# Patient Record
Sex: FEMALE | Race: WHITE | NOT HISPANIC OR LATINO | Employment: OTHER | URBAN - METROPOLITAN AREA
[De-identification: names, ages, dates, MRNs, and addresses within clinical notes are randomized per-mention and may not be internally consistent; named-entity substitution may affect disease eponyms.]

---

## 2017-05-02 ENCOUNTER — OFFICE VISIT (OUTPATIENT)
Dept: PODIATRY | Facility: CLINIC | Age: 82
End: 2017-05-02
Payer: MEDICARE

## 2017-05-02 ENCOUNTER — HOSPITAL ENCOUNTER (OUTPATIENT)
Dept: RADIOLOGY | Facility: HOSPITAL | Age: 82
Discharge: HOME | End: 2017-05-02
Attending: PODIATRIST
Payer: MEDICARE

## 2017-05-02 VITALS — HEIGHT: 65 IN | BODY MASS INDEX: 28.12 KG/M2 | WEIGHT: 168.81 LBS

## 2017-05-02 DIAGNOSIS — M79.672 FOOT PAIN, LEFT: ICD-10-CM

## 2017-05-02 DIAGNOSIS — G60.9 IDIOPATHIC PERIPHERAL NEUROPATHY: ICD-10-CM

## 2017-05-02 DIAGNOSIS — M20.10 HALLUX ABDUCTO VALGUS, UNSPECIFIED LATERALITY: ICD-10-CM

## 2017-05-02 DIAGNOSIS — M20.10 HALLUX ABDUCTO VALGUS, UNSPECIFIED LATERALITY: Primary | ICD-10-CM

## 2017-05-02 DIAGNOSIS — L84 CORN OR CALLUS: ICD-10-CM

## 2017-05-02 PROCEDURE — 1160F RVW MEDS BY RX/DR IN RCRD: CPT | Mod: S$GLB,,, | Performed by: PODIATRIST

## 2017-05-02 PROCEDURE — 99999 PR PBB SHADOW E&M-EST. PATIENT-LVL III: CPT | Mod: 25,PBBFAC,, | Performed by: PODIATRIST

## 2017-05-02 PROCEDURE — 73630 X-RAY EXAM OF FOOT: CPT | Mod: TC,PO,LT

## 2017-05-02 PROCEDURE — 73630 X-RAY EXAM OF FOOT: CPT | Mod: 26,LT,, | Performed by: RADIOLOGY

## 2017-05-02 PROCEDURE — 99203 OFFICE O/P NEW LOW 30 MIN: CPT | Mod: S$GLB,,, | Performed by: PODIATRIST

## 2017-05-02 PROCEDURE — 17999 UNLISTD PX SKN MUC MEMB SUBQ: CPT | Mod: CSM,,, | Performed by: PODIATRIST

## 2017-05-02 PROCEDURE — 1159F MED LIST DOCD IN RCRD: CPT | Mod: S$GLB,,, | Performed by: PODIATRIST

## 2017-05-02 PROCEDURE — 1126F AMNT PAIN NOTED NONE PRSNT: CPT | Mod: S$GLB,,, | Performed by: PODIATRIST

## 2017-05-02 RX ORDER — AMMONIUM LACTATE 12 G/100G
1 CREAM TOPICAL 2 TIMES DAILY
Qty: 140 G | Refills: 11 | Status: SHIPPED | OUTPATIENT
Start: 2017-05-02 | End: 2017-05-08

## 2017-05-02 RX ORDER — LIDOCAINE HYDROCHLORIDE 20 MG/ML
JELLY TOPICAL
Qty: 30 ML | Refills: 2 | Status: SHIPPED | OUTPATIENT
Start: 2017-05-02 | End: 2017-05-08

## 2017-05-02 RX ORDER — DICLOFENAC SODIUM 10 MG/G
2 GEL TOPICAL 4 TIMES DAILY
Qty: 100 G | Refills: 2 | Status: SHIPPED | OUTPATIENT
Start: 2017-05-02 | End: 2017-05-02

## 2017-05-02 NOTE — PROGRESS NOTES
Subjective:      Patient ID: Nel Cui is a 81 y.o. female.    Chief Complaint: Bunions (bilateral with painful callouse)    Pain, bunion, callus left 1st mtpj.  Gradual onset, worsening over past several months, aggravated by increased weight bearing, shoe gear, pressure.  No previous medical treatment.  OTC pain med not helping.  Denies trauma and surgery left foot.  Doesn't want bunionectomy if possible to avoid.      Review of Systems   Constitution: Negative for chills, diaphoresis, fever, malaise/fatigue and night sweats.   Cardiovascular: Negative for claudication, cyanosis, leg swelling and syncope.   Skin: Positive for suspicious lesions. Negative for color change, dry skin, nail changes, rash and unusual hair distribution.   Musculoskeletal: Positive for joint pain. Negative for falls, joint swelling, muscle cramps, muscle weakness and stiffness.   Gastrointestinal: Negative for constipation, diarrhea, nausea and vomiting.   Neurological: Negative for brief paralysis, disturbances in coordination, focal weakness, numbness, paresthesias, sensory change and tremors.           Objective:      Physical Exam   Constitutional: She appears well-developed and well-nourished. She is cooperative. No distress.   Cardiovascular:   Pulses:       Popliteal pulses are 2+ on the right side, and 2+ on the left side.        Dorsalis pedis pulses are 2+ on the right side, and 2+ on the left side.        Posterior tibial pulses are 1+ on the right side, and 1+ on the left side.   Capillary refill 3 seconds all toes/distal feet, all toes/both feet warm to touch.      Negative lymphadenopathy bilateral popliteal fossa and tarsal tunnel.      Negavie lower extremity edema bilateral.     Musculoskeletal:        Right ankle: Normal. She exhibits normal range of motion, no swelling, no ecchymosis, no deformity, no laceration and normal pulse. Achilles tendon normal. Achilles tendon exhibits no pain, no defect and normal  Charles's test results.   Visible and palpable bunion with pain at dorsomedial 1st metatarsal head right.  Hallux abducted right partially reducible, tracks laterally without being track bound.  No ecchymosis, erythema, edema, or cardinal signs infection or signs of trauma same foot.    Patient has hammertoes of digits  2-5 with crossover 2nd toe right all         partially reducible without symptom today.    Otherwise, Normal angle, base, station of gait. All ten toes without clubbing, cyanosis, or signs of ischemia.  No pain to palpation bilateral lower extremities.  Range of motion, stability, muscle strength, and muscle tone normal bilateral feet and legs.       Lymphadenopathy: No inguinal adenopathy noted on the right or left side.   Negative lymphadenopathy bilateral popliteal fossa and tarsal tunnel.   Neurological: She is alert. She has normal strength. She displays no atrophy and no tremor. A sensory deficit is present. She exhibits normal muscle tone. She displays no seizure activity. Gait normal.   Reflex Scores:       Patellar reflexes are 2+ on the right side and 2+ on the left side.       Achilles reflexes are 2+ on the right side and 2+ on the left side.  Diminished/loss of protective sensation all toes bilateral to 10 gram monofilament.     Skin: Skin is warm, dry and intact. No abrasion, no bruising, no burn, no ecchymosis, no laceration, no lesion and no rash noted. She is not diaphoretic. No cyanosis or erythema. No pallor. Nails show no clubbing.   Focal hyperkeratotic lesion consisting entirely of hyperkeratotic tissue medial left 1st mtpj without open skin, drainage, pus, fluctuance, malodor, or signs of infection    Otherwise, Skin is normal age and health appropriate color, turgor, texture, and temperature bilateral lower extremities without ulceration, hyperpigmentation, discoloration, masses nodules or cords palpated.  No ecchymosis, erythema, edema, or cardinal signs of infection  bilateral lower extremities.                 Assessment:       Encounter Diagnoses   Name Primary?    Hallux abducto valgus, unspecified laterality Yes    Foot pain, left     Idiopathic peripheral neuropathy     Corn or callus          Plan:       Nel was seen today for bunions.    Diagnoses and all orders for this visit:    Hallux abducto valgus, unspecified laterality  -     X-Ray Foot Complete Left; Future    Foot pain, left  -     X-Ray Foot Complete Left; Future    Idiopathic peripheral neuropathy    Corn or callus    Other orders  -     Cancel: Debridement  -     ammonium lactate 12 % Crea; Apply 1 application topically 2 (two) times daily.  -     diclofenac sodium 1 % Gel; Apply 2 g topically 4 (four) times daily.      I counseled the patient on her conditions, their implications and medical management.        Rx lidocaine gel, lachydrin    Discussed conservative treatment with shoes of adequate dimensions, material, and style to alleviate symptoms and delay or prevent surgical intervention.    Non covered foot care:    With the patient's permission, I debrided hyperkeratotic lesion(s) as above totaling     1 medial left 1st mtpj           to, not  Including dermis with sterile #15 blade.  Patient tolerated the procedure well and related significant relief.              No Follow-up on file.

## 2017-05-02 NOTE — MR AVS SNAPSHOT
[facility]  [address] [facility] [address]  [facility] [address]  Phone: [phone]                  Nel Cui   2017 1:00 PM   Office Visit    Description:  Female : 1935   Provider:  Pascual Mcdaniels DPM   Department:  [place] [facility]           Reason for Visit     Bunions           Diagnoses this Visit        Comments    Hallux abducto valgus, unspecified laterality    -  Primary     Foot pain, left         Idiopathic peripheral neuropathy         Corn or callus                To Do List           Future Appointments        Provider Department Dept Phone    2017 2:15 PM [facility] [id number] [facility] [phone]      Goals (5 Years of Data)     None       These Medications        Disp Refills Start End    ammonium lactate 12 % Crea 140 g 11 2017     Apply 1 application topically 2 (two) times daily. - Topical (Top)    Pharmacy: [facility] [address] [place] [address] AT [address] & [facility]  22 Ph #: [phone]       diclofenac sodium 1 % Gel 100 g 2 2017     Apply 2 g topically 4 (four) times daily. - Topical    Pharmacy: [facility] [address] [place] [address] AT [address] & [facility]  22 Ph #: [phone]         [facility]     [facility] Nurse Care Line - 24/ Assistance  Unless otherwise directed by your provider, please contact [facility], our nurse care line that is available for 24/7 assistance.     Registered nurses in the [facility] provide: appointment scheduling, clinical advisement, health education, and other advisory services.  Call: [phone] (toll free)               Medications           Message regarding Medications     Verify the changes and/or additions to your medication regime listed below are the same as discussed with your clinician today.  If any of these changes or additions are incorrect, please notify your healthcare provider.        START  taking these NEW medications        Refills    ammonium lactate 12 % Crea 11    Sig: Apply 1 application topically 2 (two) times daily.    Class: Normal    Route: Topical (Top)    diclofenac sodium 1 % Gel 2    Sig: Apply 2 g topically 4 (four) times daily.    Class: Normal    Route: Topical      STOP taking these medications     tiotropium bromide 2.5 mcg/actuation Mist Inhale into the lungs.           Verify that the below list of medications is an accurate representation of the medications you are currently taking.  If none reported, the list may be blank. If incorrect, please contact your healthcare provider. Carry this list with you in case of emergency.           Current Medications     ascorbic acid (VITAMIN C) 500 MG tablet Take 500 mg by mouth every morning. VITAMIN C 500    aspirin (ECOTRIN) 325 MG EC tablet Take 1 tablet (325 mg total) by mouth once daily.    bisoprolol (ZEBETA) 5 MG tablet Take 1 tablet (5 mg total) by mouth once daily.    CALCIUM CARBONATE (CALCIUM 600 ORAL) Take 1 tablet by mouth 2 (two) times daily before meals.     cholecalciferol, vitamin D3, (VITAMIN D3) 1,000 unit capsule Take 1,000 Units by mouth every morning. VITAMIN D 1000 UNIT TABS    coenzyme Q10 100 mg capsule Take 100 mg by mouth every morning. COENZYME Q10 100 MG CAPS    losartan-hydrochlorothiazide 100-25 mg (HYZAAR) 100-25 mg per tablet Take 1 tablet by mouth once daily.    MAGNESIUM CHLORIDE ORAL Take 1 tablet by mouth every morning.     MV/GNK BI EX/K.GINSG (CENTRUM PERFORMANCE ORAL) CENTRUM PERFORMANCE TABS    omega-3 fatty acids-vitamin E (FISH OIL) 1,000 mg Cap Take 1 tablet by mouth every morning. FISH OIL 1000 MG CAPS    pravastatin (PRAVACHOL) 40 MG tablet Take 1 tablet (40 mg total) by mouth once daily.    ammonium lactate 12 % Crea Apply 1 application topically 2 (two) times daily.    diclofenac sodium 1 % Gel Apply 2 g topically 4 (four) times daily.           Clinical Reference Information           Your  "Vitals Were     Height Weight Last Period BMI       5' 5" (1.651 m) 76.6 kg (168 lb 12.8 oz) (LMP Unknown) 28.09 kg/m2       Allergies as of 5/2/2017     Amoxicillin-pot Clavulanate    Corticosteroids (Glucocorticoids)    Erythromycin    Latex, Natural Rubber      Immunizations Administered on Date of Encounter - 5/2/2017     None      Orders Placed During Today's Visit     Future Labs/Procedures Expected by Expires    X-Ray Foot Complete Left  5/2/2017 5/3/2018      [facility] Sign-Up     Activating your [facility] account is as easy as 1-2-3!     1) Visit [email or web address], select Sign Up Now, enter this activation code and your date of birth, then select Next.  [email or web address]  Expires: 6/16/2017  1:29 PM      2) Create a username and password to use when you visit [facility] in the future and select a security question in case you lose your password and select Next.    3) Enter your e-mail address and click Sign Up!    Additional Information  If you have questions, please e-mail [email or web address] or call [phone] to talk to our [facility] staff. Remember, [facility] is NOT to be used for urgent needs. For medical emergencies, dial 911.         Language Assistance Services     ATTENTION: Language assistance services are available, free of charge. Please call [phone].      ATENCIÓN: Si habla español, tiene a vance disposición servicios gratuitos de asistencia lingüística. Llame al [phone].     [facility] Ý: N?u b?n nói Ti?ng Vi?t, có các d?ch v? h? tr? ngôn ng? mi?n phí dành cho b?n. G?i s? [phone].         [place] [facility] complies with applicable Federal civil rights laws and does not discriminate on the basis of race, color, national origin, age, disability, or sex.        "

## 2018-02-16 ENCOUNTER — OFFICE VISIT (OUTPATIENT)
Dept: PODIATRY | Facility: CLINIC | Age: 83
End: 2018-02-16
Payer: MEDICARE

## 2018-02-16 VITALS — BODY MASS INDEX: 28.29 KG/M2 | WEIGHT: 170 LBS

## 2018-02-16 DIAGNOSIS — M21.6X2 EQUINUS DEFORMITY OF BOTH FEET: ICD-10-CM

## 2018-02-16 DIAGNOSIS — G60.9 IDIOPATHIC PERIPHERAL NEUROPATHY: ICD-10-CM

## 2018-02-16 DIAGNOSIS — M21.6X1 EQUINUS DEFORMITY OF BOTH FEET: ICD-10-CM

## 2018-02-16 DIAGNOSIS — M77.42 METATARSALGIA OF LEFT FOOT: Primary | ICD-10-CM

## 2018-02-16 PROCEDURE — 99213 OFFICE O/P EST LOW 20 MIN: CPT | Mod: S$GLB,,, | Performed by: PODIATRIST

## 2018-02-16 PROCEDURE — 1125F AMNT PAIN NOTED PAIN PRSNT: CPT | Mod: S$GLB,,, | Performed by: PODIATRIST

## 2018-02-16 PROCEDURE — 99999 PR PBB SHADOW E&M-EST. PATIENT-LVL III: CPT | Mod: PBBFAC,,, | Performed by: PODIATRIST

## 2018-02-16 PROCEDURE — 1159F MED LIST DOCD IN RCRD: CPT | Mod: S$GLB,,, | Performed by: PODIATRIST

## 2018-02-16 PROCEDURE — 3008F BODY MASS INDEX DOCD: CPT | Mod: S$GLB,,, | Performed by: PODIATRIST

## 2018-02-16 NOTE — PROGRESS NOTES
"Subjective:      Patient ID: Nel Cui is a 82 y.o. female.    Chief Complaint: Foot Pain (ball of the foot left)  Patient presents to clinic with the chief complaint of Lt. Forefoot pain.  States pain is localized to the "ball of the foot" and exacerbated with pressure from weight bearing.  Describes pain as sharp and rates as a 9/10.   Symptoms are alleviated with rest.  Has attempted to self treat by wearing silicone insoles, however, this has done little to improve symptoms.  Denies injury to the affected foot.   Also, relates a past history of peripheral neuropathy.  Relates having some numbness and tingling, but is also experiencing nocturnal cramping.  Was initially told that symptoms were secondary to lack of fluids and an electrolyte imbalance, however, states that even with hydration symptoms are apparent.  Inquires as to the potential etiology of such symptoms.  Denies any additional pedal complaints.    Past Medical History:   Diagnosis Date    Cataracts, bilateral     Essential (primary) hypertension 10/5/2010    Hyperlipidemia     Osteopenia        Past Surgical History:   Procedure Laterality Date    CARPAL TUNNEL RELEASE Bilateral     wrist    CATARACT EXTRACTION, BILATERAL  2017    DILATION AND CURETTAGE OF UTERUS      MITRAL VALVE REPLACEMENT  07/20/2016    OOPHORECTOMY Left     TONSILLECTOMY      vein removal         Family History   Problem Relation Age of Onset    No Known Problems Mother     No Known Problems Father     No Known Problems Maternal Grandmother     No Known Problems Maternal Grandfather     No Known Problems Paternal Grandmother     No Known Problems Paternal Grandfather     No Known Problems Sister     No Known Problems Brother        Social History     Social History    Marital status: Single     Spouse name: N/A    Number of children: N/A    Years of education: N/A     Social History Main Topics    Smoking status: Former Smoker     Types: " "Cigarettes     Quit date: 6/28/1988    Smokeless tobacco: Never Used    Alcohol use No    Drug use: No    Sexual activity: Not Asked     Other Topics Concern    None     Social History Narrative    None       Current Outpatient Prescriptions   Medication Sig Dispense Refill    ascorbic acid (VITAMIN C) 500 MG tablet Take 500 mg by mouth every morning. VITAMIN C 500      CALCIUM CARBONATE (CALCIUM 600 ORAL) Take 630 mg by mouth 2 (two) times daily before meals.       cholecalciferol, vitamin D3, (VITAMIN D3) 1,000 unit capsule Take 1,000 Units by mouth every morning. VITAMIN D 1000 UNIT TABS      losartan-hydrochlorothiazide 100-25 mg (HYZAAR) 100-25 mg per tablet TAKE 1 TABLET BY MOUTH EVERY DAY 90 tablet 3    MAGNESIUM CHLORIDE ORAL Take 1 tablet by mouth every morning.       omega-3 fatty acids-vitamin E (FISH OIL) 1,000 mg Cap Take 1 tablet by mouth every morning. FISH OIL 1000 MG CAPS      pravastatin (PRAVACHOL) 40 MG tablet TAKE 1 TABLET(40 MG) BY MOUTH EVERY DAY 90 tablet 1    aspirin (ECOTRIN) 325 MG EC tablet Take 1 tablet (325 mg total) by mouth once daily.  0    bisoprolol (ZEBETA) 5 MG tablet Take 1 tablet (5 mg total) by mouth once daily. 90 tablet 3    coenzyme Q10 100 mg capsule Take 100 mg by mouth every morning. COENZYME Q10 100 MG CAPS      doxycycline monohydrate 100 mg Tab Take 1 tablet (100 mg total) by mouth 2 (two) times daily. 14 tablet 0    fluticasone-vilanterol (BREO ELLIPTA) 100-25 mcg/dose diskus inhaler Inhale 1 puff into the lungs once daily. Controller 1 each 0     No current facility-administered medications for this visit.        Review of patient's allergies indicates:   Allergen Reactions    Amoxicillin-pot clavulanate Other (See Comments)     "Spaced out feeling- can't take it longer than 4 or 5 days"    Corticosteroids (glucocorticoids)      Other reaction(s): tachycardia    Erythromycin      "Spaced out"    Latex, natural rubber      Turns skin red around " area where latex touches         Review of Systems   Constitution: Negative for chills and fever.   Cardiovascular: Negative for claudication and leg swelling.   Skin: Negative for color change and nail changes.   Musculoskeletal: Positive for joint pain. Negative for joint swelling.   Neurological: Positive for numbness and paresthesias.   Psychiatric/Behavioral: Negative for altered mental status.           Objective:      Physical Exam   Constitutional: She is oriented to person, place, and time. She appears well-developed and well-nourished. No distress.   Cardiovascular:   Pulses:       Dorsalis pedis pulses are 2+ on the right side, and 2+ on the left side.        Posterior tibial pulses are 2+ on the right side, and 2+ on the left side.   CFT <3 seconds bilateral.  Pedal hair growth present bilateral.  Varicosities noted bilateral.  No bilateral lower extremity edema.  Toes are warm to touch bilateral.     Musculoskeletal: She exhibits tenderness. She exhibits no edema.   Muscle strength 5/5 in all muscle groups bilateral.  No tenderness nor crepitation with ROM of foot/ankle joints bilateral.  Pain with palpation to Lt. sub 2nd - 5th metatarsal heads.  (-) Lachman sign on Lt.   Bilateral gastrocnemius equinus.  Bilateral pes planus foot type.  Forefoot fat pad atrophy bilateral.   Neurological: She is alert and oriented to person, place, and time. She has normal strength. A sensory deficit is present.   Protective sensation per [place]-Zainab monofilament decreased bilateral.    Vibratory sensation absent bilateral.    Light touch intact bilateral.   Skin: Skin is warm, dry and intact. Capillary refill takes less than 2 seconds. No abrasion, no bruising, no burn, no ecchymosis, no laceration, no lesion, no petechiae and no rash noted. She is not diaphoretic. No erythema. No pallor.   Pedal skin has normal turgor, temperature, and texture bilateral.  Toenails x 10 appear normotrophic. Examination of the  skin reveals no evidence of significant maceration, rashes, open lesions, suspicious appearing nevi or other concerning lesions.              Assessment:       Encounter Diagnoses   Name Primary?    Metatarsalgia of left foot Yes    Equinus deformity of both feet     Idiopathic peripheral neuropathy          Plan:       Nel was seen today for foot pain.    Diagnoses and all orders for this visit:    Metatarsalgia of left foot    Equinus deformity of both feet    Idiopathic peripheral neuropathy      I counseled the patient on her conditions, their implications and medical management.    - Given both verbal and written information regarding alpha lipoic acid and B-complex vitamins to address nocturnal cramping that is likely associated with motor neuropathy.    - Discussed performing stretching exercises to address bilateral equinus.  This should help to alleviate pressure to the forefoot.    - Recommend wearing supportive shoes and avoidance of barefoot walking, flip flops, and Crocs, as this will exacerbate current symptoms.      - Recommend wearing a pair of OTC 3/4 length Spenco insoles to offload the forefoot.    - Recommend icing the affected forefoot a minimum of 20 minutes daily.    - Given information regarding Procedure Brief, as she notes a tendency to develop calluses of bilateral plantar foot.    - RTC prn.    Maulik Martinez DPM

## 2019-05-11 ENCOUNTER — OFFICE VISIT (OUTPATIENT)
Dept: URGENT CARE | Facility: CLINIC | Age: 84
End: 2019-05-11
Payer: MEDICARE

## 2019-05-11 VITALS
RESPIRATION RATE: 18 BRPM | SYSTOLIC BLOOD PRESSURE: 155 MMHG | HEART RATE: 81 BPM | TEMPERATURE: 98 F | OXYGEN SATURATION: 95 % | BODY MASS INDEX: 29.32 KG/M2 | HEIGHT: 65 IN | DIASTOLIC BLOOD PRESSURE: 75 MMHG | WEIGHT: 176 LBS
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
## 2019-05-11 DIAGNOSIS — M54.50 ACUTE BILATERAL LOW BACK PAIN WITHOUT SCIATICA: ICD-10-CM

## 2019-05-11 PROCEDURE — 72100 X-RAY EXAM L-S SPINE 2/3 VWS: CPT | Mod: S$GLB,,, | Performed by: RADIOLOGY

## 2019-05-11 PROCEDURE — 1101F PR PT FALLS ASSESS DOC 0-1 FALLS W/OUT INJ PAST YR: ICD-10-PCS | Mod: CPTII,S$GLB,, | Performed by: INTERNAL MEDICINE

## 2019-05-11 PROCEDURE — 3077F PR MOST RECENT SYSTOLIC BLOOD PRESSURE >= 140 MM HG: ICD-10-PCS | Mod: CPTII,S$GLB,, | Performed by: INTERNAL MEDICINE

## 2019-05-11 PROCEDURE — 99214 OFFICE O/P EST MOD 30 MIN: CPT | Mod: S$GLB,,, | Performed by: INTERNAL MEDICINE

## 2019-05-11 PROCEDURE — 3077F SYST BP >= 140 MM HG: CPT | Mod: CPTII,S$GLB,, | Performed by: INTERNAL MEDICINE

## 2019-05-11 PROCEDURE — 99214 PR OFFICE/OUTPT VISIT, EST, LEVL IV, 30-39 MIN: ICD-10-PCS | Mod: S$GLB,,, | Performed by: INTERNAL MEDICINE

## 2019-05-11 PROCEDURE — 3078F PR MOST RECENT DIASTOLIC BLOOD PRESSURE < 80 MM HG: ICD-10-PCS | Mod: CPTII,S$GLB,, | Performed by: INTERNAL MEDICINE

## 2019-05-11 PROCEDURE — 1101F PT FALLS ASSESS-DOCD LE1/YR: CPT | Mod: CPTII,S$GLB,, | Performed by: INTERNAL MEDICINE

## 2019-05-11 PROCEDURE — 72100 XR LUMBAR SPINE 2 OR 3 VIEWS: ICD-10-PCS | Mod: S$GLB,,, | Performed by: RADIOLOGY

## 2019-05-11 PROCEDURE — 3078F DIAST BP <80 MM HG: CPT | Mod: CPTII,S$GLB,, | Performed by: INTERNAL MEDICINE

## 2019-05-11 NOTE — PATIENT INSTRUCTIONS
If your condition worsens we recommend that you receive another evaluation at the emergency room immediately or contact your primary medical clinics after hours call service to discuss your concerns. You must understand that you've received an Urgent Care treatment only and that you may be released before all of your medical problems are known or treated. You, the patient, will arrange for follow up care as instructed.  Drink plenty of Fluids  Wash hands frequently using mild antibacterial soap lathering for at least 15 seconds then rinse  Get plenty of Rest  Follow up in 1-2 weeks with Primary Care physician if not significantly better.   If you are not allergic please take Tylenol every 4-6 hours as needed and/or Ibuprofen every 6-8 hours as needed, over the counter for pain or fever.  Xray results will be available in 24 hours , we will call you with results if abnormal. Please call us if you want to find results and if you don't hear from us.

## 2019-05-11 NOTE — PROGRESS NOTES
"Subjective:       Patient ID: Nel Cui is a 84 y.o. female.    Vitals:  height is 5' 5" (1.651 m) and weight is 79.8 kg (176 lb). Her oral temperature is 97.5 °F (36.4 °C). Her blood pressure is 155/75 (abnormal) and her pulse is 81. Her respiration is 18 and oxygen saturation is 95%.     Chief Complaint: Fall    Pt presents today due a fall a [employer] on Tuesday, May 7, 2019.Pt is experiencing pain in her right elbow and right ankle and right thigh. Pt did not fall on anything in the store she fell straight back on her buttocks. Pt has taken OTC meds with no relief, did not seek emergency treatment. No head injury, no passing out    Fall   The accident occurred 3 to 5 days ago. The fall occurred while standing. She fell from a height of 3 to 5 ft. The point of impact was the buttocks. The pain is present in the right elbow, right upper leg and right wrist. The pain is at a severity of 5/10. The pain is moderate. The symptoms are aggravated by extension, movement, pressure on injury and use of injured limb. Pertinent negatives include no abdominal pain, hematuria or loss of consciousness. She has tried ice for the symptoms. The treatment provided no relief.       Constitution: Negative for fatigue.   HENT: Negative for facial swelling and facial trauma.    Neck: Negative for neck stiffness.   Cardiovascular: Negative for chest trauma.   Eyes: Negative for eye trauma, double vision and blurred vision.   Gastrointestinal: Negative for abdominal trauma, abdominal pain and rectal bleeding.   Genitourinary: Negative for hematuria, missed menses, genital trauma and pelvic pain.   Musculoskeletal: Positive for pain and trauma. Negative for joint swelling and abnormal ROM of joint.   Skin: Positive for color change, erythema and bruising. Negative for wound, abrasion and laceration.   Neurological: Negative for dizziness, history of vertigo, light-headedness, coordination disturbances, altered mental status " and loss of consciousness.   Hematologic/Lymphatic: Negative for history of bleeding disorder.   Psychiatric/Behavioral: Negative for altered mental status.       Objective:      Physical Exam   Constitutional: She is oriented to person, place, and time. She appears well-developed and well-nourished. She is cooperative.  Non-toxic appearance. She does not appear ill. No distress.   HENT:   Head: Normocephalic and atraumatic.   Right Ear: Hearing, tympanic membrane, external ear and ear canal normal.   Left Ear: Hearing, tympanic membrane, external ear and ear canal normal.   Nose: Nose normal. No mucosal edema, rhinorrhea or nasal deformity. No epistaxis. Right sinus exhibits no maxillary sinus tenderness and no frontal sinus tenderness. Left sinus exhibits no maxillary sinus tenderness and no frontal sinus tenderness.   Mouth/Throat: Uvula is midline, oropharynx is clear and moist and mucous membranes are normal. No trismus in the jaw. Normal dentition. No uvula swelling. No posterior oropharyngeal erythema.   Eyes: Conjunctivae and lids are normal. Right eye exhibits no discharge. Left eye exhibits no discharge. No scleral icterus.   Sclera clear bilat   Neck: Trachea normal, normal range of motion, full passive range of motion without pain and phonation normal. Neck supple.   Cardiovascular: Normal rate, regular rhythm, normal heart sounds, intact distal pulses and normal pulses.   Pulmonary/Chest: Effort normal and breath sounds normal. No respiratory distress.   Abdominal: Soft. Normal appearance and bowel sounds are normal. She exhibits no distension, no pulsatile midline mass and no mass. There is no tenderness.   Musculoskeletal: Normal range of motion. She exhibits no edema or deformity.        Lumbar back: She exhibits tenderness. She exhibits no swelling.        Arms:       Legs:  Neurological: She is alert and oriented to person, place, and time. She exhibits normal muscle tone. Coordination normal.    Skin: Skin is warm, dry and intact. She is not diaphoretic. There is erythema. No pallor.   Psychiatric: She has a normal mood and affect. Her speech is normal and behavior is normal. Judgment and thought content normal. Cognition and memory are normal.   Nursing note and vitals reviewed.      Good rom at affected sites including right elbow and right thigh. Dec rom at l-s spine. Denies numbness in legs , no bladder or fecal incontinence.   Assessment:       1. Acute bilateral low back pain without sciatica    2. Fall, initial encounter        Plan:         Acute bilateral low back pain without sciatica  -     XR LUMBAR SPINE 2 OR 3 VIEWS; Future; Expected date: 05/11/2019    Fall, initial encounter    If your condition worsens we recommend that you receive another evaluation at the emergency room immediately or contact your primary medical clinics after hours call service to discuss your concerns. You must understand that you've received an Urgent Care treatment only and that you may be released before all of your medical problems are known or treated. You, the patient, will arrange for follow up care as instructed.  Drink plenty of Fluids  Wash hands frequently using mild antibacterial soap lathering for at least 15 seconds then rinse  Get plenty of Rest  Follow up in 1-2 weeks with Primary Care physician if not significantly better.   If you are not allergic please take Tylenol every 4-6 hours as needed and/or Ibuprofen every 6-8 hours as needed, over the counter for pain or fever.

## 2019-05-12 ENCOUNTER — TELEPHONE (OUTPATIENT)
Dept: URGENT CARE | Facility: CLINIC | Age: 84
End: 2019-05-12

## 2019-05-12 NOTE — TELEPHONE ENCOUNTER
Called and spoke with pt regarding xray results from yesterday's visit. Pt request a copy of the report and was emailed to daughter candida 5/12

## 2019-11-29 ENCOUNTER — OFFICE VISIT (OUTPATIENT)
Dept: URGENT CARE | Facility: CLINIC | Age: 84
End: 2019-11-29
Payer: MEDICARE

## 2019-11-29 ENCOUNTER — TELEPHONE (OUTPATIENT)
Dept: URGENT CARE | Facility: CLINIC | Age: 84
End: 2019-11-29

## 2019-11-29 VITALS
RESPIRATION RATE: 16 BRPM | WEIGHT: 173 LBS | DIASTOLIC BLOOD PRESSURE: 80 MMHG | HEIGHT: 65 IN | HEART RATE: 83 BPM | SYSTOLIC BLOOD PRESSURE: 153 MMHG | OXYGEN SATURATION: 96 % | BODY MASS INDEX: 28.82 KG/M2 | TEMPERATURE: 98 F

## 2019-11-29 DIAGNOSIS — M79.672 LEFT FOOT PAIN: Primary | ICD-10-CM

## 2019-11-29 PROCEDURE — 99214 PR OFFICE/OUTPT VISIT, EST, LEVL IV, 30-39 MIN: ICD-10-PCS | Mod: S$GLB,,, | Performed by: PHYSICIAN ASSISTANT

## 2019-11-29 PROCEDURE — 73630 XR FOOT COMPLETE 3 VIEW LEFT: ICD-10-PCS | Mod: LT,S$GLB,, | Performed by: RADIOLOGY

## 2019-11-29 PROCEDURE — 73630 X-RAY EXAM OF FOOT: CPT | Mod: LT,S$GLB,, | Performed by: RADIOLOGY

## 2019-11-29 PROCEDURE — 99214 OFFICE O/P EST MOD 30 MIN: CPT | Mod: S$GLB,,, | Performed by: PHYSICIAN ASSISTANT

## 2019-11-29 NOTE — TELEPHONE ENCOUNTER
spoke with pt regarding final xray reading report from radiology, clarified with pt that there is no fracture or dislocation. told to follow up with ortho if pain persist.

## 2019-11-29 NOTE — PROGRESS NOTES
"Subjective:       Patient ID: Nel Cui is a 84 y.o. female.    Vitals:  height is 5' 5" (1.651 m) and weight is 78.5 kg (173 lb). Her oral temperature is 98.3 °F (36.8 °C). Her blood pressure is 153/80 (abnormal) and her pulse is 83. Her respiration is 16 and oxygen saturation is 96%.     Chief Complaint: Fall    Patient presents to urgent care for L foot pain. Patient reports that she accidentally tripped over her granddaughter's foot yesterday and hurt her L foot. Patient reports no LOC or head injury at the time of the injury. Patient denies prior injury or trauma to L foot. Patient reports pain is worse with weightbearing and better with rest. Patient reports that she has tried icing and elevating, but requesting XRAY to make sure it is not broken.     Fall   The accident occurred 12 to 24 hours ago. The fall occurred while walking. She fell from a height of 1 to 2 ft. She landed on hard floor. There was no blood loss. The point of impact was the left elbow and left foot. The pain is present in the left elbow and left foot. The pain is at a severity of 3/10. The pain is mild. Pertinent negatives include no abdominal pain, bowel incontinence, fever, headaches, hearing loss, loss of consciousness, nausea, numbness, tingling, visual change or vomiting. She has tried ice and elevation for the symptoms. The treatment provided mild relief.       Constitution: Negative for chills, sweating, fatigue and fever.   HENT: Negative for ear pain, congestion, sore throat, trouble swallowing and voice change.    Neck: Negative for neck pain, neck stiffness, painful lymph nodes and neck swelling.   Cardiovascular: Negative for chest pain, leg swelling, palpitations, sob on exertion and passing out.   Eyes: Negative for eye pain, eye redness, photophobia, double vision, blurred vision and eyelid swelling.   Respiratory: Negative for chest tightness, cough, sputum production, bloody sputum, shortness of breath, stridor " and wheezing.    Gastrointestinal: Negative for abdominal pain, abdominal bloating, nausea, vomiting, constipation, diarrhea, heartburn and bowel incontinence.   Genitourinary: Negative for dysuria, frequency and urgency.   Musculoskeletal: Positive for pain, trauma, joint pain, joint swelling and muscle ache. Negative for abnormal ROM of joint, back pain and muscle cramps.   Skin: Positive for bruising. Negative for rash, wound, abrasion, laceration and erythema.   Allergic/Immunologic: Negative for seasonal allergies.   Neurological: Negative for dizziness, light-headedness, passing out, loss of balance, headaches, disorientation, altered mental status, loss of consciousness, numbness, tingling and seizures.   Hematologic/Lymphatic: Negative for swollen lymph nodes.   Psychiatric/Behavioral: Negative for altered mental status, disorientation, nervous/anxious, sleep disturbance and depression. The patient is not nervous/anxious.        Objective:      Physical Exam   Constitutional: She is oriented to person, place, and time. She appears well-developed and well-nourished. She is cooperative.  Non-toxic appearance. She does not have a sickly appearance. She does not appear ill. No distress.   HENT:   Head: Normocephalic and atraumatic.   Right Ear: Hearing, tympanic membrane, external ear and ear canal normal.   Left Ear: Hearing, tympanic membrane, external ear and ear canal normal.   Nose: Nose normal. No mucosal edema, rhinorrhea or nasal deformity. No epistaxis. Right sinus exhibits no maxillary sinus tenderness and no frontal sinus tenderness. Left sinus exhibits no maxillary sinus tenderness and no frontal sinus tenderness.   Mouth/Throat: Uvula is midline, oropharynx is clear and moist and mucous membranes are normal. No trismus in the jaw. Normal dentition. No uvula swelling. No oropharyngeal exudate, posterior oropharyngeal edema or posterior oropharyngeal erythema.   Eyes: Pupils are equal, round, and  reactive to light. Conjunctivae, EOM and lids are normal. No scleral icterus.   Neck: Trachea normal, normal range of motion, full passive range of motion without pain and phonation normal. Neck supple. No neck rigidity. No edema and no erythema present.   Cardiovascular: Normal rate, regular rhythm, normal heart sounds, intact distal pulses and normal pulses.   Pulmonary/Chest: Effort normal and breath sounds normal. No respiratory distress. She has no decreased breath sounds. She has no rhonchi.   Abdominal: Normal appearance.   Musculoskeletal: She exhibits no edema or deformity.        Right ankle: Normal.        Left ankle: Normal.        Right foot: Normal.        Left foot: There is tenderness and swelling. There is normal range of motion, no bony tenderness, normal capillary refill, no crepitus, no deformity and no laceration.   FROM to upper and lower extremities bilateral. TTP over dorsal aspect of L foot with mild amount of swelling and ecchymosis. 2+ DP pulses bilateral. No numbness or tingling. Able to ambulate without difficulty.   Lymphadenopathy:     She has no cervical adenopathy.   Neurological: She is alert and oriented to person, place, and time. No cranial nerve deficit or sensory deficit. She exhibits normal muscle tone. Coordination and gait normal.   Skin: Skin is warm, dry, intact, not diaphoretic, not pale and no rash. Capillary refill takes less than 2 seconds. not left footLesions:  bruising and ecchymosisabrasion, burn, erythema, lesion and petechiae  Psychiatric: She has a normal mood and affect. Her speech is normal and behavior is normal. Judgment and thought content normal. Cognition and memory are normal.   Nursing note and vitals reviewed.    X-ray Foot Complete Left  Result Date: 11/29/2019  EXAMINATION: XR FOOT COMPLETE 3 VIEW LEFT CLINICAL HISTORY: .  Pain in left foot TECHNIQUE: AP, lateral and oblique views of the left foot were performed. COMPARISON: 05/02/2017 FINDINGS:  Three views left foot. There is osteopenia.  There is hallux valgus.  Degenerative changes are noted of the calcaneus and 1st metatarsophalangeal joint.  There is edema about the lower leg.  There is vascular calcification.   1. No acute displaced fracture or dislocation of the foot.  There is edema about the lower leg. Electronically signed by: Xander Quiroz MD Date:    11/29/2019 Time:    15:50        Assessment:       1. Left foot pain        Plan:         Left foot pain  -     X-Ray Foot Complete Left; Future; Expected date: 11/29/2019      Patient Instructions     You must understand that you've received an Urgent Care treatment only and that you may be released before all your medical problems are known or treated. You, the patient, will arrange for follow up care as instructed.  Follow up with your PCP or specialty clinic as directed if not improved or as needed. You can call [phone] to schedule an appointment with the appropriate provider.  If your condition worsens we recommend that you receive another evaluation at the Emergency Department for any concerns or worsening of condition.  Patient aware and verbalized understanding.    Discussed XRAY results with patient - pending radiology report.  Patient aware and verbalized understanding.    Rest, Ice, Compression and Elevation as discussed.  ACE Wrap and walking boot at home during the day for better support/comfort.  OTC Tylenol 6 hours as needed for pain.  You may do gentle stretching as tolerable.  Wear supportive shoes such as tennis shoes for better support/comfort.  Follow-up with PCP for further evaluation as needed.  Follow-up with Ortho for further evaluation if still experiencing pain as discussed.  Strict ER precautions given to patient.  Patient aware and verbalized understanding.    R.I.C.E.    R.I.C.E. stands for Rest, Ice, Compression, and Elevation. Doing these things helps limit pain and swelling after an injury. R.I.C.E. also helps  injuries heal faster. Use R.I.C.E. for sprains, strains, and severe bruises or bumps. Follow the tips on this handout and begin R.I.C.E. as soon as possible after an injury.  ? Rest  Pain is your bodys way of telling you to rest an injured area. Whether you have hurt an elbow, hand, foot, or knee, limiting its use will prevent further injury and help you heal.  ? Ice  Applying ice right after an injury helps prevent swelling and reduce pain. Dont place ice directly on your skin.  · Wrap a cold pack or bag of ice in a thin cloth. Place it over the injured area.  · Ice for 10 minutes every 3 hours. Dont ice for more than 20 minutes at a time.  ? Compression  Putting pressure (compression) on an injury helps prevent swelling and provides support.  · Wrap the injured area firmly with an elastic bandage. If your hand or foot tingles, becomes discolored, or feels cold to the touch, the bandage may be too tight. Rewrap it more loosely.  · If your bandage becomes too loose, rewrap it.  · Do not wear an elastic bandage overnight.  ? Elevation  Keeping an injury elevated helps reduce swelling, pain, and throbbing. Elevation is most effective when the injury is kept elevated higher than the heart.     Call your healthcare provider if you notice any of the following:  · Fingers or toes feel numb, are cold to the touch, or change color  · Skin looks shiny or tight  · Pain, swelling, or bruising worsens and is not improved with elevation   Date Last Reviewed: 9/3/2015  © 1798-4441 [employer]. [address] [place] [address], [place] [address]. All rights reserved. This information is not intended as a substitute for professional medical care. Always follow your healthcare professional's instructions.

## 2019-11-29 NOTE — PATIENT INSTRUCTIONS
You must understand that you've received an Urgent Care treatment only and that you may be released before all your medical problems are known or treated. You, the patient, will arrange for follow up care as instructed.  Follow up with your PCP or specialty clinic as directed if not improved or as needed. You can call [phone] to schedule an appointment with the appropriate provider.  If your condition worsens we recommend that you receive another evaluation at the Emergency Department for any concerns or worsening of condition.  Patient aware and verbalized understanding.    Discussed XRAY results with patient - pending radiology report.  Patient aware and verbalized understanding.    Rest, Ice, Compression and Elevation as discussed.  ACE Wrap and walking boot at home during the day for better support/comfort.  OTC Tylenol 6 hours as needed for pain.  You may do gentle stretching as tolerable.  Wear supportive shoes such as tennis shoes for better support/comfort.  Follow-up with PCP for further evaluation as needed.  Follow-up with Ortho for further evaluation if still experiencing pain as discussed.  Strict ER precautions given to patient.  Patient aware and verbalized understanding.    R.I.C.E.    R.I.C.E. stands for Rest, Ice, Compression, and Elevation. Doing these things helps limit pain and swelling after an injury. R.I.C.E. also helps injuries heal faster. Use R.I.C.E. for sprains, strains, and severe bruises or bumps. Follow the tips on this handout and begin R.I.C.E. as soon as possible after an injury.  ? Rest  Pain is your bodys way of telling you to rest an injured area. Whether you have hurt an elbow, hand, foot, or knee, limiting its use will prevent further injury and help you heal.  ? Ice  Applying ice right after an injury helps prevent swelling and reduce pain. Dont place ice directly on your skin.  · Wrap a cold pack or bag of ice in a thin cloth. Place it over the injured area.  · Ice  for 10 minutes every 3 hours. Dont ice for more than 20 minutes at a time.  ? Compression  Putting pressure (compression) on an injury helps prevent swelling and provides support.  · Wrap the injured area firmly with an elastic bandage. If your hand or foot tingles, becomes discolored, or feels cold to the touch, the bandage may be too tight. Rewrap it more loosely.  · If your bandage becomes too loose, rewrap it.  · Do not wear an elastic bandage overnight.  ? Elevation  Keeping an injury elevated helps reduce swelling, pain, and throbbing. Elevation is most effective when the injury is kept elevated higher than the heart.     Call your healthcare provider if you notice any of the following:  · Fingers or toes feel numb, are cold to the touch, or change color  · Skin looks shiny or tight  · Pain, swelling, or bruising worsens and is not improved with elevation   Date Last Reviewed: 9/3/2015  © 0417-6682 [employer]. [address] [place] [address], [place] [address]. All rights reserved. This information is not intended as a substitute for professional medical care. Always follow your healthcare professional's instructions.

## 2019-12-11 ENCOUNTER — OFFICE VISIT (OUTPATIENT)
Dept: URGENT CARE | Facility: CLINIC | Age: 84
End: 2019-12-11
Payer: MEDICARE

## 2019-12-11 VITALS
TEMPERATURE: 98 F | OXYGEN SATURATION: 98 % | BODY MASS INDEX: 28.82 KG/M2 | DIASTOLIC BLOOD PRESSURE: 77 MMHG | RESPIRATION RATE: 16 BRPM | HEIGHT: 65 IN | WEIGHT: 173 LBS | HEART RATE: 72 BPM | SYSTOLIC BLOOD PRESSURE: 153 MMHG

## 2019-12-11 DIAGNOSIS — M79.675 PAIN OF TOE OF LEFT FOOT: Primary | ICD-10-CM

## 2019-12-11 PROCEDURE — 99213 OFFICE O/P EST LOW 20 MIN: CPT | Mod: S$GLB,,, | Performed by: PHYSICIAN ASSISTANT

## 2019-12-11 PROCEDURE — 99213 PR OFFICE/OUTPT VISIT, EST, LEVL III, 20-29 MIN: ICD-10-PCS | Mod: S$GLB,,, | Performed by: PHYSICIAN ASSISTANT

## 2019-12-11 NOTE — PATIENT INSTRUCTIONS
Check with cardiologist about Ibuprofen dosage  May wrap toe if feels it helps.   Ace bandage not necessary  Try tennis shoe    You must understand that you've received an Urgent Care treatment only and that you may be released before all your medical problems are known or treated. You, the patient, will arrange for follow up care as instructed.  Follow up with your PCP or specialty clinic as directed in the next 1-2 weeks if not improved or as needed.  You can call [phone] to schedule an appointment with the appropriate provider.  If your condition worsens we recommend that you receive another evaluation at the emergency room immediately or contact your primary medical clinics after hours call service to discuss your concerns.  Please return here or go to the Emergency Department for any concerns or worsening of condition.

## 2019-12-11 NOTE — PROGRESS NOTES
"Subjective:       Patient ID: Nel Cui is a 84 y.o. female.    Vitals:  height is 5' 5" (1.651 m) and weight is 78.5 kg (173 lb). Her oral temperature is 97.8 °F (36.6 °C). Her blood pressure is 153/77 (abnormal) and her pulse is 72. Her respiration is 16 and oxygen saturation is 98%.     Chief Complaint: Foot Injury    Recurrent problem pt states this is the 3rd time for this left foot problem. Pt states its not feeling right and wants to know if needs more time to heal. Now has lower back pain with wearing the boot    Foot Injury    The incident occurred more than 1 week ago. The incident occurred at home. The pain is present in the left foot. The quality of the pain is described as aching. Associated symptoms include an inability to bear weight. The symptoms are aggravated by weight bearing and movement. She has tried nothing for the symptoms. The treatment provided no relief.       Constitution: Negative for chills, fatigue and fever.   HENT: Negative for congestion and sore throat.    Neck: Negative for painful lymph nodes.   Cardiovascular: Negative for chest pain and leg swelling.   Eyes: Negative for double vision and blurred vision.   Respiratory: Negative for cough and shortness of breath.    Gastrointestinal: Negative for nausea, vomiting and diarrhea.   Genitourinary: Negative for dysuria, frequency, urgency and history of kidney stones.   Musculoskeletal: Negative for joint pain, joint swelling, muscle cramps and muscle ache.   Skin: Negative for color change, pale, rash and bruising.   Allergic/Immunologic: Negative for seasonal allergies.   Neurological: Negative for dizziness, history of vertigo, light-headedness, passing out and headaches.   Hematologic/Lymphatic: Negative for swollen lymph nodes.   Psychiatric/Behavioral: Negative for nervous/anxious, sleep disturbance and depression. The patient is not nervous/anxious.        Objective:      Physical Exam   Constitutional: She is " oriented to person, place, and time. She appears well-developed and well-nourished. No distress.   HENT:   Head: Normocephalic and atraumatic.   Right Ear: External ear normal.   Left Ear: External ear normal.   Nose: Nose normal.   Eyes: Conjunctivae, EOM and lids are normal.   Neck: Trachea normal, full passive range of motion without pain and phonation normal. Neck supple.   Pulmonary/Chest: Effort normal. No respiratory distress.   Musculoskeletal: Normal range of motion.        Left foot: There is no bony tenderness and normal capillary refill.        Feet:    Very mild edema of 4th toe.   Neurological: She is alert and oriented to person, place, and time.   Skin: Skin is warm, dry, intact, not diaphoretic, not pale and no rash.   Psychiatric: She has a normal mood and affect. Her speech is normal and behavior is normal. Judgment and thought content normal. Cognition and memory are normal.   Nursing note and vitals reviewed.        Assessment:       1. Pain of toe of left foot        Plan:         Pain of toe of left foot     Patient has been in walking boot from his 2 weeks.  Advised that she may try her regular shoe  if this does not increase her pain she may discontinue boot.  She may try wrapping the toe to compress to keep swelling down.  Discussed natural progression of healing from injury.  She states she is already at 80% improvement.  Discussed that as long she is continuing to move in this direction I would be patient and give it a little more time.  She states that cardiologist at 1 point told her that she could take ibuprofen on a limited basis.  Advised that she call the office to get advice on what dosage in for what length of time she may do this.    You must understand that you've received an Urgent Care treatment only and that you may be released before all your medical problems are known or treated. You, the patient, will arrange for follow up care as instructed.  Follow up with your PCP or  specialty clinic as directed in the next 1-2 weeks if not improved or as needed.  You can call [phone] to schedule an appointment with the appropriate provider.  If your condition worsens we recommend that you receive another evaluation at the emergency room immediately or contact your primary medical clinics after hours call service to discuss your concerns.  Please return here or go to the Emergency Department for any concerns or worsening of condition.    Appointment time was over 15 min with over 50% spent counseling and reviewing medical records.

## 2020-07-14 ENCOUNTER — OFFICE VISIT (OUTPATIENT)
Dept: URGENT CARE | Facility: CLINIC | Age: 85
End: 2020-07-14
Payer: MEDICARE

## 2020-07-14 VITALS
OXYGEN SATURATION: 96 % | HEART RATE: 86 BPM | DIASTOLIC BLOOD PRESSURE: 75 MMHG | TEMPERATURE: 97 F | WEIGHT: 173 LBS | SYSTOLIC BLOOD PRESSURE: 157 MMHG | HEIGHT: 66 IN | BODY MASS INDEX: 27.8 KG/M2 | RESPIRATION RATE: 16 BRPM

## 2020-07-14 DIAGNOSIS — R03.0 ELEVATED BLOOD PRESSURE READING: ICD-10-CM

## 2020-07-14 DIAGNOSIS — M79.89 RIGHT LEG SWELLING: Primary | ICD-10-CM

## 2020-07-14 DIAGNOSIS — R52 PAIN: ICD-10-CM

## 2020-07-14 PROCEDURE — 99214 OFFICE O/P EST MOD 30 MIN: CPT | Mod: S$GLB,,, | Performed by: NURSE PRACTITIONER

## 2020-07-14 PROCEDURE — 99214 PR OFFICE/OUTPT VISIT, EST, LEVL IV, 30-39 MIN: ICD-10-PCS | Mod: S$GLB,,, | Performed by: NURSE PRACTITIONER

## 2020-07-14 PROCEDURE — 73502 X-RAY EXAM HIP UNI 2-3 VIEWS: CPT | Mod: RT,S$GLB,, | Performed by: RADIOLOGY

## 2020-07-14 PROCEDURE — 73502 XR HIP 2 VIEW RIGHT: ICD-10-PCS | Mod: RT,S$GLB,, | Performed by: RADIOLOGY

## 2020-07-14 NOTE — PATIENT INSTRUCTIONS
Please follow up with orthopedics as instructed. If you have been given a referral, [facility] will call you to select an appointment date, time, location, and health care provider. If [facility] does not call you, please call [phone] to set up your appointment. If you have taken x-rays today and would like a CD of your images, one can be provided for you.    Please return here or go to the Emergency Department for any concerns or worsening of condition.  If you were given a splint wear it at all times unless otherwise instructed.     If you were given crutches use them as we instructed. Do not rest your armpits on the foam pad or you risk compressing the nerves and the vessels there.    If you have been given a referral to orthopedics, I will NOT release you from restrictions for work or school duties. Orthopedics must clear you for full use if you have been referred-this is to protect and preserve your full use/function of that extremity/joint in the future.    If you lose control of your bowel and/or bladder, please go to the nearest Emergency Department immediately.  If you lose sensation in between your legs by your genitalia and/or rectum, please go to the nearest Emergency Department immediately.  If you lose control or sensation of any extremity, please go to the nearest Emergency Department immediately.    R.I.C.E.T. to the affected joint or limb as needed:    REST  Rest- the injured or sore extremity, this includes the use of an immobilization device you may have been given in clinic.   ICE Ice- for the next 24-48 hours, alternating 20 minutes on and 20 minutes off as needed up to 3 times a day. Don't use ice packs on the left shoulder if you have a heart condition, and don't use ice packs around the front or side of the neck. Heat treatments should be used for chronic/older conditions to help relax and loosen tissues and to stimulate blood flow to the area. Use heat treatments for conditions such as  overuse injuries before participating in activities.  When using heat treatments, be very careful to use a moderate heat for a limited time to avoid burns. Never leave heating pads or towels on for extended periods of time or while sleeping.   COMPRESSION Compression-Use an ACE wrap to provide compression to the injured extremity. Copper-infused compression garments are available over-the-counter at [employer], and other drug stores and may provide just the right amount of compression and reduce the likeliness of having to frequently adjust a bandage for comfort. Check circulation to make sure your bandage or compression device is not too tight.     ELEVATION Elevate-when possible to reduce swelling.     TOPICALS Topical relief: Tiger Balm may be used as directed on the label. Wash your hands before and after applying this medicine (do not get this medication in your eyes). For your first use, apply only to a small skin area to test how your skin reacts to the medicine. Tiger Balm contains camphor and menthol and this can cause a burning or cold sensation, which is usually mild and should lessen over time with continued use. If this sensation causes significant discomfort, wash the skin with soap and water.  Epsom salt: In water, Epsom salt breaks down into magnesium and sulfate. The theory is that when you soak in an Epsom salt bath, these get into your body through your skin. That hasn't been proven, but just soaking in warm water can help relax muscles and loosen stiff joints.       Why didn't I get a steroid shot or a medrol dose pack?  The benefits are small and, unlike topical glucocorticoids, systemic glucocorticoids possess a significant side effect profile. Major side effects include:     Skin consequences: Skin thinning and ecchymoses, Cushingoid appearance (rounded face), acne, weight gain, mild hirsutism, facial erythema, and striae.   Eye consequences: Cataracts, increased intraocular  pressure, exophthalmos.    Cardiovascular consequences: Fluid retention, premature atherosclerotic disease, and arrhythmias.    GI consequences: Increased risk for adverse gastrointestinal effects, such as gastritis, ulcer formation, and gastrointestinal bleeding   Bone and muscle consequences: Osteoporosis, osteonecrosis, and myopathy.   Neuropsychiatric effects: Mood disorders, psychosis, memory impairment, and    Metabolic and Endocrine consequences: Suppress the hypothalamic-pituitary-adrenal (HPA) axis and increase blood sugar.   Effects immunity predisposing you to getting a more severe infection and increases your white blood cell count.   Young children -- Growth impairment        You MAY have been given some of the following medications:    Narcotic pain medications and muscle relaxants: Muscle relaxants work by causing the muscles to become less tense or stiff, which in turn reduces pain and discomfort. Please be aware that narcotics and muscle relaxants can be addictive. If I gave you narcotics or relaxants, I have given you a limited quantity to take as it is needed at this time. However take it sparingly and only when needed. Do not operate machinery or drive on this medication.      Mobic is s known as a nonsteroidal anti-inflammatory drug (NSAID), it is used to treat inflammation. It reduces pain, swelling, and stiffness of the joints. Please do NOT take any other NSAID medications while on this medication, you can take Tylenol if you feel the need. If you were not prescribed an anti-inflammatory medication, and if you do not have any history of stomach/intestinal ulcers, or kidney disease, or are not taking a blood thinner such as Coumadin, Plavix, Pradaxa, Eloquis, or Xaralta for example, it is OK to take over the counter Ibuprofen or Advil or Motrin or Aleve as directed.  Do not take any of these medications on an empty stomach.

## 2020-07-14 NOTE — PROGRESS NOTES
"Subjective:       Patient ID: Nel Cui is a 85 y.o. female.    Vitals:  height is 5' 6" (1.676 m) and weight is 78.5 kg (173 lb). Her temperature is 97.4 °F (36.3 °C). Her blood pressure is 157/75 (abnormal) and her pulse is 86. Her respiration is 16 and oxygen saturation is 96%.     Chief Complaint: Leg Pain    Patient reports right side  leg pain, upper quad, lateral. No trauma reported. Feels a "mass".. Feels like a cramp.    Leg Pain   The incident occurred 12 to 24 hours ago. The incident occurred at home. There was no injury mechanism. The pain is present in the right thigh. The quality of the pain is described as aching. The pain is at a severity of 6/10. The pain is moderate. The pain has been fluctuating since onset. She reports no foreign bodies present. The symptoms are aggravated by palpation. She has tried ice, acetaminophen and NSAIDs for the symptoms. The treatment provided mild relief.       Constitution: Negative for chills, fatigue and fever.   HENT: Negative for congestion and sore throat.    Neck: Negative for painful lymph nodes.   Cardiovascular: Negative for chest pain and leg swelling.   Eyes: Negative for double vision and blurred vision.   Respiratory: Negative for cough and shortness of breath.    Gastrointestinal: Negative for nausea, vomiting and diarrhea.   Genitourinary: Negative for dysuria, frequency, urgency and history of kidney stones.   Musculoskeletal: Positive for pain, muscle cramps and muscle ache. Negative for joint pain and joint swelling.   Skin: Negative for color change, pale, rash and bruising.   Allergic/Immunologic: Negative for seasonal allergies.   Neurological: Negative for dizziness, history of vertigo, light-headedness, passing out and headaches.   Hematologic/Lymphatic: Negative for swollen lymph nodes.   Psychiatric/Behavioral: Negative for nervous/anxious, sleep disturbance and depression. The patient is not nervous/anxious.        Objective:    "   Physical Exam   Constitutional: She is oriented to person, place, and time. She appears well-developed. She is cooperative.  Non-toxic appearance. She does not appear ill. No distress.   HENT:   Head: Normocephalic and atraumatic.   Ears:   Right Ear: Hearing, tympanic membrane, external ear and ear canal normal.   Left Ear: Hearing, tympanic membrane, external ear and ear canal normal.   Nose: Nose normal. No mucosal edema, rhinorrhea or nasal deformity. No epistaxis. Right sinus exhibits no maxillary sinus tenderness and no frontal sinus tenderness. Left sinus exhibits no maxillary sinus tenderness and no frontal sinus tenderness.   Mouth/Throat: Uvula is midline, oropharynx is clear and moist and mucous membranes are normal. No trismus in the jaw. Normal dentition. No uvula swelling. No posterior oropharyngeal erythema.   Eyes: Conjunctivae and lids are normal. Right eye exhibits no discharge. Left eye exhibits no discharge. No scleral icterus.   Neck: Trachea normal, normal range of motion, full passive range of motion without pain and phonation normal. Neck supple.   Cardiovascular: Normal rate, regular rhythm, normal heart sounds and normal pulses.   Pulmonary/Chest: Effort normal and breath sounds normal. No respiratory distress.   Abdominal: Soft. Normal appearance and bowel sounds are normal. She exhibits no distension, no pulsatile midline mass and no mass. There is no abdominal tenderness.   Musculoskeletal: Normal range of motion.         General: No deformity.      Right hip: She exhibits swelling.        Legs:    Neurological: She is alert and oriented to person, place, and time. She exhibits normal muscle tone. Coordination normal.   Skin: Skin is warm, dry, intact, not diaphoretic and not pale. Psychiatric: Her speech is normal and behavior is normal. Judgment and thought content normal.   Nursing note and vitals reviewed.        Assessment:       1. Right leg swelling    2. Pain    3. Elevated  blood pressure reading        Plan:         Right leg swelling  -     Ambulatory referral/consult to Orthopedics    Pain  -     XR HIP 2 VIEW RIGHT; Future; Expected date: 07/14/2020    Elevated blood pressure reading    Offered Mobic and Flexeril, pt. refused.    Patient Instructions     Please follow up with orthopedics as instructed. If you have been given a referral, [facility] will call you to select an appointment date, time, location, and health care provider. If [facility] does not call you, please call [phone] to set up your appointment. If you have taken x-rays today and would like a CD of your images, one can be provided for you.    Please return here or go to the Emergency Department for any concerns or worsening of condition.  If you were given a splint wear it at all times unless otherwise instructed.     If you were given crutches use them as we instructed. Do not rest your armpits on the foam pad or you risk compressing the nerves and the vessels there.    If you have been given a referral to orthopedics, I will NOT release you from restrictions for work or school duties. Orthopedics must clear you for full use if you have been referred-this is to protect and preserve your full use/function of that extremity/joint in the future.    If you lose control of your bowel and/or bladder, please go to the nearest Emergency Department immediately.  If you lose sensation in between your legs by your genitalia and/or rectum, please go to the nearest Emergency Department immediately.  If you lose control or sensation of any extremity, please go to the nearest Emergency Department immediately.    R.I.C.E.T. to the affected joint or limb as needed:    REST  Rest- the injured or sore extremity, this includes the use of an immobilization device you may have been given in clinic.   ICE Ice- for the next 24-48 hours, alternating 20 minutes on and 20 minutes off as needed up to 3 times a day. Don't use ice packs on  the left shoulder if you have a heart condition, and don't use ice packs around the front or side of the neck. Heat treatments should be used for chronic/older conditions to help relax and loosen tissues and to stimulate blood flow to the area. Use heat treatments for conditions such as overuse injuries before participating in activities.  When using heat treatments, be very careful to use a moderate heat for a limited time to avoid burns. Never leave heating pads or towels on for extended periods of time or while sleeping.   COMPRESSION Compression-Use an ACE wrap to provide compression to the injured extremity. Copper-infused compression garments are available over-the-counter at [employer], and other drug stores and may provide just the right amount of compression and reduce the likeliness of having to frequently adjust a bandage for comfort. Check circulation to make sure your bandage or compression device is not too tight.     ELEVATION Elevate-when possible to reduce swelling.     TOPICALS Topical relief: Tiger Balm may be used as directed on the label. Wash your hands before and after applying this medicine (do not get this medication in your eyes). For your first use, apply only to a small skin area to test how your skin reacts to the medicine. Tiger Balm contains camphor and menthol and this can cause a burning or cold sensation, which is usually mild and should lessen over time with continued use. If this sensation causes significant discomfort, wash the skin with soap and water.  Epsom salt: In water, Epsom salt breaks down into magnesium and sulfate. The theory is that when you soak in an Epsom salt bath, these get into your body through your skin. That hasn't been proven, but just soaking in warm water can help relax muscles and loosen stiff joints.       Why didn't I get a steroid shot or a medrol dose pack?  The benefits are small and, unlike topical glucocorticoids, systemic  glucocorticoids possess a significant side effect profile. Major side effects include:     Skin consequences: Skin thinning and ecchymoses, Cushingoid appearance (rounded face), acne, weight gain, mild hirsutism, facial erythema, and striae.   Eye consequences: Cataracts, increased intraocular pressure, exophthalmos.    Cardiovascular consequences: Fluid retention, premature atherosclerotic disease, and arrhythmias.    GI consequences: Increased risk for adverse gastrointestinal effects, such as gastritis, ulcer formation, and gastrointestinal bleeding   Bone and muscle consequences: Osteoporosis, osteonecrosis, and myopathy.   Neuropsychiatric effects: Mood disorders, psychosis, memory impairment, and    Metabolic and Endocrine consequences: Suppress the hypothalamic-pituitary-adrenal (HPA) axis and increase blood sugar.   Effects immunity predisposing you to getting a more severe infection and increases your white blood cell count.   Young children -- Growth impairment        You MAY have been given some of the following medications:    Narcotic pain medications and muscle relaxants: Muscle relaxants work by causing the muscles to become less tense or stiff, which in turn reduces pain and discomfort. Please be aware that narcotics and muscle relaxants can be addictive. If I gave you narcotics or relaxants, I have given you a limited quantity to take as it is needed at this time. However take it sparingly and only when needed. Do not operate machinery or drive on this medication.      Mobic is s known as a nonsteroidal anti-inflammatory drug (NSAID), it is used to treat inflammation. It reduces pain, swelling, and stiffness of the joints. Please do NOT take any other NSAID medications while on this medication, you can take Tylenol if you feel the need. If you were not prescribed an anti-inflammatory medication, and if you do not have any history of stomach/intestinal ulcers, or kidney disease, or are not  taking a blood thinner such as Coumadin, Plavix, Pradaxa, Eloquis, or Xaralta for example, it is OK to take over the counter Ibuprofen or Advil or Motrin or Aleve as directed.  Do not take any of these medications on an empty stomach.

## 2020-08-28 ENCOUNTER — LAB VISIT (OUTPATIENT)
Dept: FAMILY MEDICINE | Facility: CLINIC | Age: 85
End: 2020-08-28
Payer: MEDICARE

## 2020-08-28 DIAGNOSIS — Z01.818 PREOP TESTING: ICD-10-CM

## 2020-08-28 PROCEDURE — U0003 INFECTIOUS AGENT DETECTION BY NUCLEIC ACID (DNA OR RNA); SEVERE ACUTE RESPIRATORY SYNDROME CORONAVIRUS 2 (SARS-COV-2) (CORONAVIRUS DISEASE [COVID-19]), AMPLIFIED PROBE TECHNIQUE, MAKING USE OF HIGH THROUGHPUT TECHNOLOGIES AS DESCRIBED BY CMS-2020-01-R: HCPCS

## 2020-08-29 LAB — SARS-COV-2 RNA RESP QL NAA+PROBE: NOT DETECTED

## 2021-01-01 ENCOUNTER — OFFICE VISIT (OUTPATIENT)
Dept: HEMATOLOGY/ONCOLOGY | Facility: CLINIC | Age: 86
End: 2021-01-01
Payer: MEDICARE

## 2021-01-01 ENCOUNTER — TELEPHONE (OUTPATIENT)
Dept: HEMATOLOGY/ONCOLOGY | Facility: CLINIC | Age: 86
End: 2021-01-01

## 2021-01-01 ENCOUNTER — TELEPHONE (OUTPATIENT)
Dept: HEMATOLOGY/ONCOLOGY | Facility: CLINIC | Age: 86
End: 2021-01-01
Payer: MEDICARE

## 2021-01-01 ENCOUNTER — OFFICE VISIT (OUTPATIENT)
Dept: URGENT CARE | Facility: CLINIC | Age: 86
End: 2021-01-01
Payer: MEDICARE

## 2021-01-01 VITALS
HEART RATE: 74 BPM | SYSTOLIC BLOOD PRESSURE: 111 MMHG | RESPIRATION RATE: 19 BRPM | DIASTOLIC BLOOD PRESSURE: 64 MMHG | HEIGHT: 65 IN | OXYGEN SATURATION: 96 % | WEIGHT: 152 LBS | TEMPERATURE: 98 F | BODY MASS INDEX: 25.33 KG/M2

## 2021-01-01 VITALS
TEMPERATURE: 98 F | OXYGEN SATURATION: 97 % | HEART RATE: 90 BPM | SYSTOLIC BLOOD PRESSURE: 150 MMHG | RESPIRATION RATE: 15 BRPM | BODY MASS INDEX: 25.33 KG/M2 | WEIGHT: 152 LBS | HEIGHT: 65 IN | DIASTOLIC BLOOD PRESSURE: 73 MMHG

## 2021-01-01 VITALS
SYSTOLIC BLOOD PRESSURE: 138 MMHG | HEIGHT: 65 IN | DIASTOLIC BLOOD PRESSURE: 70 MMHG | WEIGHT: 142.63 LBS | TEMPERATURE: 97 F | HEART RATE: 89 BPM | OXYGEN SATURATION: 99 % | BODY MASS INDEX: 23.76 KG/M2

## 2021-01-01 DIAGNOSIS — C25.9 ADENOCARCINOMA OF PANCREAS: Primary | ICD-10-CM

## 2021-01-01 DIAGNOSIS — L03.032 CELLULITIS OF TOE OF LEFT FOOT: Primary | ICD-10-CM

## 2021-01-01 DIAGNOSIS — C25.2 MALIGNANT NEOPLASM OF TAIL OF PANCREAS: ICD-10-CM

## 2021-01-01 DIAGNOSIS — G89.3 CANCER RELATED PAIN: ICD-10-CM

## 2021-01-01 DIAGNOSIS — B35.1 ONYCHOMYCOSIS OF GREAT TOE: Primary | ICD-10-CM

## 2021-01-01 DIAGNOSIS — I10 HYPERTENSION, UNSPECIFIED TYPE: ICD-10-CM

## 2021-01-01 DIAGNOSIS — E78.5 HYPERLIPIDEMIA, UNSPECIFIED HYPERLIPIDEMIA TYPE: ICD-10-CM

## 2021-01-01 DIAGNOSIS — M79.675 TOE PAIN, LEFT: ICD-10-CM

## 2021-01-01 DIAGNOSIS — K86.89 PANCREATIC INSUFFICIENCY: ICD-10-CM

## 2021-01-01 PROCEDURE — 1101F PR PT FALLS ASSESS DOC 0-1 FALLS W/OUT INJ PAST YR: ICD-10-PCS | Mod: CPTII,S$GLB,, | Performed by: INTERNAL MEDICINE

## 2021-01-01 PROCEDURE — 3074F SYST BP LT 130 MM HG: CPT | Mod: CPTII,S$GLB,, | Performed by: NURSE PRACTITIONER

## 2021-01-01 PROCEDURE — 3288F FALL RISK ASSESSMENT DOCD: CPT | Mod: CPTII,S$GLB,, | Performed by: INTERNAL MEDICINE

## 2021-01-01 PROCEDURE — 1160F RVW MEDS BY RX/DR IN RCRD: CPT | Mod: CPTII,S$GLB,, | Performed by: NURSE PRACTITIONER

## 2021-01-01 PROCEDURE — 3077F PR MOST RECENT SYSTOLIC BLOOD PRESSURE >= 140 MM HG: ICD-10-PCS | Mod: CPTII,S$GLB,, | Performed by: NURSE PRACTITIONER

## 2021-01-01 PROCEDURE — 1101F PT FALLS ASSESS-DOCD LE1/YR: CPT | Mod: CPTII,S$GLB,, | Performed by: INTERNAL MEDICINE

## 2021-01-01 PROCEDURE — 99204 OFFICE O/P NEW MOD 45 MIN: CPT | Mod: S$GLB,,, | Performed by: INTERNAL MEDICINE

## 2021-01-01 PROCEDURE — 3075F SYST BP GE 130 - 139MM HG: CPT | Mod: CPTII,S$GLB,, | Performed by: INTERNAL MEDICINE

## 2021-01-01 PROCEDURE — 1159F MED LIST DOCD IN RCRD: CPT | Mod: CPTII,S$GLB,, | Performed by: INTERNAL MEDICINE

## 2021-01-01 PROCEDURE — 3288F PR FALLS RISK ASSESSMENT DOCUMENTED: ICD-10-PCS | Mod: CPTII,S$GLB,, | Performed by: INTERNAL MEDICINE

## 2021-01-01 PROCEDURE — 99213 OFFICE O/P EST LOW 20 MIN: CPT | Mod: S$GLB,,, | Performed by: NURSE PRACTITIONER

## 2021-01-01 PROCEDURE — 3077F SYST BP >= 140 MM HG: CPT | Mod: CPTII,S$GLB,, | Performed by: NURSE PRACTITIONER

## 2021-01-01 PROCEDURE — 3078F DIAST BP <80 MM HG: CPT | Mod: CPTII,S$GLB,, | Performed by: NURSE PRACTITIONER

## 2021-01-01 PROCEDURE — 1160F PR REVIEW ALL MEDS BY PRESCRIBER/CLIN PHARMACIST DOCUMENTED: ICD-10-PCS | Mod: CPTII,S$GLB,, | Performed by: INTERNAL MEDICINE

## 2021-01-01 PROCEDURE — 1160F PR REVIEW ALL MEDS BY PRESCRIBER/CLIN PHARMACIST DOCUMENTED: ICD-10-PCS | Mod: CPTII,S$GLB,, | Performed by: NURSE PRACTITIONER

## 2021-01-01 PROCEDURE — 1126F PR PAIN SEVERITY QUANTIFIED, NO PAIN PRESENT: ICD-10-PCS | Mod: CPTII,S$GLB,, | Performed by: INTERNAL MEDICINE

## 2021-01-01 PROCEDURE — 1160F RVW MEDS BY RX/DR IN RCRD: CPT | Mod: CPTII,S$GLB,, | Performed by: INTERNAL MEDICINE

## 2021-01-01 PROCEDURE — 3074F PR MOST RECENT SYSTOLIC BLOOD PRESSURE < 130 MM HG: ICD-10-PCS | Mod: CPTII,S$GLB,, | Performed by: NURSE PRACTITIONER

## 2021-01-01 PROCEDURE — 1159F PR MEDICATION LIST DOCUMENTED IN MEDICAL RECORD: ICD-10-PCS | Mod: CPTII,S$GLB,, | Performed by: NURSE PRACTITIONER

## 2021-01-01 PROCEDURE — 3078F PR MOST RECENT DIASTOLIC BLOOD PRESSURE < 80 MM HG: ICD-10-PCS | Mod: CPTII,S$GLB,, | Performed by: NURSE PRACTITIONER

## 2021-01-01 PROCEDURE — 1159F PR MEDICATION LIST DOCUMENTED IN MEDICAL RECORD: ICD-10-PCS | Mod: CPTII,S$GLB,, | Performed by: INTERNAL MEDICINE

## 2021-01-01 PROCEDURE — 3075F PR MOST RECENT SYSTOLIC BLOOD PRESS GE 130-139MM HG: ICD-10-PCS | Mod: CPTII,S$GLB,, | Performed by: INTERNAL MEDICINE

## 2021-01-01 PROCEDURE — 1159F MED LIST DOCD IN RCRD: CPT | Mod: CPTII,S$GLB,, | Performed by: NURSE PRACTITIONER

## 2021-01-01 PROCEDURE — 3078F DIAST BP <80 MM HG: CPT | Mod: CPTII,S$GLB,, | Performed by: INTERNAL MEDICINE

## 2021-01-01 PROCEDURE — 3078F PR MOST RECENT DIASTOLIC BLOOD PRESSURE < 80 MM HG: ICD-10-PCS | Mod: CPTII,S$GLB,, | Performed by: INTERNAL MEDICINE

## 2021-01-01 PROCEDURE — 99213 PR OFFICE/OUTPT VISIT, EST, LEVL III, 20-29 MIN: ICD-10-PCS | Mod: S$GLB,,, | Performed by: NURSE PRACTITIONER

## 2021-01-01 PROCEDURE — 99999 PR PBB SHADOW E&M-EST. PATIENT-LVL V: ICD-10-PCS | Mod: PBBFAC,,, | Performed by: INTERNAL MEDICINE

## 2021-01-01 PROCEDURE — 99999 PR PBB SHADOW E&M-EST. PATIENT-LVL V: CPT | Mod: PBBFAC,,, | Performed by: INTERNAL MEDICINE

## 2021-01-01 PROCEDURE — 1126F AMNT PAIN NOTED NONE PRSNT: CPT | Mod: CPTII,S$GLB,, | Performed by: INTERNAL MEDICINE

## 2021-01-01 PROCEDURE — 99204 PR OFFICE/OUTPT VISIT, NEW, LEVL IV, 45-59 MIN: ICD-10-PCS | Mod: S$GLB,,, | Performed by: INTERNAL MEDICINE

## 2021-01-01 RX ORDER — CLOTRIMAZOLE 1 %
CREAM (GRAM) TOPICAL 2 TIMES DAILY
Qty: 28 G | Refills: 0 | Status: SHIPPED | OUTPATIENT
Start: 2021-01-01 | End: 2021-01-01

## 2021-01-01 RX ORDER — DOXYCYCLINE HYCLATE 100 MG
100 TABLET ORAL 2 TIMES DAILY
Qty: 14 TABLET | Refills: 0 | Status: SHIPPED | OUTPATIENT
Start: 2021-01-01 | End: 2021-01-01

## 2021-01-01 RX ORDER — PROMETHAZINE HYDROCHLORIDE 25 MG/1
25 TABLET ORAL EVERY 6 HOURS PRN
Qty: 60 TABLET | Refills: 6 | Status: SHIPPED | OUTPATIENT
Start: 2021-01-01 | End: 2022-01-01

## 2021-01-01 RX ORDER — ONDANSETRON 4 MG/1
4 TABLET, ORALLY DISINTEGRATING ORAL ONCE
Qty: 60 TABLET | Refills: 6 | Status: SHIPPED | OUTPATIENT
Start: 2021-01-01 | End: 2021-01-01

## 2021-01-01 RX ORDER — ONDANSETRON 4 MG/1
4 TABLET, ORALLY DISINTEGRATING ORAL EVERY 12 HOURS PRN
COMMUNITY
Start: 2021-01-01

## 2021-02-01 ENCOUNTER — LAB VISIT (OUTPATIENT)
Dept: LAB | Facility: OTHER | Age: 86
End: 2021-02-01
Payer: MEDICARE

## 2021-02-01 DIAGNOSIS — Z20.822 ENCOUNTER FOR LABORATORY TESTING FOR COVID-19 VIRUS: ICD-10-CM

## 2021-02-01 PROCEDURE — U0003 INFECTIOUS AGENT DETECTION BY NUCLEIC ACID (DNA OR RNA); SEVERE ACUTE RESPIRATORY SYNDROME CORONAVIRUS 2 (SARS-COV-2) (CORONAVIRUS DISEASE [COVID-19]), AMPLIFIED PROBE TECHNIQUE, MAKING USE OF HIGH THROUGHPUT TECHNOLOGIES AS DESCRIBED BY CMS-2020-01-R: HCPCS

## 2021-02-02 LAB — SARS-COV-2 RNA RESP QL NAA+PROBE: NOT DETECTED

## 2021-04-29 ENCOUNTER — PATIENT MESSAGE (OUTPATIENT)
Dept: RESEARCH | Facility: HOSPITAL | Age: 86
End: 2021-04-29

## 2021-05-07 ENCOUNTER — OFFICE VISIT (OUTPATIENT)
Dept: URGENT CARE | Facility: CLINIC | Age: 86
End: 2021-05-07
Payer: MEDICARE

## 2021-05-07 VITALS
DIASTOLIC BLOOD PRESSURE: 74 MMHG | BODY MASS INDEX: 26.84 KG/M2 | OXYGEN SATURATION: 96 % | TEMPERATURE: 99 F | HEART RATE: 91 BPM | RESPIRATION RATE: 16 BRPM | WEIGHT: 167 LBS | HEIGHT: 66 IN | SYSTOLIC BLOOD PRESSURE: 167 MMHG

## 2021-05-07 DIAGNOSIS — R53.83 FATIGUE, UNSPECIFIED TYPE: Primary | ICD-10-CM

## 2021-05-07 LAB
BILIRUB UR QL STRIP: NEGATIVE
GLUCOSE SERPL-MCNC: 85 MG/DL (ref 70–110)
GLUCOSE UR QL STRIP: NEGATIVE
KETONES UR QL STRIP: NEGATIVE
LEUKOCYTE ESTERASE UR QL STRIP: NEGATIVE
PH, POC UA: 5.5
POC BLOOD, URINE: NEGATIVE
POC NITRATES, URINE: NEGATIVE
PROT UR QL STRIP: NEGATIVE
SP GR UR STRIP: 1.01 (ref 1–1.03)
UROBILINOGEN UR STRIP-ACNC: NORMAL (ref 0.1–1.1)

## 2021-05-07 PROCEDURE — 82962 GLUCOSE BLOOD TEST: CPT | Mod: S$GLB,,, | Performed by: PHYSICIAN ASSISTANT

## 2021-05-07 PROCEDURE — 81003 POCT URINALYSIS, DIPSTICK, AUTOMATED, W/O SCOPE: ICD-10-PCS | Mod: QW,S$GLB,, | Performed by: PHYSICIAN ASSISTANT

## 2021-05-07 PROCEDURE — 99214 PR OFFICE/OUTPT VISIT, EST, LEVL IV, 30-39 MIN: ICD-10-PCS | Mod: 25,S$GLB,, | Performed by: PHYSICIAN ASSISTANT

## 2021-05-07 PROCEDURE — 82962 POCT GLUCOSE, HAND-HELD DEVICE: ICD-10-PCS | Mod: S$GLB,,, | Performed by: PHYSICIAN ASSISTANT

## 2021-05-07 PROCEDURE — 81003 URINALYSIS AUTO W/O SCOPE: CPT | Mod: QW,S$GLB,, | Performed by: PHYSICIAN ASSISTANT

## 2021-05-07 PROCEDURE — 99214 OFFICE O/P EST MOD 30 MIN: CPT | Mod: 25,S$GLB,, | Performed by: PHYSICIAN ASSISTANT

## 2021-05-07 RX ORDER — ASPIRIN 325 MG
325 TABLET ORAL DAILY
COMMUNITY
End: 2022-01-01 | Stop reason: ALTCHOICE

## 2021-09-27 ENCOUNTER — TELEPHONE (OUTPATIENT)
Dept: OTOLARYNGOLOGY | Facility: CLINIC | Age: 86
End: 2021-09-27

## 2021-12-22 PROBLEM — C25.9 ADENOCARCINOMA OF PANCREAS: Status: ACTIVE | Noted: 2021-01-01

## 2022-01-01 ENCOUNTER — PATIENT MESSAGE (OUTPATIENT)
Dept: HEMATOLOGY/ONCOLOGY | Facility: CLINIC | Age: 87
End: 2022-01-01
Payer: MEDICARE

## 2022-01-01 ENCOUNTER — NURSE TRIAGE (OUTPATIENT)
Dept: ADMINISTRATIVE | Facility: CLINIC | Age: 87
End: 2022-01-01
Payer: MEDICARE

## 2022-01-01 ENCOUNTER — DOCUMENTATION ONLY (OUTPATIENT)
Dept: INFUSION THERAPY | Facility: HOSPITAL | Age: 87
End: 2022-01-01
Payer: MEDICARE

## 2022-01-01 ENCOUNTER — OFFICE VISIT (OUTPATIENT)
Dept: HEMATOLOGY/ONCOLOGY | Facility: CLINIC | Age: 87
End: 2022-01-01
Payer: MEDICARE

## 2022-01-01 ENCOUNTER — PATIENT MESSAGE (OUTPATIENT)
Dept: HEMATOLOGY/ONCOLOGY | Facility: CLINIC | Age: 87
End: 2022-01-01

## 2022-01-01 ENCOUNTER — TELEPHONE (OUTPATIENT)
Dept: HEMATOLOGY/ONCOLOGY | Facility: CLINIC | Age: 87
End: 2022-01-01
Payer: MEDICARE

## 2022-01-01 ENCOUNTER — HOSPITAL ENCOUNTER (OUTPATIENT)
Dept: RADIOLOGY | Facility: HOSPITAL | Age: 87
Discharge: HOME | End: 2022-05-11
Attending: INTERNAL MEDICINE
Payer: MEDICARE

## 2022-01-01 ENCOUNTER — DOCUMENTATION ONLY (OUTPATIENT)
Dept: INFUSION THERAPY | Facility: HOSPITAL | Age: 87
End: 2022-01-01

## 2022-01-01 ENCOUNTER — HOSPITAL ENCOUNTER (OUTPATIENT)
Dept: RADIOLOGY | Facility: HOSPITAL | Age: 87
Discharge: HOME | End: 2022-04-01
Attending: INTERNAL MEDICINE
Payer: MEDICARE

## 2022-01-01 ENCOUNTER — TELEPHONE (OUTPATIENT)
Dept: HEMATOLOGY/ONCOLOGY | Facility: CLINIC | Age: 87
End: 2022-01-01

## 2022-01-01 ENCOUNTER — INFUSION (OUTPATIENT)
Dept: INFUSION THERAPY | Facility: HOSPITAL | Age: 87
End: 2022-01-01
Attending: INTERNAL MEDICINE
Payer: MEDICARE

## 2022-01-01 ENCOUNTER — OFFICE VISIT (OUTPATIENT)
Dept: PSYCHIATRY | Facility: CLINIC | Age: 87
End: 2022-01-01
Payer: MEDICARE

## 2022-01-01 ENCOUNTER — HOSPITAL ENCOUNTER (OUTPATIENT)
Dept: RADIOLOGY | Facility: HOSPITAL | Age: 87
Discharge: HOME | End: 2022-03-16
Attending: INTERNAL MEDICINE
Payer: MEDICARE

## 2022-01-01 ENCOUNTER — LAB VISIT (OUTPATIENT)
Dept: LAB | Facility: HOSPITAL | Age: 87
End: 2022-01-01
Payer: MEDICARE

## 2022-01-01 ENCOUNTER — TELEPHONE (OUTPATIENT)
Dept: INFUSION THERAPY | Facility: HOSPITAL | Age: 87
End: 2022-01-01
Payer: MEDICARE

## 2022-01-01 ENCOUNTER — HOSPITAL ENCOUNTER (OUTPATIENT)
Dept: RADIOLOGY | Facility: HOSPITAL | Age: 87
Discharge: HOME | End: 2022-03-21
Attending: INTERNAL MEDICINE
Payer: MEDICARE

## 2022-01-01 ENCOUNTER — HOSPITAL ENCOUNTER (OUTPATIENT)
Dept: RADIOLOGY | Facility: HOSPITAL | Age: 87
Discharge: HOME | End: 2022-06-20
Attending: INTERNAL MEDICINE
Payer: MEDICARE

## 2022-01-01 ENCOUNTER — PATIENT MESSAGE (OUTPATIENT)
Dept: FAMILY MEDICINE | Facility: CLINIC | Age: 87
End: 2022-01-01
Payer: MEDICARE

## 2022-01-01 ENCOUNTER — LAB VISIT (OUTPATIENT)
Dept: LAB | Facility: HOSPITAL | Age: 87
End: 2022-01-01
Attending: INTERNAL MEDICINE
Payer: MEDICARE

## 2022-01-01 ENCOUNTER — CLINICAL SUPPORT (OUTPATIENT)
Dept: HEMATOLOGY/ONCOLOGY | Facility: CLINIC | Age: 87
End: 2022-01-01
Payer: MEDICARE

## 2022-01-01 ENCOUNTER — HOSPITAL ENCOUNTER (OUTPATIENT)
Dept: RADIOLOGY | Facility: HOSPITAL | Age: 87
Discharge: HOME | End: 2022-05-26
Attending: INTERNAL MEDICINE
Payer: MEDICARE

## 2022-01-01 ENCOUNTER — HOSPITAL ENCOUNTER (OUTPATIENT)
Dept: RADIOLOGY | Facility: HOSPITAL | Age: 87
Discharge: HOME | End: 2022-01-04
Attending: INTERNAL MEDICINE
Payer: MEDICARE

## 2022-01-01 ENCOUNTER — CLINICAL SUPPORT (OUTPATIENT)
Dept: CARDIOLOGY | Facility: HOSPITAL | Age: 87
End: 2022-01-01
Attending: INTERNAL MEDICINE
Payer: MEDICARE

## 2022-01-01 ENCOUNTER — PATIENT MESSAGE (OUTPATIENT)
Dept: INFUSION THERAPY | Facility: HOSPITAL | Age: 87
End: 2022-01-01
Payer: MEDICARE

## 2022-01-01 ENCOUNTER — HOSPITAL ENCOUNTER (OUTPATIENT)
Dept: RADIOLOGY | Facility: HOSPITAL | Age: 87
Discharge: HOME | End: 2022-08-29
Attending: INTERNAL MEDICINE
Payer: MEDICARE

## 2022-01-01 ENCOUNTER — DOCUMENTATION ONLY (OUTPATIENT)
Dept: PHARMACY | Facility: AMBULARY SURGERY CENTER | Age: 87
End: 2022-01-01
Payer: MEDICARE

## 2022-01-01 ENCOUNTER — TELEPHONE (OUTPATIENT)
Dept: PSYCHIATRY | Facility: CLINIC | Age: 87
End: 2022-01-01
Payer: MEDICARE

## 2022-01-01 ENCOUNTER — HOSPITAL ENCOUNTER (OUTPATIENT)
Dept: RADIOLOGY | Facility: HOSPITAL | Age: 87
Discharge: HOME | End: 2022-03-23
Attending: INTERNAL MEDICINE
Payer: MEDICARE

## 2022-01-01 ENCOUNTER — PATIENT MESSAGE (OUTPATIENT)
Dept: ADMINISTRATIVE | Facility: OTHER | Age: 87
End: 2022-01-01
Payer: MEDICARE

## 2022-01-01 VITALS
OXYGEN SATURATION: 98 % | WEIGHT: 140.44 LBS | WEIGHT: 142 LBS | SYSTOLIC BLOOD PRESSURE: 132 MMHG | BODY MASS INDEX: 23.66 KG/M2 | HEART RATE: 80 BPM | DIASTOLIC BLOOD PRESSURE: 60 MMHG | HEIGHT: 65 IN | HEIGHT: 65 IN | SYSTOLIC BLOOD PRESSURE: 145 MMHG | RESPIRATION RATE: 16 BRPM | HEART RATE: 71 BPM | RESPIRATION RATE: 18 BRPM | DIASTOLIC BLOOD PRESSURE: 68 MMHG | BODY MASS INDEX: 23.4 KG/M2 | TEMPERATURE: 98 F

## 2022-01-01 VITALS
WEIGHT: 142.63 LBS | BODY MASS INDEX: 23.76 KG/M2 | WEIGHT: 142.63 LBS | BODY MASS INDEX: 23.76 KG/M2 | OXYGEN SATURATION: 98 % | HEART RATE: 71 BPM | HEIGHT: 65 IN | DIASTOLIC BLOOD PRESSURE: 60 MMHG | SYSTOLIC BLOOD PRESSURE: 134 MMHG | RESPIRATION RATE: 18 BRPM | DIASTOLIC BLOOD PRESSURE: 64 MMHG | SYSTOLIC BLOOD PRESSURE: 129 MMHG | HEIGHT: 65 IN | TEMPERATURE: 98 F | HEART RATE: 65 BPM | TEMPERATURE: 98 F

## 2022-01-01 VITALS
SYSTOLIC BLOOD PRESSURE: 128 MMHG | HEIGHT: 65 IN | HEART RATE: 79 BPM | DIASTOLIC BLOOD PRESSURE: 80 MMHG | HEIGHT: 65 IN | WEIGHT: 142.5 LBS | TEMPERATURE: 98 F | BODY MASS INDEX: 23.74 KG/M2 | SYSTOLIC BLOOD PRESSURE: 146 MMHG | DIASTOLIC BLOOD PRESSURE: 60 MMHG | WEIGHT: 140.44 LBS | HEART RATE: 92 BPM | BODY MASS INDEX: 23.4 KG/M2 | TEMPERATURE: 97 F | OXYGEN SATURATION: 97 % | OXYGEN SATURATION: 99 %

## 2022-01-01 VITALS
HEART RATE: 109 BPM | SYSTOLIC BLOOD PRESSURE: 134 MMHG | BODY MASS INDEX: 26.04 KG/M2 | DIASTOLIC BLOOD PRESSURE: 71 MMHG | DIASTOLIC BLOOD PRESSURE: 90 MMHG | OXYGEN SATURATION: 95 % | TEMPERATURE: 97 F | WEIGHT: 156.31 LBS | TEMPERATURE: 101 F | HEIGHT: 65 IN | BODY MASS INDEX: 26.93 KG/M2 | HEART RATE: 84 BPM | OXYGEN SATURATION: 96 % | HEIGHT: 65 IN | RESPIRATION RATE: 12 BRPM | WEIGHT: 161.63 LBS | RESPIRATION RATE: 16 BRPM | SYSTOLIC BLOOD PRESSURE: 148 MMHG

## 2022-01-01 VITALS
BODY MASS INDEX: 23.66 KG/M2 | WEIGHT: 142 LBS | SYSTOLIC BLOOD PRESSURE: 128 MMHG | DIASTOLIC BLOOD PRESSURE: 57 MMHG | HEIGHT: 65 IN | RESPIRATION RATE: 16 BRPM | OXYGEN SATURATION: 94 % | HEART RATE: 77 BPM

## 2022-01-01 VITALS
TEMPERATURE: 98 F | TEMPERATURE: 99 F | DIASTOLIC BLOOD PRESSURE: 80 MMHG | OXYGEN SATURATION: 97 % | RESPIRATION RATE: 16 BRPM | HEART RATE: 91 BPM | OXYGEN SATURATION: 98 % | HEART RATE: 80 BPM | HEIGHT: 65 IN | DIASTOLIC BLOOD PRESSURE: 75 MMHG | WEIGHT: 136.44 LBS | SYSTOLIC BLOOD PRESSURE: 158 MMHG | SYSTOLIC BLOOD PRESSURE: 150 MMHG | BODY MASS INDEX: 22.73 KG/M2

## 2022-01-01 VITALS
HEART RATE: 93 BPM | BODY MASS INDEX: 26.04 KG/M2 | HEIGHT: 65 IN | TEMPERATURE: 97 F | DIASTOLIC BLOOD PRESSURE: 78 MMHG | SYSTOLIC BLOOD PRESSURE: 155 MMHG | RESPIRATION RATE: 16 BRPM | WEIGHT: 156.31 LBS | OXYGEN SATURATION: 95 %

## 2022-01-01 VITALS
HEART RATE: 79 BPM | BODY MASS INDEX: 22.53 KG/M2 | HEIGHT: 66 IN | WEIGHT: 140.19 LBS | DIASTOLIC BLOOD PRESSURE: 70 MMHG | RESPIRATION RATE: 16 BRPM | OXYGEN SATURATION: 98 % | TEMPERATURE: 97 F | SYSTOLIC BLOOD PRESSURE: 145 MMHG

## 2022-01-01 VITALS
SYSTOLIC BLOOD PRESSURE: 139 MMHG | DIASTOLIC BLOOD PRESSURE: 64 MMHG | RESPIRATION RATE: 16 BRPM | HEART RATE: 71 BPM | TEMPERATURE: 97 F | TEMPERATURE: 98 F | SYSTOLIC BLOOD PRESSURE: 117 MMHG | RESPIRATION RATE: 18 BRPM | HEART RATE: 97 BPM | DIASTOLIC BLOOD PRESSURE: 69 MMHG

## 2022-01-01 VITALS
SYSTOLIC BLOOD PRESSURE: 143 MMHG | WEIGHT: 144.81 LBS | HEART RATE: 71 BPM | BODY MASS INDEX: 24.12 KG/M2 | RESPIRATION RATE: 20 BRPM | TEMPERATURE: 98 F | HEIGHT: 65 IN | DIASTOLIC BLOOD PRESSURE: 73 MMHG

## 2022-01-01 VITALS
DIASTOLIC BLOOD PRESSURE: 82 MMHG | HEIGHT: 65 IN | WEIGHT: 146.63 LBS | SYSTOLIC BLOOD PRESSURE: 144 MMHG | TEMPERATURE: 97 F | RESPIRATION RATE: 16 BRPM | BODY MASS INDEX: 24.43 KG/M2 | OXYGEN SATURATION: 94 % | HEART RATE: 91 BPM

## 2022-01-01 VITALS
DIASTOLIC BLOOD PRESSURE: 74 MMHG | BODY MASS INDEX: 23.76 KG/M2 | WEIGHT: 142.63 LBS | TEMPERATURE: 98 F | HEART RATE: 69 BPM | RESPIRATION RATE: 19 BRPM | SYSTOLIC BLOOD PRESSURE: 165 MMHG | HEIGHT: 65 IN

## 2022-01-01 VITALS
TEMPERATURE: 98 F | HEIGHT: 65 IN | RESPIRATION RATE: 20 BRPM | SYSTOLIC BLOOD PRESSURE: 131 MMHG | BODY MASS INDEX: 23.11 KG/M2 | HEART RATE: 61 BPM | WEIGHT: 138.69 LBS | OXYGEN SATURATION: 98 % | DIASTOLIC BLOOD PRESSURE: 68 MMHG

## 2022-01-01 VITALS
TEMPERATURE: 97 F | RESPIRATION RATE: 16 BRPM | SYSTOLIC BLOOD PRESSURE: 129 MMHG | HEART RATE: 65 BPM | OXYGEN SATURATION: 98 % | DIASTOLIC BLOOD PRESSURE: 64 MMHG | WEIGHT: 142.63 LBS | HEIGHT: 65 IN | BODY MASS INDEX: 23.76 KG/M2

## 2022-01-01 VITALS — RESPIRATION RATE: 18 BRPM | SYSTOLIC BLOOD PRESSURE: 147 MMHG | HEART RATE: 73 BPM | DIASTOLIC BLOOD PRESSURE: 69 MMHG

## 2022-01-01 VITALS
HEIGHT: 63 IN | RESPIRATION RATE: 18 BRPM | WEIGHT: 142.75 LBS | DIASTOLIC BLOOD PRESSURE: 70 MMHG | HEART RATE: 69 BPM | OXYGEN SATURATION: 97 % | TEMPERATURE: 98 F | HEART RATE: 70 BPM | BODY MASS INDEX: 25.29 KG/M2 | DIASTOLIC BLOOD PRESSURE: 61 MMHG | SYSTOLIC BLOOD PRESSURE: 138 MMHG | SYSTOLIC BLOOD PRESSURE: 131 MMHG | TEMPERATURE: 99 F

## 2022-01-01 VITALS
SYSTOLIC BLOOD PRESSURE: 133 MMHG | BODY MASS INDEX: 23.83 KG/M2 | HEART RATE: 72 BPM | RESPIRATION RATE: 18 BRPM | HEIGHT: 65 IN | WEIGHT: 143.06 LBS | DIASTOLIC BLOOD PRESSURE: 72 MMHG | TEMPERATURE: 97 F

## 2022-01-01 VITALS
BODY MASS INDEX: 22.75 KG/M2 | HEART RATE: 75 BPM | DIASTOLIC BLOOD PRESSURE: 73 MMHG | SYSTOLIC BLOOD PRESSURE: 126 MMHG | TEMPERATURE: 98 F | RESPIRATION RATE: 20 BRPM | WEIGHT: 141.56 LBS | OXYGEN SATURATION: 98 % | HEIGHT: 66 IN

## 2022-01-01 VITALS
DIASTOLIC BLOOD PRESSURE: 70 MMHG | BODY MASS INDEX: 22.44 KG/M2 | HEART RATE: 101 BPM | OXYGEN SATURATION: 97 % | HEIGHT: 66 IN | SYSTOLIC BLOOD PRESSURE: 114 MMHG | SYSTOLIC BLOOD PRESSURE: 130 MMHG | WEIGHT: 136.88 LBS | TEMPERATURE: 97 F | TEMPERATURE: 99 F | HEIGHT: 66 IN | HEART RATE: 88 BPM | RESPIRATION RATE: 18 BRPM | DIASTOLIC BLOOD PRESSURE: 70 MMHG | BODY MASS INDEX: 22 KG/M2

## 2022-01-01 VITALS
WEIGHT: 141.31 LBS | SYSTOLIC BLOOD PRESSURE: 133 MMHG | TEMPERATURE: 97 F | OXYGEN SATURATION: 96 % | BODY MASS INDEX: 23.16 KG/M2 | HEART RATE: 76 BPM | DIASTOLIC BLOOD PRESSURE: 67 MMHG

## 2022-01-01 VITALS
HEIGHT: 66 IN | DIASTOLIC BLOOD PRESSURE: 70 MMHG | HEART RATE: 83 BPM | SYSTOLIC BLOOD PRESSURE: 136 MMHG | OXYGEN SATURATION: 96 % | TEMPERATURE: 97 F | BODY MASS INDEX: 22.71 KG/M2 | WEIGHT: 141.31 LBS

## 2022-01-01 VITALS
RESPIRATION RATE: 19 BRPM | RESPIRATION RATE: 20 BRPM | DIASTOLIC BLOOD PRESSURE: 74 MMHG | SYSTOLIC BLOOD PRESSURE: 158 MMHG | TEMPERATURE: 98 F | WEIGHT: 142.63 LBS | DIASTOLIC BLOOD PRESSURE: 70 MMHG | BODY MASS INDEX: 23.11 KG/M2 | DIASTOLIC BLOOD PRESSURE: 74 MMHG | TEMPERATURE: 98 F | HEART RATE: 74 BPM | OXYGEN SATURATION: 99 % | BODY MASS INDEX: 22.92 KG/M2 | HEIGHT: 66 IN | SYSTOLIC BLOOD PRESSURE: 133 MMHG | SYSTOLIC BLOOD PRESSURE: 151 MMHG | HEART RATE: 74 BPM | RESPIRATION RATE: 18 BRPM | HEART RATE: 76 BPM | WEIGHT: 138.69 LBS | HEIGHT: 65 IN | TEMPERATURE: 98 F

## 2022-01-01 VITALS
RESPIRATION RATE: 18 BRPM | BODY MASS INDEX: 25.32 KG/M2 | DIASTOLIC BLOOD PRESSURE: 71 MMHG | TEMPERATURE: 98 F | HEART RATE: 78 BPM | HEIGHT: 63 IN | OXYGEN SATURATION: 97 % | SYSTOLIC BLOOD PRESSURE: 174 MMHG | WEIGHT: 142.88 LBS

## 2022-01-01 VITALS
SYSTOLIC BLOOD PRESSURE: 135 MMHG | WEIGHT: 144.19 LBS | OXYGEN SATURATION: 97 % | RESPIRATION RATE: 18 BRPM | HEART RATE: 72 BPM | WEIGHT: 142.88 LBS | HEIGHT: 63 IN | BODY MASS INDEX: 25.55 KG/M2 | TEMPERATURE: 98 F | TEMPERATURE: 97 F | DIASTOLIC BLOOD PRESSURE: 76 MMHG | DIASTOLIC BLOOD PRESSURE: 68 MMHG | BODY MASS INDEX: 25.32 KG/M2 | HEART RATE: 83 BPM | RESPIRATION RATE: 18 BRPM | SYSTOLIC BLOOD PRESSURE: 156 MMHG | HEIGHT: 63 IN

## 2022-01-01 VITALS
WEIGHT: 138.69 LBS | HEART RATE: 69 BPM | SYSTOLIC BLOOD PRESSURE: 127 MMHG | BODY MASS INDEX: 23.11 KG/M2 | TEMPERATURE: 98 F | HEIGHT: 65 IN | OXYGEN SATURATION: 97 % | RESPIRATION RATE: 20 BRPM | DIASTOLIC BLOOD PRESSURE: 67 MMHG

## 2022-01-01 VITALS
OXYGEN SATURATION: 87 % | DIASTOLIC BLOOD PRESSURE: 60 MMHG | TEMPERATURE: 98 F | SYSTOLIC BLOOD PRESSURE: 126 MMHG | WEIGHT: 136.88 LBS | HEART RATE: 113 BPM | HEIGHT: 66 IN | BODY MASS INDEX: 22 KG/M2

## 2022-01-01 VITALS
RESPIRATION RATE: 16 BRPM | BODY MASS INDEX: 24.43 KG/M2 | HEART RATE: 78 BPM | OXYGEN SATURATION: 96 % | WEIGHT: 146.63 LBS | HEIGHT: 65 IN | DIASTOLIC BLOOD PRESSURE: 72 MMHG | SYSTOLIC BLOOD PRESSURE: 142 MMHG

## 2022-01-01 VITALS
TEMPERATURE: 98 F | HEART RATE: 70 BPM | SYSTOLIC BLOOD PRESSURE: 141 MMHG | RESPIRATION RATE: 18 BRPM | DIASTOLIC BLOOD PRESSURE: 63 MMHG

## 2022-01-01 VITALS
HEIGHT: 66 IN | TEMPERATURE: 98 F | HEART RATE: 73 BPM | SYSTOLIC BLOOD PRESSURE: 131 MMHG | DIASTOLIC BLOOD PRESSURE: 71 MMHG | BODY MASS INDEX: 22.75 KG/M2 | OXYGEN SATURATION: 98 % | WEIGHT: 141.56 LBS | RESPIRATION RATE: 18 BRPM

## 2022-01-01 VITALS
DIASTOLIC BLOOD PRESSURE: 70 MMHG | WEIGHT: 140.44 LBS | TEMPERATURE: 98 F | OXYGEN SATURATION: 98 % | HEIGHT: 65 IN | HEART RATE: 86 BPM | BODY MASS INDEX: 23.4 KG/M2 | SYSTOLIC BLOOD PRESSURE: 148 MMHG

## 2022-01-01 VITALS
HEIGHT: 66 IN | TEMPERATURE: 98 F | HEART RATE: 75 BPM | OXYGEN SATURATION: 99 % | SYSTOLIC BLOOD PRESSURE: 122 MMHG | DIASTOLIC BLOOD PRESSURE: 70 MMHG | BODY MASS INDEX: 22.92 KG/M2 | WEIGHT: 142.63 LBS

## 2022-01-01 VITALS
HEART RATE: 71 BPM | WEIGHT: 142.75 LBS | BODY MASS INDEX: 25.29 KG/M2 | DIASTOLIC BLOOD PRESSURE: 71 MMHG | RESPIRATION RATE: 18 BRPM | HEIGHT: 63 IN | SYSTOLIC BLOOD PRESSURE: 147 MMHG | TEMPERATURE: 98 F | OXYGEN SATURATION: 97 %

## 2022-01-01 VITALS
HEIGHT: 65 IN | SYSTOLIC BLOOD PRESSURE: 130 MMHG | DIASTOLIC BLOOD PRESSURE: 72 MMHG | BODY MASS INDEX: 23.36 KG/M2 | RESPIRATION RATE: 16 BRPM | WEIGHT: 140.19 LBS | TEMPERATURE: 97 F | HEART RATE: 77 BPM

## 2022-01-01 VITALS
WEIGHT: 146.63 LBS | HEART RATE: 76 BPM | DIASTOLIC BLOOD PRESSURE: 70 MMHG | RESPIRATION RATE: 17 BRPM | BODY MASS INDEX: 24.43 KG/M2 | TEMPERATURE: 99 F | SYSTOLIC BLOOD PRESSURE: 158 MMHG | HEIGHT: 65 IN | OXYGEN SATURATION: 96 %

## 2022-01-01 VITALS
RESPIRATION RATE: 16 BRPM | HEART RATE: 85 BPM | SYSTOLIC BLOOD PRESSURE: 143 MMHG | TEMPERATURE: 97 F | DIASTOLIC BLOOD PRESSURE: 87 MMHG

## 2022-01-01 VITALS
BODY MASS INDEX: 23.17 KG/M2 | RESPIRATION RATE: 18 BRPM | WEIGHT: 144.19 LBS | DIASTOLIC BLOOD PRESSURE: 68 MMHG | HEIGHT: 66 IN | SYSTOLIC BLOOD PRESSURE: 135 MMHG | TEMPERATURE: 98 F | HEART RATE: 83 BPM | OXYGEN SATURATION: 95 %

## 2022-01-01 VITALS
HEART RATE: 95 BPM | DIASTOLIC BLOOD PRESSURE: 70 MMHG | SYSTOLIC BLOOD PRESSURE: 138 MMHG | WEIGHT: 145.5 LBS | OXYGEN SATURATION: 99 % | BODY MASS INDEX: 24.24 KG/M2 | HEIGHT: 65 IN | TEMPERATURE: 98 F

## 2022-01-01 VITALS
BODY MASS INDEX: 23.83 KG/M2 | DIASTOLIC BLOOD PRESSURE: 60 MMHG | HEART RATE: 63 BPM | OXYGEN SATURATION: 95 % | WEIGHT: 143.06 LBS | HEIGHT: 65 IN | SYSTOLIC BLOOD PRESSURE: 128 MMHG | TEMPERATURE: 97 F

## 2022-01-01 VITALS
OXYGEN SATURATION: 95 % | SYSTOLIC BLOOD PRESSURE: 130 MMHG | BODY MASS INDEX: 22 KG/M2 | HEIGHT: 66 IN | HEART RATE: 88 BPM | DIASTOLIC BLOOD PRESSURE: 70 MMHG | TEMPERATURE: 97 F | WEIGHT: 136.88 LBS

## 2022-01-01 VITALS
SYSTOLIC BLOOD PRESSURE: 130 MMHG | HEART RATE: 100 BPM | DIASTOLIC BLOOD PRESSURE: 70 MMHG | BODY MASS INDEX: 25.75 KG/M2 | OXYGEN SATURATION: 98 % | WEIGHT: 154.56 LBS | TEMPERATURE: 97 F | HEIGHT: 65 IN

## 2022-01-01 VITALS
DIASTOLIC BLOOD PRESSURE: 70 MMHG | HEART RATE: 79 BPM | BODY MASS INDEX: 22.75 KG/M2 | TEMPERATURE: 98 F | OXYGEN SATURATION: 98 % | SYSTOLIC BLOOD PRESSURE: 130 MMHG | HEIGHT: 66 IN | WEIGHT: 141.56 LBS

## 2022-01-01 DIAGNOSIS — K86.89 PANCREATIC INSUFFICIENCY: ICD-10-CM

## 2022-01-01 DIAGNOSIS — C78.7 METASTASIS TO LIVER: ICD-10-CM

## 2022-01-01 DIAGNOSIS — C25.9 PRIMARY PANCREATIC CANCER WITH METASTASIS TO OTHER SITE: Primary | ICD-10-CM

## 2022-01-01 DIAGNOSIS — Z79.899 IMMUNODEFICIENCY DUE TO CHEMOTHERAPY: ICD-10-CM

## 2022-01-01 DIAGNOSIS — G89.3 CANCER RELATED PAIN: ICD-10-CM

## 2022-01-01 DIAGNOSIS — I50.32 CHRONIC DIASTOLIC CONGESTIVE HEART FAILURE: ICD-10-CM

## 2022-01-01 DIAGNOSIS — C25.1 MALIGNANT NEOPLASM OF BODY OF PANCREAS: ICD-10-CM

## 2022-01-01 DIAGNOSIS — D69.6 THROMBOCYTOPENIA: ICD-10-CM

## 2022-01-01 DIAGNOSIS — E78.5 HYPERLIPIDEMIA, UNSPECIFIED HYPERLIPIDEMIA TYPE: ICD-10-CM

## 2022-01-01 DIAGNOSIS — T45.1X5A IMMUNODEFICIENCY DUE TO CHEMOTHERAPY: ICD-10-CM

## 2022-01-01 DIAGNOSIS — L89.322 PRESSURE INJURY OF LEFT BUTTOCK, STAGE 2: ICD-10-CM

## 2022-01-01 DIAGNOSIS — I48.91 ATRIAL FIBRILLATION, UNSPECIFIED TYPE: ICD-10-CM

## 2022-01-01 DIAGNOSIS — D84.821 IMMUNODEFICIENCY DUE TO CHEMOTHERAPY: ICD-10-CM

## 2022-01-01 DIAGNOSIS — A68.9 RECURRENT FEVER: ICD-10-CM

## 2022-01-01 DIAGNOSIS — I10 HYPERTENSION, UNSPECIFIED TYPE: ICD-10-CM

## 2022-01-01 DIAGNOSIS — C25.2 MALIGNANT NEOPLASM OF TAIL OF PANCREAS: ICD-10-CM

## 2022-01-01 DIAGNOSIS — R50.9 FEVER, UNSPECIFIED FEVER CAUSE: ICD-10-CM

## 2022-01-01 DIAGNOSIS — C25.9 PRIMARY PANCREATIC CANCER WITH METASTASIS TO OTHER SITE: ICD-10-CM

## 2022-01-01 DIAGNOSIS — D64.9 NORMOCYTIC ANEMIA: ICD-10-CM

## 2022-01-01 DIAGNOSIS — C25.9 ADENOCARCINOMA OF PANCREAS: Primary | ICD-10-CM

## 2022-01-01 DIAGNOSIS — C25.9 ADENOCARCINOMA OF PANCREAS: ICD-10-CM

## 2022-01-01 DIAGNOSIS — C79.51 BONE METASTASIS: ICD-10-CM

## 2022-01-01 DIAGNOSIS — R09.02 HYPOXIA: Primary | ICD-10-CM

## 2022-01-01 DIAGNOSIS — R09.02 HYPOXIA: ICD-10-CM

## 2022-01-01 DIAGNOSIS — C78.01 MALIGNANT NEOPLASM METASTATIC TO RIGHT LUNG: ICD-10-CM

## 2022-01-01 DIAGNOSIS — T40.2X5A CONSTIPATION DUE TO OPIOID THERAPY: ICD-10-CM

## 2022-01-01 DIAGNOSIS — K59.03 CONSTIPATION DUE TO OPIOID THERAPY: ICD-10-CM

## 2022-01-01 DIAGNOSIS — J90 PLEURAL EFFUSION: Primary | ICD-10-CM

## 2022-01-01 DIAGNOSIS — Z79.899 HIGH RISK MEDICATION USE: Primary | ICD-10-CM

## 2022-01-01 DIAGNOSIS — J98.4 PNEUMONITIS: Primary | ICD-10-CM

## 2022-01-01 DIAGNOSIS — R73.9 HYPERGLYCEMIA: Primary | ICD-10-CM

## 2022-01-01 DIAGNOSIS — R53.81 DEBILITY: ICD-10-CM

## 2022-01-01 DIAGNOSIS — C79.72 MALIGNANT NEOPLASM METASTATIC TO LEFT ADRENAL GLAND: ICD-10-CM

## 2022-01-01 DIAGNOSIS — C25.9 MALIGNANT NEOPLASM OF PANCREAS, UNSPECIFIED LOCATION OF MALIGNANCY: Primary | ICD-10-CM

## 2022-01-01 DIAGNOSIS — J90 PLEURAL EFFUSION: ICD-10-CM

## 2022-01-01 DIAGNOSIS — C79.51 BONE METASTASES: ICD-10-CM

## 2022-01-01 DIAGNOSIS — R42 VERTIGO: Primary | ICD-10-CM

## 2022-01-01 DIAGNOSIS — I50.32 CHRONIC DIASTOLIC HEART FAILURE: ICD-10-CM

## 2022-01-01 DIAGNOSIS — L29.9 ITCHING: Primary | ICD-10-CM

## 2022-01-01 DIAGNOSIS — R53.83 FATIGUE, UNSPECIFIED TYPE: ICD-10-CM

## 2022-01-01 DIAGNOSIS — M25.552 LEFT HIP PAIN: ICD-10-CM

## 2022-01-01 DIAGNOSIS — K59.00 CONSTIPATION, UNSPECIFIED CONSTIPATION TYPE: ICD-10-CM

## 2022-01-01 DIAGNOSIS — R50.9 FEVER, UNSPECIFIED FEVER CAUSE: Primary | ICD-10-CM

## 2022-01-01 DIAGNOSIS — F43.23 ADJUSTMENT DISORDER WITH MIXED ANXIETY AND DEPRESSED MOOD: Primary | ICD-10-CM

## 2022-01-01 DIAGNOSIS — G47.00 INSOMNIA, UNSPECIFIED TYPE: ICD-10-CM

## 2022-01-01 DIAGNOSIS — R74.8 ELEVATED LIVER ENZYMES: ICD-10-CM

## 2022-01-01 DIAGNOSIS — R73.9 HYPERGLYCEMIA: ICD-10-CM

## 2022-01-01 DIAGNOSIS — A68.9 RECURRENT FEVER: Primary | ICD-10-CM

## 2022-01-01 DIAGNOSIS — J98.4 PNEUMONITIS: ICD-10-CM

## 2022-01-01 DIAGNOSIS — Z79.899 HIGH RISK MEDICATION USE: ICD-10-CM

## 2022-01-01 DIAGNOSIS — R10.32 LEFT LOWER QUADRANT ABDOMINAL PAIN: ICD-10-CM

## 2022-01-01 DIAGNOSIS — R14.0 BLOATING: ICD-10-CM

## 2022-01-01 DIAGNOSIS — G89.3 CANCER RELATED PAIN: Primary | ICD-10-CM

## 2022-01-01 DIAGNOSIS — M62.50 MUSCULAR WASTING AND DISUSE ATROPHY: ICD-10-CM

## 2022-01-01 LAB
ALBUMIN SERPL BCP-MCNC: 3.3 G/DL (ref 3.5–5.2)
ALBUMIN SERPL BCP-MCNC: 3.5 G/DL (ref 3.5–5.2)
ALBUMIN SERPL BCP-MCNC: 3.5 G/DL (ref 3.5–5.2)
ALBUMIN SERPL BCP-MCNC: 3.6 G/DL (ref 3.5–5.2)
ALBUMIN SERPL BCP-MCNC: 3.6 G/DL (ref 3.5–5.2)
ALBUMIN SERPL BCP-MCNC: 3.7 G/DL (ref 3.5–5.2)
ALBUMIN SERPL BCP-MCNC: 3.8 G/DL (ref 3.5–5.2)
ALP SERPL-CCNC: 60 U/L (ref 55–135)
ALP SERPL-CCNC: 64 U/L (ref 55–135)
ALP SERPL-CCNC: 65 U/L (ref 55–135)
ALP SERPL-CCNC: 65 U/L (ref 55–135)
ALP SERPL-CCNC: 66 U/L (ref 55–135)
ALP SERPL-CCNC: 72 U/L (ref 55–135)
ALP SERPL-CCNC: 74 U/L (ref 55–135)
ALT SERPL W/O P-5'-P-CCNC: 16 U/L (ref 10–44)
ALT SERPL W/O P-5'-P-CCNC: 19 U/L (ref 10–44)
ALT SERPL W/O P-5'-P-CCNC: 19 U/L (ref 10–44)
ALT SERPL W/O P-5'-P-CCNC: 20 U/L (ref 10–44)
ALT SERPL W/O P-5'-P-CCNC: 21 U/L (ref 10–44)
ALT SERPL W/O P-5'-P-CCNC: 22 U/L (ref 10–44)
ALT SERPL W/O P-5'-P-CCNC: 25 U/L (ref 10–44)
ANION GAP SERPL CALC-SCNC: 10 MMOL/L (ref 8–16)
ANION GAP SERPL CALC-SCNC: 11 MMOL/L (ref 8–16)
ANION GAP SERPL CALC-SCNC: 12 MMOL/L (ref 8–16)
ANION GAP SERPL CALC-SCNC: 12 MMOL/L (ref 8–16)
ANION GAP SERPL CALC-SCNC: 14 MMOL/L (ref 8–16)
AST SERPL-CCNC: 20 U/L (ref 10–40)
AST SERPL-CCNC: 21 U/L (ref 10–40)
AST SERPL-CCNC: 22 U/L (ref 10–40)
AST SERPL-CCNC: 22 U/L (ref 10–40)
AST SERPL-CCNC: 25 U/L (ref 10–40)
AST SERPL-CCNC: 25 U/L (ref 10–40)
AST SERPL-CCNC: 29 U/L (ref 10–40)
BASOPHILS # BLD AUTO: 0.02 K/UL (ref 0–0.2)
BASOPHILS # BLD AUTO: 0.03 K/UL (ref 0–0.2)
BASOPHILS NFR BLD: 0.3 % (ref 0–1.9)
BASOPHILS NFR BLD: 0.4 % (ref 0–1.9)
BASOPHILS NFR BLD: 0.5 % (ref 0–1.9)
BASOPHILS NFR BLD: 0.5 % (ref 0–1.9)
BASOPHILS NFR BLD: 0.6 % (ref 0–1.9)
BASOPHILS NFR BLD: 0.7 % (ref 0–1.9)
BASOPHILS NFR BLD: 0.8 % (ref 0–1.9)
BILIRUB SERPL-MCNC: 0.4 MG/DL (ref 0.1–1)
BILIRUB SERPL-MCNC: 0.5 MG/DL (ref 0.1–1)
BILIRUB SERPL-MCNC: 0.6 MG/DL (ref 0.1–1)
BILIRUB SERPL-MCNC: 0.6 MG/DL (ref 0.1–1)
BILIRUB UR QL STRIP: NEGATIVE
BUN SERPL-MCNC: 14 MG/DL (ref 8–23)
BUN SERPL-MCNC: 17 MG/DL (ref 8–23)
BUN SERPL-MCNC: 18 MG/DL (ref 8–23)
BUN SERPL-MCNC: 21 MG/DL (ref 8–23)
BUN SERPL-MCNC: 23 MG/DL (ref 8–23)
BUN SERPL-MCNC: 23 MG/DL (ref 8–23)
BUN SERPL-MCNC: 26 MG/DL (ref 8–23)
CALCIUM SERPL-MCNC: 10.2 MG/DL (ref 8.7–10.5)
CALCIUM SERPL-MCNC: 9.2 MG/DL (ref 8.7–10.5)
CALCIUM SERPL-MCNC: 9.3 MG/DL (ref 8.7–10.5)
CALCIUM SERPL-MCNC: 9.5 MG/DL (ref 8.7–10.5)
CALCIUM SERPL-MCNC: 9.5 MG/DL (ref 8.7–10.5)
CALCIUM SERPL-MCNC: 9.6 MG/DL (ref 8.7–10.5)
CALCIUM SERPL-MCNC: 9.6 MG/DL (ref 8.7–10.5)
CANCER AG19-9 SERPL-ACNC: 1048.4 U/ML (ref 0–40)
CANCER AG19-9 SERPL-ACNC: 2085.2 U/ML (ref 0–40)
CANCER AG19-9 SERPL-ACNC: 39 U/ML (ref 0–40)
CANCER AG19-9 SERPL-ACNC: 4105.8 U/ML (ref 0–40)
CANCER AG19-9 SERPL-ACNC: 658.5 U/ML (ref 0–40)
CANCER AG19-9 SERPL-ACNC: 732.4 U/ML (ref 0–40)
CANCER AG19-9 SERPL-ACNC: 882.6 U/ML (ref 0–40)
CHLORIDE SERPL-SCNC: 93 MMOL/L (ref 95–110)
CHLORIDE SERPL-SCNC: 94 MMOL/L (ref 95–110)
CHLORIDE SERPL-SCNC: 94 MMOL/L (ref 95–110)
CHLORIDE SERPL-SCNC: 95 MMOL/L (ref 95–110)
CLARITY UR: CLEAR
CO2 SERPL-SCNC: 29 MMOL/L (ref 23–29)
CO2 SERPL-SCNC: 29 MMOL/L (ref 23–29)
CO2 SERPL-SCNC: 30 MMOL/L (ref 23–29)
CO2 SERPL-SCNC: 31 MMOL/L (ref 23–29)
CO2 SERPL-SCNC: 32 MMOL/L (ref 23–29)
CO2 SERPL-SCNC: 33 MMOL/L (ref 23–29)
CO2 SERPL-SCNC: 33 MMOL/L (ref 23–29)
COLOR UR: YELLOW
CREAT SERPL-MCNC: 0.6 MG/DL (ref 0.5–1.4)
CRP SERPL-MCNC: 24.4 MG/L (ref 0–8.2)
DIFFERENTIAL METHOD: ABNORMAL
EOSINOPHIL # BLD AUTO: 0 K/UL (ref 0–0.5)
EOSINOPHIL # BLD AUTO: 0.1 K/UL (ref 0–0.5)
EOSINOPHIL # BLD AUTO: 0.2 K/UL (ref 0–0.5)
EOSINOPHIL NFR BLD: 0.5 % (ref 0–8)
EOSINOPHIL NFR BLD: 0.7 % (ref 0–8)
EOSINOPHIL NFR BLD: 1.5 % (ref 0–8)
EOSINOPHIL NFR BLD: 1.6 % (ref 0–8)
EOSINOPHIL NFR BLD: 2.3 % (ref 0–8)
EOSINOPHIL NFR BLD: 2.8 % (ref 0–8)
EOSINOPHIL NFR BLD: 3.2 % (ref 0–8)
ERYTHROCYTE [DISTWIDTH] IN BLOOD BY AUTOMATED COUNT: 17.2 % (ref 11.5–14.5)
ERYTHROCYTE [DISTWIDTH] IN BLOOD BY AUTOMATED COUNT: 17.3 % (ref 11.5–14.5)
ERYTHROCYTE [DISTWIDTH] IN BLOOD BY AUTOMATED COUNT: 18.2 % (ref 11.5–14.5)
ERYTHROCYTE [DISTWIDTH] IN BLOOD BY AUTOMATED COUNT: 18.5 % (ref 11.5–14.5)
ERYTHROCYTE [DISTWIDTH] IN BLOOD BY AUTOMATED COUNT: 18.8 % (ref 11.5–14.5)
ERYTHROCYTE [DISTWIDTH] IN BLOOD BY AUTOMATED COUNT: 19 % (ref 11.5–14.5)
ERYTHROCYTE [DISTWIDTH] IN BLOOD BY AUTOMATED COUNT: 19.8 % (ref 11.5–14.5)
EST. GFR  (AFRICAN AMERICAN): >60 ML/MIN/1.73 M^2
EST. GFR  (NO RACE VARIABLE): >60 ML/MIN/1.73 M^2
EST. GFR  (NON AFRICAN AMERICAN): >60 ML/MIN/1.73 M^2
ESTIMATED AVG GLUCOSE: 126 MG/DL (ref 68–131)
GLUCOSE SERPL-MCNC: 103 MG/DL (ref 70–110)
GLUCOSE SERPL-MCNC: 105 MG/DL (ref 70–110)
GLUCOSE SERPL-MCNC: 106 MG/DL (ref 70–110)
GLUCOSE SERPL-MCNC: 108 MG/DL (ref 70–110)
GLUCOSE SERPL-MCNC: 113 MG/DL (ref 70–110)
GLUCOSE SERPL-MCNC: 118 MG/DL (ref 70–110)
GLUCOSE SERPL-MCNC: 126 MG/DL (ref 70–110)
GLUCOSE SERPL-MCNC: 89 MG/DL (ref 70–110)
GLUCOSE SERPL-MCNC: 90 MG/DL (ref 70–110)
GLUCOSE SERPL-MCNC: 92 MG/DL (ref 70–110)
GLUCOSE SERPL-MCNC: 93 MG/DL (ref 70–110)
GLUCOSE UR QL STRIP: NEGATIVE
HBA1C MFR BLD: 6 % (ref 4–5.6)
HCT VFR BLD AUTO: 36.7 % (ref 37–48.5)
HCT VFR BLD AUTO: 37.4 % (ref 37–48.5)
HCT VFR BLD AUTO: 37.9 % (ref 37–48.5)
HCT VFR BLD AUTO: 38.1 % (ref 37–48.5)
HCT VFR BLD AUTO: 38.7 % (ref 37–48.5)
HCT VFR BLD AUTO: 40 % (ref 37–48.5)
HCT VFR BLD AUTO: 40.1 % (ref 37–48.5)
HGB BLD-MCNC: 11.8 G/DL (ref 12–16)
HGB BLD-MCNC: 11.9 G/DL (ref 12–16)
HGB BLD-MCNC: 12 G/DL (ref 12–16)
HGB BLD-MCNC: 12.3 G/DL (ref 12–16)
HGB BLD-MCNC: 12.9 G/DL (ref 12–16)
HGB UR QL STRIP: ABNORMAL
IMM GRANULOCYTES # BLD AUTO: 0.02 K/UL (ref 0–0.04)
IMM GRANULOCYTES # BLD AUTO: 0.03 K/UL (ref 0–0.04)
IMM GRANULOCYTES # BLD AUTO: 0.05 K/UL (ref 0–0.04)
IMM GRANULOCYTES # BLD AUTO: 0.06 K/UL (ref 0–0.04)
IMM GRANULOCYTES NFR BLD AUTO: 0.3 % (ref 0–0.5)
IMM GRANULOCYTES NFR BLD AUTO: 0.4 % (ref 0–0.5)
IMM GRANULOCYTES NFR BLD AUTO: 0.4 % (ref 0–0.5)
IMM GRANULOCYTES NFR BLD AUTO: 0.5 % (ref 0–0.5)
IMM GRANULOCYTES NFR BLD AUTO: 0.7 % (ref 0–0.5)
IMM GRANULOCYTES NFR BLD AUTO: 0.8 % (ref 0–0.5)
IMM GRANULOCYTES NFR BLD AUTO: 1.4 % (ref 0–0.5)
KETONES UR QL STRIP: NEGATIVE
LDH SERPL L TO P-CCNC: 273 U/L (ref 110–260)
LEUKOCYTE ESTERASE UR QL STRIP: NEGATIVE
LYMPHOCYTES # BLD AUTO: 0.7 K/UL (ref 1–4.8)
LYMPHOCYTES # BLD AUTO: 0.8 K/UL (ref 1–4.8)
LYMPHOCYTES # BLD AUTO: 0.9 K/UL (ref 1–4.8)
LYMPHOCYTES NFR BLD: 10.9 % (ref 18–48)
LYMPHOCYTES NFR BLD: 13.9 % (ref 18–48)
LYMPHOCYTES NFR BLD: 17.8 % (ref 18–48)
LYMPHOCYTES NFR BLD: 18.8 % (ref 18–48)
LYMPHOCYTES NFR BLD: 19.4 % (ref 18–48)
LYMPHOCYTES NFR BLD: 19.4 % (ref 18–48)
LYMPHOCYTES NFR BLD: 20.2 % (ref 18–48)
MAGNESIUM SERPL-MCNC: 2 MG/DL (ref 1.6–2.6)
MAGNESIUM SERPL-MCNC: 2.1 MG/DL (ref 1.6–2.6)
MAGNESIUM SERPL-MCNC: 2.3 MG/DL (ref 1.6–2.6)
MCH RBC QN AUTO: 28 PG (ref 27–31)
MCH RBC QN AUTO: 28.1 PG (ref 27–31)
MCH RBC QN AUTO: 28.4 PG (ref 27–31)
MCH RBC QN AUTO: 29.1 PG (ref 27–31)
MCH RBC QN AUTO: 30.1 PG (ref 27–31)
MCH RBC QN AUTO: 30.4 PG (ref 27–31)
MCH RBC QN AUTO: 30.8 PG (ref 27–31)
MCHC RBC AUTO-ENTMCNC: 30.7 G/DL (ref 32–36)
MCHC RBC AUTO-ENTMCNC: 31 G/DL (ref 32–36)
MCHC RBC AUTO-ENTMCNC: 31.8 G/DL (ref 32–36)
MCHC RBC AUTO-ENTMCNC: 31.8 G/DL (ref 32–36)
MCHC RBC AUTO-ENTMCNC: 32.3 G/DL (ref 32–36)
MCHC RBC AUTO-ENTMCNC: 32.5 G/DL (ref 32–36)
MCHC RBC AUTO-ENTMCNC: 32.7 G/DL (ref 32–36)
MCV RBC AUTO: 88 FL (ref 82–98)
MCV RBC AUTO: 90 FL (ref 82–98)
MCV RBC AUTO: 91 FL (ref 82–98)
MCV RBC AUTO: 92 FL (ref 82–98)
MCV RBC AUTO: 93 FL (ref 82–98)
MCV RBC AUTO: 94 FL (ref 82–98)
MCV RBC AUTO: 95 FL (ref 82–98)
MONOCYTES # BLD AUTO: 0.5 K/UL (ref 0.3–1)
MONOCYTES # BLD AUTO: 0.5 K/UL (ref 0.3–1)
MONOCYTES # BLD AUTO: 0.7 K/UL (ref 0.3–1)
MONOCYTES # BLD AUTO: 0.7 K/UL (ref 0.3–1)
MONOCYTES # BLD AUTO: 0.8 K/UL (ref 0.3–1)
MONOCYTES # BLD AUTO: 1 K/UL (ref 0.3–1)
MONOCYTES # BLD AUTO: 1.1 K/UL (ref 0.3–1)
MONOCYTES NFR BLD: 10.5 % (ref 4–15)
MONOCYTES NFR BLD: 13.8 % (ref 4–15)
MONOCYTES NFR BLD: 14.1 % (ref 4–15)
MONOCYTES NFR BLD: 14.8 % (ref 4–15)
MONOCYTES NFR BLD: 16 % (ref 4–15)
MONOCYTES NFR BLD: 19.7 % (ref 4–15)
MONOCYTES NFR BLD: 20.5 % (ref 4–15)
NEUTROPHILS # BLD AUTO: 2.4 K/UL (ref 1.8–7.7)
NEUTROPHILS # BLD AUTO: 2.4 K/UL (ref 1.8–7.7)
NEUTROPHILS # BLD AUTO: 2.7 K/UL (ref 1.8–7.7)
NEUTROPHILS # BLD AUTO: 2.8 K/UL (ref 1.8–7.7)
NEUTROPHILS # BLD AUTO: 2.8 K/UL (ref 1.8–7.7)
NEUTROPHILS # BLD AUTO: 3.7 K/UL (ref 1.8–7.7)
NEUTROPHILS # BLD AUTO: 5.4 K/UL (ref 1.8–7.7)
NEUTROPHILS NFR BLD: 58.4 % (ref 38–73)
NEUTROPHILS NFR BLD: 60.4 % (ref 38–73)
NEUTROPHILS NFR BLD: 61.4 % (ref 38–73)
NEUTROPHILS NFR BLD: 61.9 % (ref 38–73)
NEUTROPHILS NFR BLD: 65.1 % (ref 38–73)
NEUTROPHILS NFR BLD: 65.2 % (ref 38–73)
NEUTROPHILS NFR BLD: 76.9 % (ref 38–73)
NITRITE UR QL STRIP: NEGATIVE
NRBC BLD-RTO: 0 /100 WBC
NT-PROBNP SERPL-MCNC: 1528 PG/ML
PH UR STRIP: 6 [PH] (ref 5–8)
PHOSPHATE SERPL-MCNC: 3.9 MG/DL (ref 2.7–4.5)
PHOSPHATE SERPL-MCNC: 4 MG/DL (ref 2.7–4.5)
PHOSPHATE SERPL-MCNC: 4.2 MG/DL (ref 2.7–4.5)
PHOSPHATE SERPL-MCNC: 4.3 MG/DL (ref 2.7–4.5)
PHOSPHATE SERPL-MCNC: 4.5 MG/DL (ref 2.7–4.5)
PHOSPHATE SERPL-MCNC: 4.5 MG/DL (ref 2.7–4.5)
PLATELET # BLD AUTO: 130 K/UL (ref 150–450)
PLATELET # BLD AUTO: 163 K/UL (ref 150–450)
PLATELET # BLD AUTO: 167 K/UL (ref 150–450)
PLATELET # BLD AUTO: 170 K/UL (ref 150–450)
PLATELET # BLD AUTO: 191 K/UL (ref 150–450)
PLATELET # BLD AUTO: 193 K/UL (ref 150–450)
PLATELET # BLD AUTO: 205 K/UL (ref 150–450)
PLATELET BLD QL SMEAR: ABNORMAL
PMV BLD AUTO: 10.7 FL (ref 9.2–12.9)
PMV BLD AUTO: 8.4 FL (ref 9.2–12.9)
PMV BLD AUTO: 8.6 FL (ref 9.2–12.9)
PMV BLD AUTO: 8.7 FL (ref 9.2–12.9)
PMV BLD AUTO: 9 FL (ref 9.2–12.9)
POTASSIUM SERPL-SCNC: 3.7 MMOL/L (ref 3.5–5.1)
POTASSIUM SERPL-SCNC: 4.3 MMOL/L (ref 3.5–5.1)
POTASSIUM SERPL-SCNC: 4.4 MMOL/L (ref 3.5–5.1)
POTASSIUM SERPL-SCNC: 4.7 MMOL/L (ref 3.5–5.1)
POTASSIUM SERPL-SCNC: 4.7 MMOL/L (ref 3.5–5.1)
POTASSIUM SERPL-SCNC: 4.9 MMOL/L (ref 3.5–5.1)
POTASSIUM SERPL-SCNC: 5.3 MMOL/L (ref 3.5–5.1)
PROT SERPL-MCNC: 6.3 G/DL (ref 6–8.4)
PROT SERPL-MCNC: 6.5 G/DL (ref 6–8.4)
PROT SERPL-MCNC: 6.5 G/DL (ref 6–8.4)
PROT SERPL-MCNC: 6.6 G/DL (ref 6–8.4)
PROT SERPL-MCNC: 6.7 G/DL (ref 6–8.4)
PROT SERPL-MCNC: 6.8 G/DL (ref 6–8.4)
PROT SERPL-MCNC: 6.9 G/DL (ref 6–8.4)
PROT UR QL STRIP: ABNORMAL
RBC # BLD AUTO: 3.95 M/UL (ref 4–5.4)
RBC # BLD AUTO: 3.96 M/UL (ref 4–5.4)
RBC # BLD AUTO: 4 M/UL (ref 4–5.4)
RBC # BLD AUTO: 4.15 M/UL (ref 4–5.4)
RBC # BLD AUTO: 4.38 M/UL (ref 4–5.4)
RBC # BLD AUTO: 4.4 M/UL (ref 4–5.4)
RBC # BLD AUTO: 4.43 M/UL (ref 4–5.4)
SODIUM SERPL-SCNC: 135 MMOL/L (ref 136–145)
SODIUM SERPL-SCNC: 136 MMOL/L (ref 136–145)
SODIUM SERPL-SCNC: 136 MMOL/L (ref 136–145)
SODIUM SERPL-SCNC: 137 MMOL/L (ref 136–145)
SODIUM SERPL-SCNC: 138 MMOL/L (ref 136–145)
SODIUM SERPL-SCNC: 138 MMOL/L (ref 136–145)
SODIUM SERPL-SCNC: 139 MMOL/L (ref 136–145)
SP GR UR STRIP: 1.02 (ref 1–1.03)
URATE SERPL-MCNC: 2.8 MG/DL (ref 2.4–5.7)
URN SPEC COLLECT METH UR: ABNORMAL
WBC # BLD AUTO: 3.7 K/UL (ref 3.9–12.7)
WBC # BLD AUTO: 3.91 K/UL (ref 3.9–12.7)
WBC # BLD AUTO: 4.38 K/UL (ref 3.9–12.7)
WBC # BLD AUTO: 4.69 K/UL (ref 3.9–12.7)
WBC # BLD AUTO: 4.79 K/UL (ref 3.9–12.7)
WBC # BLD AUTO: 5.74 K/UL (ref 3.9–12.7)
WBC # BLD AUTO: 7.04 K/UL (ref 3.9–12.7)

## 2022-01-01 PROCEDURE — 85025 COMPLETE CBC W/AUTO DIFF WBC: CPT | Mod: PN | Performed by: INTERNAL MEDICINE

## 2022-01-01 PROCEDURE — 96415 CHEMO IV INFUSION ADDL HR: CPT | Mod: PN

## 2022-01-01 PROCEDURE — 78815 NM PET CT ROUTINE: ICD-10-PCS | Mod: 26,PS,, | Performed by: RADIOLOGY

## 2022-01-01 PROCEDURE — 99214 PR OFFICE/OUTPT VISIT, EST, LEVL IV, 30-39 MIN: ICD-10-PCS | Mod: S$GLB,,, | Performed by: NURSE PRACTITIONER

## 2022-01-01 PROCEDURE — A4216 STERILE WATER/SALINE, 10 ML: HCPCS | Mod: PN | Performed by: INTERNAL MEDICINE

## 2022-01-01 PROCEDURE — 3078F PR MOST RECENT DIASTOLIC BLOOD PRESSURE < 80 MM HG: ICD-10-PCS | Mod: CPTII,S$GLB,, | Performed by: NURSE PRACTITIONER

## 2022-01-01 PROCEDURE — 96368 THER/DIAG CONCURRENT INF: CPT | Mod: PN

## 2022-01-01 PROCEDURE — 99999 PR PBB SHADOW E&M-EST. PATIENT-LVL IV: CPT | Mod: PBBFAC,,, | Performed by: INTERNAL MEDICINE

## 2022-01-01 PROCEDURE — 1126F AMNT PAIN NOTED NONE PRSNT: CPT | Mod: CPTII,S$GLB,, | Performed by: INTERNAL MEDICINE

## 2022-01-01 PROCEDURE — 3078F PR MOST RECENT DIASTOLIC BLOOD PRESSURE < 80 MM HG: ICD-10-PCS | Mod: CPTII,S$GLB,, | Performed by: INTERNAL MEDICINE

## 2022-01-01 PROCEDURE — 3288F FALL RISK ASSESSMENT DOCD: CPT | Mod: CPTII,S$GLB,, | Performed by: INTERNAL MEDICINE

## 2022-01-01 PROCEDURE — 99999 PR PBB SHADOW E&M-EST. PATIENT-LVL V: CPT | Mod: PBBFAC,,, | Performed by: INTERNAL MEDICINE

## 2022-01-01 PROCEDURE — 96367 TX/PROPH/DG ADDL SEQ IV INF: CPT | Mod: PN

## 2022-01-01 PROCEDURE — 96377 APPLICATON ON-BODY INJECTOR: CPT | Mod: PN

## 2022-01-01 PROCEDURE — 96413 CHEMO IV INFUSION 1 HR: CPT | Mod: PN

## 2022-01-01 PROCEDURE — 96411 CHEMO IV PUSH ADDL DRUG: CPT | Mod: PN

## 2022-01-01 PROCEDURE — 1111F PR DISCHARGE MEDS RECONCILED W/ CURRENT OUTPATIENT MED LIST: ICD-10-PCS | Mod: CPTII,S$GLB,, | Performed by: INTERNAL MEDICINE

## 2022-01-01 PROCEDURE — 1100F PR PT FALLS ASSESS DOC 2+ FALLS/FALL W/INJURY/YR: ICD-10-PCS | Mod: CPTII,S$GLB,, | Performed by: INTERNAL MEDICINE

## 2022-01-01 PROCEDURE — 1159F MED LIST DOCD IN RCRD: CPT | Mod: CPTII,S$GLB,, | Performed by: INTERNAL MEDICINE

## 2022-01-01 PROCEDURE — 71046 XR CHEST PA AND LATERAL: ICD-10-PCS | Mod: 26,,, | Performed by: RADIOLOGY

## 2022-01-01 PROCEDURE — 1101F PT FALLS ASSESS-DOCD LE1/YR: CPT | Mod: CPTII,S$GLB,, | Performed by: INTERNAL MEDICINE

## 2022-01-01 PROCEDURE — 25000003 PHARM REV CODE 250: Mod: PN | Performed by: INTERNAL MEDICINE

## 2022-01-01 PROCEDURE — 1126F PR PAIN SEVERITY QUANTIFIED, NO PAIN PRESENT: ICD-10-PCS | Mod: CPTII,S$GLB,,

## 2022-01-01 PROCEDURE — 1101F PR PT FALLS ASSESS DOC 0-1 FALLS W/OUT INJ PAST YR: ICD-10-PCS | Mod: CPTII,S$GLB,, | Performed by: INTERNAL MEDICINE

## 2022-01-01 PROCEDURE — 96417 CHEMO IV INFUS EACH ADDL SEQ: CPT | Mod: PN

## 2022-01-01 PROCEDURE — 1126F PR PAIN SEVERITY QUANTIFIED, NO PAIN PRESENT: ICD-10-PCS | Mod: CPTII,S$GLB,, | Performed by: INTERNAL MEDICINE

## 2022-01-01 PROCEDURE — 96375 TX/PRO/DX INJ NEW DRUG ADDON: CPT | Mod: PN

## 2022-01-01 PROCEDURE — 99215 PR OFFICE/OUTPT VISIT, EST, LEVL V, 40-54 MIN: ICD-10-PCS | Mod: S$GLB,,, | Performed by: INTERNAL MEDICINE

## 2022-01-01 PROCEDURE — 3078F DIAST BP <80 MM HG: CPT | Mod: CPTII,S$GLB,, | Performed by: INTERNAL MEDICINE

## 2022-01-01 PROCEDURE — M0220 HC INJECTION ADMIN, TIXAGEVIMAB-CILGAVIMAB, INCL POST ADMIN MONIT: HCPCS | Performed by: INTERNAL MEDICINE

## 2022-01-01 PROCEDURE — 83735 ASSAY OF MAGNESIUM: CPT | Mod: PN | Performed by: INTERNAL MEDICINE

## 2022-01-01 PROCEDURE — 36415 COLL VENOUS BLD VENIPUNCTURE: CPT | Mod: PN | Performed by: INTERNAL MEDICINE

## 2022-01-01 PROCEDURE — 3077F SYST BP >= 140 MM HG: CPT | Mod: CPTII,S$GLB,,

## 2022-01-01 PROCEDURE — 1126F PR PAIN SEVERITY QUANTIFIED, NO PAIN PRESENT: ICD-10-PCS | Mod: CPTII,S$GLB,, | Performed by: NURSE PRACTITIONER

## 2022-01-01 PROCEDURE — 1125F AMNT PAIN NOTED PAIN PRSNT: CPT | Mod: CPTII,S$GLB,, | Performed by: INTERNAL MEDICINE

## 2022-01-01 PROCEDURE — 63600175 PHARM REV CODE 636 W HCPCS: Mod: PN | Performed by: INTERNAL MEDICINE

## 2022-01-01 PROCEDURE — 99999 PR PBB SHADOW E&M-EST. PATIENT-LVL I: ICD-10-PCS | Mod: PBBFAC,,, | Performed by: PSYCHOLOGIST

## 2022-01-01 PROCEDURE — 71046 X-RAY EXAM CHEST 2 VIEWS: CPT | Mod: TC,FY,PO

## 2022-01-01 PROCEDURE — 93010 ELECTROCARDIOGRAM REPORT: CPT | Mod: ,,, | Performed by: INTERNAL MEDICINE

## 2022-01-01 PROCEDURE — 99999 PR PBB SHADOW E&M-EST. PATIENT-LVL III: CPT | Mod: PBBFAC,,,

## 2022-01-01 PROCEDURE — 3080F DIAST BP >= 90 MM HG: CPT | Mod: CPTII,S$GLB,, | Performed by: INTERNAL MEDICINE

## 2022-01-01 PROCEDURE — 99215 OFFICE O/P EST HI 40 MIN: CPT | Mod: S$GLB,,, | Performed by: INTERNAL MEDICINE

## 2022-01-01 PROCEDURE — 99213 PR OFFICE/OUTPT VISIT, EST, LEVL III, 20-29 MIN: ICD-10-PCS | Mod: S$GLB,,, | Performed by: INTERNAL MEDICINE

## 2022-01-01 PROCEDURE — 1111F DSCHRG MED/CURRENT MED MERGE: CPT | Mod: CPTII,S$GLB,, | Performed by: INTERNAL MEDICINE

## 2022-01-01 PROCEDURE — 3079F PR MOST RECENT DIASTOLIC BLOOD PRESSURE 80-89 MM HG: ICD-10-PCS | Mod: CPTII,S$GLB,,

## 2022-01-01 PROCEDURE — A4216 STERILE WATER/SALINE, 10 ML: HCPCS | Mod: PN

## 2022-01-01 PROCEDURE — 25000003 PHARM REV CODE 250: Mod: PN | Performed by: NURSE PRACTITIONER

## 2022-01-01 PROCEDURE — A9552 F18 FDG: HCPCS | Mod: PN

## 2022-01-01 PROCEDURE — 25000003 PHARM REV CODE 250: Mod: PN

## 2022-01-01 PROCEDURE — 99999 PR PBB SHADOW E&M-EST. PATIENT-LVL IV: ICD-10-PCS | Mod: PBBFAC,,, | Performed by: INTERNAL MEDICINE

## 2022-01-01 PROCEDURE — 90837 PR PSYCHOTHERAPY W/PATIENT, 60 MIN: ICD-10-PCS | Mod: S$GLB,,, | Performed by: PSYCHOLOGIST

## 2022-01-01 PROCEDURE — 3288F PR FALLS RISK ASSESSMENT DOCUMENTED: ICD-10-PCS | Mod: CPTII,S$GLB,, | Performed by: INTERNAL MEDICINE

## 2022-01-01 PROCEDURE — 99999 PR PBB SHADOW E&M-EST. PATIENT-LVL IV: CPT | Mod: PBBFAC,,,

## 2022-01-01 PROCEDURE — 71046 X-RAY EXAM CHEST 2 VIEWS: CPT | Mod: 26,,, | Performed by: RADIOLOGY

## 2022-01-01 PROCEDURE — 1126F AMNT PAIN NOTED NONE PRSNT: CPT | Mod: CPTII,S$GLB,,

## 2022-01-01 PROCEDURE — 1100F PTFALLS ASSESS-DOCD GE2>/YR: CPT | Mod: CPTII,S$GLB,, | Performed by: INTERNAL MEDICINE

## 2022-01-01 PROCEDURE — 3075F PR MOST RECENT SYSTOLIC BLOOD PRESS GE 130-139MM HG: ICD-10-PCS | Mod: CPTII,S$GLB,, | Performed by: INTERNAL MEDICINE

## 2022-01-01 PROCEDURE — 99214 PR OFFICE/OUTPT VISIT, EST, LEVL IV, 30-39 MIN: ICD-10-PCS | Mod: S$GLB,,,

## 2022-01-01 PROCEDURE — 84100 ASSAY OF PHOSPHORUS: CPT | Mod: PO

## 2022-01-01 PROCEDURE — 90834 PR PSYCHOTHERAPY W/PATIENT, 45 MIN: ICD-10-PCS | Mod: S$GLB,,, | Performed by: PSYCHOLOGIST

## 2022-01-01 PROCEDURE — 96416 CHEMO PROLONG INFUSE W/PUMP: CPT | Mod: PN

## 2022-01-01 PROCEDURE — 96365 THER/PROPH/DIAG IV INF INIT: CPT | Mod: PN

## 2022-01-01 PROCEDURE — 1125F PR PAIN SEVERITY QUANTIFIED, PAIN PRESENT: ICD-10-PCS | Mod: CPTII,S$GLB,, | Performed by: INTERNAL MEDICINE

## 2022-01-01 PROCEDURE — 85025 COMPLETE CBC W/AUTO DIFF WBC: CPT | Mod: PN

## 2022-01-01 PROCEDURE — 99999 PR PBB SHADOW E&M-EST. PATIENT-LVL IV: ICD-10-PCS | Mod: PBBFAC,,,

## 2022-01-01 PROCEDURE — 83735 ASSAY OF MAGNESIUM: CPT | Mod: PO

## 2022-01-01 PROCEDURE — 3078F PR MOST RECENT DIASTOLIC BLOOD PRESSURE < 80 MM HG: ICD-10-PCS | Mod: CPTII,S$GLB,,

## 2022-01-01 PROCEDURE — 99212 PR OFFICE/OUTPT VISIT, EST, LEVL II, 10-19 MIN: ICD-10-PCS | Mod: S$GLB,,, | Performed by: INTERNAL MEDICINE

## 2022-01-01 PROCEDURE — 99999 PR PBB SHADOW E&M-EST. PATIENT-LVL V: ICD-10-PCS | Mod: PBBFAC,,, | Performed by: INTERNAL MEDICINE

## 2022-01-01 PROCEDURE — 3077F SYST BP >= 140 MM HG: CPT | Mod: CPTII,S$GLB,, | Performed by: INTERNAL MEDICINE

## 2022-01-01 PROCEDURE — 99999 PR PBB SHADOW E&M-EST. PATIENT-LVL IV: ICD-10-PCS | Mod: PBBFAC,,, | Performed by: NURSE PRACTITIONER

## 2022-01-01 PROCEDURE — 80053 COMPREHEN METABOLIC PANEL: CPT | Mod: PN | Performed by: INTERNAL MEDICINE

## 2022-01-01 PROCEDURE — 63600175 PHARM REV CODE 636 W HCPCS: Mod: JG,PN | Performed by: INTERNAL MEDICINE

## 2022-01-01 PROCEDURE — 36415 COLL VENOUS BLD VENIPUNCTURE: CPT | Mod: PN

## 2022-01-01 PROCEDURE — 99214 OFFICE O/P EST MOD 30 MIN: CPT | Mod: S$GLB,,,

## 2022-01-01 PROCEDURE — 86301 IMMUNOASSAY TUMOR CA 19-9: CPT | Performed by: INTERNAL MEDICINE

## 2022-01-01 PROCEDURE — 3080F PR MOST RECENT DIASTOLIC BLOOD PRESSURE >= 90 MM HG: ICD-10-PCS | Mod: CPTII,S$GLB,, | Performed by: INTERNAL MEDICINE

## 2022-01-01 PROCEDURE — 1101F PR PT FALLS ASSESS DOC 0-1 FALLS W/OUT INJ PAST YR: ICD-10-PCS | Mod: CPTII,S$GLB,,

## 2022-01-01 PROCEDURE — 3077F PR MOST RECENT SYSTOLIC BLOOD PRESSURE >= 140 MM HG: ICD-10-PCS | Mod: CPTII,S$GLB,, | Performed by: NURSE PRACTITIONER

## 2022-01-01 PROCEDURE — 3288F FALL RISK ASSESSMENT DOCD: CPT | Mod: CPTII,S$GLB,,

## 2022-01-01 PROCEDURE — 3079F DIAST BP 80-89 MM HG: CPT | Mod: CPTII,S$GLB,,

## 2022-01-01 PROCEDURE — 3288F PR FALLS RISK ASSESSMENT DOCUMENTED: ICD-10-PCS | Mod: CPTII,S$GLB,,

## 2022-01-01 PROCEDURE — 1159F PR MEDICATION LIST DOCUMENTED IN MEDICAL RECORD: ICD-10-PCS | Mod: CPTII,S$GLB,, | Performed by: INTERNAL MEDICINE

## 2022-01-01 PROCEDURE — 90791 PR PSYCHIATRIC DIAGNOSTIC EVALUATION: ICD-10-PCS | Mod: S$GLB,,, | Performed by: PSYCHOLOGIST

## 2022-01-01 PROCEDURE — 63600175 PHARM REV CODE 636 W HCPCS: Mod: PN

## 2022-01-01 PROCEDURE — 3074F SYST BP LT 130 MM HG: CPT | Mod: CPTII,S$GLB,, | Performed by: INTERNAL MEDICINE

## 2022-01-01 PROCEDURE — 90834 PSYTX W PT 45 MINUTES: CPT | Mod: S$GLB,,, | Performed by: PSYCHOLOGIST

## 2022-01-01 PROCEDURE — 3078F DIAST BP <80 MM HG: CPT | Mod: CPTII,S$GLB,, | Performed by: NURSE PRACTITIONER

## 2022-01-01 PROCEDURE — 3077F PR MOST RECENT SYSTOLIC BLOOD PRESSURE >= 140 MM HG: ICD-10-PCS | Mod: CPTII,S$GLB,, | Performed by: INTERNAL MEDICINE

## 2022-01-01 PROCEDURE — 96523 IRRIG DRUG DELIVERY DEVICE: CPT | Mod: PN

## 2022-01-01 PROCEDURE — 3075F SYST BP GE 130 - 139MM HG: CPT | Mod: CPTII,S$GLB,, | Performed by: INTERNAL MEDICINE

## 2022-01-01 PROCEDURE — 78815 PET IMAGE W/CT SKULL-THIGH: CPT | Mod: 26,PI,, | Performed by: RADIOLOGY

## 2022-01-01 PROCEDURE — 83036 HEMOGLOBIN GLYCOSYLATED A1C: CPT | Performed by: INTERNAL MEDICINE

## 2022-01-01 PROCEDURE — 83615 LACTATE (LD) (LDH) ENZYME: CPT | Mod: PN | Performed by: INTERNAL MEDICINE

## 2022-01-01 PROCEDURE — 90791 PSYCH DIAGNOSTIC EVALUATION: CPT | Mod: S$GLB,,, | Performed by: PSYCHOLOGIST

## 2022-01-01 PROCEDURE — 3288F FALL RISK ASSESSMENT DOCD: CPT | Mod: CPTII,S$GLB,, | Performed by: NURSE PRACTITIONER

## 2022-01-01 PROCEDURE — 90837 PSYTX W PT 60 MINUTES: CPT | Mod: S$GLB,,, | Performed by: PSYCHOLOGIST

## 2022-01-01 PROCEDURE — 86301 IMMUNOASSAY TUMOR CA 19-9: CPT

## 2022-01-01 PROCEDURE — 1101F PR PT FALLS ASSESS DOC 0-1 FALLS W/OUT INJ PAST YR: ICD-10-PCS | Mod: CPTII,S$GLB,, | Performed by: NURSE PRACTITIONER

## 2022-01-01 PROCEDURE — 1101F PT FALLS ASSESS-DOCD LE1/YR: CPT | Mod: CPTII,S$GLB,,

## 2022-01-01 PROCEDURE — 99999 PR PBB SHADOW E&M-EST. PATIENT-LVL III: ICD-10-PCS | Mod: PBBFAC,,,

## 2022-01-01 PROCEDURE — 99214 OFFICE O/P EST MOD 30 MIN: CPT | Mod: S$GLB,,, | Performed by: NURSE PRACTITIONER

## 2022-01-01 PROCEDURE — 3078F DIAST BP <80 MM HG: CPT | Mod: CPTII,S$GLB,,

## 2022-01-01 PROCEDURE — 78815 PET IMAGE W/CT SKULL-THIGH: CPT | Mod: 26,PS,, | Performed by: RADIOLOGY

## 2022-01-01 PROCEDURE — 99212 OFFICE O/P EST SF 10 MIN: CPT | Mod: S$GLB,,, | Performed by: INTERNAL MEDICINE

## 2022-01-01 PROCEDURE — 84100 ASSAY OF PHOSPHORUS: CPT | Mod: PN | Performed by: INTERNAL MEDICINE

## 2022-01-01 PROCEDURE — 63600175 PHARM REV CODE 636 W HCPCS: Mod: PN | Performed by: NURSE PRACTITIONER

## 2022-01-01 PROCEDURE — 71046 X-RAY EXAM CHEST 2 VIEWS: CPT | Mod: TC,PO

## 2022-01-01 PROCEDURE — 99999 PR PBB SHADOW E&M-EST. PATIENT-LVL IV: CPT | Mod: PBBFAC,,, | Performed by: NURSE PRACTITIONER

## 2022-01-01 PROCEDURE — 63600175 PHARM REV CODE 636 W HCPCS: Mod: JW,JG,PN

## 2022-01-01 PROCEDURE — 81003 URINALYSIS AUTO W/O SCOPE: CPT | Mod: PN | Performed by: INTERNAL MEDICINE

## 2022-01-01 PROCEDURE — 93010 EKG 12-LEAD: ICD-10-PCS | Mod: ,,, | Performed by: INTERNAL MEDICINE

## 2022-01-01 PROCEDURE — 3074F PR MOST RECENT SYSTOLIC BLOOD PRESSURE < 130 MM HG: ICD-10-PCS | Mod: CPTII,S$GLB,, | Performed by: INTERNAL MEDICINE

## 2022-01-01 PROCEDURE — 80053 COMPREHEN METABOLIC PANEL: CPT | Mod: PO

## 2022-01-01 PROCEDURE — 99999 PR PBB SHADOW E&M-EST. PATIENT-LVL I: CPT | Mod: PBBFAC,,, | Performed by: PSYCHOLOGIST

## 2022-01-01 PROCEDURE — 1126F AMNT PAIN NOTED NONE PRSNT: CPT | Mod: CPTII,S$GLB,, | Performed by: NURSE PRACTITIONER

## 2022-01-01 PROCEDURE — 3288F PR FALLS RISK ASSESSMENT DOCUMENTED: ICD-10-PCS | Mod: CPTII,S$GLB,, | Performed by: NURSE PRACTITIONER

## 2022-01-01 PROCEDURE — 84550 ASSAY OF BLOOD/URIC ACID: CPT | Mod: PN | Performed by: INTERNAL MEDICINE

## 2022-01-01 PROCEDURE — 86140 C-REACTIVE PROTEIN: CPT | Performed by: INTERNAL MEDICINE

## 2022-01-01 PROCEDURE — 99213 OFFICE O/P EST LOW 20 MIN: CPT | Mod: S$GLB,,, | Performed by: INTERNAL MEDICINE

## 2022-01-01 PROCEDURE — 90832 PR PSYCHOTHERAPY W/PATIENT, 30 MIN: ICD-10-PCS | Mod: S$GLB,,, | Performed by: PSYCHOLOGIST

## 2022-01-01 PROCEDURE — 78815 PET IMAGE W/CT SKULL-THIGH: CPT | Mod: TC,PN

## 2022-01-01 PROCEDURE — 3074F SYST BP LT 130 MM HG: CPT | Mod: CPTII,S$GLB,,

## 2022-01-01 PROCEDURE — 96367 TX/PROPH/DG ADDL SEQ IV INF: CPT

## 2022-01-01 PROCEDURE — 90832 PSYTX W PT 30 MINUTES: CPT | Mod: S$GLB,,, | Performed by: PSYCHOLOGIST

## 2022-01-01 PROCEDURE — 93005 ELECTROCARDIOGRAM TRACING: CPT | Mod: PO

## 2022-01-01 PROCEDURE — 3077F SYST BP >= 140 MM HG: CPT | Mod: CPTII,S$GLB,, | Performed by: NURSE PRACTITIONER

## 2022-01-01 PROCEDURE — 78815 NM PET CT ROUTINE: ICD-10-PCS | Mod: 26,PI,, | Performed by: RADIOLOGY

## 2022-01-01 PROCEDURE — 1101F PT FALLS ASSESS-DOCD LE1/YR: CPT | Mod: CPTII,S$GLB,, | Performed by: NURSE PRACTITIONER

## 2022-01-01 PROCEDURE — 3074F PR MOST RECENT SYSTOLIC BLOOD PRESSURE < 130 MM HG: ICD-10-PCS | Mod: CPTII,S$GLB,,

## 2022-01-01 PROCEDURE — 99999 PR PBB SHADOW E&M-EST. PATIENT-LVL V: CPT | Mod: PBBFAC,,, | Performed by: NURSE PRACTITIONER

## 2022-01-01 PROCEDURE — 3077F PR MOST RECENT SYSTOLIC BLOOD PRESSURE >= 140 MM HG: ICD-10-PCS | Mod: CPTII,S$GLB,,

## 2022-01-01 PROCEDURE — 99999 PR PBB SHADOW E&M-EST. PATIENT-LVL V: ICD-10-PCS | Mod: PBBFAC,,, | Performed by: NURSE PRACTITIONER

## 2022-01-01 PROCEDURE — 83880 ASSAY OF NATRIURETIC PEPTIDE: CPT | Performed by: INTERNAL MEDICINE

## 2022-01-01 RX ORDER — ONDANSETRON 2 MG/ML
8 INJECTION INTRAMUSCULAR; INTRAVENOUS
Status: CANCELLED | OUTPATIENT
Start: 2022-01-01

## 2022-01-01 RX ORDER — HEPARIN 100 UNIT/ML
500 SYRINGE INTRAVENOUS
Status: CANCELLED | OUTPATIENT
Start: 2022-01-01

## 2022-01-01 RX ORDER — PANCRELIPASE 36000; 180000; 114000 [USP'U]/1; [USP'U]/1; [USP'U]/1
3 CAPSULE, DELAYED RELEASE PELLETS ORAL
Qty: 300 CAPSULE | Refills: 11 | Status: SHIPPED | OUTPATIENT
Start: 2022-01-01

## 2022-01-01 RX ORDER — SODIUM CHLORIDE 0.9 % (FLUSH) 0.9 %
10 SYRINGE (ML) INJECTION
Status: DISCONTINUED | OUTPATIENT
Start: 2022-01-01 | End: 2022-01-01 | Stop reason: HOSPADM

## 2022-01-01 RX ORDER — EPINEPHRINE 0.3 MG/.3ML
0.3 INJECTION SUBCUTANEOUS ONCE AS NEEDED
Status: CANCELLED | OUTPATIENT
Start: 2022-01-01

## 2022-01-01 RX ORDER — SODIUM CHLORIDE 0.9 % (FLUSH) 0.9 %
10 SYRINGE (ML) INJECTION
Status: CANCELLED | OUTPATIENT
Start: 2022-01-01

## 2022-01-01 RX ORDER — DIPHENHYDRAMINE HCL 25 MG
25 CAPSULE ORAL ONCE
Status: CANCELLED | OUTPATIENT
Start: 2022-01-01 | End: 2022-01-01

## 2022-01-01 RX ORDER — DEXAMETHASONE 4 MG/1
8 TABLET ORAL DAILY
Qty: 24 TABLET | Refills: 5 | Status: CANCELLED | OUTPATIENT
Start: 2022-01-01

## 2022-01-01 RX ORDER — ONDANSETRON 2 MG/ML
8 INJECTION INTRAMUSCULAR; INTRAVENOUS
Status: COMPLETED | OUTPATIENT
Start: 2022-01-01 | End: 2022-01-01

## 2022-01-01 RX ORDER — HEPARIN 100 UNIT/ML
500 SYRINGE INTRAVENOUS
Status: DISCONTINUED | OUTPATIENT
Start: 2022-01-01 | End: 2022-01-01 | Stop reason: HOSPADM

## 2022-01-01 RX ORDER — ACETAMINOPHEN 325 MG/1
650 TABLET ORAL ONCE
Status: CANCELLED | OUTPATIENT
Start: 2022-01-01 | End: 2022-01-01

## 2022-01-01 RX ORDER — ATROPINE SULFATE 0.4 MG/ML
0.4 INJECTION, SOLUTION ENDOTRACHEAL; INTRAMEDULLARY; INTRAMUSCULAR; INTRAVENOUS; SUBCUTANEOUS ONCE AS NEEDED
Status: CANCELLED | OUTPATIENT
Start: 2022-01-01

## 2022-01-01 RX ORDER — EPINEPHRINE 0.3 MG/.3ML
0.3 INJECTION SUBCUTANEOUS
Status: CANCELLED | OUTPATIENT
Start: 2022-01-01

## 2022-01-01 RX ORDER — DIPHENHYDRAMINE HYDROCHLORIDE 50 MG/ML
50 INJECTION INTRAMUSCULAR; INTRAVENOUS ONCE AS NEEDED
Status: CANCELLED | OUTPATIENT
Start: 2022-01-01

## 2022-01-01 RX ORDER — CHLORHEXIDINE GLUCONATE ORAL RINSE 1.2 MG/ML
SOLUTION DENTAL
COMMUNITY
Start: 2022-01-01 | End: 2022-01-01 | Stop reason: CLARIF

## 2022-01-01 RX ORDER — SENNOSIDES 8.6 MG/1
1 TABLET ORAL 2 TIMES DAILY
Qty: 60 TABLET | Refills: 2 | Status: SHIPPED | OUTPATIENT
Start: 2022-01-01 | End: 2023-09-28

## 2022-01-01 RX ORDER — LEVOFLOXACIN 500 MG/1
500 TABLET, FILM COATED ORAL DAILY
Qty: 5 TABLET | Refills: 0 | Status: ON HOLD | OUTPATIENT
Start: 2022-01-01 | End: 2022-01-01 | Stop reason: HOSPADM

## 2022-01-01 RX ORDER — DIPHENHYDRAMINE HYDROCHLORIDE 50 MG/ML
50 INJECTION INTRAMUSCULAR; INTRAVENOUS ONCE AS NEEDED
Status: DISCONTINUED | OUTPATIENT
Start: 2022-01-01 | End: 2022-01-01 | Stop reason: HOSPADM

## 2022-01-01 RX ORDER — EPINEPHRINE 0.3 MG/.3ML
0.3 INJECTION SUBCUTANEOUS ONCE AS NEEDED
Status: DISCONTINUED | OUTPATIENT
Start: 2022-01-01 | End: 2022-01-01 | Stop reason: HOSPADM

## 2022-01-01 RX ORDER — HYDROXYZINE HYDROCHLORIDE 25 MG/1
25 TABLET, FILM COATED ORAL 3 TIMES DAILY PRN
Qty: 30 TABLET | Refills: 3 | Status: SHIPPED | OUTPATIENT
Start: 2022-01-01 | End: 2022-01-01

## 2022-01-01 RX ORDER — ONDANSETRON 4 MG/1
4 TABLET, ORALLY DISINTEGRATING ORAL ONCE
Status: DISCONTINUED | OUTPATIENT
Start: 2022-01-01 | End: 2022-01-01 | Stop reason: HOSPADM

## 2022-01-01 RX ORDER — EPINEPHRINE 0.3 MG/.3ML
0.3 INJECTION SUBCUTANEOUS
Status: DISCONTINUED | OUTPATIENT
Start: 2022-01-01 | End: 2022-01-01 | Stop reason: HOSPADM

## 2022-01-01 RX ORDER — ATROPINE SULFATE 0.4 MG/ML
0.4 INJECTION, SOLUTION ENDOTRACHEAL; INTRAMEDULLARY; INTRAMUSCULAR; INTRAVENOUS; SUBCUTANEOUS ONCE AS NEEDED
Status: DISCONTINUED | OUTPATIENT
Start: 2022-01-01 | End: 2022-01-01 | Stop reason: HOSPADM

## 2022-01-01 RX ORDER — ONDANSETRON 4 MG/1
4 TABLET, ORALLY DISINTEGRATING ORAL ONCE
Status: CANCELLED | OUTPATIENT
Start: 2022-01-01 | End: 2022-01-01

## 2022-01-01 RX ORDER — ALBUTEROL SULFATE 90 UG/1
2 AEROSOL, METERED RESPIRATORY (INHALATION)
Status: DISCONTINUED | OUTPATIENT
Start: 2022-01-01 | End: 2022-01-01 | Stop reason: HOSPADM

## 2022-01-01 RX ORDER — DIPHENHYDRAMINE HCL 25 MG
25 CAPSULE ORAL ONCE
Status: DISCONTINUED | OUTPATIENT
Start: 2022-01-01 | End: 2022-01-01 | Stop reason: HOSPADM

## 2022-01-01 RX ORDER — FLUOROURACIL 50 MG/ML
400 INJECTION, SOLUTION INTRAVENOUS
Status: COMPLETED | OUTPATIENT
Start: 2022-01-01 | End: 2022-01-01

## 2022-01-01 RX ORDER — LIDOCAINE AND PRILOCAINE 25; 25 MG/G; MG/G
CREAM TOPICAL
Qty: 30 G | Refills: 2 | Status: SHIPPED | OUTPATIENT
Start: 2022-01-01

## 2022-01-01 RX ORDER — FLUOROURACIL 50 MG/ML
400 INJECTION, SOLUTION INTRAVENOUS
Status: CANCELLED | OUTPATIENT
Start: 2022-01-01

## 2022-01-01 RX ORDER — ALBUTEROL SULFATE 90 UG/1
2 AEROSOL, METERED RESPIRATORY (INHALATION)
Status: CANCELLED | OUTPATIENT
Start: 2022-01-01

## 2022-01-01 RX ORDER — ACETAMINOPHEN 325 MG/1
650 TABLET ORAL ONCE
Status: DISCONTINUED | OUTPATIENT
Start: 2022-01-01 | End: 2022-01-01 | Stop reason: HOSPADM

## 2022-01-01 RX ORDER — LOPERAMIDE HCL 2 MG
TABLET ORAL
Qty: 24 TABLET | Refills: 25 | Status: ON HOLD | OUTPATIENT
Start: 2022-01-01 | End: 2022-01-01 | Stop reason: HOSPADM

## 2022-01-01 RX ORDER — DEXAMETHASONE 4 MG/1
8 TABLET ORAL DAILY
Qty: 24 TABLET | Refills: 5 | Status: SHIPPED | OUTPATIENT
Start: 2022-01-01 | End: 2022-01-01

## 2022-01-01 RX ORDER — DEXAMETHASONE 4 MG/1
8 TABLET ORAL DAILY
Qty: 24 TABLET | Refills: 5 | Status: SHIPPED | OUTPATIENT
Start: 2022-01-01

## 2022-01-01 RX ORDER — ATROPINE SULFATE 0.4 MG/ML
0.4 INJECTION, SOLUTION ENDOTRACHEAL; INTRAMEDULLARY; INTRAMUSCULAR; INTRAVENOUS; SUBCUTANEOUS ONCE AS NEEDED
Status: COMPLETED | OUTPATIENT
Start: 2022-01-01 | End: 2022-01-01

## 2022-01-01 RX ORDER — PREDNISONE 20 MG/1
40 TABLET ORAL ONCE
Status: CANCELLED | OUTPATIENT
Start: 2022-01-01 | End: 2022-01-01

## 2022-01-01 RX ORDER — PREDNISONE 20 MG/1
40 TABLET ORAL ONCE AS NEEDED
Status: DISCONTINUED | OUTPATIENT
Start: 2022-01-01 | End: 2022-01-01 | Stop reason: HOSPADM

## 2022-01-01 RX ORDER — MUPIROCIN 20 MG/G
OINTMENT TOPICAL
COMMUNITY
Start: 2022-01-01 | End: 2022-01-01 | Stop reason: CLARIF

## 2022-01-01 RX ORDER — LEVOFLOXACIN 500 MG/1
500 TABLET, FILM COATED ORAL DAILY
Qty: 3 TABLET | Refills: 0 | Status: SHIPPED | OUTPATIENT
Start: 2022-01-01 | End: 2022-01-01 | Stop reason: DRUGHIGH

## 2022-01-01 RX ORDER — POLYETHYLENE GLYCOL 3350 17 G/17G
17 POWDER, FOR SOLUTION ORAL DAILY
COMMUNITY
End: 2022-01-01 | Stop reason: CLARIF

## 2022-01-01 RX ORDER — ONDANSETRON 8 MG/1
8 TABLET, ORALLY DISINTEGRATING ORAL EVERY 8 HOURS PRN
Qty: 60 TABLET | Refills: 5 | Status: SHIPPED | OUTPATIENT
Start: 2022-01-01

## 2022-01-01 RX ORDER — TRAMADOL HYDROCHLORIDE 50 MG/1
50 TABLET ORAL EVERY 6 HOURS PRN
Qty: 30 TABLET | Refills: 0 | Status: SHIPPED | OUTPATIENT
Start: 2022-01-01 | End: 2022-01-01

## 2022-01-01 RX ADMIN — GEMCITABINE 1400 MG: 38 INJECTION, SOLUTION INTRAVENOUS at 12:04

## 2022-01-01 RX ADMIN — LEUCOVORIN CALCIUM 680 MG: 350 INJECTION, POWDER, LYOPHILIZED, FOR SOLUTION INTRAMUSCULAR; INTRAVENOUS at 01:08

## 2022-01-01 RX ADMIN — Medication 10 ML: at 04:02

## 2022-01-01 RX ADMIN — PACLITAXEL 170 MG: 100 INJECTION, POWDER, LYOPHILIZED, FOR SUSPENSION INTRAVENOUS at 11:05

## 2022-01-01 RX ADMIN — ATROPINE SULFATE 0.4 MG: 0.4 INJECTION, SOLUTION INTRAMUSCULAR; INTRAVENOUS; SUBCUTANEOUS at 11:07

## 2022-01-01 RX ADMIN — LEUCOVORIN CALCIUM 680 MG: 350 INJECTION, POWDER, LYOPHILIZED, FOR SOLUTION INTRAMUSCULAR; INTRAVENOUS at 01:09

## 2022-01-01 RX ADMIN — ONDANSETRON 8 MG: 2 INJECTION INTRAMUSCULAR; INTRAVENOUS at 12:04

## 2022-01-01 RX ADMIN — LEUCOVORIN CALCIUM 555 MG: 350 INJECTION, POWDER, LYOPHILIZED, FOR SOLUTION INTRAMUSCULAR; INTRAVENOUS at 01:07

## 2022-01-01 RX ADMIN — PACLITAXEL 180 MG: 100 INJECTION, POWDER, LYOPHILIZED, FOR SUSPENSION INTRAVENOUS at 04:03

## 2022-01-01 RX ADMIN — Medication 10 ML: at 12:08

## 2022-01-01 RX ADMIN — PALONOSETRON 0.25 MG: 0.05 INJECTION, SOLUTION INTRAVENOUS at 11:07

## 2022-01-01 RX ADMIN — Medication 10 ML: at 12:06

## 2022-01-01 RX ADMIN — Medication 10 ML: at 03:08

## 2022-01-01 RX ADMIN — OXALIPLATIN 145 MG: 5 INJECTION, SOLUTION, CONCENTRATE INTRAVENOUS at 01:08

## 2022-01-01 RX ADMIN — ONDANSETRON 8 MG: 2 INJECTION INTRAMUSCULAR; INTRAVENOUS at 02:02

## 2022-01-01 RX ADMIN — FLUOROURACIL 4080 MG: 50 INJECTION, SOLUTION INTRAVENOUS at 03:08

## 2022-01-01 RX ADMIN — DEXTROSE: 5 SOLUTION INTRAVENOUS at 12:09

## 2022-01-01 RX ADMIN — LEUCOVORIN CALCIUM 555 MG: 350 INJECTION, POWDER, LYOPHILIZED, FOR SOLUTION INTRAMUSCULAR; INTRAVENOUS at 01:08

## 2022-01-01 RX ADMIN — PALONOSETRON 0.25 MG: 0.05 INJECTION, SOLUTION INTRAVENOUS at 09:08

## 2022-01-01 RX ADMIN — PACLITAXEL 180 MG: 100 INJECTION, POWDER, LYOPHILIZED, FOR SUSPENSION INTRAVENOUS at 12:03

## 2022-01-01 RX ADMIN — SODIUM CHLORIDE: 0.9 INJECTION, SOLUTION INTRAVENOUS at 04:01

## 2022-01-01 RX ADMIN — FLUOROURACIL 3245 MG: 50 INJECTION, SOLUTION INTRAVENOUS at 02:06

## 2022-01-01 RX ADMIN — Medication 10 ML: at 02:09

## 2022-01-01 RX ADMIN — GEMCITABINE 1385 MG: 38 INJECTION, POWDER, LYOPHILIZED, FOR SOLUTION INTRAVENOUS at 05:01

## 2022-01-01 RX ADMIN — Medication 10 ML: at 12:07

## 2022-01-01 RX ADMIN — FLUOROURACIL 3320 MG: 50 INJECTION, SOLUTION INTRAVENOUS at 02:07

## 2022-01-01 RX ADMIN — Medication 10 ML: at 01:09

## 2022-01-01 RX ADMIN — GEMCITABINE 1400 MG: 38 INJECTION, SOLUTION INTRAVENOUS at 04:02

## 2022-01-01 RX ADMIN — SODIUM CHLORIDE: 9 INJECTION, SOLUTION INTRAVENOUS at 11:06

## 2022-01-01 RX ADMIN — SODIUM CHLORIDE: 0.9 INJECTION, SOLUTION INTRAVENOUS at 03:01

## 2022-01-01 RX ADMIN — ATROPINE SULFATE 0.4 MG: 0.4 INJECTION, SOLUTION INTRAMUSCULAR; INTRAVENOUS; SUBCUTANEOUS at 11:08

## 2022-01-01 RX ADMIN — SODIUM CHLORIDE: 0.9 INJECTION, SOLUTION INTRAVENOUS at 11:07

## 2022-01-01 RX ADMIN — IRINOTECAN HYDROCHLORIDE 96.88 MG: 4.3 INJECTION, POWDER, FOR SOLUTION INTRAVENOUS at 12:07

## 2022-01-01 RX ADMIN — IRINOTECAN HYDROCHLORIDE 94.64 MG: 4.3 INJECTION, POWDER, FOR SOLUTION INTRAVENOUS at 11:06

## 2022-01-01 RX ADMIN — ONDANSETRON 8 MG: 2 INJECTION INTRAMUSCULAR; INTRAVENOUS at 04:01

## 2022-01-01 RX ADMIN — Medication 10 ML: at 10:05

## 2022-01-01 RX ADMIN — GEMCITABINE 1370 MG: 38 INJECTION, SOLUTION INTRAVENOUS at 11:05

## 2022-01-01 RX ADMIN — PALONOSETRON 0.25 MG: 0.05 INJECTION, SOLUTION INTRAVENOUS at 11:06

## 2022-01-01 RX ADMIN — FLUOROURACIL 3320 MG: 50 INJECTION, SOLUTION INTRAVENOUS at 02:08

## 2022-01-01 RX ADMIN — IRINOTECAN HYDROCHLORIDE 96.88 MG: 4.3 INJECTION, POWDER, FOR SOLUTION INTRAVENOUS at 10:08

## 2022-01-01 RX ADMIN — PALONOSETRON 0.25 MG: 0.05 INJECTION, SOLUTION INTRAVENOUS at 01:08

## 2022-01-01 RX ADMIN — SODIUM CHLORIDE: 0.9 INJECTION, SOLUTION INTRAVENOUS at 03:02

## 2022-01-01 RX ADMIN — GEMCITABINE 1400 MG: 38 INJECTION, SOLUTION INTRAVENOUS at 01:03

## 2022-01-01 RX ADMIN — LEUCOVORIN CALCIUM 555 MG: 350 INJECTION, POWDER, LYOPHILIZED, FOR SOLUTION INTRAMUSCULAR; INTRAVENOUS at 11:08

## 2022-01-01 RX ADMIN — IRINOTECAN HYDROCHLORIDE 96.88 MG: 4.3 INJECTION, POWDER, FOR SOLUTION INTRAVENOUS at 11:08

## 2022-01-01 RX ADMIN — LEUCOVORIN CALCIUM 540 MG: 350 INJECTION, POWDER, LYOPHILIZED, FOR SOLUTION INTRAMUSCULAR; INTRAVENOUS at 01:06

## 2022-01-01 RX ADMIN — GEMCITABINE 1385 MG: 38 INJECTION, SOLUTION INTRAVENOUS at 04:01

## 2022-01-01 RX ADMIN — Medication 10 ML: at 03:07

## 2022-01-01 RX ADMIN — FLUOROURACIL 4080 MG: 50 INJECTION, SOLUTION INTRAVENOUS at 03:09

## 2022-01-01 RX ADMIN — PACLITAXEL 180 MG: 100 INJECTION, POWDER, LYOPHILIZED, FOR SUSPENSION INTRAVENOUS at 11:04

## 2022-01-01 RX ADMIN — ONDANSETRON 8 MG: 2 INJECTION INTRAMUSCULAR; INTRAVENOUS at 03:02

## 2022-01-01 RX ADMIN — Medication 10 ML: at 02:07

## 2022-01-01 RX ADMIN — PACLITAXEL 170 MG: 100 INJECTION, POWDER, LYOPHILIZED, FOR SUSPENSION INTRAVENOUS at 04:01

## 2022-01-01 RX ADMIN — OXALIPLATIN 145 MG: 5 INJECTION, SOLUTION, CONCENTRATE INTRAVENOUS at 01:09

## 2022-01-01 RX ADMIN — Medication 10 ML: at 06:01

## 2022-01-01 RX ADMIN — SODIUM CHLORIDE: 0.9 INJECTION, SOLUTION INTRAVENOUS at 09:08

## 2022-01-01 RX ADMIN — GEMCITABINE 1400 MG: 38 INJECTION, SOLUTION INTRAVENOUS at 05:03

## 2022-01-01 RX ADMIN — PALONOSETRON 0.25 MG: 0.05 INJECTION, SOLUTION INTRAVENOUS at 11:08

## 2022-01-01 RX ADMIN — Medication 300 MG: at 12:06

## 2022-01-01 RX ADMIN — SODIUM CHLORIDE: 9 INJECTION, SOLUTION INTRAVENOUS at 11:03

## 2022-01-01 RX ADMIN — Medication 10 ML: at 03:09

## 2022-01-01 RX ADMIN — SODIUM CHLORIDE: 0.9 INJECTION, SOLUTION INTRAVENOUS at 10:04

## 2022-01-01 RX ADMIN — GEMCITABINE 1400 MG: 38 INJECTION, SOLUTION INTRAVENOUS at 01:04

## 2022-01-01 RX ADMIN — ONDANSETRON 8 MG: 2 INJECTION INTRAMUSCULAR; INTRAVENOUS at 10:05

## 2022-01-01 RX ADMIN — SODIUM CHLORIDE: 0.9 INJECTION, SOLUTION INTRAVENOUS at 12:04

## 2022-01-01 RX ADMIN — ONDANSETRON 8 MG: 2 INJECTION INTRAMUSCULAR; INTRAVENOUS at 03:01

## 2022-01-01 RX ADMIN — SODIUM CHLORIDE: 0.9 INJECTION, SOLUTION INTRAVENOUS at 04:03

## 2022-01-01 RX ADMIN — FLUOROURACIL 680 MG: 50 INJECTION, SOLUTION INTRAVENOUS at 03:08

## 2022-01-01 RX ADMIN — Medication 10 ML: at 11:08

## 2022-01-01 RX ADMIN — PACLITAXEL 180 MG: 100 INJECTION, POWDER, LYOPHILIZED, FOR SUSPENSION INTRAVENOUS at 03:02

## 2022-01-01 RX ADMIN — FLUOROURACIL 3320 MG: 50 INJECTION, SOLUTION INTRAVENOUS at 12:08

## 2022-01-01 RX ADMIN — FLUOROURACIL 680 MG: 50 INJECTION, SOLUTION INTRAVENOUS at 03:09

## 2022-01-01 RX ADMIN — ONDANSETRON 8 MG: 2 INJECTION INTRAMUSCULAR; INTRAVENOUS at 10:04

## 2022-01-01 RX ADMIN — Medication 10 ML: at 05:02

## 2022-01-01 RX ADMIN — ONDANSETRON 8 MG: 2 INJECTION INTRAMUSCULAR; INTRAVENOUS at 04:03

## 2022-01-01 RX ADMIN — ONDANSETRON 8 MG: 2 INJECTION INTRAMUSCULAR; INTRAVENOUS at 12:03

## 2022-01-01 RX ADMIN — PACLITAXEL 170 MG: 100 INJECTION, POWDER, LYOPHILIZED, FOR SUSPENSION INTRAVENOUS at 03:01

## 2022-01-01 RX ADMIN — PALONOSETRON 0.25 MG: 0.05 INJECTION, SOLUTION INTRAVENOUS at 12:09

## 2022-01-01 RX ADMIN — PACLITAXEL 180 MG: 100 INJECTION, POWDER, LYOPHILIZED, FOR SUSPENSION INTRAVENOUS at 01:04

## 2022-01-01 RX ADMIN — SODIUM CHLORIDE: 0.9 INJECTION, SOLUTION INTRAVENOUS at 02:02

## 2022-01-01 RX ADMIN — Medication 10 ML: at 05:01

## 2022-01-01 RX ADMIN — GEMCITABINE 1375 MG: 38 INJECTION, SOLUTION INTRAVENOUS at 04:01

## 2022-01-01 RX ADMIN — SODIUM CHLORIDE: 0.9 INJECTION, SOLUTION INTRAVENOUS at 10:05

## 2022-01-01 RX ADMIN — DEXTROSE: 5 SOLUTION INTRAVENOUS at 01:08

## 2022-01-03 PROBLEM — C78.7 METASTASIS TO LIVER: Status: ACTIVE | Noted: 2022-01-01

## 2022-01-03 NOTE — TELEPHONE ENCOUNTER
Contacted pt to setup treatment start for next week. Patient very overwhelmed at the moment and requested to call me back. Pt states she will speak with her daughter and call me back tomorrow. Provided contact info.

## 2022-01-03 NOTE — TELEPHONE ENCOUNTER
Patient called back after speaking with daughter and is ready to schedule appts. Scheduled pt for labs and Dr. Green on 1/12 starting at 8:35.  Pt will be setup for infusion start after that.

## 2022-01-03 NOTE — PROGRESS NOTES
Oncology Nutrition   New Patient Education  Nel WELLS Basilia   1935    Nutrition Education   This is a 86 y.o.female with a medical diagnosis of adenocarcinoma of pancreas w/ liver mets.     Met w/ pt and pt's daughter, Brinda, to discuss current nutritional status and nutrition as it relates to cancer and cancer treatment. Pt currently at mild/moderate nutritional risk s/t poor appetite and reports of weight loss. Pt reports UBW: 174lbs x4 years - in October of this year when she started feeling sick she noticed her weight had been dropping down to 164lbs and CBW:142lbs. Pt reports she does not have an appetite but she forces herself to eat. Reports eating oatmeal, potato w/ butter. Drinks premiere protein BID. States her water intake has recently decreased. States she was just recently put on Creon - confirms compliance x3 weeks. Denies any GI intolerances at current.     Wt Readings from Last 10 Encounters:   01/03/22 64.7 kg (142 lb 8.4 oz)   12/27/21 64.4 kg (142 lb)   12/22/21 64.7 kg (142 lb 10.2 oz)   12/02/21 65.8 kg (145 lb)   11/06/21 68.9 kg (152 lb)   10/30/21 68.9 kg (152 lb)   10/29/21 68.9 kg (152 lb)   10/20/21 72.1 kg (159 lb)   09/27/21 72.3 kg (159 lb 6.3 oz)   08/25/21 74.8 kg (165 lb)      [x] PMHx reviewed  [x] Labs reviewed    Educated on food safety and common nutrition impact symptoms associated with chemotherapy treatment. Reinforced the importance of good hydration. Explained different ONS - sample variety bag provided. Handouts provided.    Answered all nutrition related questions.     Patient provided with dietitian contact number and advised to call with questions or make future appointment if further intervention is needed. RD to follow throughout tx.    MELISA MURILLO MA, RD, LDN  01/03/2022  11:41 AM

## 2022-01-03 NOTE — PROGRESS NOTES
Subjective:       Patient ID: Nel Cui is a 86 y.o. female.    Chief Complaint: Patient Education (Chemo class )    HPI  This is an 86 year old WF known to Dr. 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 She presents to the clinic today with her daughter for chemotherapy education.      Onc history:  The patient initially presented to her PCP on 12/02/21 with complaint of abdominal pain.  She underwent a CT of the abdomen on 12/09/21 showing subcentimeter para-aortic, mesenteric lymph nodes; multiple hepatic infiltrative lesions present throughout the liver with 1 of the larger areas right liver lobe measuring approximately 2.2 cm in diameter; mass in the pancreatic tail measuring 9 x 3.3 cm.  The patient underwent EUS under the care of Dr Tse on 12/15/21 showing a mass in the pancreatic body and multiple liver lesions.  Path from the procedure showed adenocarcinoma in the pancreatic body and adenocarcinoma in the liver.    Consult done with Dr. Cas Acuña for medi-port placement; has not been scheduled to date.     Oncology History   Adenocarcinoma of pancreas   12/22/2021 Initial Diagnosis    Adenocarcinoma of pancreas     12/22/2021 Cancer Staged    Staging form: Exocrine Pancreas, AJCC 8th Edition  - Clinical: Stage IV (cT3, cN0, pM1)     1/5/2022 -  Chemotherapy    Treatment Summary   Plan Name: OP PANC NAB-PACLITAXEL + GEMCITABINE Q4W  Treatment Goal: Palliative  Status: Active  Start Date: 1/5/2022 (Planned)  End Date: 11/23/2022 (Planned)  Provider: Salvador Green MD  Chemotherapy: gemcitabine (GEMZAR) 1,720 mg in sodium chloride 0.9% 250 mL chemo infusion, 1,000 mg/m2, Intravenous, Clinic/HOD 1 time, 0 of 12 cycles       Review of Systems   All other systems reviewed and are negative.        Objective:      Physical Exam  Vitals reviewed.   Constitutional:       Appearance: Normal appearance.   HENT:      Head: Normocephalic and atraumatic.   Eyes:       Conjunctiva/sclera: Conjunctivae normal.   Pulmonary:      Effort: Pulmonary effort is normal.   Musculoskeletal:         General: Normal range of motion.      Cervical back: Normal range of motion.   Neurological:      Mental Status: She is alert and oriented to person, place, and time.   Psychiatric:         Mood and Affect: Mood normal.         Behavior: Behavior normal.         Thought Content: Thought content normal.         Judgment: Judgment normal.         Assessment:       Problem List Items Addressed This Visit        Oncology    Adenocarcinoma of pancreas    Metastasis to liver      Other Visit Diagnoses     Malignant neoplasm of tail of pancreas         Relevant Medications    LIDOcaine-prilocaine (EMLA) cream          Plan:       1.  Treatment plan of Gemcitabine/Abraxane Days 1, 8, 15/28 day cycle discussed with patient and daughter.  2.  Handouts provided from [employer] on chemotherapy agents.    3.  Discussed the mechanism of action, potential side effects of this treatment as well as ways to manage them at home. Some of these side effects include but not limited to fever, nausea,  vomiting, decreased appetite, fatigue, weakness, cytopenias, myalgia/arthralgia, constipation, diarrhea, bleeding, headache, nail changes, taste change, hair thinning/loss, PN.  4.  Dietary modifications discussed.  Detail instructions to be provided by dietician.   5.  [facility]otherapy [facility] supplied with contact information.   6.  Discussed follow-up with the physician for toxicity monitoring throughout treatment.    7.  Scripts were escribed ([facility]; Dr. Green sent Zofran/Phenergan) and explained for home use.     8.  The patient and daughter verbalized understanding. All questions were answered and an informed consent was obtained.   9.  Waiting on date for port placement.  10.  CT PET & MRI brain scheduled for 01/04/2022.  11. Chemotherapy approved per insurance.    Assessment/plan reviewed and approved by   Angelita

## 2022-01-04 NOTE — TELEPHONE ENCOUNTER
Called patient to discuss infusion times. Patient states she is currently waiting on her daughter to pick her up for her scans. Pt requested to call back later to discuss.

## 2022-01-04 NOTE — TELEPHONE ENCOUNTER
[2:07 PM] Claire Schoennagel  Can you put the patient on for that spot on 1/12 at 2:30?    [2:07 PM] Claire Schoennagel  MRN [id number]    [2:12 PM] Mary Goss  mrn [id number] 01/12/22 2:30

## 2022-01-04 NOTE — TELEPHONE ENCOUNTER
Patient called back and informed treatment time on 1/12 wouldn't be until 2:30 pm. Offered to schedule on 1/13, but pt states her and daughter are ok with gap in appt times.

## 2022-01-04 NOTE — TELEPHONE ENCOUNTER
"Patient came in person today and I spoke with her. Patient stated " Initially when I saw Dr. Green I was just taking tylenol for the dull achey feeling I was having on both sides of my lower abdomen, but since the time I saw him it is a constant pain now. Can you ask him if he could put me on a very very low dose of tramadol to help me?" I voiced understanding. Patient would like medications called into [facility] on [address].     Patient also stated "This morning was really difficult for me. I was really having a hard time with vertigo this morning. The entire morning I was very dizzy no matter what I did. Could this be related to the cancer? 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 Thank you in advance.     "

## 2022-01-04 NOTE — TELEPHONE ENCOUNTER
I called the patient whom stated she was having some dizziness today with the PET/CT but was able to complete the scan.  She continues to have abdominal pain.  I instructed her that I could send in Tramadol for her pain The patient expressed understanding.  All questions were answered to her satisfaction.    Salvador Green MD  [facility]  Hematology and Oncology  [facility]   [address]   [place] [address]   O: [phone]  F: [phone]
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
## 2022-01-04 NOTE — PROGRESS NOTES
PET Imaging Questionnaire    1. Are you a Diabetic? Recent Blood Sugar level? No    2. Are you anemic? Bone Marrow Stimulation Meds? No    3. Have you had a CT Scan, if so when & where was your last one? Yes - 12/9/21    4. Have you had a PET Scan, if so when & where was your last one? No    5. Chemotherapy or currently on Chemotherapy? No    6. Radiation therapy? No    Surgical History:   Past Surgical History:   Procedure Laterality Date    CARPAL TUNNEL RELEASE Bilateral     wrist    CATARACT EXTRACTION, BILATERAL  2017    CORONARY ANGIOGRAPHY  9/1/2020    Procedure: ANGIOGRAM, CORONARY ARTERY;  Surgeon: Ivette Horne MD;  Location: [facility] CATH;  Service: Cardiology;;    DILATION AND CURETTAGE OF UTERUS      ENDOSCOPIC ULTRASOUND OF UPPER GASTROINTESTINAL TRACT Left 12/15/2021    Procedure: ULTRASOUND, UPPER GI TRACT, ENDOSCOPIC;  Surgeon: Lico Tse MD;  Location: [facility] ENDO;  Service: Endoscopy;  Laterality: Left;    ESOPHAGOGASTRODUODENOSCOPY N/A 12/15/2021    Procedure: EGD (ESOPHAGOGASTRODUODENOSCOPY);  Surgeon: Lico Tse MD;  Location: [facility] ENDO;  Service: Endoscopy;  Laterality: N/A;    LEFT HEART CATHETERIZATION  9/1/2020    Procedure: Left heart cath;  Surgeon: Ivette Horne MD;  Location: [facility] CATH;  Service: Cardiology;;    MITRAL VALVE REPLACEMENT  07/20/2016    OOPHORECTOMY Left 1988    REPAIR OF MENISCUS OF KNEE Left     TONSILLECTOMY      vein removal     7.      8. Have you been fasting for at least 6 hours? Yes    9. Is there any chance you may be pregnant or breastfeeding? No    Assay: 12.38 MCi@:10:27   Injection Site:RT AC    Residual: .563 mCi@: 10:29   Technologist: Jagjit Benton Injected:11.82mCi

## 2022-01-05 NOTE — TELEPHONE ENCOUNTER
Assisted patientin re-scheduling MRI brain.   ----- Message from Shelly Disla sent at 1/5/2022 11:30 AM CST -----  Regarding: regarding rescheduling her cancelled mri from yesterday  Type: Needs Medical Advice  Who Called:  patient   Best Call Back Number: [phone]  Additional Information: patient request call back regarding rescheduling her mri that was cancelled on yesterday 1/4/22,  please advise.

## 2022-01-11 NOTE — PROGRESS NOTES
PATIENT: Nel Cui  MRN: [id number]  DATE: 1/12/2022      Diagnosis:   1. Adenocarcinoma of pancreas    2. Malignant neoplasm of tail of pancreas     3. Metastasis to liver    4. 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 Bone metastases    6. 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therapy    7. Pancreatic insufficiency    8. Hypertension, unspecified type    9. Hyperlipidemia, unspecified hyperlipidemia type        Chief Complaint: Pancreatic Cancer (3 week follow up )      Oncologic History:      Oncologic History 12/09/21 Ct Abdomen  12/15/21 EUS  1/04/22 PET/CT  1/07/22 PORT - Dr Acuña  1/11/22 MRI Brain    Oncologic Treatment     Pathology 12/15/21 adenocarcinoma in the pancreatic body and adenocarcinoma in the liver       The patient initially presented to her PCP on 12/02/21 with complaint of abdominal pain.  She underwent a CT of the abdomen on 12/09/21 showing subcentimeter para-aortic, mesenteric lymph nodes; multiple hepatic infiltrative lesions present throughout the liver with 1 of the larger areas right liver lobe measuring approximately 2.2 cm in diameter; mass in the pancreatic tail measuring 9 x 3.3 cm.  The patient underwent EUS under the care of Dr Tse on 12/15/21 showing a mass in the pancreatic body and multiple liver lesions.  Path from the procedure showed adenocarcinoma in the pancreatic body and adenocarcinoma in the liver.    Subjective:    Initial History: Ms. Cui is a 86 y.o. female with Osteopenia, HLD, HTN who presents for pancreatic cancer.  Since the last clinic visit the patient underwent PET/CT on 1/04/22 showing hypermetabolic rounded mass at the junction of the body and tail of the pancreas measuring 2.5 x 2.4 cm, hypermetabolic left adrenal gland nodule measuring 1.9 cm, innumerable hypermetabolic nodular masses throughout the left and right hepatic lobes with largest lesions in the right liver measuring 2.5 x 1.9 cm and 5.2 x 4.4 cm, mildly enlarged  hypermetabolic portacaval lymph node measuring 1.4 cm, 0.7 cm hypermetabolic focus at the umbilicus, lesion in the T9 vertebra measuring 1.8 x 1.7 cm and a lesion in the left iliac fossa measuring 1.7 x 1.2 cm.  MRI of the brain on 01/11/2022 showed no evidence of metastases but did show an old right midbrain lacunar infarct.    Currently the patient complains of abdominal pains around both sides.  The patient denies CP, cough, SOB, abdominal pain, N/V, constipation, diarrhea.  The patient denies fever, chills, night sweats, weight loss, new lumps or bumps, easy bruising or bleeding.    Past Medical History:   Past Medical History:   Diagnosis Date    A-fib     Arthritis     Cancer     pancreas and liver    Cataracts, bilateral     Coronary artery disease     Essential (primary) hypertension 10/5/2010    Hyperlipidemia     Osteopenia        Past Surgical HIstory:   Past Surgical History:   Procedure Laterality Date    CARPAL TUNNEL RELEASE Bilateral     wrist    CATARACT EXTRACTION, BILATERAL  2017    CORONARY ANGIOGRAPHY  9/1/2020    Procedure: ANGIOGRAM, CORONARY ARTERY;  Surgeon: Ivette Horne MD;  Location: [facility] CATH;  Service: Cardiology;;    DILATION AND CURETTAGE OF UTERUS      ENDOSCOPIC ULTRASOUND OF UPPER GASTROINTESTINAL TRACT Left 12/15/2021    Procedure: ULTRASOUND, UPPER GI TRACT, ENDOSCOPIC;  Surgeon: Lico Tse MD;  Location: [facility] ENDO;  Service: Endoscopy;  Laterality: Left;    ESOPHAGOGASTRODUODENOSCOPY N/A 12/15/2021    Procedure: EGD (ESOPHAGOGASTRODUODENOSCOPY);  Surgeon: Lico Tse MD;  Location: [facility] ENDO;  Service: Endoscopy;  Laterality: N/A;    INSERTION OF TUNNELED CENTRAL VENOUS CATHETER (CVC) WITH SUBCUTANEOUS PORT N/A 1/7/2022    Procedure: [id number];  Surgeon: Cas Acuña MD;  Location: [facility] OR;  Service: General;  Laterality: N/A;    LEFT HEART CATHETERIZATION  9/1/2020    Procedure: Left heart cath;  Surgeon: Ivette Horne MD;   "Location: [facility];  Service: Cardiology;;    MITRAL VALVE REPLACEMENT  07/20/2016    OOPHORECTOMY Left 1988    REPAIR OF MENISCUS OF KNEE Left     TONSILLECTOMY      vein removal         Family History:   Family History   Problem Relation Age of Onset    No Known Problems Mother     No Known Problems Father     Cancer Maternal Grandmother         Stomach    No Known Problems Maternal Grandfather     No Known Problems Paternal Grandmother     No Known Problems Paternal Grandfather     Breast cancer Sister         over 60    No Known Problems Brother        Social History:  reports that she quit smoking about 33 years ago. Her smoking use included cigarettes. She has a 30.00 pack-year smoking history. She has never used smokeless tobacco. She reports that she does not drink alcohol and does not use drugs.    Allergies:  Review of patient's allergies indicates:   Allergen Reactions    Amoxicillin-pot clavulanate Other (See Comments)     "Spaced out feeling- can't take it longer than 4 or 5 days"    Breo ellipta [fluticasone furoate-vilanterol] Other (See Comments)     Feels jittery    Corticosteroids (glucocorticoids)      Other reaction(s): tachycardia    Erythromycin      "Spaced out"    Latex, natural rubber      Turns skin red around area where latex touches       Medications:  Current Outpatient Medications   Medication Sig Dispense Refill    acetaminophen (TYLENOL) 650 MG TbSR Take 1,300 mg by mouth every 8 (eight) hours.      aspirin 325 MG tablet Take 325 mg by mouth once daily.      bisoprolol (ZEBETA) 5 MG tablet TAKE 1 TABLET(5 MG) BY MOUTH EVERY DAY (Patient taking differently: Take 5 mg by mouth every evening.) 90 tablet 3    chlorhexidine (PERIDEX) 0.12 % solution SWISH AND SPIT 10 MLS FOR 30 SECONDS AND SPIT TWICE DAILY FOR 5 DAYS. START 1/6      cholecalciferol, vitamin D3, (VITAMIN D3) 25 mcg (1,000 unit) capsule Take 1,000 Units by mouth every morning. VITAMIN D 1000 UNIT TABS "      hydroCHLOROthiazide (HYDRODIURIL) 12.5 MG Tab Take 1 tablet (12.5 mg total) by mouth once daily. 90 tablet 3    LIDOcaine-prilocaine (EMLA) cream Apply topically as needed (30 to 60 minutes prior to chemo). 30 g 2    lipase-protease-amylase (CREON) 36,000-114,000- 180,000 unit CpDR Take 2 capsules by mouth 3 (three) times daily with meals. Take 2 capsules TID with meals and 1 capsule PRN with snacks 240 capsule 11    losartan (COZAAR) 50 MG tablet Take 1 tablet (50 mg total) by mouth every evening. 90 tablet 3    MAGNESIUM CHLORIDE ORAL Take 250 mg by mouth every evening.      MULTIVIT WITH MINERALS/LUTEIN (MULTIVITAMIN 50 PLUS ORAL) Take by mouth every other day.       mupirocin (BACTROBAN) 2 % ointment APPLY 1 GRAM IN EACH NOSTRIL TWICE DAILY FOR 5 DAYS. START 1/6      ondansetron (ZOFRAN-ODT) 4 MG TbDL DISSOLVE ONE TABLET BY MOUTH EVERY 12 HOURS AS NEEDED FORNAUSEA AND VOMITING      pravastatin (PRAVACHOL) 40 MG tablet Take 1 tablet (40 mg total) by mouth once daily. (Patient taking differently: Take 40 mg by mouth every evening.) 90 tablet 3    promethazine (PHENERGAN) 25 MG tablet Take 1 tablet (25 mg total) by mouth every 6 (six) hours as needed for Nausea. 60 tablet 6    traMADoL (ULTRAM) 50 mg tablet Take 1 tablet (50 mg total) by mouth every 6 (six) hours as needed for Pain. 30 tablet 0     No current facility-administered medications for this visit.       Review of Systems   Constitutional: Negative for chills, fatigue, fever and unexpected weight change.   Respiratory: Negative for cough and shortness of breath.    Cardiovascular: Negative for chest pain and palpitations.   Gastrointestinal: Positive for abdominal pain. Negative for constipation, diarrhea, nausea and vomiting.   Skin: Negative for rash.   Neurological: Negative for headaches.   Hematological: Negative for adenopathy. Does not bruise/bleed easily.       ECOG Performance Status: 2   Objective:      Vitals:   Vitals:     "01/12/22 0914   BP: 128/60   BP Location: Left arm   Patient Position: Sitting   BP Method: Medium (Manual)   Pulse: 79   Temp: 97.8 °F (36.6 °C)   TempSrc: Temporal   SpO2: 97%   Weight: 63.7 kg (140 lb 6.9 oz)   Height: 5' 5" (1.651 m)     Physical Exam  Constitutional:       General: She is not in acute distress.     Appearance: She is well-developed. She is not diaphoretic.   HENT:      Head: Normocephalic and atraumatic.   Cardiovascular:      Rate and Rhythm: Normal rate and regular rhythm.      Heart sounds: Normal heart sounds. No murmur heard.  No friction rub. No gallop.    Pulmonary:      Effort: Pulmonary effort is normal. No respiratory distress.      Breath sounds: Normal breath sounds. No wheezing or rales.   Chest:      Chest wall: No tenderness.   Breasts:      Right: No axillary adenopathy or supraclavicular adenopathy.      Left: No axillary adenopathy or supraclavicular adenopathy.       Abdominal:      General: Bowel sounds are normal. There is no distension.      Palpations: Abdomen is soft. There is no mass.      Tenderness: There is no abdominal tenderness. There is no guarding or rebound.   Musculoskeletal:         General: No tenderness. Normal range of motion.   Lymphadenopathy:      Cervical: No cervical adenopathy.      Upper Body:      Right upper body: No supraclavicular or axillary adenopathy.      Left upper body: No supraclavicular or axillary adenopathy.   Skin:     Findings: No erythema or rash.   Neurological:      Mental Status: She is alert and oriented to person, place, and time.   Psychiatric:         Behavior: Behavior normal.         Laboratory Data:  Lab Visit on 01/12/2022   Component Date Value Ref Range Status    WBC 01/12/2022 6.85  3.90 - 12.70 K/uL Final    RBC 01/12/2022 4.51  4.00 - 5.40 M/uL Final    Hemoglobin 01/12/2022 13.4  12.0 - 16.0 g/dL Final    Hematocrit 01/12/2022 40.6  37.0 - 48.5 % Final    MCV 01/12/2022 90  82 - 98 fL Final    MCH 01/12/2022 " 29.7  27.0 - 31.0 pg Final    MCHC 01/12/2022 33.0  32.0 - 36.0 g/dL Final    RDW 01/12/2022 13.1  11.5 - 14.5 % Final    Platelets 01/12/2022 220  150 - 450 K/uL Final    MPV 01/12/2022 8.6* 9.2 - 12.9 fL Final    Immature Granulocytes 01/12/2022 0.3  0.0 - 0.5 % Final    Gran # (ANC) 01/12/2022 4.5  1.8 - 7.7 K/uL Final    Immature Grans (Abs) 01/12/2022 0.02  0.00 - 0.04 K/uL Final    Comment: Mild elevation in immature granulocytes is non specific and   can be seen in a variety of conditions including stress response,   acute inflammation, trauma and pregnancy. Correlation with other   laboratory and clinical findings is essential.      Lymph # 01/12/2022 0.9* 1.0 - 4.8 K/uL Final    Mono # 01/12/2022 1.0  0.3 - 1.0 K/uL Final    Eos # 01/12/2022 0.4  0.0 - 0.5 K/uL Final    Baso # 01/12/2022 0.04  0.00 - 0.20 K/uL Final    nRBC 01/12/2022 0  0 /100 WBC Final    Gran % 01/12/2022 66.1  38.0 - 73.0 % Final    Lymph % 01/12/2022 13.6* 18.0 - 48.0 % Final    Mono % 01/12/2022 14.0  4.0 - 15.0 % Final    Eosinophil % 01/12/2022 5.4  0.0 - 8.0 % Final    Basophil % 01/12/2022 0.6  0.0 - 1.9 % Final    Differential Method 01/12/2022 Automated   Final    Sodium 01/12/2022 136  136 - 145 mmol/L Final    Potassium 01/12/2022 3.8  3.5 - 5.1 mmol/L Final    Chloride 01/12/2022 94* 95 - 110 mmol/L Final    CO2 01/12/2022 31* 23 - 29 mmol/L Final    Glucose 01/12/2022 115* 70 - 110 mg/dL Final    BUN 01/12/2022 14  8 - 23 mg/dL Final    Creatinine 01/12/2022 0.6  0.5 - 1.4 mg/dL Final    Calcium 01/12/2022 9.9  8.7 - 10.5 mg/dL Final    Total Protein 01/12/2022 7.0  6.0 - 8.4 g/dL Final    Albumin 01/12/2022 3.8  3.5 - 5.2 g/dL Final    Total Bilirubin 01/12/2022 0.6  0.1 - 1.0 mg/dL Final    Comment: For infants and newborns, interpretation of results should be based  on gestational age, weight and in agreement with clinical  observations.    Premature Infant recommended reference ranges:  Up to  24 hours.............<8.0 mg/dL  Up to 48 hours............<12.0 mg/dL  3-5 days..................<15.0 mg/dL  6-29 days.................<15.0 mg/dL      Alkaline Phosphatase 01/12/2022 89  55 - 135 U/L Final    AST 01/12/2022 32  10 - 40 U/L Final    ALT 01/12/2022 27  10 - 44 U/L Final    Anion Gap 01/12/2022 11  8 - 16 mmol/L Final    eGFR if African American 01/12/2022 >60  >60 mL/min/1.73 m^2 Final    eGFR if non African American 01/12/2022 >60  >60 mL/min/1.73 m^2 Final    Comment: Calculation used to obtain the estimated glomerular filtration  rate (eGFR) is the CKD-EPI equation.      Admission on 01/07/2022, Discharged on 01/07/2022   Component Date Value Ref Range Status    Sodium 01/07/2022 132* 136 - 145 mmol/L Final    Potassium 01/07/2022 3.7  3.5 - 5.1 mmol/L Final    Chloride 01/07/2022 93* 95 - 110 mmol/L Final    CO2 01/07/2022 30  22 - 31 mmol/L Final    Glucose 01/07/2022 100  70 - 110 mg/dL Final    Comment: The ADA recommends the following guidelines for fasting glucose:    Normal:       less than 100 mg/dL    Prediabetes:  100 mg/dL to 125 mg/dL    Diabetes:     126 mg/dL or higher      BUN 01/07/2022 13  7 - 18 mg/dL Final    Creatinine 01/07/2022 0.40* 0.50 - 1.40 mg/dL Final    Calcium 01/07/2022 9.3  8.4 - 10.2 mg/dL Final    Anion Gap 01/07/2022 9  8 - 16 mmol/L Final    eGFR if African American 01/07/2022 >60  >60 mL/min/1.73 m^2 Final    eGFR if non African American 01/07/2022 >60  >60 mL/min/1.73 m^2 Final    Comment: Calculation used to obtain the estimated glomerular filtration  rate (eGFR) is the CKD-EPI equation.       WBC 01/07/2022 4.27  3.90 - 12.70 K/uL Final    RBC 01/07/2022 4.42  4.00 - 5.40 M/uL Final    Hemoglobin 01/07/2022 13.2  12.0 - 16.0 g/dL Final    Hematocrit 01/07/2022 38.9  37.0 - 48.5 % Final    MCV 01/07/2022 88  82 - 98 fL Final    MCH 01/07/2022 29.9  27.0 - 31.0 pg Final    MCHC 01/07/2022 33.9  32.0 - 36.0 g/dL Final    RDW  01/07/2022 13.1  11.5 - 14.5 % Final    Platelets 01/07/2022 168  150 - 450 K/uL Final    MPV 01/07/2022 9.5  9.2 - 12.9 fL Final    aPTT 01/07/2022 32.0  24.6 - 36.7 sec Final    PTT normal range is not established for pediatrics.         Imaging:     PET/CT 1/04/22    Head/neck:     No significant abnormal hypermetabolic foci are identified in the head and neck region.  No lymphadenopathy is present.     Chest:     No significant abnormal hypermetabolic foci are identified within the soft tissues of the chest.  No hypermetabolic pulmonary nodules, lymphadenopathy, pleural effusion, or pericardial effusion are present.     Abdomen/pelvis:     There is a markedly hypermetabolic rounded mass at the junction of the body and tail of the pancreas consistent with the history of pancreatic cancer.  On image 134, the mass measures 2.5 x 2.4 cm and has a max SUV of 11.2.  There is dilatation of the distal pancreatic duct beyond of the mass.  Which measures at least 10 mm in greatest diameter.  There is adjacent enlargement and hypermetabolism of the left adrenal gland suspicious for metastatic disease.  The left adrenal gland measures 1.9 x 1.9 cm and has a max SUV of 4.3.  There are innumerable hypermetabolic nodular masses throughout the left and right hepatic lobes consistent with hepatic metastatic disease.  Two of the larger more discrete masses will be measured as index lesions.  A mass in the anterior segment of the right hepatic lobe on image 129 measures 2.5 x 1.9 cm and has a max SUV of 16.2.  A mass in the posterior segment of the right hepatic lobe on image 120 measures 5.2 x 4.4 cm with a max SUV of 14.0.  There is no bile duct dilatation.  The gallbladder appears normal.  No ascites is present.  The right adrenal gland appears normal.  There is a mildly enlarged hypermetabolic portacaval lymph node which on image 143 measures 1.4 x 1.0 cm and has a max SUV of 3.8 which is suspicious for neoplastic  adenopathy.  There is hypermetabolism of the umbilicus of uncertain significance.  The umbilicus measures 0.7 cm and has a max SUV of 4.8.  Clinical correlation is advised.     Skeleton:     There is an oval-shaped hypermetabolic focus within the left anterior aspect of the T9 vertebral body highly suspicious for osseous metastatic disease.  The metastatic lesion is not well visualized on the CT portion of the study and is best measured on the PET-CT images.  On image 85, the lesion measures 1.8 x 1.7 cm and has a max SUV of 13.3.  There is also a hypermetabolic focus in the left iliac fossa which on image 178 measures 1.7 x 1.2 cm and has a max SUV of 6.8.  No other significant abnormal hypermetabolic foci are identified within the skeleton.    MRI Brain 1/11/22    INTRACRANIAL: Mild global volume loss.  Scattered T2 FLAIR hyperintense white matter foci are present, likely related to chronic microvascular ischemic change.  Old right mid brain lacunar infarct.  No abnormal enhancement demonstrated.  No definite parenchymal restricted diffusion.  No evidence of intracranial hemorrhage.  No extra-axial fluid collection or mass.  No intracranial mass effect.  No hydrocephalus.  Midline structures have a normal configuration.  Visualized pituitary gland and infundibulum are normal.  Visualized major intracranial vascular structures demonstrate normal flow voids and are normal in course and caliber.     SINUSES: Paranasal sinuses and mastoid air cells are clear.     ORBITS: Bilateral lens replacements.        Assessment:       1. Adenocarcinoma of pancreas    2. Malignant neoplasm of tail of pancreas     3. Metastasis to liver    4. 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 Bone metastases    6. 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therapy    7. Pancreatic insufficiency    8. Hypertension, unspecified type    9. Hyperlipidemia, unspecified hyperlipidemia type           Plan:     Metastatic Pancreatic Cancer - The  patient was diagnosed with metastatic pancreatic cancer adenocarcinoma with mets to the liver on 12/15/21  -Ca19-9 was 7,581 on 12/15/21  -PET/CT on 1/04/22 shows mets to the liver, left adrenal gland, T9 vertebra and left iliac fossa.  -Potential mets are seen near the umbilicus  -PT underwent POET placement 1/07/22  -MRI brain on 1/11/22 showed no brain mets  -Pt consented for Gemcitabine and paclitaxel on 1/03/22  -Will start treatment today with 20% dose reduction in paclitaxel and Gemcitabine to 100mg/mm2 and 800mg/mm2 respectively  -Will consider increasing based on patient tolerance    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 - pt instructed on increased infection risk on chemo    Pancreatic Insufficiency - pt on creon  -Will monitor    Cancer Related Pain - pt with occasional abdominal pain  -Pt instructed to take tramadol  -Will monitor    HTN - pt taking losartan, HCTZ  -Will monitor    HLD - pt on pravastatin  -Management per PCP    Debility - pt with decreased ability to ambulate given pancreatic cancer and discomfort  -Will have the patient obtain a walker for home use.    Follow Up - palliative care appt; CBC, CMp Ca19-9 on 1/19/22 with clinic appt and treatment  Salvador Green MD  [facility]  Hematology and Oncology  [facility] [address] [place] [address]   O: [phone]  F: [phone]

## 2022-01-11 NOTE — PROGRESS NOTES
Pharmacy Treatment Plan Review    Patient  Nel Cui, 86 y.o.  1935    Indication: Pancreatic Cancer     Labs:  CBC  Lab Results   Component Value Date    WBC 4.27 01/07/2022    HGB 13.2 01/07/2022    HCT 38.9 01/07/2022    MCV 88 01/07/2022     01/07/2022       BMP  Lab Results   Component Value Date     (L) 01/07/2022    K 3.7 01/07/2022    CL 93 (L) 01/07/2022    CO2 30 01/07/2022    BUN 13 01/07/2022    CREATININE 0.40 (L) 01/07/2022    CALCIUM 9.3 01/07/2022    ANIONGAP 9 01/07/2022    ESTGFRAFRICA >60 01/07/2022    EGFRNONAA >60 01/07/2022       LFTs  Lab Results   Component Value Date    ALT 26 12/15/2021    AST 42 (H) 12/15/2021    ALKPHOS 69 12/15/2021    BILITOT 0.4 12/15/2021       The patient is scheduled to start the following infusion:   OP PANC NAB-PACLITAXEL + GEMCITABINE Q4W    28-day cycle for 12 cycles of:    -Paclitaxel-protein bound 125 mg/m2 in sodium chloride 0.9% 44 mL, infusion, intravneous, on days 1, 8, 15    -Gemcitabine 1000 mg/m2 in sodium chloride 0.9% 250 mL, infusion, intravneous, on days 1, 8, 15    Premeds  -Ondansetron 8mg IV on days 1, 8, 15    The treatment plan is per [facility]    Theodore Ybarra  PharmD

## 2022-01-12 NOTE — PLAN OF CARE
Problem: Adult Inpatient Plan of Care  Goal: Plan of Care Review  Outcome: Ongoing, Progressing  Flowsheets (Taken 1/12/2022 1706)  Plan of Care Reviewed With:   patient   daughter  Goal: Patient-Specific Goal (Individualized)  Outcome: Ongoing, Progressing  Flowsheets (Taken 1/12/2022 1706)  Anxieties, Fears or Concerns: fear of unknown side effects that could happen  Individualized Care Needs:   blanket, recliner, tv, education, daughter at bedside   /dietician visit  Patient-Specific Goals (Include Timeframe): no signs of reaction during infusions  Goal: Optimal Comfort and Wellbeing  Outcome: Ongoing, Progressing  Intervention: Provide Person-Centered Care  Flowsheets (Taken 1/12/2022 1706)  Trust Relationship/Rapport:   care explained   questions encouraged   choices provided   reassurance provided   emotional support provided   thoughts/feelings acknowledged   empathic listening provided   questions answered     Problem: Fatigue  Goal: Improved Activity Tolerance  Outcome: Ongoing, Progressing  Intervention: Promote Improved Energy  Flowsheets (Taken 1/12/2022 1706)  Fatigue Management:   paced activity encouraged   frequent rest breaks encouraged  Sleep/Rest Enhancement:   relaxation techniques promoted   natural light exposure provided   consistent schedule promoted   noise level reduced   room darkened   therapeutic touch utilized   family presence promoted  Activity Management: Ambulated -L4  Pt arrived to clinic for first Abraxane/Gemzar infusion and tolerated well with no changes throughout therapy. Pt aware of side effects and f/u appts and met with  and dietician during visit. Pt anxious about how she will feel at home but positive. Port site still swollen with no signs of infection. Pt discharged to home with edgar in NAD.

## 2022-01-12 NOTE — Clinical Note
The patient can proceed with treatment today.  She will need a CBC, CMp Ca19-9 on 1/19/22.  She can get an appt with me at 3:30 on 1/19/22.  The patient's palliative care appt on 2/22/22 can be cancelled.  She needs to see palliative care on the [facility].

## 2022-01-13 NOTE — TELEPHONE ENCOUNTER
RD received phone call from pt. Pt reports that she had a bout of diarrhea last night and was very confused regarding the instructions on what she was suppose to do regarding medication for diarrhea. She mentioned she called the answering services and got clarification. Pt inquiring if she needs to now follow a bland diet d/t her diarrhea. Pt w/ a lot of nutritional questions regarding what she can and cannot eat. All questions/concerns addressed at time of call. Encouraged pt to call clinic back if any other questions were to arise. Pt v/u.    MELISA MURILLO MA, RD, LDN  01/13/2022  10:33 AM

## 2022-01-13 NOTE — PROGRESS NOTES
Oncology   Chemotherapy Infusion Visit    BETTINA saw pt on 1/12/22  Quick Social Service Status Follow Up   Met w/ pt briefly to follow up on social and emotional needs since initiation of treatment.    BETTINA met with pt and daughter in private room. Pt had questions about getting a walker. She said when she gets up ay night she has been using a straight cane. She said she feel shaky. She would like a walker. Had questions as to if insurance would cover this and if it can be delivered to her daughter's home. She said it is difficult to get things delivered to her appartment at [facility].  SW to gather information and inform to answer questions.     BETTINA sent message to Akiko with Dr. Green with pt's request for a walker. BETTINA spoke with [facility] [phone] regarding orders and documentation needed.They do accept pt's insurance. Order is written but socumentation of need still needs to be obtained. BETTINA updated Akiko and pt's daughter Brinda who will inform pt. Will send off request for Walker when needed notes obtained.   BETTINA to follow.           Christy Martinez, ANA  01/13/2022  7:32 AM

## 2022-01-13 NOTE — PROGRESS NOTES
ONCOLOGY NUTRITION   FOLLOW UP VISIT        Nel Cui is a 86 y.o. female.  DATE: 01/13/2022        Oncology Diagnosis: Pancreas Cancer     REFERRAL FROM:   [] Integrative Oncology   [] Med/Heme Oncology  [] Radiation Oncology  [] Surgical Oncology     [x] Routine Nutrition follow up    TREATMENT PLAN:   [] Full treatment plan pending  [x] Chemotherapy  [] Immunotherapy  [] Radiation  [] Concurrent  [] Surgery  [] Treatment complete/post-treatment    ANTHROPOMETRICS:  Wt Readings from Last 10 Encounters:   01/12/22 63.7 kg (140 lb 6.9 oz)   01/12/22 63.7 kg (140 lb 6.9 oz)   01/06/22 65.8 kg (145 lb)   01/03/22 64.7 kg (142 lb 8.4 oz)   12/27/21 64.4 kg (142 lb)   12/22/21 64.7 kg (142 lb 10.2 oz)   12/02/21 65.8 kg (145 lb)   11/06/21 68.9 kg (152 lb)   10/30/21 68.9 kg (152 lb)   10/29/21 68.9 kg (152 lb)      Weight Changes: has decreased 5 pounds over last 1 week    7.89% weight decrease in     PHYSICAL EXAM:  Muscle Wasting Observed:  [] No Deficit   [x] Mild Deficit   [] Moderate   [] Severe    INTAKE:  [x] PO Intake [] TF Intake  Current Diet: regular diet  Dietary Patterns:  Eating meals/snacks as tolerated  [x] Oral nutritional supplements: 2-3 Ensure per day    SYMPTOMS/COMPLAINTS:   [] No nutritional concerns at current  [] Diarrhea                    [] Constipation           [] Nausea                 [] Vomiting                [] Indigestion                [] Reflux              [x] Poor Appetite            [] Anorexia                 [x] Early Satiety         [] Gas                       [] Bloating                     [] Dry Mouth    [] Mucositis                   [] Mouth Sores           [] Poor Dentition      [] Difficulty chewing  [] Difficulty Swallowing   [] Pain with swallowing [] Change in taste      [] Change in smell   [] Pain (general)       [] Fatigue                      [] Sleep issues    [] Weight loss  [x] other, please specify: Concerns with Creon    Nutrition Re-Assessment  Risk: Mild/Moderate    [x] Labs reviewed   [x] Meds reviewed    Education Provided:  [] No Education Needed at this time  [] Diarrhea                                              [] Constipation                          [] Nausea/Vomiting  [] Mucositis                [] Dry Mouth    [] Dealing with changes in Taste/Smell  [x] Dealing with Poor Appetite   [] Soft/moist Diet      [x] Weight Loss/Gain     [] Weight Maintenance                           [] Indigestion/GERD                 [] Gas/Bloating          [] Foods High/ Low in specific nutrients [] Increasing Calories/Protein   [] Milkshake/Smoothies Recipes   [x] Nutrition Supplements                        [] Increasing Fluid Intakes         [] Foods that fight cancer    [] Evidence bases resources                 [] Fermented Foods/Probiotics  [] Mediterranean/Plant Based Diet     [x] Other, specify: Taking Creon                                  [] Handouts provided      [] Samples provided     RD NOTE:  RD met with patient and her daughter, Brinda, during infusion treatment. Pt with questions regarding her CoQ10 and vitamin C supplements and her Creon. Pt states Dr. Green told her she could continue taking CoQ10 and vitamin C supplements with the treatment she is receiving. RD told pt to follow Dr. Green's recommendations. In regards to Creon, RD explained recommendations to take Creon with every meal and snack. Discussed side effects of possibly not taking Creon and side effects.     RD Goals:  [x] Weight stable                  [] Weight gain                      [] Weight Loss                               [] Continue adequate Kcal/protein   [x] Increase Kcal/protein      [] Adjust Tube-feeding Rx   [] Tolerate Tube Feedings             [] Increase tube feedings to goal     [] Tolerate Supplements     [] Symptom Improvement   [x] Understand nutrition Education  [] Offer supportive visits   [] other, please specify    RECOMMENDATIONS:  Continue to take  Chano- approximately 1 with snacks and 2 with meals  Take Vitamin C/CoQ10 per Dr. Green's recommendations  Eat every 3-4 hours, smaller more frequent meals to help with poor appetite   Continue 2-3 ONS per day    Follow up: Next infusion or PRN throughout tx    Debra Juárez, MS, RD, LDN  01/13/2022  8:22 AM

## 2022-01-13 NOTE — TELEPHONE ENCOUNTER
Found out recently that she has cancer on her pancreas. Hast first chemo past evening. Feel good right now. Have the instructions for diarrhea that is printed on [facility] paper work. Instructions states at first sign of diarrhea take 8 mg imodium immediately. Then Take 4 mg every 2 hours even if you have to disturb your sleep until no diarrhea for 12 hours. Pt calling for clarification.  Dr. Green called for clarification of instructions for imodium.MD states for pt to take 8 mg after first diarrhea stool of which she has taken already.Then take 4 mg after second and 4 mg thereafter each diarrhea stool. Maximum amount is 16 mg/24 hour period. Pt instructed until understanding verbalized. Do not exceed 16 mg in a 24 hour period. Pt has already taken 8 mg. Advised no more than 8 mg in the next 12 hours.     Reason for Disposition   [1] Caller has URGENT medicine question about med that PCP or specialist prescribed AND [2] triager unable to answer question    Protocols used: MEDICATION QUESTION CALL-A-

## 2022-01-13 NOTE — TELEPHONE ENCOUNTER
BETTINA called pt's daughter and informed her this BETTINA spoke with [facility] [phone] regarding order for walker.   [employer] covers most of the cost of the walker. There will be an upgrade charge for items (breaks) [employer] does not cover. Charge should be around $40.00. They will run the insurance and call daughter with the exact cost. Daughter will make arrangements to  the walker from the [employer] company when ready.     BETTINA faxed order, notes and demographics to [facility] at [phone] for walker.     SW to follow.      Christy Martinez, VESNAW

## 2022-01-15 NOTE — TELEPHONE ENCOUNTER
3 days ago chemotherapy on Wednesday. She is calling because temp is 100.4 oral, she has socks, long pj's and wrapped in a blanket. She was told by oncologist that she could take tylenol for aches. I told her that she should not take it if her body temp is elevated, because she would have to be evaluated for neutropenia. For now oral temp is 100.2 and she is taking off socks and heavy blanket.     Reason for Disposition   [1] Fever AND [2] no signs of serious infection or localizing symptoms (all other triage questions negative)    Additional Information   Negative: Shock suspected (e.g., cold/pale/clammy skin, too weak to stand, low BP, rapid pulse)   Negative: Difficult to awaken or acting confused  (e.g., disoriented, slurred speech)   Negative: Bluish lips, tongue, or face now   Negative: New onset rash with many purple (or blood-colored) spots or dots   Negative: Sounds like a life-threatening emergency to the triager   Negative: Other symptom is present, see that guideline  (e.g., symptoms of cough, runny nose, sore throat, earache, abdominal pain, diarrhea, vomiting)   Negative: Fever > 104 F (40 C)   Negative: [1] Neutropenia known or suspected (e.g., recent cancer chemotherapy) AND [2] fever > 100.4 F (38.0 C)   Negative: [1] Neutropenia known or suspected (e.g., recent cancer chemotherapy) AND [2] signs or symptoms of suspected infection are present   Negative: [1] Headache AND [2] stiff neck (can't touch chin to chest)   Negative: Difficulty breathing   Negative: [1] Drinking very little AND [2] dehydration suspected (e.g., no urine > 12 hours, very dry mouth, very lightheaded)   Negative: Patient sounds very sick or weak to the triager  (Exception: mild weakness and hasn't taken fever medicine)   Negative: [1] Fever > 101 F (38.3 C) AND [2] age > 60   Negative: [1] Fever > 101 F (38.3 C) AND [2] co-morbidity risk factor for serious infection (e.g., COPD, heart failure, liver failure,  renal failure with dialysis )   Negative: [1] Fever > 101 F (38.3 C) AND [2] bedridden (e.g., nursing home patient, CVA, chronic illness, recovering from surgery)   Negative: [1] Fever >  100.5 F (38.1 C) AND weak immune system (e.g., diabetes, splenectomy, leukemia, HIV infection, chronic steroid use)   Negative: [1] Fever > 100.5 F (38.1 C) AND [2] indwelling urinary catheter (e.g., Bauman, Coude)   Negative: [1] Fever > 100.5 F (38.1 C) AND [2] port (portacath), central line, or PICC line   Negative: [1] Fever > 100.5 F (38.1 C) AND [2] surgery in the last month   Negative: Fever present > 3 days (72 hours)   Negative: [1] Fever > 100.5 F (38.1 C) AND [2] foreign travel to a developing country in the last month    Protocols used: ST CANCER - FEVER-A-

## 2022-01-16 NOTE — TELEPHONE ENCOUNTER
Reason for Disposition   Patient sounds very sick or weak to the triager    Protocols used: CONSTIPATION-A-AH

## 2022-01-16 NOTE — TELEPHONE ENCOUNTER
Caller states that she had first chemo treatment, caller is c/o constipation x 4 days, despite taking miralax. On call provider Dr. Thomas, contacted, suggested pt take stool softner x twice daily and continue taking miralax with Gatorade daily. Pt informed.  Pt advised per protocol and verbalized understanding.

## 2022-01-16 NOTE — TELEPHONE ENCOUNTER
Patient is c/o not having a BM in 3d. She will take Miralax but noticed it may take 1-2 days to work and she is afraid it will be pass the threshold of her advisement from the oncologist for BM's. She will take tonight and if no relief by mid day she will call back for assistance from the oncologist. Ash abdominal swelling and she states the abdominal pain she has is the same as before CHEMO.  Reason for Disposition   [1] Follow-up call to recent contact AND [2] information only call, no triage required   Constipation in a cancer patient who is currently (or recently) receiving chemotherapy or radiation therapy, or cancer patient who has metastatic or end-stage cancer and is receiving palliative care   Unable to have a bowel movement (BM) without laxative or enema (Exception: this the doctor's current treatment plan for constipation)    Additional Information   Negative: [1] Caller is not with the adult (patient) AND [2] reporting urgent symptoms   Negative: Lab result questions   Negative: Medication questions   Negative: Caller can't be reached by phone   Negative: Caller has already spoken to PCP or another triager   Negative: RN needs further essential information from caller in order to complete triage   Negative: Requesting regular office appointment   Negative: [1] Caller requesting NON-URGENT health information AND [2] PCP's office is the best resource   Negative: Health Information question, no triage required and triager able to answer question   Negative: General information question, no triage required and triager able to answer question   Negative: Question about upcoming scheduled test, no triage required and triager able to answer question   Negative: [1] Caller is not with the adult (patient) AND [2] probable NON-URGENT symptoms   Negative: [1] Abdomen pain is main symptom AND [2] male   Negative: [1] Abdomen pain is main symptom AND [2] adult female   Negative: Rectal bleeding or  blood in stool is main symptom   Negative: Rectal pain or itching is main symptom   Negative: Sounds like a life-threatening emergency to the triager   Negative: [1] Abdomen pain is main symptom AND [2] male   Negative: [1] Abdomen pain is main symptom AND [2] adult female   Negative: Rectal bleeding or blood in stool is main symptom   Negative: Rectal pain or itching is main symptom   Negative: [1] Neutropenia known or suspected (e.g., recent cancer chemotherapy) AND [2] fever > 100.4 F (38.0 C)   Negative: Patient sounds very sick or weak to the triager   Negative: [1] Vomiting AND [2] abdomen looks much more swollen than usual   Negative: [1] Vomiting AND [2] contains bile (green color)   Negative: [1] Constant abdominal pain AND [2] present > 2 hours   Negative: [1] Sudden onset rectal pain (straining, rectal pressure or fullness) AND [2] NOT better after SITZ bath, suppository or enema   Negative: [1] Constipation present > 1 week AND [2] no improvement after using CARE ADVICE   Negative: [1] Abdominal pain (i.e., mild or intermittent) AND [2] fever   Negative: [1] Abdominal pain (i.e., mild or intermittent) AND [2] abdomen looks much more swollen than usual   Negative: Last bowel movement (BM) > 4 days ago   Negative: Leaking stool    Protocols used: INFORMATION ONLY CALL-A-, CONSTIPATION-A-, CANCER - CONSTIPATION-A-

## 2022-01-18 NOTE — TELEPHONE ENCOUNTER
I called the patient's daughter and discussed the results of the Ca19-9.  We discussed the ability to monitor this as the patient goes through treatment and that we had repeated the value to get her current baseline.  She expressed understanding.  All questions were answered to her satisfaction.    Salvador Green MD  [facility]  Hematology and Oncology  [facility]   [address] [facility]   [place] [address]   O: [phone]  F: [phone]

## 2022-01-18 NOTE — TELEPHONE ENCOUNTER
The patient instructed me that she has not had a bowel movement since last week with a bout of diarrhea.  I recommended she continue taking senokot and miralax.  We discussed possible suppository; however,I instructed her that I would like to see her labs before considering that treatment and that we could further discuss tomorrow.  The patient expressed understanding.  All questions were answered to her satisfaction.    Salvador Green MD  [facility]  Hematology and Oncology  [facility]   [address] [facility]   [place] [address]   O: [phone]  F: [phone]

## 2022-01-18 NOTE — TELEPHONE ENCOUNTER
CATRINA called patient d/t receiving VM from patient on 1/14 @ 5:51pm.     Pt states she has some questions regarding foods she can eat related to her Creon. Pt states she was just waking up this morning and would like to call RD back at a better time. She mentioned she is coming in for a few appointments tomorrow 1/19 and may come a little early to discuss with CATRINA in person. CATRINA HAGER.    Debra Juárez 01/18/2022   8:56 AM

## 2022-01-18 NOTE — TELEPHONE ENCOUNTER
"Spoke to patient per phone.  Reviewed calls per patient over holiday weekend.    See below:  Wed,01/12:  C1, D1 chemo. Large amt watery stool @ home.  Took Imodium i tab--diarrhea resolved    Sat night, 01/15:  Temp = 100.4  Thinks was "wrapped up in too much clothing".  Temp decreased to 99 w/o intervention.  No BM since Wed. Called "ON-CALL" RN.  Instructed to take Miralax.    Sun, 01/16:  No BM.  Instructed by "ON-CALL" RN/per MD to continue Miralax + add stool softner BID    Mon, 01/17:  No BM.  Continues Miralax, stool softner.  No nausea/vomiting, no elevated temp.  Appetite increasing    Please advise on above  "

## 2022-01-19 NOTE — PROGRESS NOTES
PATIENT: Nel Cui  MRN: [id number]  DATE: 1/19/2022      Diagnosis:   1. Adenocarcinoma of pancreas    2. Malignant neoplasm of tail of pancreas     3. Metastasis to liver    4. 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 Bone metastases    6. 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therapy    7. Constipation, unspecified constipation type    8. Pancreatic insufficiency    9. Hypertension, unspecified type    10. Hyperlipidemia, unspecified hyperlipidemia type    11. Elevated liver enzymes        Chief Complaint: Pancreatic Cancer (1 week follow up )      Oncologic History:      Oncologic History 12/09/21 Ct Abdomen  12/15/21 EUS  1/04/22 PET/CT  1/07/22 PORT - Dr Acuña  1/11/22 MRI Brain    Oncologic Treatment 1/12/22 - current Gemcitabine and Paclitaxel    Pathology 12/15/21 adenocarcinoma in the pancreatic body and adenocarcinoma in the liver       The patient initially presented to her PCP on 12/02/21 with complaint of abdominal pain.  She underwent a CT of the abdomen on 12/09/21 showing subcentimeter para-aortic, mesenteric lymph nodes; multiple hepatic infiltrative lesions present throughout the liver with 1 of the larger areas right liver lobe measuring approximately 2.2 cm in diameter; mass in the pancreatic tail measuring 9 x 3.3 cm.  The patient underwent EUS under the care of Dr Tse on 12/15/21 showing a mass in the pancreatic body and multiple liver lesions.  Path from the procedure showed adenocarcinoma in the pancreatic body and adenocarcinoma in the liver.    The patient underwent PET/CT on 1/04/22 showing hypermetabolic rounded mass at the junction of the body and tail of the pancreas measuring 2.5 x 2.4 cm, hypermetabolic left adrenal gland nodule measuring 1.9 cm, innumerable hypermetabolic nodular masses throughout the left and right hepatic lobes with largest lesions in the right liver measuring 2.5 x 1.9 cm and 5.2 x 4.4 cm, mildly enlarged hypermetabolic portacaval lymph node  measuring 1.4 cm, 0.7 cm hypermetabolic focus at the umbilicus, lesion in the T9 vertebra measuring 1.8 x 1.7 cm and a lesion in the left iliac fossa measuring 1.7 x 1.2 cm.  MRI of the brain on 01/11/2022 showed no evidence of metastases but did show an old right midbrain lacunar infarct.    Subjective:     Interval History: Ms. Cui is a 86 y.o. female with Osteopenia, HLD, HTN who presents for pancreatic cancer.  Since the last clinic visit the patient states her abdominal pain has improved.  She continues to have constipation and is taking senokot-S twice daily as well as miralax.  The patient denies CP, cough, SOB, abdominal pain, N/V, diarrhea.  The patient denies fever, chills, night sweats, weight loss, new lumps or bumps, easy bruising or bleeding.    Past Medical History:   Past Medical History:   Diagnosis Date    A-fib     Arthritis     Cancer     pancreas and liver    Cataracts, bilateral     Coronary artery disease     Essential (primary) hypertension 10/5/2010    Hyperlipidemia     Osteopenia        Past Surgical HIstory:   Past Surgical History:   Procedure Laterality Date    CARPAL TUNNEL RELEASE Bilateral     wrist    CATARACT EXTRACTION, BILATERAL  2017    CORONARY ANGIOGRAPHY  9/1/2020    Procedure: ANGIOGRAM, CORONARY ARTERY;  Surgeon: Ivette Horne MD;  Location: [facility] CATH;  Service: Cardiology;;    DILATION AND CURETTAGE OF UTERUS      ENDOSCOPIC ULTRASOUND OF UPPER GASTROINTESTINAL TRACT Left 12/15/2021    Procedure: ULTRASOUND, UPPER GI TRACT, ENDOSCOPIC;  Surgeon: Lico Tse MD;  Location: [facility] ENDO;  Service: Endoscopy;  Laterality: Left;    ESOPHAGOGASTRODUODENOSCOPY N/A 12/15/2021    Procedure: EGD (ESOPHAGOGASTRODUODENOSCOPY);  Surgeon: Lico Tse MD;  Location: [facility] ENDO;  Service: Endoscopy;  Laterality: N/A;    INSERTION OF TUNNELED CENTRAL VENOUS CATHETER (CVC) WITH SUBCUTANEOUS PORT N/A 1/7/2022    Procedure: [id number];  Surgeon:  "Cas Acuña MD;  Location: [facility] OR;  Service: General;  Laterality: N/A;    LEFT HEART CATHETERIZATION  9/1/2020    Procedure: Left heart cath;  Surgeon: Ivette Horne MD;  Location: [facility] CATH;  Service: Cardiology;;    MITRAL VALVE REPLACEMENT  07/20/2016    OOPHORECTOMY Left 1988    REPAIR OF MENISCUS OF KNEE Left     TONSILLECTOMY      vein removal         Family History:   Family History   Problem Relation Age of Onset    No Known Problems Mother     No Known Problems Father     Cancer Maternal Grandmother         Stomach    No Known Problems Maternal Grandfather     No Known Problems Paternal Grandmother     No Known Problems Paternal Grandfather     Breast cancer Sister         over 60    No Known Problems Brother        Social History:  reports that she quit smoking about 33 years ago. Her smoking use included cigarettes. She has a 30.00 pack-year smoking history. She has never used smokeless tobacco. She reports that she does not drink alcohol and does not use drugs.    Allergies:  Review of patient's allergies indicates:   Allergen Reactions    Amoxicillin-pot clavulanate Other (See Comments)     "Spaced out feeling- can't take it longer than 4 or 5 days"    Breo ellipta [fluticasone furoate-vilanterol] Other (See Comments)     Feels jittery    Corticosteroids (glucocorticoids)      Other reaction(s): tachycardia    Erythromycin      "Spaced out"    Latex, natural rubber      Turns skin red around area where latex touches       Medications:  Current Outpatient Medications   Medication Sig Dispense Refill    acetaminophen (TYLENOL) 650 MG TbSR Take 1,300 mg by mouth every 8 (eight) hours.      aspirin 325 MG tablet Take 325 mg by mouth once daily.      bisoprolol (ZEBETA) 5 MG tablet TAKE 1 TABLET(5 MG) BY MOUTH EVERY DAY (Patient taking differently: Take 5 mg by mouth every evening.) 90 tablet 3    chlorhexidine (PERIDEX) 0.12 % solution SWISH AND SPIT 10 MLS FOR 30 SECONDS AND SPIT " TWICE DAILY FOR 5 DAYS. START 1/6      cholecalciferol, vitamin D3, (VITAMIN D3) 25 mcg (1,000 unit) capsule Take 1,000 Units by mouth every morning. VITAMIN D 1000 UNIT TABS      docusate sodium (STOOL SOFTENER ORAL) Take by mouth.      hydroCHLOROthiazide (HYDRODIURIL) 12.5 MG Tab Take 1 tablet (12.5 mg total) by mouth once daily. 90 tablet 3    LIDOcaine-prilocaine (EMLA) cream Apply topically as needed (30 to 60 minutes prior to chemo). 30 g 2    lipase-protease-amylase (CREON) 36,000-114,000- 180,000 unit CpDR Take 2 capsules by mouth 3 (three) times daily with meals. Take 2 capsules TID with meals and 1 capsule PRN with snacks 240 capsule 11    losartan (COZAAR) 50 MG tablet Take 1 tablet (50 mg total) by mouth every evening. 90 tablet 3    MAGNESIUM CHLORIDE ORAL Take 250 mg by mouth every evening.      MULTIVIT WITH MINERALS/LUTEIN (MULTIVITAMIN 50 PLUS ORAL) Take by mouth every other day.       mupirocin (BACTROBAN) 2 % ointment APPLY 1 GRAM IN EACH NOSTRIL TWICE DAILY FOR 5 DAYS. START 1/6      ondansetron (ZOFRAN-ODT) 4 MG TbDL DISSOLVE ONE TABLET BY MOUTH EVERY 12 HOURS AS NEEDED FORNAUSEA AND VOMITING      polyethylene glycol (GLYCOLAX) 17 gram/dose powder Take 17 g by mouth once daily.      pravastatin (PRAVACHOL) 40 MG tablet Take 1 tablet (40 mg total) by mouth once daily. (Patient taking differently: Take 40 mg by mouth every evening.) 90 tablet 3    promethazine (PHENERGAN) 25 MG tablet Take 1 tablet (25 mg total) by mouth every 6 (six) hours as needed for Nausea. 60 tablet 6     No current facility-administered medications for this visit.     Facility-Administered Medications Ordered in Other Visits   Medication Dose Route Frequency Provider Last Rate Last Admin    sodium chloride 0.9% flush 10 mL  10 mL Intravenous PRN Salvador Green MD   10 mL at 01/19/22 1801       Review of Systems   Constitutional: Negative for chills, fatigue, fever and unexpected weight change.  "  Respiratory: Negative for cough and shortness of breath.    Cardiovascular: Negative for chest pain and palpitations.   Gastrointestinal: Positive for constipation. Negative for abdominal pain, diarrhea, nausea and vomiting.   Skin: Negative for rash.   Neurological: Negative for headaches.   Hematological: Negative for adenopathy. Does not bruise/bleed easily.       ECOG Performance Status: 2   Objective:      Vitals:   Vitals:    01/19/22 1506   BP: 128/60   BP Location: Right arm   Patient Position: Sitting   BP Method: Medium (Manual)   Pulse: 63   Temp: 97.4 °F (36.3 °C)   TempSrc: Temporal   SpO2: 95%   Weight: 64.9 kg (143 lb 1.3 oz)   Height: 5' 5" (1.651 m)     Physical Exam  Constitutional:       General: She is not in acute distress.     Appearance: She is well-developed. She is not diaphoretic.   HENT:      Head: Normocephalic and atraumatic.   Cardiovascular:      Rate and Rhythm: Normal rate and regular rhythm.      Heart sounds: Normal heart sounds. No murmur heard.  No friction rub. No gallop.    Pulmonary:      Effort: Pulmonary effort is normal. No respiratory distress.      Breath sounds: Normal breath sounds. No wheezing or rales.   Chest:      Chest wall: No tenderness.   Breasts:      Right: No axillary adenopathy or supraclavicular adenopathy.      Left: No axillary adenopathy or supraclavicular adenopathy.       Abdominal:      General: Bowel sounds are normal. There is no distension.      Palpations: Abdomen is soft. There is no mass.      Tenderness: There is no abdominal tenderness. There is no guarding or rebound.   Musculoskeletal:         General: No tenderness. Normal range of motion.   Lymphadenopathy:      Cervical: No cervical adenopathy.      Upper Body:      Right upper body: No supraclavicular or axillary adenopathy.      Left upper body: No supraclavicular or axillary adenopathy.   Skin:     Findings: No erythema or rash.   Neurological:      Mental Status: She is alert and " oriented to person, place, and time.   Psychiatric:         Behavior: Behavior normal.         Laboratory Data:  Lab Visit on 01/19/2022   Component Date Value Ref Range Status    WBC 01/19/2022 6.29  3.90 - 12.70 K/uL Final    RBC 01/19/2022 4.14  4.00 - 5.40 M/uL Final    Hemoglobin 01/19/2022 12.2  12.0 - 16.0 g/dL Final    Hematocrit 01/19/2022 36.7* 37.0 - 48.5 % Final    MCV 01/19/2022 89  82 - 98 fL Final    MCH 01/19/2022 29.5  27.0 - 31.0 pg Final    MCHC 01/19/2022 33.2  32.0 - 36.0 g/dL Final    RDW 01/19/2022 13.0  11.5 - 14.5 % Final    Platelets 01/19/2022 185  150 - 450 K/uL Final    MPV 01/19/2022 8.7* 9.2 - 12.9 fL Final    Immature Granulocytes 01/19/2022 0.3  0.0 - 0.5 % Final    Gran # (ANC) 01/19/2022 4.4  1.8 - 7.7 K/uL Final    Immature Grans (Abs) 01/19/2022 0.02  0.00 - 0.04 K/uL Final    Comment: Mild elevation in immature granulocytes is non specific and   can be seen in a variety of conditions including stress response,   acute inflammation, trauma and pregnancy. Correlation with other   laboratory and clinical findings is essential.      Lymph # 01/19/2022 1.1  1.0 - 4.8 K/uL Final    Mono # 01/19/2022 0.6  0.3 - 1.0 K/uL Final    Eos # 01/19/2022 0.2  0.0 - 0.5 K/uL Final    Baso # 01/19/2022 0.03  0.00 - 0.20 K/uL Final    nRBC 01/19/2022 0  0 /100 WBC Final    Gran % 01/19/2022 70.3  38.0 - 73.0 % Final    Lymph % 01/19/2022 16.9* 18.0 - 48.0 % Final    Mono % 01/19/2022 8.7  4.0 - 15.0 % Final    Eosinophil % 01/19/2022 3.3  0.0 - 8.0 % Final    Basophil % 01/19/2022 0.5  0.0 - 1.9 % Final    Differential Method 01/19/2022 Automated   Final    Sodium 01/19/2022 131* 136 - 145 mmol/L Final    Potassium 01/19/2022 4.6  3.5 - 5.1 mmol/L Final    Chloride 01/19/2022 91* 95 - 110 mmol/L Final    CO2 01/19/2022 31* 23 - 29 mmol/L Final    Glucose 01/19/2022 110  70 - 110 mg/dL Final    BUN 01/19/2022 21  8 - 23 mg/dL Final    Creatinine 01/19/2022 0.6  0.5 -  1.4 mg/dL Final    Calcium 01/19/2022 9.7  8.7 - 10.5 mg/dL Final    Total Protein 01/19/2022 6.8  6.0 - 8.4 g/dL Final    Albumin 01/19/2022 3.7  3.5 - 5.2 g/dL Final    Total Bilirubin 01/19/2022 0.5  0.1 - 1.0 mg/dL Final    Comment: For infants and newborns, interpretation of results should be based  on gestational age, weight and in agreement with clinical  observations.    Premature Infant recommended reference ranges:  Up to 24 hours.............<8.0 mg/dL  Up to 48 hours............<12.0 mg/dL  3-5 days..................<15.0 mg/dL  6-29 days.................<15.0 mg/dL      Alkaline Phosphatase 01/19/2022 83  55 - 135 U/L Final    AST 01/19/2022 41* 10 - 40 U/L Final    ALT 01/19/2022 53* 10 - 44 U/L Final    Anion Gap 01/19/2022 9  8 - 16 mmol/L Final    eGFR if African American 01/19/2022 >60  >60 mL/min/1.73 m^2 Final    eGFR if non African American 01/19/2022 >60  >60 mL/min/1.73 m^2 Final    Comment: Calculation used to obtain the estimated glomerular filtration  rate (eGFR) is the CKD-EPI equation.            Imaging:     PET/CT 1/04/22    Head/neck:     No significant abnormal hypermetabolic foci are identified in the head and neck region.  No lymphadenopathy is present.     Chest:     No significant abnormal hypermetabolic foci are identified within the soft tissues of the chest.  No hypermetabolic pulmonary nodules, lymphadenopathy, pleural effusion, or pericardial effusion are present.     Abdomen/pelvis:     There is a markedly hypermetabolic rounded mass at the junction of the body and tail of the pancreas consistent with the history of pancreatic cancer.  On image 134, the mass measures 2.5 x 2.4 cm and has a max SUV of 11.2.  There is dilatation of the distal pancreatic duct beyond of the mass.  Which measures at least 10 mm in greatest diameter.  There is adjacent enlargement and hypermetabolism of the left adrenal gland suspicious for metastatic disease.  The left adrenal gland  measures 1.9 x 1.9 cm and has a max SUV of 4.3.  There are innumerable hypermetabolic nodular masses throughout the left and right hepatic lobes consistent with hepatic metastatic disease.  Two of the larger more discrete masses will be measured as index lesions.  A mass in the anterior segment of the right hepatic lobe on image 129 measures 2.5 x 1.9 cm and has a max SUV of 16.2.  A mass in the posterior segment of the right hepatic lobe on image 120 measures 5.2 x 4.4 cm with a max SUV of 14.0.  There is no bile duct dilatation.  The gallbladder appears normal.  No ascites is present.  The right adrenal gland appears normal.  There is a mildly enlarged hypermetabolic portacaval lymph node which on image 143 measures 1.4 x 1.0 cm and has a max SUV of 3.8 which is suspicious for neoplastic adenopathy.  There is hypermetabolism of the umbilicus of uncertain significance.  The umbilicus measures 0.7 cm and has a max SUV of 4.8.  Clinical correlation is advised.     Skeleton:     There is an oval-shaped hypermetabolic focus within the left anterior aspect of the T9 vertebral body highly suspicious for osseous metastatic disease.  The metastatic lesion is not well visualized on the CT portion of the study and is best measured on the PET-CT images.  On image 85, the lesion measures 1.8 x 1.7 cm and has a max SUV of 13.3.  There is also a hypermetabolic focus in the left iliac fossa which on image 178 measures 1.7 x 1.2 cm and has a max SUV of 6.8.  No other significant abnormal hypermetabolic foci are identified within the skeleton.    MRI Brain 1/11/22    INTRACRANIAL: Mild global volume loss.  Scattered T2 FLAIR hyperintense white matter foci are present, likely related to chronic microvascular ischemic change.  Old right mid brain lacunar infarct.  No abnormal enhancement demonstrated.  No definite parenchymal restricted diffusion.  No evidence of intracranial hemorrhage.  No extra-axial fluid collection or mass.  No  intracranial mass effect.  No hydrocephalus.  Midline structures have a normal configuration.  Visualized pituitary gland and infundibulum are normal.  Visualized major intracranial vascular structures demonstrate normal flow voids and are normal in course and caliber.     SINUSES: Paranasal sinuses and mastoid air cells are clear.     ORBITS: Bilateral lens replacements.        Assessment:       1. Adenocarcinoma of pancreas    2. Malignant neoplasm of tail of pancreas     3. Metastasis to liver    4. 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 Bone metastases    6. 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therapy    7. Constipation, unspecified constipation type    8. Pancreatic insufficiency    9. Hypertension, unspecified type    10. Hyperlipidemia, unspecified hyperlipidemia type    11. Elevated liver enzymes           Plan:     Metastatic Pancreatic Cancer - The patient was diagnosed with metastatic pancreatic cancer adenocarcinoma with mets to the liver on 12/15/21  -Ca19-9 was 7,581 on 12/15/21  -PET/CT on 1/04/22 shows mets to the liver, left adrenal gland, T9 vertebra and left iliac fossa.  -Potential mets are seen near the umbilicus  -PT underwent POET placement 1/07/22  -MRI brain on 1/11/22 showed no brain mets  -Pt consented for Gemcitabine and paclitaxel on 1/03/22  -Pt treated with 20% dose reduction in paclitaxel and Gemcitabine to 100mg/mm2 and 800mg/mm2 respectively  -Will proceed with C1D8 today  -Will consider increasing dose with next cycle    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 - pt with increased infection risk on chemo  -Will monitor    Constipation - pt on Senokot S twice daily, miralax  -Pt instructed to try a suppository or enema  -Pt also to start on prune juice  -Will monitor    Pancreatic Insufficiency - pt on creon  -Will monitor    Cancer Related Pain - improved  -Pt not needing to take tramadol  -Will monitor    HTN - pt taking losartan, HCTZ  -Will monitor    HLD - pt on  pravastatin  -Management per PCP    Elevated Liver Enzymes - AST and ALT are mildly elevated on lab work 1/19/22  -Will monitor    Follow Up - CBC and CMP on 1/26/22 with an appt with an NP and treatment that day; CBC, CMP and Ca19-9 prior to her clinic appt with me on 2/09/22 with treatment that day    Salvador Green MD  [facility]  Hematology and Oncology  [facility]   [address] [place] [address]   O: [phone]  F: [phone]

## 2022-01-19 NOTE — Clinical Note
The patient can proceed with treatment.  She will need a CBC and CMP on 1/26/22 with an appt with an NP and treatment that day.  She will need a CBC, CMP and Ca19-9 prior to her clinic appt with me on 2/09/22 with treatment that day.  Please schedule her appt with me on 2/16/22 for cycle 2 day 8.

## 2022-01-19 NOTE — PLAN OF CARE
Problem: Fatigue  Goal: Improved Activity Tolerance  Intervention: Promote Improved Energy  Flowsheets (Taken 1/19/2022 1613)  Fatigue Management:   activity schedule adjusted   fatigue-related activity identified   frequent rest breaks encouraged   paced activity encouraged  Sleep/Rest Enhancement:   regular sleep/rest pattern promoted   relaxation techniques promoted  Activity Management:   Ambulated -L4   Ambulated in villarreal - L4   Ambulated in room - L4   Standing - L3   Up in chair - L1     Problem: Adult Inpatient Plan of Care  Goal: Patient-Specific Goal (Individualized)  Outcome: Ongoing, Progressing  Flowsheets (Taken 1/19/2022 1613)  Anxieties, Fears or Concerns: second chemo treatment  Individualized Care Needs: recliner, warm blanket, tv  Patient-Specific Goals (Include Timeframe): no sign of reaction during treatment

## 2022-01-19 NOTE — PROGRESS NOTES
Nutrition Note:    RD met with patient before her appt with Dr. Green per pt request. Pt with follow up questions regarding constipation and Creon dosing. RD answered all questions to pt satisfaction and discussed different ways patient could increase overall caloric intake, as well as fiber to help with constipation. Pt's daughter present to help pt and record information as needed. RD will continue to follow/monitor throughout tx.    Debra Juárez 01/19/2022   4:01 PM

## 2022-01-20 NOTE — TELEPHONE ENCOUNTER
SW called daughter and confirmed pt was able to receive her walker from [facility]. Brinda picked it up yesterday and pt has the walker now.   BETTINA to follow.    Christy Martinez, VESNAW

## 2022-01-20 NOTE — PLAN OF CARE
Problem: Adult Inpatient Plan of Care  Goal: Plan of Care Review  1/19/2022 1813 by Viri Trivedi, RN  Outcome: Ongoing, Progressing  Flowsheets (Taken 1/19/2022 1805)  Plan of Care Reviewed With:   patient   daughter   Pt tolerated her Abraxane and Gemzar infusions well, NAD. No new c/o voiced. Pt given a schedule and reviewed, pt verbalized understanding. Pt ambulated out of the clinic without difficulty accompanied by her daughter.

## 2022-01-26 NOTE — Clinical Note
Proceed with treatment today Keep f/u with Dr. Green on 02/09 as planned. Treatment that day with labs prior

## 2022-01-26 NOTE — PROGRESS NOTES
PATIENT: Nel Cui  MRN: [id number]  DATE: 1/26/2022      Diagnosis:   1. Adenocarcinoma of pancreas    2. Malignant neoplasm of tail of pancreas     3. Metastasis to liver    4. Bone metastases        Chief Complaint: Adenocarcinoma of pancreas      Oncologic History:      Oncologic History 12/09/21 Ct Abdomen  12/15/21 EUS  1/04/22 PET/CT  1/07/22 PORT - Dr Acuña  1/11/22 MRI Brain    Oncologic Treatment 1/12/22 - current Gemcitabine and Paclitaxel    Pathology 12/15/21 adenocarcinoma in the pancreatic body and adenocarcinoma in the liver       The patient initially presented to her PCP on 12/02/21 with complaint of abdominal pain.  She underwent a CT of the abdomen on 12/09/21 showing subcentimeter para-aortic, mesenteric lymph nodes; multiple hepatic infiltrative lesions present throughout the liver with 1 of the larger areas right liver lobe measuring approximately 2.2 cm in diameter; mass in the pancreatic tail measuring 9 x 3.3 cm.  The patient underwent EUS under the care of Dr Tse on 12/15/21 showing a mass in the pancreatic body and multiple liver lesions.  Path from the procedure showed adenocarcinoma in the pancreatic body and adenocarcinoma in the liver.    The patient underwent PET/CT on 1/04/22 showing hypermetabolic rounded mass at the junction of the body and tail of the pancreas measuring 2.5 x 2.4 cm, hypermetabolic left adrenal gland nodule measuring 1.9 cm, innumerable hypermetabolic nodular masses throughout the left and right hepatic lobes with largest lesions in the right liver measuring 2.5 x 1.9 cm and 5.2 x 4.4 cm, mildly enlarged hypermetabolic portacaval lymph node measuring 1.4 cm, 0.7 cm hypermetabolic focus at the umbilicus, lesion in the T9 vertebra measuring 1.8 x 1.7 cm and a lesion in the left iliac fossa measuring 1.7 x 1.2 cm.  MRI of the brain on 01/11/2022 showed no evidence of metastases but did show an old right midbrain lacunar infarct.    Subjective:      Interval History: Ms. Cui is a 86 y.o. female with Osteopenia, HLD, HTN who presents for pancreatic cancer. She is here for consideration of C1 D15 Gemzar/Abraxane. She is accompanied by her daughter.   She is tolerating treatment without complaint. Constipation has resolved with Senokot-S twice daily. Patient reports some tenderness on her gums, she is using Biotene mouth rinse throughout the day. No sores or white patchy areas.   Denies CP, cough, SOB, abd pain, N/V, diarrhea. She has had no fevers, chills or night sweats. No bleeding or bruising.     Past Medical History:   Past Medical History:   Diagnosis Date    A-fib     Arthritis     Cancer     pancreas and liver    Cataracts, bilateral     Coronary artery disease     Essential (primary) hypertension 10/5/2010    Hyperlipidemia     Osteopenia        Past Surgical HIstory:   Past Surgical History:   Procedure Laterality Date    CARPAL TUNNEL RELEASE Bilateral     wrist    CATARACT EXTRACTION, BILATERAL  2017    CORONARY ANGIOGRAPHY  9/1/2020    Procedure: ANGIOGRAM, CORONARY ARTERY;  Surgeon: Ivette Horne MD;  Location: [facility] CATH;  Service: Cardiology;;    DILATION AND CURETTAGE OF UTERUS      ENDOSCOPIC ULTRASOUND OF UPPER GASTROINTESTINAL TRACT Left 12/15/2021    Procedure: ULTRASOUND, UPPER GI TRACT, ENDOSCOPIC;  Surgeon: Lico Tse MD;  Location: [facility] ENDO;  Service: Endoscopy;  Laterality: Left;    ESOPHAGOGASTRODUODENOSCOPY N/A 12/15/2021    Procedure: EGD (ESOPHAGOGASTRODUODENOSCOPY);  Surgeon: Lico Tse MD;  Location: [facility] ENDO;  Service: Endoscopy;  Laterality: N/A;    INSERTION OF TUNNELED CENTRAL VENOUS CATHETER (CVC) WITH SUBCUTANEOUS PORT N/A 1/7/2022    Procedure: [id number]-CATH;  Surgeon: Cas Acuña MD;  Location: [facility] OR;  Service: General;  Laterality: N/A;    LEFT HEART CATHETERIZATION  9/1/2020    Procedure: Left heart cath;  Surgeon: Ivette Horne MD;  Location: [facility] CATH;  Service:  "Cardiology;;    MITRAL VALVE REPLACEMENT  07/20/2016    OOPHORECTOMY Left 1988    REPAIR OF MENISCUS OF KNEE Left     TONSILLECTOMY      vein removal         Family History:   Family History   Problem Relation Age of Onset    No Known Problems Mother     No Known Problems Father     Cancer Maternal Grandmother         Stomach    No Known Problems Maternal Grandfather     No Known Problems Paternal Grandmother     No Known Problems Paternal Grandfather     Breast cancer Sister         over 60    No Known Problems Brother        Social History:  reports that she quit smoking about 33 years ago. Her smoking use included cigarettes. She has a 30.00 pack-year smoking history. She has never used smokeless tobacco. She reports that she does not drink alcohol and does not use drugs.    Allergies:  Review of patient's allergies indicates:   Allergen Reactions    Amoxicillin-pot clavulanate Other (See Comments)     "Spaced out feeling- can't take it longer than 4 or 5 days"    Breo ellipta [fluticasone furoate-vilanterol] Other (See Comments)     Feels jittery    Corticosteroids (glucocorticoids)      Other reaction(s): tachycardia    Erythromycin      "Spaced out"    Latex, natural rubber      Turns skin red around area where latex touches       Medications:  Current Outpatient Medications   Medication Sig Dispense Refill    acetaminophen (TYLENOL) 650 MG TbSR Take 1,300 mg by mouth every 8 (eight) hours.      aspirin 325 MG tablet Take 325 mg by mouth once daily.      bisoprolol (ZEBETA) 5 MG tablet TAKE 1 TABLET(5 MG) BY MOUTH EVERY DAY (Patient taking differently: Take 5 mg by mouth every evening.) 90 tablet 3    chlorhexidine (PERIDEX) 0.12 % solution SWISH AND SPIT 10 MLS FOR 30 SECONDS AND SPIT TWICE DAILY FOR 5 DAYS. START 1/6      cholecalciferol, vitamin D3, (VITAMIN D3) 25 mcg (1,000 unit) capsule Take 1,000 Units by mouth every morning. VITAMIN D 1000 UNIT TABS      docusate sodium (STOOL " SOFTENER ORAL) Take by mouth.      hydroCHLOROthiazide (HYDRODIURIL) 12.5 MG Tab Take 1 tablet (12.5 mg total) by mouth once daily. 90 tablet 3    LIDOcaine-prilocaine (EMLA) cream Apply topically as needed (30 to 60 minutes prior to chemo). 30 g 2    lipase-protease-amylase (CREON) 36,000-114,000- 180,000 unit CpDR Take 2 capsules by mouth 3 (three) times daily with meals. Take 2 capsules TID with meals and 1 capsule PRN with snacks 240 capsule 11    losartan (COZAAR) 50 MG tablet Take 1 tablet (50 mg total) by mouth every evening. 90 tablet 3    MAGNESIUM CHLORIDE ORAL Take 250 mg by mouth every evening.      MULTIVIT WITH MINERALS/LUTEIN (MULTIVITAMIN 50 PLUS ORAL) Take by mouth every other day.       mupirocin (BACTROBAN) 2 % ointment APPLY 1 GRAM IN EACH NOSTRIL TWICE DAILY FOR 5 DAYS. START 1/6      ondansetron (ZOFRAN-ODT) 4 MG TbDL DISSOLVE ONE TABLET BY MOUTH EVERY 12 HOURS AS NEEDED FORNAUSEA AND VOMITING      pravastatin (PRAVACHOL) 40 MG tablet Take 1 tablet (40 mg total) by mouth once daily. (Patient taking differently: Take 40 mg by mouth every evening.) 90 tablet 3    polyethylene glycol (GLYCOLAX) 17 gram/dose powder Take 17 g by mouth once daily.       No current facility-administered medications for this visit.     Facility-Administered Medications Ordered in Other Visits   Medication Dose Route Frequency Provider Last Rate Last Admin    gemcitabine (GEMZAR) 1,385 mg in sodium chloride 0.9% 250 mL chemo infusion  800 mg/m2 (Treatment Plan Recorded) Intravenous 1 time in Clinic/HOD Zamzam Zee  mL/hr at 01/26/22 1634 1,385 mg at 01/26/22 1634    sodium chloride 0.9% flush 10 mL  10 mL Intravenous PRN Zamzam Zee NP           Review of Systems   Constitutional: Negative for chills, fatigue, fever and unexpected weight change.   HENT: Negative for mouth sores (Some tenderness on gums).    Respiratory: Negative for cough and shortness of breath.    Cardiovascular: Negative for  "chest pain and palpitations.   Gastrointestinal: Negative for abdominal pain, constipation, diarrhea, nausea and vomiting.   Genitourinary: Negative for dysuria.   Skin: Negative for rash.   Neurological: Negative for numbness and headaches.   Hematological: Negative for adenopathy. Does not bruise/bleed easily.       ECOG Performance Status: 2   Objective:      Vitals:   Vitals:    01/26/22 1427   BP: (!) 128/57   BP Location: Left arm   Patient Position: Sitting   Pulse: 77   Resp: 16   SpO2: (!) 94%   Weight: 64.4 kg (141 lb 15.6 oz)   Height: 5' 5" (1.651 m)     Physical Exam  Constitutional:       General: She is not in acute distress.     Appearance: She is well-developed. She is not diaphoretic.   HENT:      Head: Normocephalic and atraumatic.   Eyes:      General: No scleral icterus.  Cardiovascular:      Rate and Rhythm: Normal rate and regular rhythm.      Heart sounds: Normal heart sounds. No murmur heard.      Pulmonary:      Effort: Pulmonary effort is normal. No respiratory distress.      Breath sounds: Normal breath sounds. No wheezing.   Chest:      Chest wall: No tenderness.   Breasts:      Right: No axillary adenopathy or supraclavicular adenopathy.      Left: No axillary adenopathy or supraclavicular adenopathy.       Abdominal:      General: Bowel sounds are normal. There is no distension.      Palpations: Abdomen is soft. There is no mass.      Tenderness: There is no abdominal tenderness. There is no guarding.   Musculoskeletal:         General: No tenderness. Normal range of motion.      Right lower leg: No edema.      Left lower leg: No edema.   Lymphadenopathy:      Cervical: No cervical adenopathy.      Upper Body:      Right upper body: No supraclavicular or axillary adenopathy.      Left upper body: No supraclavicular or axillary adenopathy.   Skin:     Findings: No erythema or rash.   Neurological:      Mental Status: She is alert and oriented to person, place, and time.   Psychiatric:   "       Behavior: Behavior normal.         Laboratory Data:  Lab Visit on 01/26/2022   Component Date Value Ref Range Status    WBC 01/26/2022 3.49* 3.90 - 12.70 K/uL Final    RBC 01/26/2022 3.86* 4.00 - 5.40 M/uL Final    Hemoglobin 01/26/2022 11.5* 12.0 - 16.0 g/dL Final    Hematocrit 01/26/2022 34.6* 37.0 - 48.5 % Final    MCV 01/26/2022 90  82 - 98 fL Final    MCH 01/26/2022 29.8  27.0 - 31.0 pg Final    MCHC 01/26/2022 33.2  32.0 - 36.0 g/dL Final    RDW 01/26/2022 13.3  11.5 - 14.5 % Final    Platelets 01/26/2022 115* 150 - 450 K/uL Final    MPV 01/26/2022 8.8* 9.2 - 12.9 fL Final    Immature Granulocytes 01/26/2022 0.3  0.0 - 0.5 % Final    Gran # (ANC) 01/26/2022 2.3  1.8 - 7.7 K/uL Final    Immature Grans (Abs) 01/26/2022 0.01  0.00 - 0.04 K/uL Final    Comment: Mild elevation in immature granulocytes is non specific and   can be seen in a variety of conditions including stress response,   acute inflammation, trauma and pregnancy. Correlation with other   laboratory and clinical findings is essential.      Lymph # 01/26/2022 0.9* 1.0 - 4.8 K/uL Final    Mono # 01/26/2022 0.3  0.3 - 1.0 K/uL Final    Eos # 01/26/2022 0.1  0.0 - 0.5 K/uL Final    Baso # 01/26/2022 0.01  0.00 - 0.20 K/uL Final    nRBC 01/26/2022 0  0 /100 WBC Final    Gran % 01/26/2022 65.0  38.0 - 73.0 % Final    Lymph % 01/26/2022 24.4  18.0 - 48.0 % Final    Mono % 01/26/2022 8.0  4.0 - 15.0 % Final    Eosinophil % 01/26/2022 2.0  0.0 - 8.0 % Final    Basophil % 01/26/2022 0.3  0.0 - 1.9 % Final    Platelet Estimate 01/26/2022 Appears normal   Final    Differential Method 01/26/2022 Automated   Final    Sodium 01/26/2022 135* 136 - 145 mmol/L Final    Potassium 01/26/2022 3.8  3.5 - 5.1 mmol/L Final    Chloride 01/26/2022 93* 95 - 110 mmol/L Final    CO2 01/26/2022 32* 23 - 29 mmol/L Final    Glucose 01/26/2022 113* 70 - 110 mg/dL Final    BUN 01/26/2022 15  8 - 23 mg/dL Final    Creatinine 01/26/2022 0.7  0.5  - 1.4 mg/dL Final    Calcium 01/26/2022 9.3  8.7 - 10.5 mg/dL Final    Total Protein 01/26/2022 6.6  6.0 - 8.4 g/dL Final    Albumin 01/26/2022 3.7  3.5 - 5.2 g/dL Final    Total Bilirubin 01/26/2022 0.5  0.1 - 1.0 mg/dL Final    Comment: For infants and newborns, interpretation of results should be based  on gestational age, weight and in agreement with clinical  observations.    Premature Infant recommended reference ranges:  Up to 24 hours.............<8.0 mg/dL  Up to 48 hours............<12.0 mg/dL  3-5 days..................<15.0 mg/dL  6-29 days.................<15.0 mg/dL      Alkaline Phosphatase 01/26/2022 73  55 - 135 U/L Final    AST 01/26/2022 48* 10 - 40 U/L Final    ALT 01/26/2022 71* 10 - 44 U/L Final    Anion Gap 01/26/2022 10  8 - 16 mmol/L Final    eGFR if African American 01/26/2022 >60  >60 mL/min/1.73 m^2 Final    eGFR if non African American 01/26/2022 >60  >60 mL/min/1.73 m^2 Final    Comment: Calculation used to obtain the estimated glomerular filtration  rate (eGFR) is the CKD-EPI equation.            Imaging:     PET/CT 1/04/22    Head/neck:     No significant abnormal hypermetabolic foci are identified in the head and neck region.  No lymphadenopathy is present.     Chest:     No significant abnormal hypermetabolic foci are identified within the soft tissues of the chest.  No hypermetabolic pulmonary nodules, lymphadenopathy, pleural effusion, or pericardial effusion are present.     Abdomen/pelvis:     There is a markedly hypermetabolic rounded mass at the junction of the body and tail of the pancreas consistent with the history of pancreatic cancer.  On image 134, the mass measures 2.5 x 2.4 cm and has a max SUV of 11.2.  There is dilatation of the distal pancreatic duct beyond of the mass.  Which measures at least 10 mm in greatest diameter.  There is adjacent enlargement and hypermetabolism of the left adrenal gland suspicious for metastatic disease.  The left adrenal gland  measures 1.9 x 1.9 cm and has a max SUV of 4.3.  There are innumerable hypermetabolic nodular masses throughout the left and right hepatic lobes consistent with hepatic metastatic disease.  Two of the larger more discrete masses will be measured as index lesions.  A mass in the anterior segment of the right hepatic lobe on image 129 measures 2.5 x 1.9 cm and has a max SUV of 16.2.  A mass in the posterior segment of the right hepatic lobe on image 120 measures 5.2 x 4.4 cm with a max SUV of 14.0.  There is no bile duct dilatation.  The gallbladder appears normal.  No ascites is present.  The right adrenal gland appears normal.  There is a mildly enlarged hypermetabolic portacaval lymph node which on image 143 measures 1.4 x 1.0 cm and has a max SUV of 3.8 which is suspicious for neoplastic adenopathy.  There is hypermetabolism of the umbilicus of uncertain significance.  The umbilicus measures 0.7 cm and has a max SUV of 4.8.  Clinical correlation is advised.     Skeleton:     There is an oval-shaped hypermetabolic focus within the left anterior aspect of the T9 vertebral body highly suspicious for osseous metastatic disease.  The metastatic lesion is not well visualized on the CT portion of the study and is best measured on the PET-CT images.  On image 85, the lesion measures 1.8 x 1.7 cm and has a max SUV of 13.3.  There is also a hypermetabolic focus in the left iliac fossa which on image 178 measures 1.7 x 1.2 cm and has a max SUV of 6.8.  No other significant abnormal hypermetabolic foci are identified within the skeleton.    MRI Brain 1/11/22    INTRACRANIAL: Mild global volume loss.  Scattered T2 FLAIR hyperintense white matter foci are present, likely related to chronic microvascular ischemic change.  Old right mid brain lacunar infarct.  No abnormal enhancement demonstrated.  No definite parenchymal restricted diffusion.  No evidence of intracranial hemorrhage.  No extra-axial fluid collection or mass.  No  intracranial mass effect.  No hydrocephalus.  Midline structures have a normal configuration.  Visualized pituitary gland and infundibulum are normal.  Visualized major intracranial vascular structures demonstrate normal flow voids and are normal in course and caliber.     SINUSES: Paranasal sinuses and mastoid air cells are clear.     ORBITS: Bilateral lens replacements.        Assessment:       1. Adenocarcinoma of pancreas    2. Malignant neoplasm of tail of pancreas     3. Metastasis to liver    4. Bone metastases           Plan:     Metastatic Pancreatic Cancer - The patient was diagnosed with metastatic pancreatic cancer adenocarcinoma with mets to the liver on 12/15/21  -Ca19-9 was 7,581 on 12/15/21  -PET/CT on 1/04/22 shows mets to the liver, left adrenal gland, T9 vertebra and left iliac fossa.  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 Green on 2/09/22 with treatment that day    Patient queried and all questions were answered.

## 2022-01-29 PROBLEM — E87.1 HYPONATREMIA: Status: ACTIVE | Noted: 2022-01-01

## 2022-01-29 PROBLEM — R50.9 FEVER: Status: ACTIVE | Noted: 2022-01-01

## 2022-01-29 NOTE — TELEPHONE ENCOUNTER
Nel calling states that she had her 3rd treatment on Wednesday. States she felt warm and her temp was 101.1. She placed an ice pack on neck area and drank powerade. While talking retook temp and it was 100.5. reported that she took a Tylenol at 2110 and checked her temp and was 101.6.Protocol reviewed and care advice given. Pt agrees with advice and verbalizes understanding. Instructed to call for questions, concerns or changes.      Reason for Disposition   Fever > 104 F (40 C)    Protocols used:  CANCER - FEVER-A-AH

## 2022-01-31 NOTE — TELEPHONE ENCOUNTER
I called the patient and let her know that we could keep the appt on 2/09/22 as previously scheduled and that she did not need to be seen sooner.  The patient expressed understanding.  All questions were answered to her satisfaction.    Salvador Green MD  [facility]  Hematology and Oncology  [facility]   [address]   [place] [address]   O: [phone]  F: [phone]

## 2022-02-04 NOTE — TELEPHONE ENCOUNTER
----- Message from Shelly Disla sent at 2/4/2022 12:05 PM CST -----  Type: Needs Medical Advice  Who Called: patient   Best Call Back Number: [phone]  Additional Information:  patient request call back from nurse patient decline to say what it was regarding, please advise.

## 2022-02-04 NOTE — TELEPHONE ENCOUNTER
"Returned phone call to patient.  States,"I have a little swelling to the top of my feet."  Pt denies SOB, no ankle swelling.    @ recent hospitalization, pt's HCTZ was discontinued & Losartan 50 mg was changed to 100 mg every day.  Instructed it could possibly be a result from recent medication change.    Instructed pt to notify cardiologist if swelling continues to increase, continue to elevate feet in recliner & of course go immediately to ED if she should become SOB    Patient instructed & verbally acknowledges understanding  "

## 2022-02-06 NOTE — TELEPHONE ENCOUNTER
"  Reason for Disposition   Cancer treatment in past six months (or has cancer now)    Additional Information   Negative: SEVERE difficulty breathing (e.g., struggling for each breath, speaks in single words)   Negative: [1] Breathing stopped AND [2] hasn't returned   Negative: Choking on something   Negative: Bluish (or gray) lips or face now   Negative: Difficult to awaken or acting confused (e.g., disoriented, slurred speech)   Negative: Passed out (i.e., lost consciousness, collapsed and was not responding)   Negative: Wheezing started suddenly after medicine, an allergic food or bee sting   Negative: Stridor   Negative: Slow, shallow and weak breathing   Negative: Sounds like a life-threatening emergency to the triager   Negative: Chest pain   Negative: [1] Wheezing (high pitched whistling sound) AND [2] previous asthma attacks or use of asthma medicines   Negative: [1] Difficulty breathing AND [2] only present when coughing   Negative: [1] Difficulty breathing AND [2] only from stuffy or runny nose   Negative: [1] Difficulty breathing AND [2] within 14 days of COVID-19 Exposure   Negative: [1] MODERATE difficulty breathing (e.g., speaks in phrases, SOB even at rest, pulse 100-120) AND [2] NEW-onset or WORSE than normal   Negative: Wheezing can be heard across the room   Negative: Drooling or spitting out saliva (because can't swallow)   Negative: History of prior "blood clot" in leg or lungs (i.e., deep vein thrombosis, pulmonary embolism)   Negative: History of inherited increased risk of blood clots (e.g., Factor 5 Leiden, Anti-thrombin 3, Protein C or Protein S deficiency, Prothrombin mutation)   Negative: Major surgery in the past month   Negative: Hip or leg fracture (broken bone) in past month (or had cast on leg or ankle in past month)   Negative: Illness requiring prolonged bedrest in past month (e.g., immobilization, long hospital stay)   Negative: Long-distance travel in past " month (e.g., car, bus, train, plane; with trip lasting 6 or more hours)    Protocols used: BREATHING DIFFICULTY-A-AH  pt called re getting chemo. three session s so far. This wed pt was off chemo. pt states once in a while she has to sigh deep. this past week seen at [facility] office.pt told her heart and lungs sounds good. Pt was recently in ED T101.5. dx'd hyponatremia. taken off HCTZ. told to stay off and changed losartan form 50-100mg. yest first time she drove in 5 days. Pt admits she sits in recliner most of the time. felt good yest. seems like when she gets up in am pt has to sigh first thing. told to take meds at hs. Told to eat bkft then take BP 30 mins after. 147-153-160/ told not to worry if 150.  162/82 rechecked 154/?. T98.6, sl HA when woke but now gone. last pm noises in chest maybe wheezing. noticed only when laying down. rec ED now. Pt agrees. Call back with questions

## 2022-02-07 NOTE — TELEPHONE ENCOUNTER
I called the patient whom stated that her breathing had changed making her feel she needed to take deeper breaths.  The patient stated she was recently seen in the hospital with CTA which was negative.  She was instructed to start taking lasix EOD and to see her cardiologist.  I instructed her that she would need an appt with her cardiologist.  The patient expressed understanding.  All questions were answered to her satisfaction.    Salvador Green MD  [facility]  Hematology and Oncology  [facility] [address] [place] [address]   O: [phone]  F: [phone]

## 2022-02-07 NOTE — TELEPHONE ENCOUNTER
"Spoke to daughterBrinda, per phone.  States pt had slight increase in foot swelling & increase in SOB  Was referred to ED per "TRIAGE NURSE" yesterday.    See Hospital note.    Encouraged daughter to call the cardiologist's office to notify them of change in cardiac meds.  Verbally acknowledged understanding.  Pt now on furosemide 20 mg po every other day    Instructed daughter this RN will notify Dr. Green of episode over weekend.    Next appointment w/ Dr. Green, Feb 9 for labs, MD visit & chemo        "

## 2022-02-08 NOTE — PROGRESS NOTES
PATIENT: Nel Cui  MRN: [id number]  DATE: 2/9/2022      Diagnosis:   1. Adenocarcinoma of pancreas    2. Malignant neoplasm of tail of pancreas     3. Metastasis to liver    4. Bone metastases    5. 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therapy    7. Thrombocytopenia    8. Normocytic anemia    9. Constipation, unspecified constipation type    10. Pancreatic insufficiency    11. Cancer related pain    12. Hypertension, unspecified type    13. Hyperlipidemia, unspecified hyperlipidemia type    14. Chronic diastolic congestive heart failure        Chief Complaint: adenocarcinoma of pancreas      Oncologic History:      Oncologic History 12/09/21 Ct Abdomen  12/15/21 EUS  1/04/22 PET/CT  1/07/22 PORT - Dr Acuña  1/11/22 MRI Brain    Oncologic Treatment 1/12/22 - current Gemcitabine and Paclitaxel    Pathology 12/15/21 adenocarcinoma in the pancreatic body and adenocarcinoma in the liver       The patient initially presented to her PCP on 12/02/21 with complaint of abdominal pain.  She underwent a CT of the abdomen on 12/09/21 showing subcentimeter para-aortic, mesenteric lymph nodes; multiple hepatic infiltrative lesions present throughout the liver with 1 of the larger areas right liver lobe measuring approximately 2.2 cm in diameter; mass in the pancreatic tail measuring 9 x 3.3 cm.  The patient underwent EUS under the care of Dr Tse on 12/15/21 showing a mass in the pancreatic body and multiple liver lesions.  Path from the procedure showed adenocarcinoma in the pancreatic body and adenocarcinoma in the liver.    The patient underwent PET/CT on 1/04/22 showing hypermetabolic rounded mass at the junction of the body and tail of the pancreas measuring 2.5 x 2.4 cm, hypermetabolic left adrenal gland nodule measuring 1.9 cm, innumerable hypermetabolic nodular masses throughout the left and right hepatic lobes with largest lesions in the right liver measuring  2.5 x 1.9 cm and 5.2 x 4.4 cm, mildly enlarged hypermetabolic portacaval lymph node measuring 1.4 cm, 0.7 cm hypermetabolic focus at the umbilicus, lesion in the T9 vertebra measuring 1.8 x 1.7 cm and a lesion in the left iliac fossa measuring 1.7 x 1.2 cm.  MRI of the brain on 01/11/2022 showed no evidence of metastases but did show an old right midbrain lacunar infarct.    Subjective:     Interval History: Ms. Cui is a 86 y.o. female with Osteopenia, HLD, HTN who presents for pancreatic cancer.  Since the last clinic visit the patient was in the hospital from 1/28/22-1/29/22 with fever and low sodium.  The patient's hydrochlorothiazide was discontinued.  The patient then presented to the hospital on 02/06/2022 for dyspnea on exertion.  CTA was done on 02/06/2022 showing no evidence of pulmonary embolism.  The patient underwent NT proBNP which was elevated at 2630 pg/mL.  The patient was started on Lasix every other day at the request of Cardiology.    Currently the patient endorses shortness of breath with exertion.  The patient denies CP, cough, SOB, abdominal pain, N/V, constipation, diarrhea.  The patient denies fever, chills, night sweats, weight loss, new lumps or bumps, easy bruising or bleeding.  She states she occasionally has difficulty lying down flat due to discomfort.  She does denies orthopnea.      Past Medical History:   Past Medical History:   Diagnosis Date    A-fib     Arthritis     Cancer     pancreas and liver    Cataracts, bilateral     CHF (congestive heart failure)     Coronary artery disease     Essential (primary) hypertension 10/5/2010    Hyperlipidemia     Osteopenia        Past Surgical HIstory:   Past Surgical History:   Procedure Laterality Date    CARPAL TUNNEL RELEASE Bilateral     wrist    CATARACT EXTRACTION, BILATERAL  2017    CORONARY ANGIOGRAPHY  9/1/2020    Procedure: ANGIOGRAM, CORONARY ARTERY;  Surgeon: Ivette Horne MD;  Location: [facility];  Service:  "Cardiology;;    DILATION AND CURETTAGE OF UTERUS      ENDOSCOPIC ULTRASOUND OF UPPER GASTROINTESTINAL TRACT Left 12/15/2021    Procedure: ULTRASOUND, UPPER GI TRACT, ENDOSCOPIC;  Surgeon: Lico Tse MD;  Location: [facility] ENDO;  Service: Endoscopy;  Laterality: Left;    ESOPHAGOGASTRODUODENOSCOPY N/A 12/15/2021    Procedure: EGD (ESOPHAGOGASTRODUODENOSCOPY);  Surgeon: Lico Tse MD;  Location: [facility] ENDO;  Service: Endoscopy;  Laterality: N/A;    INSERTION OF TUNNELED CENTRAL VENOUS CATHETER (CVC) WITH SUBCUTANEOUS PORT N/A 1/7/2022    Procedure: [id number];  Surgeon: Cas Acuña MD;  Location: [facility] OR;  Service: General;  Laterality: N/A;    LEFT HEART CATHETERIZATION  9/1/2020    Procedure: Left heart cath;  Surgeon: Ivette Horne MD;  Location: [facility] CATH;  Service: Cardiology;;    MITRAL VALVE REPLACEMENT  07/20/2016    OOPHORECTOMY Left 1988    REPAIR OF MENISCUS OF KNEE Left     TONSILLECTOMY      vein removal         Family History:   Family History   Problem Relation Age of Onset    No Known Problems Mother     No Known Problems Father     Cancer Maternal Grandmother         Stomach    No Known Problems Maternal Grandfather     No Known Problems Paternal Grandmother     No Known Problems Paternal Grandfather     Breast cancer Sister         over 60    No Known Problems Brother        Social History:  reports that she quit smoking about 33 years ago. Her smoking use included cigarettes. She has a 30.00 pack-year smoking history. She has never used smokeless tobacco. She reports that she does not drink alcohol and does not use drugs.    Allergies:  Review of patient's allergies indicates:   Allergen Reactions    Amoxicillin-pot clavulanate Other (See Comments)     "Spaced out feeling- can't take it longer than 4 or 5 days"    Breo ellipta [fluticasone furoate-vilanterol] Other (See Comments)     Feels jittery    Corticosteroids (glucocorticoids)      Other " "reaction(s): tachycardia    Erythromycin      "Spaced out"    Latex, natural rubber      Turns skin red around area where latex touches       Medications:  Current Outpatient Medications   Medication Sig Dispense Refill    acetaminophen (TYLENOL) 650 MG TbSR Take 1,300 mg by mouth every evening.      ascorbic acid, vitamin C, (VITAMIN C) 500 MG tablet Take 500 mg by mouth once daily.      aspirin 325 MG tablet Take 325 mg by mouth once daily.      bisoprolol (ZEBETA) 5 MG tablet TAKE 1 TABLET(5 MG) BY MOUTH EVERY DAY 90 tablet 3    cholecalciferol, vitamin D3, (VITAMIN D3) 25 mcg (1,000 unit) capsule Take 1,000 Units by mouth every morning.      coenzyme Q10 100 mg capsule Take 100 mg by mouth once daily.      furosemide (LASIX) 20 MG tablet Take 1 tablet (20 mg total) by mouth every other day. 15 tablet 2    LIDOcaine-prilocaine (EMLA) cream Apply topically as needed (30 to 60 minutes prior to chemo). 30 g 2    lipase-protease-amylase (CREON) 36,000-114,000- 180,000 unit CpDR Take 2 capsules by mouth 3 (three) times daily with meals. Take 2 capsules TID with meals and 1 capsule PRN with snacks 240 capsule 11    lipase-protease-amylase (CREON) 36,000-114,000- 180,000 unit CpDR Take 1 capsule by mouth with snacks.      losartan (COZAAR) 100 MG tablet Take 1 tablet (100 mg total) by mouth every evening. 90 tablet 3    magnesium 250 mg Tab Take 250 mg by mouth every evening.      ondansetron (ZOFRAN-ODT) 4 MG TbDL Take 4 mg by mouth every 12 (twelve) hours as needed (nausea/vomiting).      pravastatin (PRAVACHOL) 40 MG tablet Take 1 tablet (40 mg total) by mouth once daily. 90 tablet 3    saliva substitute combo no.9 (BIOTENE DRY MOUTH ORAL RINSE) Mwsh Take 5 mLs by mouth once daily. SWISH & SPIT       No current facility-administered medications for this visit.     Facility-Administered Medications Ordered in Other Visits   Medication Dose Route Frequency Provider Last Rate Last Admin    gemcitabine " "(GEMZAR) 1,400 mg in sodium chloride 0.9% 250 mL chemo infusion  800 mg/m2 (Treatment Plan Recorded) Intravenous 1 time in Clinic/HOD Salvador Green  mL/hr at 02/09/22 1623 1,400 mg at 02/09/22 1623    heparin, porcine (PF) 100 unit/mL injection flush 500 Units  500 Units Intravenous PRN Salvador Green MD        sodium chloride 0.9% flush 10 mL  10 mL Intravenous PRN Salvador Green MD           Review of Systems   Constitutional: Negative for chills, fatigue, fever and unexpected weight change.   Respiratory: Positive for shortness of breath (with exertion). Negative for cough.    Cardiovascular: Negative for chest pain and palpitations.   Gastrointestinal: Negative for abdominal pain, constipation, diarrhea, nausea and vomiting.   Musculoskeletal:        Discomfort lying flat   Skin: Negative for rash.   Neurological: Negative for headaches.   Hematological: Negative for adenopathy. Does not bruise/bleed easily.       ECOG Performance Status: 2   Objective:      Vitals:   Vitals:    02/09/22 1351   BP: (!) 142/72   BP Location: Left arm   Patient Position: Sitting   Pulse: 78   Resp: 16   SpO2: 96%   Weight: 66.5 kg (146 lb 9.7 oz)   Height: 5' 5" (1.651 m)     Physical Exam  Constitutional:       General: She is not in acute distress.     Appearance: She is well-developed. She is not diaphoretic.   HENT:      Head: Normocephalic and atraumatic.   Cardiovascular:      Rate and Rhythm: Normal rate and regular rhythm.      Heart sounds: Normal heart sounds. No murmur heard.  No friction rub. No gallop.    Pulmonary:      Effort: Pulmonary effort is normal. No respiratory distress.      Breath sounds: Normal breath sounds. No wheezing or rales.   Chest:      Chest wall: No tenderness.   Breasts:      Right: No axillary adenopathy or supraclavicular adenopathy.      Left: No axillary adenopathy or supraclavicular adenopathy.       Abdominal:      General: Bowel sounds are normal. There is no distension. "      Palpations: Abdomen is soft. There is no mass.      Tenderness: There is no abdominal tenderness. There is no guarding or rebound.   Musculoskeletal:         General: No tenderness. Normal range of motion.      Right lower leg: No edema.      Left lower leg: No edema.   Lymphadenopathy:      Cervical: No cervical adenopathy.      Upper Body:      Right upper body: No supraclavicular or axillary adenopathy.      Left upper body: No supraclavicular or axillary adenopathy.   Skin:     Findings: No erythema or rash.   Neurological:      Mental Status: She is alert and oriented to person, place, and time.   Psychiatric:         Behavior: Behavior normal.         Laboratory Data:  Lab Visit on 02/09/2022   Component Date Value Ref Range Status    WBC 02/09/2022 5.67  3.90 - 12.70 K/uL Final    RBC 02/09/2022 3.69* 4.00 - 5.40 M/uL Final    Hemoglobin 02/09/2022 11.1* 12.0 - 16.0 g/dL Final    Hematocrit 02/09/2022 34.8* 37.0 - 48.5 % Final    MCV 02/09/2022 94  82 - 98 fL Final    MCH 02/09/2022 30.1  27.0 - 31.0 pg Final    MCHC 02/09/2022 31.9* 32.0 - 36.0 g/dL Final    RDW 02/09/2022 17.7* 11.5 - 14.5 % Final    Platelets 02/09/2022 370  150 - 450 K/uL Final    MPV 02/09/2022 8.7* 9.2 - 12.9 fL Final    Immature Granulocytes 02/09/2022 0.7* 0.0 - 0.5 % Final    Gran # (ANC) 02/09/2022 3.2  1.8 - 7.7 K/uL Final    Immature Grans (Abs) 02/09/2022 0.04  0.00 - 0.04 K/uL Final    Comment: Mild elevation in immature granulocytes is non specific and   can be seen in a variety of conditions including stress response,   acute inflammation, trauma and pregnancy. Correlation with other   laboratory and clinical findings is essential.      Lymph # 02/09/2022 1.2  1.0 - 4.8 K/uL Final    Mono # 02/09/2022 1.2* 0.3 - 1.0 K/uL Final    Eos # 02/09/2022 0.1  0.0 - 0.5 K/uL Final    Baso # 02/09/2022 0.05  0.00 - 0.20 K/uL Final    nRBC 02/09/2022 0  0 /100 WBC Final    Gran % 02/09/2022 55.7  38.0 - 73.0 %  Final    Lymph % 02/09/2022 21.0  18.0 - 48.0 % Final    Mono % 02/09/2022 20.8* 4.0 - 15.0 % Final    Eosinophil % 02/09/2022 0.9  0.0 - 8.0 % Final    Basophil % 02/09/2022 0.9  0.0 - 1.9 % Final    Differential Method 02/09/2022 Automated   Final    Sodium 02/09/2022 136  136 - 145 mmol/L Final    Potassium 02/09/2022 4.5  3.5 - 5.1 mmol/L Final    Chloride 02/09/2022 99  95 - 110 mmol/L Final    CO2 02/09/2022 27  23 - 29 mmol/L Final    Glucose 02/09/2022 85  70 - 110 mg/dL Final    BUN 02/09/2022 13  8 - 23 mg/dL Final    Creatinine 02/09/2022 0.6  0.5 - 1.4 mg/dL Final    Calcium 02/09/2022 9.2  8.7 - 10.5 mg/dL Final    Total Protein 02/09/2022 6.5  6.0 - 8.4 g/dL Final    Albumin 02/09/2022 3.6  3.5 - 5.2 g/dL Final    Total Bilirubin 02/09/2022 0.4  0.1 - 1.0 mg/dL Final    Comment: For infants and newborns, interpretation of results should be based  on gestational age, weight and in agreement with clinical  observations.    Premature Infant recommended reference ranges:  Up to 24 hours.............<8.0 mg/dL  Up to 48 hours............<12.0 mg/dL  3-5 days..................<15.0 mg/dL  6-29 days.................<15.0 mg/dL      Alkaline Phosphatase 02/09/2022 71  55 - 135 U/L Final    AST 02/09/2022 22  10 - 40 U/L Final    ALT 02/09/2022 24  10 - 44 U/L Final    Anion Gap 02/09/2022 10  8 - 16 mmol/L Final    eGFR if African American 02/09/2022 >60  >60 mL/min/1.73 m^2 Final    eGFR if non African American 02/09/2022 >60  >60 mL/min/1.73 m^2 Final    Comment: Calculation used to obtain the estimated glomerular filtration  rate (eGFR) is the CKD-EPI equation.       Magnesium 02/09/2022 2.1  1.6 - 2.6 mg/dL Final   Admission on 02/06/2022, Discharged on 02/06/2022   Component Date Value Ref Range Status    WBC 02/06/2022 5.13  3.90 - 12.70 K/uL Final    RBC 02/06/2022 3.71* 4.00 - 5.40 M/uL Final    Hemoglobin 02/06/2022 11.2* 12.0 - 16.0 g/dL Final    Hematocrit 02/06/2022 34.2*  37.0 - 48.5 % Final    MCV 02/06/2022 92  82 - 98 fL Final    MCH 02/06/2022 30.2  27.0 - 31.0 pg Final    MCHC 02/06/2022 32.7  32.0 - 36.0 g/dL Final    RDW 02/06/2022 16.9* 11.5 - 14.5 % Final    Platelets 02/06/2022 216  150 - 450 K/uL Final    MPV 02/06/2022 8.8* 9.2 - 12.9 fL Final    Immature Granulocytes 02/06/2022 2.1* 0.0 - 0.5 % Final    Gran # (ANC) 02/06/2022 3.0  1.8 - 7.7 K/uL Final    Immature Grans (Abs) 02/06/2022 0.11* 0.00 - 0.04 K/uL Final    Comment: Mild elevation in immature granulocytes is non specific and   can be seen in a variety of conditions including stress response,   acute inflammation, trauma and pregnancy. Correlation with other   laboratory and clinical findings is essential.      Lymph # 02/06/2022 0.7* 1.0 - 4.8 K/uL Final    Mono # 02/06/2022 1.2* 0.3 - 1.0 K/uL Final    Eos # 02/06/2022 0.0  0.0 - 0.5 K/uL Final    Baso # 02/06/2022 0.05  0.00 - 0.20 K/uL Final    nRBC 02/06/2022 0  0 /100 WBC Final    Gran % 02/06/2022 58.3  38.0 - 73.0 % Final    Lymph % 02/06/2022 13.8* 18.0 - 48.0 % Final    Mono % 02/06/2022 24.2* 4.0 - 15.0 % Final    Eosinophil % 02/06/2022 0.6  0.0 - 8.0 % Final    Basophil % 02/06/2022 1.0  0.0 - 1.9 % Final    Differential Method 02/06/2022 Automated   Final    Sodium 02/06/2022 134* 136 - 145 mmol/L Final    Potassium 02/06/2022 4.4  3.5 - 5.1 mmol/L Final    Chloride 02/06/2022 98  95 - 110 mmol/L Final    CO2 02/06/2022 28  22 - 31 mmol/L Final    Glucose 02/06/2022 98  70 - 110 mg/dL Final    Comment: The ADA recommends the following guidelines for fasting glucose:    Normal:       less than 100 mg/dL    Prediabetes:  100 mg/dL to 125 mg/dL    Diabetes:     126 mg/dL or higher      BUN 02/06/2022 17  7 - 18 mg/dL Final    Creatinine 02/06/2022 0.35* 0.50 - 1.40 mg/dL Final    Calcium 02/06/2022 9.2  8.4 - 10.2 mg/dL Final    Total Protein 02/06/2022 6.6  6.0 - 8.4 g/dL Final    Albumin 02/06/2022 4.0  3.5 - 5.2 g/dL  Final    Total Bilirubin 02/06/2022 0.6  0.2 - 1.3 mg/dL Final    Alkaline Phosphatase 02/06/2022 62  38 - 145 U/L Final    AST 02/06/2022 46* 14 - 36 U/L Final    ALT 02/06/2022 39* 0 - 35 U/L Final    Anion Gap 02/06/2022 8  8 - 16 mmol/L Final    eGFR if African American 02/06/2022 >60  >60 mL/min/1.73 m^2 Final    eGFR if non African American 02/06/2022 >60  >60 mL/min/1.73 m^2 Final    Comment: Calculation used to obtain the estimated glomerular filtration  rate (eGFR) is the CKD-EPI equation.       Troponin I 02/06/2022 0.014  0.012 - 0.034 ng/mL Final    Comment: Warning:  Samples from patients receiving preparations of   mouse monoclonal antibodies for therapy or diagnosis may   contain Human Anti-Mouse Antibodies (HAMA). Such samples may   show either falsely elevated or falsely depressed values when   tested with this method.    Patients taking very high Biotin doses of >300 mcg/day may   cause a negative bias in this assay.      NT-proBNP 02/06/2022 2630* 5 - 1800 pg/mL Final    Comment: Summary of suggested optimal cut-offs for use of NT-proBNp     Situation                 Optimal Cut-offs  Evaluation of heart failure 125 pg/mL for age <75 years  (out-patient/office evaluation) 450 pg/mL for age >=75 years       Evaluation of dyspnea  Exclusion of heart failure      (emergency department)  300 pg/mL, age independent            Diagnosis of heart failure          450 pg/mL for age <50 years         900 pg/mL for age 50-75 years        1800 pg/mL for age >75 years       Use of age stratification does not change either sensitivity   or specificity, but improves positive predictive value   significantly.     Serum concentrations of natriuretic peptides may be elevated   in patients with acute myocardial infarction and renal   insufficiency. Factors such as these should be considered when   interpreting results from any NT-proBNP or BNP method.    treated with animal serum products. Results which  are   The results from this assay should be used and interpreted   only in t                           he context of the overall clinical picture. NT-proBNP   values should be assessed in conjunction with other   cardiovascular risk factors and clinical findings.    Heterophilic antibodies in serum or plasma of certain   individuals are known to cause interference with immunoassays.   These antibodies may be present in the blood samples from   individuals regularly exposed to animals or who have been   treated with animal serum products. Results which are   inconsistent with clinical observation indicate the need for  additional testing.      Note:Patients taking very high Biotin doses of >300 mcg/day may   cause a negative bias in this assay.      D-Dimer 02/06/2022 1.53* <0.50 ug/mL FEU Final    <0.50 ug/mL FEU cut-off value for exclusion of venous thromboembolism.         Imaging:     PET/CT 1/04/22    Head/neck:     No significant abnormal hypermetabolic foci are identified in the head and neck region.  No lymphadenopathy is present.     Chest:     No significant abnormal hypermetabolic foci are identified within the soft tissues of the chest.  No hypermetabolic pulmonary nodules, lymphadenopathy, pleural effusion, or pericardial effusion are present.     Abdomen/pelvis:     There is a markedly hypermetabolic rounded mass at the junction of the body and tail of the pancreas consistent with the history of pancreatic cancer.  On image 134, the mass measures 2.5 x 2.4 cm and has a max SUV of 11.2.  There is dilatation of the distal pancreatic duct beyond of the mass.  Which measures at least 10 mm in greatest diameter.  There is adjacent enlargement and hypermetabolism of the left adrenal gland suspicious for metastatic disease.  The left adrenal gland measures 1.9 x 1.9 cm and has a max SUV of 4.3.  There are innumerable hypermetabolic nodular masses throughout the left and right hepatic lobes consistent with  hepatic metastatic disease.  Two of the larger more discrete masses will be measured as index lesions.  A mass in the anterior segment of the right hepatic lobe on image 129 measures 2.5 x 1.9 cm and has a max SUV of 16.2.  A mass in the posterior segment of the right hepatic lobe on image 120 measures 5.2 x 4.4 cm with a max SUV of 14.0.  There is no bile duct dilatation.  The gallbladder appears normal.  No ascites is present.  The right adrenal gland appears normal.  There is a mildly enlarged hypermetabolic portacaval lymph node which on image 143 measures 1.4 x 1.0 cm and has a max SUV of 3.8 which is suspicious for neoplastic adenopathy.  There is hypermetabolism of the umbilicus of uncertain significance.  The umbilicus measures 0.7 cm and has a max SUV of 4.8.  Clinical correlation is advised.     Skeleton:     There is an oval-shaped hypermetabolic focus within the left anterior aspect of the T9 vertebral body highly suspicious for osseous metastatic disease.  The metastatic lesion is not well visualized on the CT portion of the study and is best measured on the PET-CT images.  On image 85, the lesion measures 1.8 x 1.7 cm and has a max SUV of 13.3.  There is also a hypermetabolic focus in the left iliac fossa which on image 178 measures 1.7 x 1.2 cm and has a max SUV of 6.8.  No other significant abnormal hypermetabolic foci are identified within the skeleton.    MRI Brain 1/11/22    INTRACRANIAL: Mild global volume loss.  Scattered T2 FLAIR hyperintense white matter foci are present, likely related to chronic microvascular ischemic change.  Old right mid brain lacunar infarct.  No abnormal enhancement demonstrated.  No definite parenchymal restricted diffusion.  No evidence of intracranial hemorrhage.  No extra-axial fluid collection or mass.  No intracranial mass effect.  No hydrocephalus.  Midline structures have a normal configuration.  Visualized pituitary gland and infundibulum are normal.   Visualized major intracranial vascular structures demonstrate normal flow voids and are normal in course and caliber.     SINUSES: Paranasal sinuses and mastoid air cells are clear.     ORBITS: Bilateral lens replacements.        Assessment:       1. Adenocarcinoma of pancreas    2. Malignant neoplasm of tail of pancreas     3. Metastasis to liver    4. Bone metastases    5. 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therapy    7. Thrombocytopenia    8. Normocytic anemia    9. Constipation, unspecified constipation type    10. Pancreatic insufficiency    11. Cancer related pain    12. Hypertension, unspecified type    13. Hyperlipidemia, unspecified hyperlipidemia type    14. Chronic diastolic congestive heart failure           Plan:     Metastatic Pancreatic Cancer - The patient was diagnosed with metastatic pancreatic cancer adenocarcinoma with mets to the liver on 12/15/21  -Ca19-9 was 7,581 on 12/15/21  -PET/CT on 1/04/22 shows mets to the liver, left adrenal gland, T9 vertebra and left iliac fossa.  -Potential mets are seen near the umbilicus  -PT underwent POET placement 1/07/22  -MRI brain on 1/11/22 showed no brain mets  -Pt consented for Gemcitabine and paclitaxel on 1/03/22  -Pt treated with 20% dose reduction in paclitaxel and Gemcitabine to 100mg/mm2 and 800mg/mm2 respectively  -Will proceed with C2D2 today  -Pt with marked thrombocytopenia with last treatment so will not increase treatment dose currently    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 - pt with increased infection risk on chemo  -Will monitor    Thrombocytopenia - pt with thrombocytopenia with prior treatment  -Platelets are 370k today  -Will monitor    Constipation - improved  -Will monitor    Pancreatic Insufficiency - pt on creon  -Will monitor    Cancer Related Pain - improved  -Will monitor    HTN - pt taking losartan, bisoprolol  -Cardiology managing  -Will monitor    HLD - pt on  pravastatin  -Management per PCP    Elevated Liver Enzymes - improved  -Will monitor    CHF Exacerbation - pt with apparent CHF exacerbation on 2/06/22 with evidence of pulmonary edema and elevated BNP  -Pt is taking lasix EOD  -Echo 10/2021 showed diastolic dysfunction  -Management per cardiology    Follow Up - appt with me on 2/16/22 with CBC and CMP that day    Salvador Green MD  [facility]  Hematology and Oncology  [facility]   [address]   [place] [id number]   O: [phone]  F: [phone]

## 2022-02-09 NOTE — PLAN OF CARE
"  Problem: Adult Inpatient Plan of Care  Goal: Patient-Specific Goal (Individualized)  Outcome: Ongoing, Progressing  Flowsheets (Taken 2/9/2022 1446)  Anxieties, Fears or Concerns: getting cold during txt  Individualized Care Needs: recliner, blankets, pillow  Patient-Specific Goals (Include Timeframe): no s/s rxn during txt     Problem: Fatigue  Goal: Improved Activity Tolerance  Intervention: Promote Improved Energy  Flowsheets (Taken 2/9/2022 1446)  Fatigue Management: frequent rest breaks encouraged  Sleep/Rest Enhancement:   natural light exposure provided   noise level reduced  Activity Management:   Ambulated -L4   Ambulated in villarreal - L4     BP (!) 142/72 (Patient Position: Sitting)   Pulse 78   Temp 98.6 °F (37 °C)   Resp 16   Ht 5' 5" (1.651 m)   Wt 66.5 kg (146 lb 9.7 oz)   LMP  (LMP Unknown)   SpO2 96%   BMI 24.40 kg/m²   Patient here today for Gemzar/Abraxane. VSS. Port accessed to left chest without difficulty; patent with blood return. Orders released. Pt oriented to unit. Symptoms reviewed. Questions and concerns addressed.     "

## 2022-02-09 NOTE — PLAN OF CARE
"  Problem: Adult Inpatient Plan of Care  Goal: Plan of Care Review  Outcome: Ongoing, Progressing  Flowsheets (Taken 2/9/2022 1710)  Plan of Care Reviewed With:   patient   daughter     BP (!) 158/70   Pulse 76   Temp 98.6 °F (37 °C)   Resp 17   Ht 5' 5" (1.651 m)   Wt 66.5 kg (146 lb 9.7 oz)   LMP  (LMP Unknown)   SpO2 96%   BMI 24.40 kg/m²   Patient tolerated treatment well. Port flushed with blood return, saline locked, and de-accessed. AVS provided and reviewed. Ambulates per self.     "

## 2022-02-09 NOTE — Clinical Note
The patient can proceed with treatment.  She already has an appt with me on 2/16/22 with a lab appt that day.  She will need a CBC and CMP linked to the lab draw on 2/16/22.

## 2022-02-13 PROBLEM — R79.89 ELEVATED TROPONIN: Status: ACTIVE | Noted: 2022-01-01

## 2022-02-15 NOTE — TELEPHONE ENCOUNTER
Contacted pt to f/u after hospital d/c. Pt states she is feeling good and was able to eat today and do things around the house. Pt states she saw Dr. Green in the hospital yesterday and he advised her to f/u on Tues 2/22 to see him and resume treatment, once off abx. Informed pt that she has appts scheduled for tomorrow, but pt states she thinks that is incorrect. Informed pt I would reach out to Dr. Green's staff to see when she needs to f/u. Also informed pt that I would be placing referral to integrative oncology. Explained the services to her and the role they play in symptom management. Patient requests that I call her daughter with all of the above information.

## 2022-02-15 NOTE — TELEPHONE ENCOUNTER
Spoke w/ pt's daughter and informed her I spoke w/ Dr. Green's nurse and patient will have labs and f/u with Dr. Green 2/23, with planned infusion at 2:30.  Also informed daughter that I have placed a referral to integrative oncology. Daughter requesting that they setup appt on a date they are already planned to be at [facility]. Will forward request to I/O staff.   Daughter verbalized understanding of all of the above and encouraged to call with any questions.

## 2022-02-16 NOTE — TELEPHONE ENCOUNTER
I spoke with patient's daughter, Brinda, about getting an IO appt scheduled. She verbalized understanding of what we offer and verbalized acceptance of a date/time. Location was reviewed.

## 2022-02-16 NOTE — PROGRESS NOTES
12:53 PM  This LCSW contacted this pt to remind her to bring her proof of income.     The pt was agreeable to this plan.

## 2022-02-17 PROBLEM — R79.89 ELEVATED TROPONIN: Status: RESOLVED | Noted: 2022-01-01 | Resolved: 2022-01-01

## 2022-02-17 PROBLEM — R50.9 FEVER AND CHILLS: Status: RESOLVED | Noted: 2022-01-01 | Resolved: 2022-01-01

## 2022-02-21 NOTE — PROGRESS NOTES
PATIENT: Nel Cui  MRN: [id number]  DATE: 2/23/2022      Diagnosis:   1. Adenocarcinoma of pancreas    2. Metastasis to liver    3. Bone metastases    4. 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 Atrial fibrillation, unspecified type    6. 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therapy    7. Thrombocytopenia    8. Normocytic anemia    9. Pancreatic insufficiency    10. Cancer related pain    11. Hypertension, unspecified type    12. Hyperlipidemia, unspecified hyperlipidemia type    13. Chronic diastolic congestive heart failure        Chief Complaint: Pancreatic Cancer (2 week follow up)      Oncologic History:      Oncologic History 12/09/21 Ct Abdomen  12/15/21 EUS  1/04/22 PET/CT  1/07/22 PORT - Dr Acuña  1/11/22 MRI Brain    Oncologic Treatment 1/12/22 - current Gemcitabine and Paclitaxel    Pathology 12/15/21 adenocarcinoma in the pancreatic body and adenocarcinoma in the liver       The patient initially presented to her PCP on 12/02/21 with complaint of abdominal pain.  She underwent a CT of the abdomen on 12/09/21 showing subcentimeter para-aortic, mesenteric lymph nodes; multiple hepatic infiltrative lesions present throughout the liver with 1 of the larger areas right liver lobe measuring approximately 2.2 cm in diameter; mass in the pancreatic tail measuring 9 x 3.3 cm.  The patient underwent EUS under the care of Dr Tse on 12/15/21 showing a mass in the pancreatic body and multiple liver lesions.  Path from the procedure showed adenocarcinoma in the pancreatic body and adenocarcinoma in the liver.    The patient underwent PET/CT on 1/04/22 showing hypermetabolic rounded mass at the junction of the body and tail of the pancreas measuring 2.5 x 2.4 cm, hypermetabolic left adrenal gland nodule measuring 1.9 cm, innumerable hypermetabolic nodular masses throughout the left and right hepatic lobes with largest lesions in the right liver measuring 2.5 x 1.9 cm and 5.2 x 4.4 cm, mildly  enlarged hypermetabolic portacaval lymph node measuring 1.4 cm, 0.7 cm hypermetabolic focus at the umbilicus, lesion in the T9 vertebra measuring 1.8 x 1.7 cm and a lesion in the left iliac fossa measuring 1.7 x 1.2 cm.  MRI of the brain on 01/11/2022 showed no evidence of metastases but did show an old right midbrain lacunar infarct.   The patient was in the hospital from 1/28/22-1/29/22 with fever and low sodium.  The patient's hydrochlorothiazide was discontinued.  The patient then presented to the hospital on 02/06/2022 for dyspnea on exertion.  CTA was done on 02/06/2022 showing no evidence of pulmonary embolism.  The patient underwent NT proBNP which was elevated at 2630 pg/mL.  The patient was started on Lasix every other day at the request of Cardiology.  Subjective:     Interval History: Ms. Cui is a 86 y.o. female with Osteopenia, HLD, HTN who presents for pancreatic cancer.  Since the last clinic visit the patient was admitted to the hospital from [id number] for fever.  The patient was discharged on Levaquin for 5 days.  The patient saw Cardiology on 02/17/2022 for atrial fibrillation and was started on Eliquis and taken off of aspirin.  The patient currently has a Holter monitor.  The patient complains of swelling in her feet which have improved after being on Lasix.  The patient denies CP, cough, SOB, abdominal pain, N/V, constipation, diarrhea.  The patient denies fever, chills, night sweats, weight loss, new lumps or bumps, easy bruising or bleeding.    Past Medical History:   Past Medical History:   Diagnosis Date    A-fib     Arthritis     Cancer     pancreas and liver    Cataracts, bilateral     CHF (congestive heart failure)     Coronary artery disease     Essential (primary) hypertension 10/5/2010    Hyperlipidemia     Osteopenia        Past Surgical HIstory:   Past Surgical History:   Procedure Laterality Date    CARPAL TUNNEL RELEASE Bilateral     wrist    CATARACT  "EXTRACTION, BILATERAL  2017    CORONARY ANGIOGRAPHY  9/1/2020    Procedure: ANGIOGRAM, CORONARY ARTERY;  Surgeon: Ivette Horne MD;  Location: [facility] CATH;  Service: Cardiology;;    DILATION AND CURETTAGE OF UTERUS      ENDOSCOPIC ULTRASOUND OF UPPER GASTROINTESTINAL TRACT Left 12/15/2021    Procedure: ULTRASOUND, UPPER GI TRACT, ENDOSCOPIC;  Surgeon: Lico Tse MD;  Location: [facility] ENDO;  Service: Endoscopy;  Laterality: Left;    ESOPHAGOGASTRODUODENOSCOPY N/A 12/15/2021    Procedure: EGD (ESOPHAGOGASTRODUODENOSCOPY);  Surgeon: Lioc Tse MD;  Location: [facility] ENDO;  Service: Endoscopy;  Laterality: N/A;    INSERTION OF TUNNELED CENTRAL VENOUS CATHETER (CVC) WITH SUBCUTANEOUS PORT N/A 1/7/2022    Procedure: FEAHKMLIN-LIFE-X-CATH;  Surgeon: Cas Acuña MD;  Location: [facility] OR;  Service: General;  Laterality: N/A;    LEFT HEART CATHETERIZATION  9/1/2020    Procedure: Left heart cath;  Surgeon: Ivette Horne MD;  Location: [facility] CATH;  Service: Cardiology;;    MITRAL VALVE REPLACEMENT  07/20/2016    OOPHORECTOMY Left 1988    REPAIR OF MENISCUS OF KNEE Left     TONSILLECTOMY      vein removal         Family History:   Family History   Problem Relation Age of Onset    No Known Problems Mother     No Known Problems Father     Cancer Maternal Grandmother         Stomach    No Known Problems Maternal Grandfather     No Known Problems Paternal Grandmother     No Known Problems Paternal Grandfather     Breast cancer Sister         over 60    No Known Problems Brother        Social History:  reports that she quit smoking about 33 years ago. Her smoking use included cigarettes. She has a 30.00 pack-year smoking history. She has never used smokeless tobacco. She reports that she does not drink alcohol and does not use drugs.    Allergies:  Review of patient's allergies indicates:   Allergen Reactions    Amoxicillin-pot clavulanate Other (See Comments)     "Spaced out feeling- can't take it " "longer than 4 or 5 days"    Breo ellipta [fluticasone furoate-vilanterol] Other (See Comments)     Feels jittery    Corticosteroids (glucocorticoids)      Other reaction(s): tachycardia    Erythromycin      "Spaced out"    Latex, natural rubber      Turns skin red around area where latex touches       Medications:  Current Outpatient Medications   Medication Sig Dispense Refill    acetaminophen (TYLENOL) 650 MG TbSR Take 1,300 mg by mouth every evening.      apixaban (ELIQUIS) 5 mg Tab Take 1 tablet (5 mg total) by mouth 2 (two) times daily. 60 tablet 11    ascorbic acid, vitamin C, (VITAMIN C) 500 MG tablet Take 500 mg by mouth once daily.      bisoprolol (ZEBETA) 5 MG tablet TAKE 1 TABLET(5 MG) BY MOUTH EVERY DAY (Patient taking differently: Take 5 mg by mouth once daily.) 90 tablet 3    cholecalciferol, vitamin D3, (VITAMIN D3) 25 mcg (1,000 unit) capsule Take 1,000 Units by mouth every morning.      coenzyme Q10 100 mg capsule Take 100 mg by mouth once daily.      furosemide (LASIX) 20 MG tablet Take 1 tablet (20 mg total) by mouth every other day. 15 tablet 2    LIDOcaine-prilocaine (EMLA) cream Apply topically as needed (30 to 60 minutes prior to chemo). 30 g 2    lipase-protease-amylase (CREON) 36,000-114,000- 180,000 unit CpDR Take 2 capsules by mouth 3 (three) times daily with meals. Take 2 capsules TID with meals and 1 capsule PRN with snacks 240 capsule 11    losartan (COZAAR) 100 MG tablet Take 1 tablet (100 mg total) by mouth every evening. 90 tablet 3    magnesium 250 mg Tab Take 250 mg by mouth every evening.      multivitamin (THERAGRAN) per tablet Take 1 tablet by mouth every other day.      ondansetron (ZOFRAN-ODT) 4 MG TbDL Take 4 mg by mouth every 12 (twelve) hours as needed (nausea/vomiting).      pravastatin (PRAVACHOL) 40 MG tablet Take 1 tablet (40 mg total) by mouth once daily. 90 tablet 3    saliva substitute combo no.9 (BIOTENE DRY MOUTH ORAL RINSE) Mwsh Take 5 mLs by " "mouth once daily. SWISH & SPIT       No current facility-administered medications for this visit.       Review of Systems   Constitutional: Negative for chills, fatigue, fever and unexpected weight change.   Respiratory: Negative for cough and shortness of breath.    Cardiovascular: Positive for leg swelling. Negative for chest pain and palpitations.   Gastrointestinal: Negative for abdominal pain, constipation, diarrhea, nausea and vomiting.   Skin: Negative for rash.   Neurological: Negative for headaches.   Hematological: Negative for adenopathy. Does not bruise/bleed easily.       ECOG Performance Status: 2   Objective:      Vitals:   Vitals:    02/23/22 1010   BP: 138/70   BP Location: Left arm   Patient Position: Sitting   BP Method: Medium (Manual)   Pulse: 95   Temp: 98.1 °F (36.7 °C)   TempSrc: Temporal   SpO2: 99%   Weight: 66 kg (145 lb 8.1 oz)   Height: 5' 5" (1.651 m)     Physical Exam  Constitutional:       General: She is not in acute distress.     Appearance: She is well-developed. She is not diaphoretic.   HENT:      Head: Normocephalic and atraumatic.   Cardiovascular:      Rate and Rhythm: Rhythm irregular.      Heart sounds: No murmur heard.    No friction rub. No gallop.   Pulmonary:      Effort: Pulmonary effort is normal. No respiratory distress.      Breath sounds: Normal breath sounds. No wheezing or rales.   Chest:      Chest wall: No tenderness.   Breasts:      Right: No axillary adenopathy or supraclavicular adenopathy.      Left: No axillary adenopathy or supraclavicular adenopathy.       Abdominal:      General: Bowel sounds are normal. There is no distension.      Palpations: Abdomen is soft. There is no mass.      Tenderness: There is no abdominal tenderness. There is no guarding or rebound.   Musculoskeletal:         General: No tenderness. Normal range of motion.      Right lower leg: No edema.      Left lower leg: No edema.   Lymphadenopathy:      Cervical: No cervical adenopathy. "      Upper Body:      Right upper body: No supraclavicular or axillary adenopathy.      Left upper body: No supraclavicular or axillary adenopathy.   Skin:     Findings: No erythema or rash.   Neurological:      Mental Status: She is alert and oriented to person, place, and time.   Psychiatric:         Behavior: Behavior normal.         Laboratory Data:  Lab Visit on 02/23/2022   Component Date Value Ref Range Status    WBC 02/23/2022 6.12  3.90 - 12.70 K/uL Final    RBC 02/23/2022 3.81 (A) 4.00 - 5.40 M/uL Final    Hemoglobin 02/23/2022 11.7 (A) 12.0 - 16.0 g/dL Final    Hematocrit 02/23/2022 36.5 (A) 37.0 - 48.5 % Final    MCV 02/23/2022 96  82 - 98 fL Final    MCH 02/23/2022 30.7  27.0 - 31.0 pg Final    MCHC 02/23/2022 32.1  32.0 - 36.0 g/dL Final    RDW 02/23/2022 18.6 (A) 11.5 - 14.5 % Final    Platelets 02/23/2022 187  150 - 450 K/uL Final    MPV 02/23/2022 8.9 (A) 9.2 - 12.9 fL Final    Immature Granulocytes 02/23/2022 0.8 (A) 0.0 - 0.5 % Final    Gran # (ANC) 02/23/2022 4.0  1.8 - 7.7 K/uL Final    Immature Grans (Abs) 02/23/2022 0.05 (A) 0.00 - 0.04 K/uL Final    Comment: Mild elevation in immature granulocytes is non specific and   can be seen in a variety of conditions including stress response,   acute inflammation, trauma and pregnancy. Correlation with other   laboratory and clinical findings is essential.      Lymph # 02/23/2022 0.9 (A) 1.0 - 4.8 K/uL Final    Mono # 02/23/2022 1.0  0.3 - 1.0 K/uL Final    Eos # 02/23/2022 0.1  0.0 - 0.5 K/uL Final    Baso # 02/23/2022 0.05  0.00 - 0.20 K/uL Final    nRBC 02/23/2022 0  0 /100 WBC Final    Gran % 02/23/2022 65.0  38.0 - 73.0 % Final    Lymph % 02/23/2022 15.4 (A) 18.0 - 48.0 % Final    Mono % 02/23/2022 16.2 (A) 4.0 - 15.0 % Final    Eosinophil % 02/23/2022 1.8  0.0 - 8.0 % Final    Basophil % 02/23/2022 0.8  0.0 - 1.9 % Final    Differential Method 02/23/2022 Automated   Final    Sodium 02/23/2022 138  136 - 145 mmol/L Final     Potassium 02/23/2022 4.8  3.5 - 5.1 mmol/L Final    Chloride 02/23/2022 98  95 - 110 mmol/L Final    CO2 02/23/2022 29  23 - 29 mmol/L Final    Glucose 02/23/2022 107  70 - 110 mg/dL Final    BUN 02/23/2022 14  8 - 23 mg/dL Final    Creatinine 02/23/2022 0.6  0.5 - 1.4 mg/dL Final    Calcium 02/23/2022 9.4  8.7 - 10.5 mg/dL Final    Total Protein 02/23/2022 6.7  6.0 - 8.4 g/dL Final    Albumin 02/23/2022 3.7  3.5 - 5.2 g/dL Final    Total Bilirubin 02/23/2022 0.6  0.1 - 1.0 mg/dL Final    Comment: For infants and newborns, interpretation of results should be based  on gestational age, weight and in agreement with clinical  observations.    Premature Infant recommended reference ranges:  Up to 24 hours.............<8.0 mg/dL  Up to 48 hours............<12.0 mg/dL  3-5 days..................<15.0 mg/dL  6-29 days.................<15.0 mg/dL      Alkaline Phosphatase 02/23/2022 66  55 - 135 U/L Final    AST 02/23/2022 22  10 - 40 U/L Final    ALT 02/23/2022 19  10 - 44 U/L Final    Anion Gap 02/23/2022 11  8 - 16 mmol/L Final    eGFR if African American 02/23/2022 >60  >60 mL/min/1.73 m^2 Final    eGFR if non African American 02/23/2022 >60  >60 mL/min/1.73 m^2 Final    Comment: Calculation used to obtain the estimated glomerular filtration  rate (eGFR) is the CKD-EPI equation.            Imaging:     PET/CT 1/04/22    Head/neck:     No significant abnormal hypermetabolic foci are identified in the head and neck region.  No lymphadenopathy is present.     Chest:     No significant abnormal hypermetabolic foci are identified within the soft tissues of the chest.  No hypermetabolic pulmonary nodules, lymphadenopathy, pleural effusion, or pericardial effusion are present.     Abdomen/pelvis:     There is a markedly hypermetabolic rounded mass at the junction of the body and tail of the pancreas consistent with the history of pancreatic cancer.  On image 134, the mass measures 2.5 x 2.4 cm and has a max  SUV of 11.2.  There is dilatation of the distal pancreatic duct beyond of the mass.  Which measures at least 10 mm in greatest diameter.  There is adjacent enlargement and hypermetabolism of the left adrenal gland suspicious for metastatic disease.  The left adrenal gland measures 1.9 x 1.9 cm and has a max SUV of 4.3.  There are innumerable hypermetabolic nodular masses throughout the left and right hepatic lobes consistent with hepatic metastatic disease.  Two of the larger more discrete masses will be measured as index lesions.  A mass in the anterior segment of the right hepatic lobe on image 129 measures 2.5 x 1.9 cm and has a max SUV of 16.2.  A mass in the posterior segment of the right hepatic lobe on image 120 measures 5.2 x 4.4 cm with a max SUV of 14.0.  There is no bile duct dilatation.  The gallbladder appears normal.  No ascites is present.  The right adrenal gland appears normal.  There is a mildly enlarged hypermetabolic portacaval lymph node which on image 143 measures 1.4 x 1.0 cm and has a max SUV of 3.8 which is suspicious for neoplastic adenopathy.  There is hypermetabolism of the umbilicus of uncertain significance.  The umbilicus measures 0.7 cm and has a max SUV of 4.8.  Clinical correlation is advised.     Skeleton:     There is an oval-shaped hypermetabolic focus within the left anterior aspect of the T9 vertebral body highly suspicious for osseous metastatic disease.  The metastatic lesion is not well visualized on the CT portion of the study and is best measured on the PET-CT images.  On image 85, the lesion measures 1.8 x 1.7 cm and has a max SUV of 13.3.  There is also a hypermetabolic focus in the left iliac fossa which on image 178 measures 1.7 x 1.2 cm and has a max SUV of 6.8.  No other significant abnormal hypermetabolic foci are identified within the skeleton.    MRI Brain 1/11/22    INTRACRANIAL: Mild global volume loss.  Scattered T2 FLAIR hyperintense white matter foci are  present, likely related to chronic microvascular ischemic change.  Old right mid brain lacunar infarct.  No abnormal enhancement demonstrated.  No definite parenchymal restricted diffusion.  No evidence of intracranial hemorrhage.  No extra-axial fluid collection or mass.  No intracranial mass effect.  No hydrocephalus.  Midline structures have a normal configuration.  Visualized pituitary gland and infundibulum are normal.  Visualized major intracranial vascular structures demonstrate normal flow voids and are normal in course and caliber.     SINUSES: Paranasal sinuses and mastoid air cells are clear.     ORBITS: Bilateral lens replacements.        Assessment:       1. Adenocarcinoma of pancreas    2. Metastasis to liver    3. Bone metastases    4. 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 Atrial fibrillation, unspecified type    6. 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therapy    7. Thrombocytopenia    8. Normocytic anemia    9. Pancreatic insufficiency    10. Cancer related pain    11. Hypertension, unspecified type    12. Hyperlipidemia, unspecified hyperlipidemia type    13. Chronic diastolic congestive heart failure           Plan:     Metastatic Pancreatic Cancer - The patient was diagnosed with metastatic pancreatic cancer adenocarcinoma with mets to the liver on 12/15/21  -Ca19-9 was 7,581 on 12/15/21  -PET/CT on 1/04/22 shows mets to the liver, left adrenal gland, T9 vertebra and left iliac fossa.  -Potential mets are seen near the umbilicus  -PT underwent POET placement 1/07/22  -MRI brain on 1/11/22 showed no brain mets  -Pt consented for Gemcitabine and paclitaxel on 1/03/22  -Pt treated with 20% dose reduction in paclitaxel and Gemcitabine to 100mg/mm2 and 800mg/mm2 respectively  -C2D8 and C2D15 cancelled given recent hospitalization  -Will proceed with C3D1 today  -Will continue [place]/Abraxane at 20% does reduction due to prior thrombocytopeni  -Pt to be set up for My Chart Care      A-fib - pt followed by cardiology and is on bisoprolol and Eliquis  -Management per cardiology    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 - pt with increased infection risk on chemo  -Will monitor    Thrombocytopenia - pt with thrombocytopenia with prior treatment  -Platelets are 187k 2/23/22  -Will monitor    Pancreatic Insufficiency - pt on creon  -Will monitor    Cancer Related Pain - improved  -Will monitor    HTN - pt taking losartan, bisoprolol  -Cardiology managing  -Will monitor    HLD - pt on pravastatin  -Management per PCP    CHF Exacerbation - pt with apparent CHF exacerbation on 2/06/22 with evidence of pulmonary edema and elevated BNP  -Pt is taking lasix EOD  -Echo 10/2021 showed diastolic dysfunction  -Pt also recently diagnosed with A-fib  -Management per cardiology    Advance Care Planning     Power of   I initiated the process of advance care planning today and explained the importance of this process to the patient.  I introduced the concept of advance directives to the patient, as well. Then the patient received detailed information about the importance of designating a Health Care Power of  (HCPOA). She was also instructed to communicate with this person about their wishes for future healthcare, should she become sick and lose decision-making capacity. The patient has not previously appointed a HCPOA. After our discussion, the patient has decided to complete a HCPOA and will bring the form completed to her next clinic appointment.            Follow Up - CBC, CMP and clinic visit with NP on 3/02/22 with treatment that day; CBC, cMP and clinic appt with me on 3/09/22 with treatment that day; pt to be set up for my chart care companion at the Erik Green MD  [facility]  Hematology and Oncology  [facility]   [address] [place] [address]   O: [phone]  F: [phone]

## 2022-02-23 NOTE — Clinical Note
The patient can proceed with treatment.  She will need a CBC, CMP and clinic visit with NP on 3/02/22 with treatment that day.  She will need a CBC, cMP and clinci appt with me on 3/09/22 with treatment that day.  Pt can see me at 1PM on 3/09/22.  OK to double book.  The patient will need to be set up for my chart care companion at the [facility].

## 2022-02-23 NOTE — PLAN OF CARE
Problem: Adult Inpatient Plan of Care  Goal: Patient-Specific Goal (Individualized)  Outcome: Ongoing, Progressing  Flowsheets (Taken 2/23/2022 1710)  Anxieties, Fears or Concerns: None  Individualized Care Needs: None  Patient-Specific Goals (Include Timeframe): Infection prevention during treatment.     Problem: Fatigue  Goal: Improved Activity Tolerance  Intervention: Promote Improved Energy  Flowsheets (Taken 2/23/2022 1710)  Fatigue Management:   activity schedule adjusted   frequent rest breaks encouraged   paced activity encouraged   fatigue-related activity identified  Sleep/Rest Enhancement:   relaxation techniques promoted   regular sleep/rest pattern promoted  Activity Management:   Ambulated -L4   Ambulated in villarreal - L4  Tolerated treatment with no known distress.  Ambulated from infusion center with steady gait.

## 2022-02-24 NOTE — PROGRESS NOTES
Oncology   Chemotherapy Infusion Visit    Late entry SW saw pt 2/23/22.    Quick Social Service Status Follow Up   Met w/ pt briefly to follow up on social and emotional needs since initiation of treatment.      SW met with pt. Her daughter was present. Pt had questions about food pantry. SW answered questions and made plan with pt to sign up at next visit per pt's request.  Pt did provide SW with prof of income.     Pt and SW discussed her fears and anxiety since learning of her cancer diagnosis. She is worried about leaving her apartment and worries that she may get sick when out of her home. SW provided emotional support and validated feelings of anxiety. Pt has called and made an appointment [facility]. SW reinforced this was a good plan.   SW will follow for additional support and resoureces.             Christy Martinez, ANA  02/24/2022  3:15 PM

## 2022-02-25 NOTE — TELEPHONE ENCOUNTER
Called and spoke with pt. Pt reports having a light pink discoloration on her back and both hips that developed the day after her chemo treatment. Patient has tried using hydrocortisone cream which helps alleviate her symptoms to some extent, but only temporarily. Pt took her temperature and reported that her temperature was 99.4 Farenheit. I voiced understanding and let the patient know I would have Dr. Green's nurse Mrs. Galeana speak to her. Pt voiced understanding.

## 2022-02-25 NOTE — TELEPHONE ENCOUNTER
----- Message from Sara Izquierdo sent at 2/25/2022 10:32 AM CST -----  Type: Needs Medical Advice  Who Called:  Patient   Symptoms (please be specific):    How long has patient had these symptoms:    Pharmacy name and phone #:    Best Call Back Number: [phone]  Additional Information: Patient is requesting a call back from the nurse.

## 2022-03-02 NOTE — PLAN OF CARE
Tolerated abraxane/gemzar well.  No reactions noted.  No questions or concerns at this time.  Ambulated off unit in [facility].

## 2022-03-02 NOTE — Clinical Note
Proceed with C3D8 [place]/Abraxane today 3/7/22 with Dr. Mandujano Follow-up with Dr. Green for C3D15 of treatment on 3/9/22; CBC and CMP prior to appointment

## 2022-03-02 NOTE — PROGRESS NOTES
PATIENT: Nel Cui  MRN: [id number]  DATE: 3/2/2022      Diagnosis:   1. Adenocarcinoma of pancreas    2. Metastasis to liver    3. 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 Atrial fibrillation, unspecified type    5. 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therapy    6. Bone metastases    7. Thrombocytopenia    8. Pancreatic insufficiency    9. Hyperlipidemia, unspecified hyperlipidemia type    10. Hypertension, unspecified type    11. Chronic diastolic congestive heart failure        Chief Complaint: Follow-up pancreatic cancer     Oncologic History:      Oncologic History 12/09/21 Ct Abdomen  12/15/21 EUS  1/04/22 PET/CT  1/07/22 PORT - Dr Acuña  1/11/22 MRI Brain    Oncologic Treatment 1/12/22 - current Gemcitabine and Paclitaxel    Pathology 12/15/21 adenocarcinoma in the pancreatic body and adenocarcinoma in the liver       The patient initially presented to her PCP on 12/02/21 with complaint of abdominal pain.  She underwent a CT of the abdomen on 12/09/21 showing subcentimeter para-aortic, mesenteric lymph nodes; multiple hepatic infiltrative lesions present throughout the liver with 1 of the larger areas right liver lobe measuring approximately 2.2 cm in diameter; mass in the pancreatic tail measuring 9 x 3.3 cm.  The patient underwent EUS under the care of Dr Tse on 12/15/21 showing a mass in the pancreatic body and multiple liver lesions.  Path from the procedure showed adenocarcinoma in the pancreatic body and adenocarcinoma in the liver.    The patient underwent PET/CT on 1/04/22 showing hypermetabolic rounded mass at the junction of the body and tail of the pancreas measuring 2.5 x 2.4 cm, hypermetabolic left adrenal gland nodule measuring 1.9 cm, innumerable hypermetabolic nodular masses throughout the left and right hepatic lobes with largest lesions in the right liver measuring 2.5 x 1.9 cm and 5.2 x 4.4 cm, mildly enlarged hypermetabolic portacaval lymph node measuring 1.4 cm, 0.7  cm hypermetabolic focus at the umbilicus, lesion in the T9 vertebra measuring 1.8 x 1.7 cm and a lesion in the left iliac fossa measuring 1.7 x 1.2 cm.  MRI of the brain on 01/11/2022 showed no evidence of metastases but did show an old right midbrain lacunar infarct.    Subjective:     Interval History: Ms. Cui is a 86 y.o. female with Osteopenia, HLD, HTN who presents for pancreatic cancer. She is here for consideration of C3 D8 Gemzar/Abraxane. She is accompanied by her daughter.   Since last visit, she has noticed some swelling in BLE, she has contacted cardiology for guidance and is waiting to hear back from them. Wearing compression stockings and trying to keep feet elevated. She did have a temperature of 100.5 after her last treatment. She waited approximately one hour and retook her temperature; it had gone down to normal. No other s/s infection. Weight is stable and her appetite remains fair. She is quite anxious about her diagnosis and treatment, is looking forward to her appointment with Dr. Mandujano.  Denies CP, cough, SOB, abd pain, N/V,constipation, diarrhea. She has had no chills, rigors, or night sweats. No bleeding or bruising.     Past Medical History:   Past Medical History:   Diagnosis Date    A-fib     Arthritis     Cancer     pancreas and liver    Cataracts, bilateral     CHF (congestive heart failure)     Coronary artery disease     Essential (primary) hypertension 10/5/2010    Hyperlipidemia     Osteopenia        Past Surgical HIstory:   Past Surgical History:   Procedure Laterality Date    CARPAL TUNNEL RELEASE Bilateral     wrist    CATARACT EXTRACTION, BILATERAL  2017    CORONARY ANGIOGRAPHY  9/1/2020    Procedure: ANGIOGRAM, CORONARY ARTERY;  Surgeon: Ivette Horne MD;  Location: [facility];  Service: Cardiology;;    DILATION AND CURETTAGE OF UTERUS      ENDOSCOPIC ULTRASOUND OF UPPER GASTROINTESTINAL TRACT Left 12/15/2021    Procedure: ULTRASOUND, UPPER GI TRACT,  "ENDOSCOPIC;  Surgeon: Lico Tse MD;  Location: [facility] ENDO;  Service: Endoscopy;  Laterality: Left;    ESOPHAGOGASTRODUODENOSCOPY N/A 12/15/2021    Procedure: EGD (ESOPHAGOGASTRODUODENOSCOPY);  Surgeon: Lico Tse MD;  Location: [facility] ENDO;  Service: Endoscopy;  Laterality: N/A;    INSERTION OF TUNNELED CENTRAL VENOUS CATHETER (CVC) WITH SUBCUTANEOUS PORT N/A 1/7/2022    Procedure: [id number]-A-CATH;  Surgeon: Cas Acuña MD;  Location: [facility] OR;  Service: General;  Laterality: N/A;    LEFT HEART CATHETERIZATION  9/1/2020    Procedure: Left heart cath;  Surgeon: Ivette Horne MD;  Location: [facility] CATH;  Service: Cardiology;;    MITRAL VALVE REPLACEMENT  07/20/2016    OOPHORECTOMY Left 1988    REPAIR OF MENISCUS OF KNEE Left     TONSILLECTOMY      vein removal         Family History:   Family History   Problem Relation Age of Onset    No Known Problems Mother     No Known Problems Father     Cancer Maternal Grandmother         Stomach    No Known Problems Maternal Grandfather     No Known Problems Paternal Grandmother     No Known Problems Paternal Grandfather     Breast cancer Sister         over 60    No Known Problems Brother        Social History:  reports that she quit smoking about 33 years ago. Her smoking use included cigarettes. She has a 30.00 pack-year smoking history. She has never used smokeless tobacco. She reports that she does not drink alcohol and does not use drugs.    Allergies:  Review of patient's allergies indicates:   Allergen Reactions    Amoxicillin-pot clavulanate Other (See Comments)     "Spaced out feeling- can't take it longer than 4 or 5 days"    Breo ellipta [fluticasone furoate-vilanterol] Other (See Comments)     Feels jittery    Corticosteroids (glucocorticoids)      Other reaction(s): tachycardia    Erythromycin      "Spaced out"    Latex, natural rubber      Turns skin red around area where latex touches       Medications:  Current " Outpatient Medications   Medication Sig Dispense Refill    acetaminophen (TYLENOL) 650 MG TbSR Take 1,300 mg by mouth every evening.      apixaban (ELIQUIS) 5 mg Tab Take 1 tablet (5 mg total) by mouth 2 (two) times daily. 60 tablet 11    ascorbic acid, vitamin C, (VITAMIN C) 500 MG tablet Take 500 mg by mouth once daily.      bisoprolol (ZEBETA) 5 MG tablet TAKE 1 TABLET(5 MG) BY MOUTH EVERY DAY (Patient taking differently: Take 5 mg by mouth once daily.) 90 tablet 3    cholecalciferol, vitamin D3, (VITAMIN D3) 25 mcg (1,000 unit) capsule Take 1,000 Units by mouth every morning.      coenzyme Q10 100 mg capsule Take 100 mg by mouth once daily.      furosemide (LASIX) 20 MG tablet Take 1 tablet (20 mg total) by mouth every other day. 15 tablet 2    LIDOcaine-prilocaine (EMLA) cream Apply topically as needed (30 to 60 minutes prior to chemo). 30 g 2    lipase-protease-amylase (CREON) 36,000-114,000- 180,000 unit CpDR Take 2 capsules by mouth 3 (three) times daily with meals. Take 2 capsules TID with meals and 1 capsule PRN with snacks 240 capsule 11    losartan (COZAAR) 100 MG tablet Take 1 tablet (100 mg total) by mouth every evening. 90 tablet 3    magnesium 250 mg Tab Take 250 mg by mouth every evening.      multivitamin (THERAGRAN) per tablet Take 1 tablet by mouth every other day.      pravastatin (PRAVACHOL) 40 MG tablet Take 1 tablet (40 mg total) by mouth once daily. 90 tablet 3    saliva substitute combo no.9 (BIOTENE DRY MOUTH ORAL RINSE) Mwsh Take 5 mLs by mouth once daily. SWISH & SPIT      ondansetron (ZOFRAN-ODT) 4 MG TbDL Take 4 mg by mouth every 12 (twelve) hours as needed (nausea/vomiting).       No current facility-administered medications for this visit.     Facility-Administered Medications Ordered in Other Visits   Medication Dose Route Frequency Provider Last Rate Last Admin    alteplase injection 2 mg  2 mg Intra-Catheter PRN Zamzam Zee NP        gemcitabine (GEMZAR) 1,400  "mg in sodium chloride 0.9% 250 mL chemo infusion  800 mg/m2 (Treatment Plan Recorded) Intravenous 1 time in Clinic/HOD Zamzam Zee NP        heparin, porcine (PF) 100 unit/mL injection flush 500 Units  500 Units Intravenous PRN Zamzam Zee NP        PACLitaxel-protein bound (ABRAXANE) 100 mg/m2 = 180 mg in 36 mL infusion  100 mg/m2 (Treatment Plan Recorded) Intravenous 1 time in Clinic/HOD Zamzam Zee NP 72 mL/hr at 03/02/22 1659 180 mg at 03/02/22 1659    sodium chloride 0.9% flush 10 mL  10 mL Intravenous PRN Zamzam Zee NP           Review of Systems   Constitutional: Negative for chills, fatigue, fever and unexpected weight change.   HENT: Negative for mouth sores and sore throat.    Respiratory: Negative for cough and shortness of breath.    Cardiovascular: Positive for leg swelling. Negative for chest pain and palpitations.   Gastrointestinal: Negative for abdominal pain, constipation, diarrhea, nausea and vomiting.   Genitourinary: Negative for dysuria.   Skin: Negative for rash.   Neurological: Negative for numbness and headaches.   Hematological: Negative for adenopathy. Does not bruise/bleed easily.       ECOG Performance Status: 2   Objective:      Vitals:   Vitals:    03/02/22 1605   BP: (!) 144/82   BP Location: Left arm   Patient Position: Sitting   BP Method: Medium (Automatic)   Pulse: 91   Resp: 16   Temp: 97 °F (36.1 °C)   TempSrc: Temporal   SpO2: (!) 94%   Weight: 66.5 kg (146 lb 9.7 oz)   Height: 5' 5" (1.651 m)     Physical Exam  Constitutional:       General: She is not in acute distress.     Appearance: She is well-developed. She is not diaphoretic.   HENT:      Head: Normocephalic and atraumatic.      Mouth/Throat:      Mouth: Mucous membranes are moist.   Eyes:      General: No scleral icterus.  Cardiovascular:      Rate and Rhythm: Normal rate and regular rhythm.      Heart sounds: Normal heart sounds. No murmur heard.  Pulmonary:      Effort: Pulmonary effort is normal. No " respiratory distress.      Breath sounds: Normal breath sounds. No wheezing.   Chest:      Chest wall: No tenderness.   Breasts:      Right: No axillary adenopathy or supraclavicular adenopathy.      Left: No axillary adenopathy or supraclavicular adenopathy.       Abdominal:      General: Bowel sounds are normal. There is no distension.      Palpations: Abdomen is soft. There is no mass.      Tenderness: There is no abdominal tenderness. There is no guarding.   Musculoskeletal:         General: No tenderness. Normal range of motion.      Right lower leg: Edema (Trace) present.      Left lower leg: Edema (Trace) present.   Lymphadenopathy:      Cervical: No cervical adenopathy.      Upper Body:      Right upper body: No supraclavicular or axillary adenopathy.      Left upper body: No supraclavicular or axillary adenopathy.   Skin:     General: Skin is warm and dry.      Coloration: Skin is not jaundiced.      Findings: No bruising, erythema or rash.   Neurological:      Mental Status: She is alert and oriented to person, place, and time.   Psychiatric:         Behavior: Behavior normal.         Laboratory Data:  Lab Visit on 03/02/2022   Component Date Value Ref Range Status    WBC 03/02/2022 3.25 (A) 3.90 - 12.70 K/uL Final    RBC 03/02/2022 3.55 (A) 4.00 - 5.40 M/uL Final    Hemoglobin 03/02/2022 11.0 (A) 12.0 - 16.0 g/dL Final    Hematocrit 03/02/2022 33.6 (A) 37.0 - 48.5 % Final    MCV 03/02/2022 95  82 - 98 fL Final    MCH 03/02/2022 31.0  27.0 - 31.0 pg Final    MCHC 03/02/2022 32.7  32.0 - 36.0 g/dL Final    RDW 03/02/2022 18.0 (A) 11.5 - 14.5 % Final    Platelets 03/02/2022 199  150 - 450 K/uL Final    MPV 03/02/2022 9.1 (A) 9.2 - 12.9 fL Final    Immature Granulocytes 03/02/2022 0.6 (A) 0.0 - 0.5 % Final    Gran # (ANC) 03/02/2022 1.9  1.8 - 7.7 K/uL Final    Immature Grans (Abs) 03/02/2022 0.02  0.00 - 0.04 K/uL Final    Comment: Mild elevation in immature granulocytes is non specific and    can be seen in a variety of conditions including stress response,   acute inflammation, trauma and pregnancy. Correlation with other   laboratory and clinical findings is essential.      Lymph # 03/02/2022 0.9 (A) 1.0 - 4.8 K/uL Final    Mono # 03/02/2022 0.4  0.3 - 1.0 K/uL Final    Eos # 03/02/2022 0.0  0.0 - 0.5 K/uL Final    Baso # 03/02/2022 0.03  0.00 - 0.20 K/uL Final    nRBC 03/02/2022 0  0 /100 WBC Final    Gran % 03/02/2022 57.9  38.0 - 73.0 % Final    Lymph % 03/02/2022 26.2  18.0 - 48.0 % Final    Mono % 03/02/2022 13.2  4.0 - 15.0 % Final    Eosinophil % 03/02/2022 1.2  0.0 - 8.0 % Final    Basophil % 03/02/2022 0.9  0.0 - 1.9 % Final    Differential Method 03/02/2022 Automated   Final    Sodium 03/02/2022 135 (A) 136 - 145 mmol/L Final    Potassium 03/02/2022 4.2  3.5 - 5.1 mmol/L Final    Chloride 03/02/2022 96  95 - 110 mmol/L Final    CO2 03/02/2022 28  23 - 29 mmol/L Final    Glucose 03/02/2022 102  70 - 110 mg/dL Final    BUN 03/02/2022 12  8 - 23 mg/dL Final    Creatinine 03/02/2022 0.6  0.5 - 1.4 mg/dL Final    Calcium 03/02/2022 9.3  8.7 - 10.5 mg/dL Final    Total Protein 03/02/2022 6.6  6.0 - 8.4 g/dL Final    Albumin 03/02/2022 3.6  3.5 - 5.2 g/dL Final    Total Bilirubin 03/02/2022 0.5  0.1 - 1.0 mg/dL Final    Comment: For infants and newborns, interpretation of results should be based  on gestational age, weight and in agreement with clinical  observations.    Premature Infant recommended reference ranges:  Up to 24 hours.............<8.0 mg/dL  Up to 48 hours............<12.0 mg/dL  3-5 days..................<15.0 mg/dL  6-29 days.................<15.0 mg/dL      Alkaline Phosphatase 03/02/2022 69  55 - 135 U/L Final    AST 03/02/2022 25  10 - 40 U/L Final    ALT 03/02/2022 22  10 - 44 U/L Final    Anion Gap 03/02/2022 11  8 - 16 mmol/L Final    eGFR if African American 03/02/2022 >60  >60 mL/min/1.73 m^2 Final    eGFR if non African American 03/02/2022 >60   >60 mL/min/1.73 m^2 Final    Comment: Calculation used to obtain the estimated glomerular filtration  rate (eGFR) is the CKD-EPI equation.            Imaging:     PET/CT 1/04/22    Head/neck:     No significant abnormal hypermetabolic foci are identified in the head and neck region.  No lymphadenopathy is present.     Chest:     No significant abnormal hypermetabolic foci are identified within the soft tissues of the chest.  No hypermetabolic pulmonary nodules, lymphadenopathy, pleural effusion, or pericardial effusion are present.     Abdomen/pelvis:     There is a markedly hypermetabolic rounded mass at the junction of the body and tail of the pancreas consistent with the history of pancreatic cancer.  On image 134, the mass measures 2.5 x 2.4 cm and has a max SUV of 11.2.  There is dilatation of the distal pancreatic duct beyond of the mass.  Which measures at least 10 mm in greatest diameter.  There is adjacent enlargement and hypermetabolism of the left adrenal gland suspicious for metastatic disease.  The left adrenal gland measures 1.9 x 1.9 cm and has a max SUV of 4.3.  There are innumerable hypermetabolic nodular masses throughout the left and right hepatic lobes consistent with hepatic metastatic disease.  Two of the larger more discrete masses will be measured as index lesions.  A mass in the anterior segment of the right hepatic lobe on image 129 measures 2.5 x 1.9 cm and has a max SUV of 16.2.  A mass in the posterior segment of the right hepatic lobe on image 120 measures 5.2 x 4.4 cm with a max SUV of 14.0.  There is no bile duct dilatation.  The gallbladder appears normal.  No ascites is present.  The right adrenal gland appears normal.  There is a mildly enlarged hypermetabolic portacaval lymph node which on image 143 measures 1.4 x 1.0 cm and has a max SUV of 3.8 which is suspicious for neoplastic adenopathy.  There is hypermetabolism of the umbilicus of uncertain significance.  The umbilicus  measures 0.7 cm and has a max SUV of 4.8.  Clinical correlation is advised.     Skeleton:     There is an oval-shaped hypermetabolic focus within the left anterior aspect of the T9 vertebral body highly suspicious for osseous metastatic disease.  The metastatic lesion is not well visualized on the CT portion of the study and is best measured on the PET-CT images.  On image 85, the lesion measures 1.8 x 1.7 cm and has a max SUV of 13.3.  There is also a hypermetabolic focus in the left iliac fossa which on image 178 measures 1.7 x 1.2 cm and has a max SUV of 6.8.  No other significant abnormal hypermetabolic foci are identified within the skeleton.    MRI Brain 1/11/22    INTRACRANIAL: Mild global volume loss.  Scattered T2 FLAIR hyperintense white matter foci are present, likely related to chronic microvascular ischemic change.  Old right mid brain lacunar infarct.  No abnormal enhancement demonstrated.  No definite parenchymal restricted diffusion.  No evidence of intracranial hemorrhage.  No extra-axial fluid collection or mass.  No intracranial mass effect.  No hydrocephalus.  Midline structures have a normal configuration.  Visualized pituitary gland and infundibulum are normal.  Visualized major intracranial vascular structures demonstrate normal flow voids and are normal in course and caliber.     SINUSES: Paranasal sinuses and mastoid air cells are clear.     ORBITS: Bilateral lens replacements.        Assessment:       1. Adenocarcinoma of pancreas    2. Metastasis to liver    3. 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 Atrial fibrillation, unspecified type    5. 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therapy    6. Bone metastases    7. Thrombocytopenia    8. Pancreatic insufficiency    9. Hyperlipidemia, unspecified hyperlipidemia type    10. Hypertension, unspecified type    11. Chronic diastolic congestive heart failure           Plan:     Metastatic Pancreatic Cancer - The patient was diagnosed  with metastatic pancreatic cancer adenocarcinoma with mets to the liver on 12/15/21  -Ca19-9 was 7,581 on 12/15/21  -PET/CT on 1/04/22 shows mets to the liver, left adrenal gland, T9 vertebra and left iliac fossa.  -Potential mets are seen near the umbilicus  -PT underwent POET placement 1/07/22  -MRI brain on 1/11/22 showed no brain mets  -Pt consented for Gemcitabine and paclitaxel on 1/03/22  -Pt treated with 20% dose reduction in paclitaxel and Gemcitabine to 100mg/mm2 and 800mg/mm2 respectively  -C2D8 and C2D15 cancelled given hospitalization  -Will proceed with C3 D8 today without dose increase due to prior thrombocytopenia     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   -pt with increased infection risk on chemo  -Will monitor    Pancreatic Insufficiency   -Pt on Creon   -Will monitor    A-fib  -Followed by cardiology and is on Bisoprolol and Eliquis  -Mgmt per cardiology    Thrombocytopenia  -Thrombocytopenia prior to treatment  -Platelets are 199k 03/02/22  -Will monitor     HTN  -Compliant with Losartan and bisoprolol   -Managed per cardiology    HLD  -Complaint with Pravastatin   -Managed per PCP    CHF  -Exacerbation on 02/06/22 with evidence of pulmonary edema and elevated BNP  -Echo 10/2021 showed diastolic dysfunction  -Currently with trace edema BLE, not on Lasix  -Management per cardiology    Follow Up   CBC, CMP and prior to her clinic appt with Dr. Green on 3/09/22 with treatment that day    Patient queried and all questions were answered.

## 2022-03-04 PROBLEM — L03.115 CELLULITIS OF BOTH LOWER EXTREMITIES: Status: ACTIVE | Noted: 2022-01-01

## 2022-03-04 PROBLEM — L03.116 CELLULITIS OF BOTH LOWER EXTREMITIES: Status: ACTIVE | Noted: 2022-01-01

## 2022-03-04 PROBLEM — D69.6 THROMBOCYTOPENIA: Status: ACTIVE | Noted: 2022-01-01

## 2022-03-04 PROBLEM — A41.9 SEPSIS: Status: ACTIVE | Noted: 2022-01-01

## 2022-03-05 PROBLEM — R50.9 FEVER: Status: ACTIVE | Noted: 2022-01-01

## 2022-03-05 PROBLEM — I87.2 VENOUS STASIS DERMATITIS: Status: ACTIVE | Noted: 2022-01-01

## 2022-03-05 NOTE — TELEPHONE ENCOUNTER
Reports chemo Wednesday w/ feeling warm all day today w/ myalgia. Reports at 6:15pm her temperature was  101.3 @ 6:30p 101.5. Advised, per protocol and verbalizes understanding. Will call back with any questions/concerns in this regard but would like follow up with provider.     Reason for Disposition   [1] Neutropenia known or suspected (e.g., recent cancer chemotherapy) AND [2] fever > 100.4 F (38.0 C)    Additional Information   Negative: Difficult to awaken or acting confused (e.g., disoriented, slurred speech)   Negative: Bluish (or gray) lips or face now   Negative: Shock suspected (e.g., cold/pale/clammy skin, too weak to stand, low BP, rapid pulse)   Negative: New-onset rash with many purple (or blood-colored) spots or dots   Negative: Sounds like a life-threatening emergency to the triager    Protocols used: CANCER - FEVER-A-

## 2022-03-07 PROBLEM — K59.09 OTHER CONSTIPATION: Status: ACTIVE | Noted: 2022-01-01

## 2022-03-08 NOTE — TELEPHONE ENCOUNTER
BETTINA received a phone call from pt's daughter Brinda. The pt has been in the hopsital for three days  With a fever. Daughter said will be seeing doctor tomorrow and will discuss if treatment needs to be changed.      She said pt is home and will be getting home health and OT. Daughter had questions about a shower chair and power lift recliner. These ate not covered by insurance and will be private pay. Possible that the motor on lift chair maybe covered. BETTINA encouraged her to talk with OT about recommendations when they come to see pt this week. She agreed. BETTINA to assist further if needed.     Daughter asked to reschedule pt's wig appointment. SW set apt for pt for 3/9 @ 1:30 after she sees her doctor per request.     BETTINA to follow.     Christy Martinez LCSW

## 2022-03-09 NOTE — PROGRESS NOTES
PATIENT: Nel Cui  MRN: [id number]  DATE: 3/9/2022      Diagnosis:   1. Adenocarcinoma of pancreas    2. Malignant neoplasm of body of pancreas     3. Metastasis to liver    4. 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 Bone metastases    6. Atrial fibrillation, unspecified type    7. 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therapy    8. Thrombocytopenia    9. Normocytic anemia    10. Pancreatic insufficiency    11. Cancer related pain    12. Hypertension, unspecified type    13. Hyperlipidemia, unspecified hyperlipidemia type    14. Chronic diastolic congestive heart failure        Chief Complaint: Hospital Follow Up and Pancreatic Cancer      Oncologic History:      Oncologic History 12/09/21 Ct Abdomen  12/15/21 EUS  1/04/22 PET/CT  1/07/22 PORT - Dr Acuña  1/11/22 MRI Brain    Oncologic Treatment 1/12/22 - current Gemcitabine and Paclitaxel s/p 2 cycles    Pathology 12/15/21 adenocarcinoma in the pancreatic body and adenocarcinoma in the liver       The patient initially presented to her PCP on 12/02/21 with complaint of abdominal pain.  She underwent a CT of the abdomen on 12/09/21 showing subcentimeter para-aortic, mesenteric lymph nodes; multiple hepatic infiltrative lesions present throughout the liver with 1 of the larger areas right liver lobe measuring approximately 2.2 cm in diameter; mass in the pancreatic tail measuring 9 x 3.3 cm.  The patient underwent EUS under the care of Dr Tse on 12/15/21 showing a mass in the pancreatic body and multiple liver lesions.  Path from the procedure showed adenocarcinoma in the pancreatic body and adenocarcinoma in the liver.    The patient underwent PET/CT on 1/04/22 showing hypermetabolic rounded mass at the junction of the body and tail of the pancreas measuring 2.5 x 2.4 cm, hypermetabolic left adrenal gland nodule measuring 1.9 cm, innumerable hypermetabolic nodular masses throughout the left and right hepatic lobes with largest lesions in the  right liver measuring 2.5 x 1.9 cm and 5.2 x 4.4 cm, mildly enlarged hypermetabolic portacaval lymph node measuring 1.4 cm, 0.7 cm hypermetabolic focus at the umbilicus, lesion in the T9 vertebra measuring 1.8 x 1.7 cm and a lesion in the left iliac fossa measuring 1.7 x 1.2 cm.  MRI of the brain on 01/11/2022 showed no evidence of metastases but did show an old right midbrain lacunar infarct.   The patient was in the hospital from 1/28/22-1/29/22 with fever and low sodium.  The patient's hydrochlorothiazide was discontinued.  The patient then presented to the hospital on 02/06/2022 for dyspnea on exertion.  CTA was done on 02/06/2022 showing no evidence of pulmonary embolism.  The patient underwent NT proBNP which was elevated at 2630 pg/mL.  The patient was started on Lasix every other day at the request of Cardiology.   The patient was admitted to the hospital from 2/12/22-2/14/22 for fever.  The patient was discharged on Levaquin for 5 days.  The patient saw Cardiology on 02/17/2022 for atrial fibrillation and was started on Eliquis and taken off of aspirin.   Subjective:     Interval History: Ms. Cui is a 86 y.o. female with Osteopenia, HLD, HTN who presents for pancreatic cancer.  Since the last clinic visit the the patient was admitted to the hospital from 1760-5226 for fever.  During her hospitalization procalcitonin was checked and was normal.  All her cultures were negative.  Her fever was felt to be from chemotherapy.  Currently the patient complains of increased fluid in the legs.  She also complains of difficulty breathing when lying flat.  The patient denies CP, cough, SOB, abdominal pain, N/V, constipation, diarrhea.  The patient denies fever, chills, night sweats, weight loss, new lumps or bumps, easy bruising or bleeding.    Past Medical History:   Past Medical History:   Diagnosis Date    A-fib     Anemia     Arthritis     Cataracts, bilateral     CHF (congestive heart failure)      chronic diastolic    Coronary artery disease     Nonobstructive    Essential (primary) hypertension 10/05/2010    Hyperlipidemia     Hyponatremia     Mitral regurgitation     Osteopenia     Pancreatic insufficiency     Primary pancreatic cancer with metastasis to other site     Thrombocytopenia        Past Surgical HIstory:   Past Surgical History:   Procedure Laterality Date    CARPAL TUNNEL RELEASE Bilateral     wrist    CATARACT EXTRACTION, BILATERAL  2017    CORONARY ANGIOGRAPHY  09/01/2020    Procedure: ANGIOGRAM, CORONARY ARTERY;  Surgeon: Ivette Horne MD;  Location: [facility] CATH;  Service: Cardiology;;    DILATION AND CURETTAGE OF UTERUS      ENDOSCOPIC ULTRASOUND OF UPPER GASTROINTESTINAL TRACT Left 12/15/2021    Procedure: ULTRASOUND, UPPER GI TRACT, ENDOSCOPIC;  Surgeon: Lico Tse MD;  Location: [facility] ENDO;  Service: Endoscopy;  Laterality: Left;    ESOPHAGOGASTRODUODENOSCOPY N/A 12/15/2021    Procedure: EGD (ESOPHAGOGASTRODUODENOSCOPY);  Surgeon: Lico Tse MD;  Location: [facility] ENDO;  Service: Endoscopy;  Laterality: N/A;    INSERTION OF TUNNELED CENTRAL VENOUS CATHETER (CVC) WITH SUBCUTANEOUS PORT N/A 01/07/2022    Procedure: [id number];  Surgeon: Cas Acuña MD;  Location: [facility] OR;  Service: General;  Laterality: N/A;    LEFT HEART CATHETERIZATION  09/01/2020    Procedure: Left heart cath;  Surgeon: Ivette Horne MD;  Location: [facility] CATH;  Service: Cardiology;;    MITRAL VALVE REPAIR      OOPHORECTOMY Left 1988    OTHER SURGICAL HISTORY      maze procedure and PHILL ligation    REPAIR OF MENISCUS OF KNEE Left     TONSILLECTOMY      vein removal         Family History:   Family History   Problem Relation Age of Onset    No Known Problems Mother     No Known Problems Father     Cancer Maternal Grandmother         Stomach    No Known Problems Maternal Grandfather     No Known Problems Paternal Grandmother     No Known Problems Paternal Grandfather   "   Breast cancer Sister         over 60    No Known Problems Brother        Social History:  reports that she quit smoking about 33 years ago. Her smoking use included cigarettes. She has a 30.00 pack-year smoking history. She has never used smokeless tobacco. She reports that she does not drink alcohol and does not use drugs.    Allergies:  Review of patient's allergies indicates:   Allergen Reactions    Amoxicillin-pot clavulanate Other (See Comments)     "Spaced out feeling- can't take it longer than 4 or 5 days"  Patient tolerated Zosyn    Breo ellipta [fluticasone furoate-vilanterol] Other (See Comments)     Feels jittery    Corticosteroids (glucocorticoids)      Other reaction(s): tachycardia    Erythromycin      "Spaced out"    Latex, natural rubber      Turns skin red around area where latex touches       Medications:  Current Outpatient Medications   Medication Sig Dispense Refill    acetaminophen (TYLENOL) 650 MG TbSR Take 650 mg by mouth every 6 (six) hours as needed (mild pain < 5 or temp > 100.5).      apixaban (ELIQUIS) 5 mg Tab Take 1 tablet (5 mg total) by mouth 2 (two) times daily. 60 tablet 11    ascorbic acid, vitamin C, (VITAMIN C) 500 MG tablet Take 500 mg by mouth once daily.      bisoprolol (ZEBETA) 5 MG tablet TAKE 1 TABLET(5 MG) BY MOUTH EVERY DAY (Patient taking differently: Take 5 mg by mouth every evening.) 90 tablet 3    cholecalciferol, vitamin D3, (VITAMIN D3) 25 mcg (1,000 unit) capsule Take 1,000 Units by mouth every morning.      coenzyme Q10 100 mg capsule Take 100 mg by mouth once daily.      docusate sodium (COLACE) 100 MG capsule Take 100 mg by mouth 2 (two) times daily.      furosemide (LASIX) 20 MG tablet Take 20 mg p.o. every other day.  Can utilize an additional 20 mg p.o. on alternating days as needed for a weight gain of > 3 lbs from baseline (144-145 lbs) or swelling (Patient taking differently: Take 20 mg by mouth every other day.  Can utilize an " additional 20 mg by mouth on alternating days as needed for a weight gain of > 3 lbs from baseline (144-145 lbs) or swelling) 30 tablet 5    LIDOcaine-prilocaine (EMLA) cream Apply topically as needed (30 to 60 minutes prior to chemo). 30 g 2    lipase-protease-amylase (CREON) 36,000-114,000- 180,000 unit CpDR Take 2 capsules by mouth 3 (three) times daily with meals. Take 2 capsules TID with meals and 1 capsule PRN with snacks (Patient taking differently: Take 2 capsules by mouth 3 (three) times daily with meals. Take 2 capsules by mouth 3x daily with meals and 1 capsule as needed with snacks) 240 capsule 11    losartan (COZAAR) 100 MG tablet Take 1 tablet (100 mg total) by mouth every evening. 90 tablet 3    magnesium 250 mg Tab Take 250 mg by mouth every evening.      multivitamin (THERAGRAN) per tablet Take 1 tablet by mouth every other day.      ondansetron (ZOFRAN-ODT) 4 MG TbDL Take 4 mg by mouth every 12 (twelve) hours as needed (nausea/vomiting).      pravastatin (PRAVACHOL) 40 MG tablet Take 1 tablet (40 mg total) by mouth once daily. (Patient taking differently: Take 40 mg by mouth every evening.) 90 tablet 3    saliva substitute combo no.9 (BIOTENE DRY MOUTH ORAL RINSE) Mwsh Take 5 mLs by mouth once daily. SWISH & SPIT       No current facility-administered medications for this visit.       Review of Systems   Constitutional: Negative for chills, fatigue, fever and unexpected weight change.   Respiratory: Positive for shortness of breath (when lying flat). Negative for cough.    Cardiovascular: Positive for leg swelling. Negative for chest pain and palpitations.   Gastrointestinal: Negative for abdominal pain, constipation, diarrhea, nausea and vomiting.   Skin: Negative for rash.   Neurological: Negative for headaches.   Hematological: Negative for adenopathy. Does not bruise/bleed easily.       ECOG Performance Status: 2   Objective:      Vitals:   Vitals:    03/09/22 1308   BP: 130/70   BP  "Location: Left arm   Patient Position: Sitting   BP Method: Medium (Manual)   Pulse: 100   Temp: 97.2 °F (36.2 °C)   TempSrc: Temporal   SpO2: 98%   Weight: 70.1 kg (154 lb 8.7 oz)   Height: 5' 5" (1.651 m)     Physical Exam  Constitutional:       General: She is not in acute distress.     Appearance: She is well-developed. She is not diaphoretic.   HENT:      Head: Normocephalic and atraumatic.   Cardiovascular:      Rate and Rhythm: Rhythm irregular.      Heart sounds: No murmur heard.    No friction rub. No gallop.   Pulmonary:      Effort: Pulmonary effort is normal. No respiratory distress.      Breath sounds: Normal breath sounds. No wheezing or rales.   Chest:      Chest wall: No tenderness.   Breasts:      Right: No axillary adenopathy or supraclavicular adenopathy.      Left: No axillary adenopathy or supraclavicular adenopathy.       Abdominal:      General: Bowel sounds are normal. There is no distension.      Palpations: Abdomen is soft. There is no mass.      Tenderness: There is no abdominal tenderness. There is no guarding or rebound.   Musculoskeletal:         General: No tenderness. Normal range of motion.      Right lower leg: Edema present.      Left lower leg: Edema present.   Lymphadenopathy:      Cervical: No cervical adenopathy.      Upper Body:      Right upper body: No supraclavicular or axillary adenopathy.      Left upper body: No supraclavicular or axillary adenopathy.   Skin:     Findings: No erythema or rash.   Neurological:      Mental Status: She is alert and oriented to person, place, and time.   Psychiatric:         Behavior: Behavior normal.         Laboratory Data:  No results displayed because visit has over 200 results.            Imaging:     PET/CT 1/04/22    Head/neck:     No significant abnormal hypermetabolic foci are identified in the head and neck region.  No lymphadenopathy is present.     Chest:     No significant abnormal hypermetabolic foci are identified within the " soft tissues of the chest.  No hypermetabolic pulmonary nodules, lymphadenopathy, pleural effusion, or pericardial effusion are present.     Abdomen/pelvis:     There is a markedly hypermetabolic rounded mass at the junction of the body and tail of the pancreas consistent with the history of pancreatic cancer.  On image 134, the mass measures 2.5 x 2.4 cm and has a max SUV of 11.2.  There is dilatation of the distal pancreatic duct beyond of the mass.  Which measures at least 10 mm in greatest diameter.  There is adjacent enlargement and hypermetabolism of the left adrenal gland suspicious for metastatic disease.  The left adrenal gland measures 1.9 x 1.9 cm and has a max SUV of 4.3.  There are innumerable hypermetabolic nodular masses throughout the left and right hepatic lobes consistent with hepatic metastatic disease.  Two of the larger more discrete masses will be measured as index lesions.  A mass in the anterior segment of the right hepatic lobe on image 129 measures 2.5 x 1.9 cm and has a max SUV of 16.2.  A mass in the posterior segment of the right hepatic lobe on image 120 measures 5.2 x 4.4 cm with a max SUV of 14.0.  There is no bile duct dilatation.  The gallbladder appears normal.  No ascites is present.  The right adrenal gland appears normal.  There is a mildly enlarged hypermetabolic portacaval lymph node which on image 143 measures 1.4 x 1.0 cm and has a max SUV of 3.8 which is suspicious for neoplastic adenopathy.  There is hypermetabolism of the umbilicus of uncertain significance.  The umbilicus measures 0.7 cm and has a max SUV of 4.8.  Clinical correlation is advised.     Skeleton:     There is an oval-shaped hypermetabolic focus within the left anterior aspect of the T9 vertebral body highly suspicious for osseous metastatic disease.  The metastatic lesion is not well visualized on the CT portion of the study and is best measured on the PET-CT images.  On image 85, the lesion measures 1.8 x  1.7 cm and has a max SUV of 13.3.  There is also a hypermetabolic focus in the left iliac fossa which on image 178 measures 1.7 x 1.2 cm and has a max SUV of 6.8.  No other significant abnormal hypermetabolic foci are identified within the skeleton.    MRI Brain 1/11/22    INTRACRANIAL: Mild global volume loss.  Scattered T2 FLAIR hyperintense white matter foci are present, likely related to chronic microvascular ischemic change.  Old right mid brain lacunar infarct.  No abnormal enhancement demonstrated.  No definite parenchymal restricted diffusion.  No evidence of intracranial hemorrhage.  No extra-axial fluid collection or mass.  No intracranial mass effect.  No hydrocephalus.  Midline structures have a normal configuration.  Visualized pituitary gland and infundibulum are normal.  Visualized major intracranial vascular structures demonstrate normal flow voids and are normal in course and caliber.     SINUSES: Paranasal sinuses and mastoid air cells are clear.     ORBITS: Bilateral lens replacements.        Assessment:       1. Adenocarcinoma of pancreas    2. Malignant neoplasm of body of pancreas     3. Metastasis to liver    4. 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 Bone metastases    6. Atrial fibrillation, unspecified type    7. 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therapy    8. Thrombocytopenia    9. Normocytic anemia    10. Pancreatic insufficiency    11. Cancer related pain    12. Hypertension, unspecified type    13. Hyperlipidemia, unspecified hyperlipidemia type    14. Chronic diastolic congestive heart failure           Plan:     Metastatic Pancreatic Cancer - The patient was diagnosed with metastatic pancreatic cancer adenocarcinoma with mets to the liver on 12/15/21  -Ca19-9 was 7,581 on 12/15/21  -PET/CT on 1/04/22 shows mets to the liver, left adrenal gland, T9 vertebra and left iliac fossa.  -Potential mets are seen near the umbilicus  -PT underwent POET placement 1/07/22  -MRI brain  on 1/11/22 showed no brain mets  -Pt consented for Gemcitabine and paclitaxel on 1/03/22  -Pt treated with 20% dose reduction in paclitaxel and Gemcitabine to 100mg/mm2 and 800mg/mm2 respectively  -C2D8 and C2D15 cancelled given her hospitalization from 2/12/22-2/14/22  -Patient completed C3D8 and was hospitalized again for a fever  -Treatment moving forward was discussed with the patient and decision was made to remove day 8 of treatment  -Will delay treatment until 03/21/2022 as the patient is having a cardiac ablation on 03/17/2022  -Will repeat PET-CT prior to next appointment  -Will continue [place]/Abraxane at 20% does reduction due to prior thrombocytopenia  -Pt up for My Chart Care Companion    A-fib - pt followed by cardiology and is on bisoprolol and Eliquis  -PT to have Ablation on 3/17/22  -Management per cardiology    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 - pt with increased infection risk on chemo  -Will monitor    Thrombocytopenia - pt with thrombocytopenia with prior treatment  -Platelets are 109k 3/07/22  -Will monitor    Pancreatic Insufficiency - pt on creon  -Will monitor    Cancer Related Pain - improved  -Will monitor    HTN - pt taking losartan, bisoprolol  -Cardiology managing  -Will monitor    HLD - pt on pravastatin  -Management per PCP    CHF Exacerbation - pt with apparent CHF exacerbation on 2/06/22 with evidence of pulmonary edema and elevated BNP  -Pt is taking lasix EOD  -Echo 10/2021 showed diastolic dysfunction  -Pt also recently diagnosed with A-fib  -Pt with increased fluid in her legs and orthopnea  -Pt to schedule an appt with her cardiologist  -Management per cardiology    Advance Care Planning     Power of   I initiated the process of advance care planning today and explained the importance of this process to the patient.  I introduced the concept of advance directives to the patient, as well. Then the patient received detailed information about the importance of designating a  Health Care Power of  (HCPOA). She was also instructed to communicate with this person about their wishes for future healthcare, should she become sick and lose decision-making capacity. The patient has not previously appointed a HCPOA. After our discussion, the patient has decided to complete a HCPOA and will bring the form completed to her next clinic appointment.            Follow Up - PET/CT scheduled next week;CBC, CMP and  on 3/21/22 with an appt with me and treatment that day    Salvador Green MD  [facility]  Hematology and Oncology  [facility]   [address] [place] [address]   O: [phone]  F: [phone]

## 2022-03-09 NOTE — PROGRESS NOTES
SW met with pt and daughter for a wig fitting.  Pt had a difficult time finding a wig that she liked and felt comfortable. Pt does not like anything tight on her head. Some of the choices were too tight. SW provided support through out the process. SW answered questions for pt. Sw provided pt with a [employer] catalog for the pt to see if there is a wig she may like. She will call with questions regarding sizes and cutting wigs. If pt finds one she likes SW can provide her with a $75 voucher. Pt will follow up with SW if she decides to order a wig.      Pt also had questions about DME. She called her insurance who said they will not cover a power recliner. They said would not cover a shower chair either but pt could try to get a letter for doctor and try. SW suggested pt talk with Pt/OT that is coming into her home for recommendations. If letter is needed SW can assist.     Pt provided donated food from [employer] today.     SW to follow.     Christy Martinez, ANA

## 2022-03-09 NOTE — Clinical Note
The patient will not get chemo this week.  She will need a PEt/Ct scheduled next week.  She will need a CBC, CMP and  on 3/21/22 with an appt with me and treatment that day.

## 2022-03-16 NOTE — TELEPHONE ENCOUNTER
I called and spoke with the patient's daughter and informed her that the PET-CT showed improvement of the patient's cancer but did show the presence of a moderate-sized right-sided pleural effusion.  The patient is to undergo cardiac ablation and cardioversion tomorrow with Cardiology.  It was discussed that the fluid could be cardiogenic in nature and that the conversion of her AFib to normal sinus rhythm may improve her cardiac output.  I recommended she discuss these findings with her cardiologist prior to us setting the patient up for a thoracentesis.  I instructed her that we would repeat a chest x-ray prior to her appointment next week.  She expressed understanding.  All questions were answered to her satisfaction.    Salvador Green MD  [facility]  Hematology and Oncology  [facility]   [address]   [place] [address]   O: [phone]  F: [phone]

## 2022-03-16 NOTE — TELEPHONE ENCOUNTER
I called the patient and left her daughter a message to call me back at [phone]     Salvador Green MD  [facility]  Hematology and Oncology  [facility]   [address] [place] [address]   O: [phone]  F: [phone]

## 2022-03-16 NOTE — PROGRESS NOTES
PET Imaging Questionnaire    1. Are you a Diabetic? Recent Blood Sugar level? No    2. Are you anemic? Bone Marrow Stimulation Meds? No    3. Have you had a CT Scan, if so when & where was your last one? Yes -     4. Have you had a PET Scan, if so when & where was your last one? Yes -     5. Chemotherapy or currently on Chemotherapy? Yes    6. Radiation therapy? No    Surgical History:   Past Surgical History:   Procedure Laterality Date    CARPAL TUNNEL RELEASE Bilateral     wrist    CATARACT EXTRACTION, BILATERAL  2017    CORONARY ANGIOGRAPHY  09/01/2020    Procedure: ANGIOGRAM, CORONARY ARTERY;  Surgeon: Ivette Horne MD;  Location: [facility] CATH;  Service: Cardiology;;    DILATION AND CURETTAGE OF UTERUS      ENDOSCOPIC ULTRASOUND OF UPPER GASTROINTESTINAL TRACT Left 12/15/2021    Procedure: ULTRASOUND, UPPER GI TRACT, ENDOSCOPIC;  Surgeon: Lico Tse MD;  Location: [facility] ENDO;  Service: Endoscopy;  Laterality: Left;    ESOPHAGOGASTRODUODENOSCOPY N/A 12/15/2021    Procedure: EGD (ESOPHAGOGASTRODUODENOSCOPY);  Surgeon: Lico Tse MD;  Location: [facility] ENDO;  Service: Endoscopy;  Laterality: N/A;    INSERTION OF TUNNELED CENTRAL VENOUS CATHETER (CVC) WITH SUBCUTANEOUS PORT N/A 01/07/2022    Procedure: [id number];  Surgeon: Cas Acuña MD;  Location: [facility] OR;  Service: General;  Laterality: N/A;    LEFT HEART CATHETERIZATION  09/01/2020    Procedure: Left heart cath;  Surgeon: Ivette Horne MD;  Location: [facility] CATH;  Service: Cardiology;;    MITRAL VALVE REPAIR      OOPHORECTOMY Left 1988    OTHER SURGICAL HISTORY      maze procedure and PHILL ligation    REPAIR OF MENISCUS OF KNEE Left     TONSILLECTOMY      vein removal     7.      8. Have you been fasting for at least 6 hours? Yes    9. Is there any chance you may be pregnant or breastfeeding? No    Assay: 10.52 MCi@:7:50   Injection Site:LT AC    Residual: .93 mCi@: 7:52   Technologist: Jagjit Benton Injected:9.59mCi

## 2022-03-16 NOTE — TELEPHONE ENCOUNTER
Patient had her PET scan done today. Mrs. Cui and her daughter (Mrs. Parry) have questions about the result report that they read. They would like a phone call at [phone] to go over the results prior to her appt scheduled on 3/21/2022.    Please advise and review. Thank you in advance.

## 2022-03-17 NOTE — TELEPHONE ENCOUNTER
Called and spoke with the pt daughter about the xray appt on 03/21 after infusion. Pt daughter verbalized and understanding.  ----- Message from Lissett Bradford RN sent at 3/16/2022  4:52 PM CDT -----    ----- Message -----  From: Salvador Green MD  Sent: 3/16/2022   4:37 PM CDT  To: Norma BARRETO Staff    Please have the patient scheduled for a CXR on 3/21/22 prior to her appt with me.  Please call the daughter with the appt.

## 2022-03-17 NOTE — TELEPHONE ENCOUNTER
Navigation chart review completed. Plan of care reviewed. Per last discussion with Dr. Green, pt to have CXR at next f/u- both scheduled.

## 2022-03-20 NOTE — PROGRESS NOTES
PATIENT: Nel Cui  MRN: [id number]  DATE: 3/21/2022      Diagnosis:   1. Adenocarcinoma of pancreas    2. Malignant neoplasm of body of pancreas     3. Metastasis to liver    4. 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 Bone metastases        Chief Complaint: adenocarcinoma of pancreas      Oncologic History:      Oncologic History 12/09/21 Ct Abdomen  12/15/21 EUS  1/04/22 PET/CT  1/07/22 PORT - Dr Acuña  1/11/22 MRI Brain  3/16/22 PET/CT    Oncologic Treatment 1/12/22 - current Gemcitabine and Paclitaxel s/p 2 cycles    Pathology 12/15/21 adenocarcinoma in the pancreatic body and adenocarcinoma in the liver       The patient initially presented to her PCP on 12/02/21 with complaint of abdominal pain.  She underwent a CT of the abdomen on 12/09/21 showing subcentimeter para-aortic, mesenteric lymph nodes; multiple hepatic infiltrative lesions present throughout the liver with 1 of the larger areas right liver lobe measuring approximately 2.2 cm in diameter; mass in the pancreatic tail measuring 9 x 3.3 cm.  The patient underwent EUS under the care of Dr Tse on 12/15/21 showing a mass in the pancreatic body and multiple liver lesions.  Path from the procedure showed adenocarcinoma in the pancreatic body and adenocarcinoma in the liver.    The patient underwent PET/CT on 1/04/22 showing hypermetabolic rounded mass at the junction of the body and tail of the pancreas measuring 2.5 x 2.4 cm, hypermetabolic left adrenal gland nodule measuring 1.9 cm, innumerable hypermetabolic nodular masses throughout the left and right hepatic lobes with largest lesions in the right liver measuring 2.5 x 1.9 cm and 5.2 x 4.4 cm, mildly enlarged hypermetabolic portacaval lymph node measuring 1.4 cm, 0.7 cm hypermetabolic focus at the umbilicus, lesion in the T9 vertebra measuring 1.8 x 1.7 cm and a lesion in the left iliac fossa measuring 1.7 x 1.2 cm.  MRI of the brain on 01/11/2022 showed no evidence of  metastases but did show an old right midbrain lacunar infarct.   The patient was in the hospital from 1/28/22-1/29/22 with fever and low sodium.  The patient's hydrochlorothiazide was discontinued.  The patient then presented to the hospital on 02/06/2022 for dyspnea on exertion.  CTA was done on 02/06/2022 showing no evidence of pulmonary embolism.  The patient underwent NT proBNP which was elevated at 2630 pg/mL.  The patient was started on Lasix every other day at the request of Cardiology.   The patient was admitted to the hospital from 2/12/22-2/14/22 for fever.  The patient was discharged on Levaquin for 5 days.  The patient saw Cardiology on 02/17/2022 for atrial fibrillation and was started on Eliquis and taken off of aspirin.     The the patient was admitted to the hospital from 2340-5493 for fever.  During her hospitalization procalcitonin was checked and was normal.  All her cultures were negative.  Her fever was felt to be from chemotherapy.  Subjective:     Interval History: Ms. Cui is a 86 y.o. female with Osteopenia, HLD, HTN who presents for pancreatic cancer.  Since the last clinic visit the patient underwent PET-CT on 03/16/2022 showing a followed of mm pulmonary based nodule; decrease metabolic activity in the liver mass with stable right lobe mass measuring 5.2 x 4.2 cm; right lobe mass measuring 2.2 x 1.8 cm; adrenal gland thickening of 1.9 cm; decrease in pancreatic mass measuring 2 cm; decreased metabolic activity of hypermetabolic mesenteric foci and right-sided pleural effusion.  The patient underwent cardioversion on 03/17/2022 at which point constantine was performed.  Patient was put back in normal sinus rhythm.    Currently the patient complains of suspected episodes of low blood sugar.  She states she begins to feel lightheaded and then eats with improvement of symptoms.  The patient denies CP, cough, SOB, abdominal pain, N/V, constipation, diarrhea.  The patient denies fever, chills,  night sweats, weight loss, new lumps or bumps, easy bruising or bleeding.    Past Medical History:   Past Medical History:   Diagnosis Date    A-fib     Anemia     Anticoagulant long-term use     Arthritis     Cataracts, bilateral     CHF (congestive heart failure)     chronic diastolic    Coronary artery disease     Nonobstructive    Essential (primary) hypertension 10/05/2010    Hyperlipidemia     Hyponatremia     Mitral regurgitation     Osteopenia     Pancreatic insufficiency     Primary pancreatic cancer with metastasis to other site     Thrombocytopenia        Past Surgical HIstory:   Past Surgical History:   Procedure Laterality Date    CARPAL TUNNEL RELEASE Bilateral     wrist    CATARACT EXTRACTION, BILATERAL  2017    CORONARY ANGIOGRAPHY  09/01/2020    Procedure: ANGIOGRAM, CORONARY ARTERY;  Surgeon: Ivette Horne MD;  Location: [facility] CATH;  Service: Cardiology;;    DILATION AND CURETTAGE OF UTERUS      ENDOSCOPIC ULTRASOUND OF UPPER GASTROINTESTINAL TRACT Left 12/15/2021    Procedure: ULTRASOUND, UPPER GI TRACT, ENDOSCOPIC;  Surgeon: Lico Tse MD;  Location: [facility] ENDO;  Service: Endoscopy;  Laterality: Left;    ESOPHAGOGASTRODUODENOSCOPY N/A 12/15/2021    Procedure: EGD (ESOPHAGOGASTRODUODENOSCOPY);  Surgeon: Lico Tse MD;  Location: [facility] ENDO;  Service: Endoscopy;  Laterality: N/A;    INSERTION OF TUNNELED CENTRAL VENOUS CATHETER (CVC) WITH SUBCUTANEOUS PORT N/A 01/07/2022    Procedure: [id number];  Surgeon: Cas Acuña MD;  Location: [facility] OR;  Service: General;  Laterality: N/A;    LEFT HEART CATHETERIZATION  09/01/2020    Procedure: Left heart cath;  Surgeon: Ivette Horne MD;  Location: [facility] CATH;  Service: Cardiology;;    MITRAL VALVE REPAIR      OOPHORECTOMY Left 1988    OTHER SURGICAL HISTORY      maze procedure and PHILL ligation    REPAIR OF MENISCUS OF KNEE Left     TONSILLECTOMY      vein removal         Family History:   Family  "History   Problem Relation Age of Onset    No Known Problems Mother     No Known Problems Father     Cancer Maternal Grandmother         Stomach    No Known Problems Maternal Grandfather     No Known Problems Paternal Grandmother     No Known Problems Paternal Grandfather     Breast cancer Sister         over 60    No Known Problems Brother        Social History:  reports that she quit smoking about 33 years ago. Her smoking use included cigarettes. She has a 30.00 pack-year smoking history. She has never used smokeless tobacco. She reports that she does not drink alcohol and does not use drugs.    Allergies:  Review of patient's allergies indicates:   Allergen Reactions    Amoxicillin-pot clavulanate Other (See Comments)     "Spaced out feeling- can't take it longer than 4 or 5 days"  Patient tolerated Zosyn    Breo ellipta [fluticasone furoate-vilanterol] Other (See Comments)     Feels jittery    Corticosteroids (glucocorticoids)      Other reaction(s): tachycardia    Erythromycin      "Spaced out"    Latex, natural rubber      Turns skin red around area where latex touches       Medications:  Current Outpatient Medications   Medication Sig Dispense Refill    acetaminophen (TYLENOL) 650 MG TbSR Take 650 mg by mouth every 6 (six) hours as needed (mild pain < 5 or temp > 100.5).      apixaban (ELIQUIS) 5 mg Tab Take 1 tablet (5 mg total) by mouth 2 (two) times daily. 60 tablet 11    ascorbic acid, vitamin C, (VITAMIN C) 500 MG tablet Take 500 mg by mouth once daily.      bisoprolol (ZEBETA) 5 MG tablet TAKE 1 TABLET(5 MG) BY MOUTH EVERY DAY (Patient taking differently: Take 5 mg by mouth every evening.) 90 tablet 3    cholecalciferol, vitamin D3, (VITAMIN D3) 25 mcg (1,000 unit) capsule Take 1,000 Units by mouth every morning.      coenzyme Q10 100 mg capsule Take 100 mg by mouth once daily.      docusate sodium (COLACE) 100 MG capsule Take 100 mg by mouth 2 (two) times daily.      furosemide " (LASIX) 20 MG tablet Take 20 mg p.o. every other day.  Can utilize an additional 20 mg p.o. on alternating days as needed for a weight gain of > 3 lbs from baseline (144-145 lbs) or swelling (Patient taking differently: Take 20 mg by mouth every other day.  Can utilize an additional 20 mg by mouth on alternating days as needed for a weight gain of > 3 lbs from baseline (144-145 lbs) or swelling) 30 tablet 5    LIDOcaine-prilocaine (EMLA) cream Apply topically as needed (30 to 60 minutes prior to chemo). 30 g 2    lipase-protease-amylase (CREON) 36,000-114,000- 180,000 unit CpDR Take 2 capsules by mouth 3 (three) times daily with meals. Take 2 capsules TID with meals and 1 capsule PRN with snacks (Patient taking differently: Take 2 capsules by mouth 3 (three) times daily with meals. Take 2 capsules by mouth 3x daily with meals and 1 capsule as needed with snacks) 240 capsule 11    losartan (COZAAR) 100 MG tablet Take 1 tablet (100 mg total) by mouth every evening. 90 tablet 3    magnesium 250 mg Tab Take 250 mg by mouth every evening.      multivitamin (THERAGRAN) per tablet Take 1 tablet by mouth every other day.      ondansetron (ZOFRAN-ODT) 4 MG TbDL Take 4 mg by mouth every 12 (twelve) hours as needed (nausea/vomiting).      pravastatin (PRAVACHOL) 40 MG tablet Take 1 tablet (40 mg total) by mouth once daily. (Patient taking differently: Take 40 mg by mouth every evening.) 90 tablet 3    saliva substitute combo no.9 (BIOTENE DRY MOUTH ORAL RINSE) Mwsh Take 5 mLs by mouth once daily. SWISH & SPIT       No current facility-administered medications for this visit.     Facility-Administered Medications Ordered in Other Visits   Medication Dose Route Frequency Provider Last Rate Last Admin    gemcitabine (GEMZAR) 1,400 mg in sodium chloride 0.9% 250 mL chemo infusion  1,400 mg Intravenous 1 time in Clinic/HOD Salvador Green MD        sodium chloride 0.9% flush 10 mL  10 mL Intravenous PRN Salvador Green  "MD           Review of Systems   Constitutional: Negative for chills, fatigue, fever and unexpected weight change.   Respiratory: Negative for cough and shortness of breath.    Cardiovascular: Positive for leg swelling. Negative for chest pain and palpitations.   Gastrointestinal: Negative for abdominal pain, constipation, diarrhea, nausea and vomiting.   Skin: Negative for rash.   Neurological: Positive for light-headedness (on occasion). Negative for headaches.   Hematological: Negative for adenopathy. Does not bruise/bleed easily.       ECOG Performance Status: 2   Objective:      Vitals:   Vitals:    03/21/22 1011   BP: (!) 155/78   BP Location: Left arm   Patient Position: Sitting   Pulse: 93   Resp: 16   Temp: 97.3 °F (36.3 °C)   TempSrc: Temporal   SpO2: 95%   Weight: 70.9 kg (156 lb 4.9 oz)   Height: 5' 5" (1.651 m)     Physical Exam  Constitutional:       General: She is not in acute distress.     Appearance: She is well-developed. She is not diaphoretic.   HENT:      Head: Normocephalic and atraumatic.   Cardiovascular:      Rate and Rhythm: Normal rate and regular rhythm.      Heart sounds: Normal heart sounds. No murmur heard.    No friction rub. No gallop.   Pulmonary:      Effort: Pulmonary effort is normal. No respiratory distress.      Breath sounds: Normal breath sounds. No wheezing or rales.   Chest:      Chest wall: No tenderness.   Breasts:      Right: No axillary adenopathy or supraclavicular adenopathy.      Left: No axillary adenopathy or supraclavicular adenopathy.       Abdominal:      General: Bowel sounds are normal. There is no distension.      Palpations: Abdomen is soft. There is no mass.      Tenderness: There is no abdominal tenderness. There is no guarding or rebound.   Musculoskeletal:         General: No tenderness. Normal range of motion.      Right lower leg: Edema present.      Left lower leg: Edema present.   Lymphadenopathy:      Cervical: No cervical adenopathy.      Upper " Body:      Right upper body: No supraclavicular or axillary adenopathy.      Left upper body: No supraclavicular or axillary adenopathy.   Skin:     Findings: No erythema or rash.   Neurological:      Mental Status: She is alert and oriented to person, place, and time.   Psychiatric:         Behavior: Behavior normal.         Laboratory Data:  Hospital Outpatient Visit on 03/16/2022   Component Date Value Ref Range Status    POC Glucose 03/16/2022 118 (A) 70 - 110 MG/DL Final   Lab Visit on 03/16/2022   Component Date Value Ref Range Status    WBC 03/16/2022 5.44  3.90 - 12.70 K/uL Final    RBC 03/16/2022 3.55 (A) 4.00 - 5.40 M/uL Final    Hemoglobin 03/16/2022 11.0 (A) 12.0 - 16.0 g/dL Final    Hematocrit 03/16/2022 34.4 (A) 37.0 - 48.5 % Final    MCV 03/16/2022 97  82 - 98 fL Final    MCH 03/16/2022 31.0  27.0 - 31.0 pg Final    MCHC 03/16/2022 32.0  32.0 - 36.0 g/dL Final    RDW 03/16/2022 20.3 (A) 11.5 - 14.5 % Final    Platelets 03/16/2022 326  150 - 450 K/uL Final    MPV 03/16/2022 9.1 (A) 9.2 - 12.9 fL Final    Immature Granulocytes 03/16/2022 1.1 (A) 0.0 - 0.5 % Final    Gran # (ANC) 03/16/2022 3.5  1.8 - 7.7 K/uL Final    Immature Grans (Abs) 03/16/2022 0.06 (A) 0.00 - 0.04 K/uL Final    Comment: Mild elevation in immature granulocytes is non specific and   can be seen in a variety of conditions including stress response,   acute inflammation, trauma and pregnancy. Correlation with other   laboratory and clinical findings is essential.      Lymph # 03/16/2022 0.9 (A) 1.0 - 4.8 K/uL Final    Mono # 03/16/2022 0.9  0.3 - 1.0 K/uL Final    Eos # 03/16/2022 0.1  0.0 - 0.5 K/uL Final    Baso # 03/16/2022 0.03  0.00 - 0.20 K/uL Final    nRBC 03/16/2022 0  0 /100 WBC Final    Gran % 03/16/2022 64.1  38.0 - 73.0 % Final    Lymph % 03/16/2022 16.0 (A) 18.0 - 48.0 % Final    Mono % 03/16/2022 17.3 (A) 4.0 - 15.0 % Final    Eosinophil % 03/16/2022 0.9  0.0 - 8.0 % Final    Basophil % 03/16/2022  0.6  0.0 - 1.9 % Final    Differential Method 03/16/2022 Automated   Final    Sodium 03/16/2022 139  136 - 145 mmol/L Final    Potassium 03/16/2022 4.7  3.5 - 5.1 mmol/L Final    Chloride 03/16/2022 97  95 - 110 mmol/L Final    CO2 03/16/2022 29  23 - 29 mmol/L Final    Glucose 03/16/2022 108  70 - 110 mg/dL Final    BUN 03/16/2022 12  8 - 23 mg/dL Final    Creatinine 03/16/2022 0.6  0.5 - 1.4 mg/dL Final    Calcium 03/16/2022 9.7  8.7 - 10.5 mg/dL Final    Total Protein 03/16/2022 6.6  6.0 - 8.4 g/dL Final    Albumin 03/16/2022 3.7  3.5 - 5.2 g/dL Final    Total Bilirubin 03/16/2022 0.7  0.1 - 1.0 mg/dL Final    Comment: For infants and newborns, interpretation of results should be based  on gestational age, weight and in agreement with clinical  observations.    Premature Infant recommended reference ranges:  Up to 24 hours.............<8.0 mg/dL  Up to 48 hours............<12.0 mg/dL  3-5 days..................<15.0 mg/dL  6-29 days.................<15.0 mg/dL      Alkaline Phosphatase 03/16/2022 69  55 - 135 U/L Final    AST 03/16/2022 29  10 - 40 U/L Final    ALT 03/16/2022 26  10 - 44 U/L Final    Anion Gap 03/16/2022 13  8 - 16 mmol/L Final    eGFR if African American 03/16/2022 >60  >60 mL/min/1.73 m^2 Final    eGFR if non African American 03/16/2022 >60  >60 mL/min/1.73 m^2 Final    Comment: Calculation used to obtain the estimated glomerular filtration  rate (eGFR) is the CKD-EPI equation.       CA 19-9 03/16/2022 170.1 (A) 0.0 - 40.0 U/mL Final         Imaging:     PET/CT 3/16/22    Head and neck:     No hypermetabolic activity is noted within the head neck to suggest metastatic disease.     Atherosclerotic carotid calcifications are noted.     Thorax:     A port is noted overlying the left hemithorax with its tip in the superior vena cava.     Extensive mitral valve calcifications or mitral valve replacement are noted.  Extensive coronary calcifications are noted increasing the  patient's coronary risk.     A moderate sized joint effusion is developed since the prior.  Please correlate for etiologies.  This could relate to cardiogenic edema or metastatic fluid.  No obvious hypermetabolic activity is noted.     A pleural based density is noted of 5 mm image 50 sequence 3 similar since the prior.  Atelectasis, scarring, or pulmonary nodule is on the differential.  In a high-risk patient follow-up for long-term size stability would be suggested.     Sternotomy wires are noted     Abdomen and pelvis:     Significant improvement is noted since prior with the liver demonstrating near uniform metabolic activity.  Significantly less hypermetabolic activity is noted than was seen on the prior.  A hypodense region in the right lobe measured on the prior on CT is still thought to be seen image 131 without convincing detrimental change when measured in a similar manner as on the prior at 5.2 x 4.2 cm image 131 sequence 3.  Several other hypodense regions are noted without worrisome detrimental change since the prior.  The region in the right lobe of the liver that measured 2.5 x 1.9 cm on the prior is less easily visualized but can be measured similarly at 2.2 x 1.8 cm image 142 sequence 3     The adrenal thickening seen on the prior appears of 2 cm in short diameter measuring 1.9 cm on the prior unchanged.  In long dimension it was favored to be under measured on the prior but is unchanged when measured in a similar manner.  It is not significantly hypermetabolic on today's exam     The hypermetabolic pancreatic tail mass seen on the prior demonstrates near background activity on today's exam as well and no obvious focal mass can be measured on the CT portion of this exam.  The pancreas in this region measures 2.0 cm in thickness versus is 2.5 cm on the prior.  Decrease in size of the mass is suspected     Several hypermetabolic mesenteric foci were suspected on the prior suggestive of mesenteric  metastasis.  No significant hypermetabolic activity is noted on today's exam.     Musculoskeletal:     Glenohumeral joint space loss is noted on the left greater than right suggestive of degenerative change likely associated with labral tearing.     Central canal stenosis is noted in the lower lumbar spine particularly at the L4-L5 level.  No obvious hypermetabolic osseous changes are noted on today's exam.  This is an improvement since the prior      Assessment:       1. Adenocarcinoma of pancreas    2. Malignant neoplasm of body of pancreas     3. Metastasis to liver    4. 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 Bone metastases           Plan:     Metastatic Pancreatic Cancer - The patient was diagnosed with metastatic pancreatic cancer adenocarcinoma with mets to the liver on 12/15/21  -Ca19-9 was 7,581 on 12/15/21  -PET/CT on 1/04/22 shows mets to the liver, left adrenal gland, T9 vertebra and left iliac fossa.  -Potential mets are seen near the umbilicus  -PT underwent PORT placement 1/07/22  -MRI brain on 1/11/22 showed no brain mets  -Pt consented for Gemcitabine and paclitaxel on 1/03/22  -Pt treated with 20% dose reduction in paclitaxel and Gemcitabine to 100mg/mm2 and 800mg/mm2 respectively  -C2D8 and C2D15 cancelled given her hospitalization from 2/12/22-2/14/22  -Patient completed C3D8 and was hospitalized again for a fever  -Treatment moving forward was discussed with the patient and decision was made to remove day 8 of treatment  -PET/CT on 3/16/22 shows stable disease  -Ca19-9 has dropped dramatically to 170 on 3/16/22  -C3D15 delayed until today given cardiac ablation on 03/17/2022  -Will continue Stoddard/Abraxane at 20% does reduction due to prior thrombocytopenia  -Pt up for My Chart Care [facility]ion    A-fib - pt now in NSR  -PT on Eliquis  -PT s/p Ablation on 3/17/22  -Management per cardiology    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 - pt with increased infection risk on chemo  -Will  monitor    Thrombocytopenia - pt with thrombocytopenia with prior treatment  -Platelets are 326k 3/16/22  -Will monitor    Pancreatic Insufficiency - pt on creon  -Will monitor    Cancer Related Pain - improved  -Will monitor    HTN - pt taking losartan, bisoprolol  -Cardiology managing  -Will monitor    HLD - pt on pravastatin  -Management per PCP    CHF Exacerbation - pt with apparent CHF exacerbation on 2/06/22 with evidence of pulmonary edema and elevated BNP  -Pt is taking lasix EOD  -Echo 10/2021 showed diastolic dysfunction  -Pt with increased fluid in her legs  -Pt on lasix every other day  -Management per cardiology    Pleural Effusion - Pt with a right sided pleural effusion on PET/CT  -Possibly due to cardiac disease  -Will have CXR done with referral to see if effusion persists    Advance Care Planning     Power of   I initiated the process of advance care planning today and explained the importance of this process to the patient.  I introduced the concept of advance directives to the patient, as well. Then the patient received detailed information about the importance of designating a Health Care Power of  (HCPOA). She was also instructed to communicate with this person about their wishes for future healthcare, should she become sick and lose decision-making capacity. The patient has not previously appointed a HCPOA. After our discussion, the patient has decided to complete a HCPOA and will bring the form completed to her next clinic appointment.            Route Chart for Scheduling    Med Onc Chart Routing      Follow up with physician . Clinic appt on 4/04/22   Follow up with IVONNE    Labs CA 19-9, CBC and CMP   Lab interval:  Labs on 4/01/22   Imaging    Pharmacy appointment    Other referrals          Treatment Plan Information   OP PANC NAB-PACLITAXEL + GEMCITABINE Q4W   Salvador Green MD   Upcoming Treatment Dates - OP PANC NAB-PACLITAXEL + GEMCITABINE Q4W    4/4/2022        Chemotherapy       PACLitaxel-protein bound (ABRAXANE) 100 mg/m2 = 180 mg in 36 mL infusion       gemcitabine (GEMZAR) 1,440 mg in sodium chloride 0.9% 250 mL chemo infusion  4/18/2022       Chemotherapy       PACLitaxel-protein bound (ABRAXANE) 100 mg/m2 = 180 mg in 36 mL infusion       gemcitabine (GEMZAR) 1,440 mg in sodium chloride 0.9% 250 mL chemo infusion  5/2/2022       Chemotherapy       PACLitaxel-protein bound (ABRAXANE) 100 mg/m2 = 180 mg in 36 mL infusion       gemcitabine (GEMZAR) 1,440 mg in sodium chloride 0.9% 250 mL chemo infusion  5/16/2022       Chemotherapy       PACLitaxel-protein bound (ABRAXANE) 100 mg/m2 = 180 mg in 36 mL infusion       gemcitabine (GEMZAR) 1,440 mg in sodium chloride 0.9% 250 mL chemo infusion      Salvador Green MD  [facility]  Hematology and Oncology  [facility]   [address] [place] [address]   O: [phone]  F: [phone]

## 2022-03-21 NOTE — PROGRESS NOTES
Oncology   Chemotherapy Infusion Visit    Quick Social Service Status Follow Up   Met w/ pt briefly to follow up on social and emotional needs since initiation of treatment.      SW followed up with pt in private room. Pt reports she has had many things going on medically and she has not had time to review the [employer] wig booklet. SW reassured pt this wig voucher is available at anytime and there is no rush. Encouraged pt to focus on things that are priority and the wig can wait. She also said she wants to wait to sign up for the food pantry for the same reason. SW encouraged pt to let SW know when she is ready to move forward. Pt agreed.   SW to follow.            Christy Martinez, ANA  03/21/2022  12:12 PM

## 2022-03-21 NOTE — PLAN OF CARE
Problem: Adult Inpatient Plan of Care  Goal: Plan of Care Review  Outcome: Ongoing, Progressing  Flowsheets (Taken 3/21/2022 1255)  Plan of Care Reviewed With: patient  Goal: Patient-Specific Goal (Individualized)  Outcome: Ongoing, Progressing  Flowsheets (Taken 3/21/2022 1255)  Anxieties, Fears or Concerns: None  Individualized Care Needs: Recliner, warm blanket, dimmed lights, tv  Patient-Specific Goals (Include Timeframe): Free of S/S of reaction with infusions.     Problem: Fatigue  Goal: Improved Activity Tolerance  Outcome: Ongoing, Progressing  Intervention: Promote Improved Energy  Flowsheets (Taken 3/21/2022 1255)  Fatigue Management: paced activity encouraged  Sleep/Rest Enhancement: regular sleep/rest pattern promoted  Activity Management:   Ambulated -L4   Walk with assistive devise and /or staff member - L3    Patient to Infusion for Abraxane/Gemzar after appt with Dr. Green. Accompanied by dtr. VSS. Tolerated treatment. Provided with copy of upcoming appointment schedule. Escorted to the front lobby for discharge to home.

## 2022-03-21 NOTE — PROGRESS NOTES
PSYCHO-ONCOLOGY INTAKE    Diagnostic Interview - CPT 36821    Date: 3/21/2022  Site: [place], [place]    Evaluation Length (direct face-to-face time):  30 minutes     Referral Source: Salvador Green MD   Oncologist: Salvador Green MD   PCP: Marla Garcia MD    Clinical status of patient: Outpatient    Nel Cui, a 86 y.o. female, seen for initial evaluation visit.    Nel Cui reviewed and agreed to informed consent and the limits of confidentiality.    Chief complaint/reason for encounter: adjustment to illness    Medical/Surgical History:    Patient Active Problem List   Diagnosis    Pure hypercholesterolemia    Essential (primary) hypertension    ACP (advance care planning)    Osteopenia    Nutritional deficiency    Nerve disease, peripheral    Vitamin D deficiency    AR (allergic rhinitis)    Atrial fibrillation    Chronic diastolic congestive heart failure    Stenosis of right carotid artery    Hyperglycemia    Coronary artery disease, moderate 50-60% LAD 2020    Hypophosphatemia    S/P MVR (mitral valve repair) 07/2016 for severe MR, Dr. Armando    S/P Maze operation for atrial fibrillation + PHILL resection Dr. Armando 07/2016    Fall    Primary pancreatic cancer with metastasis to other site    Metastasis to liver    Hyponatremia    Fever and chills    Venous stasis dermatitis    Thrombocytopenia    Fever    Other constipation       Health Behaviors:       Tobacco Use: No. Previous smoker (quit 1988)   Illicit Drug Use:  No     Prescription Misuse:No   Caffeine: minimal   Exercise:The patient engages in environmental activity only. The patient engaged in regular activity prior to illness   Advanced directives:Yes      Past Psychiatric History:   Inpatient treatment: No        Psychotropic Medications:  Current: none       Current medications as per below, allergies reviewed in chart.    Current Outpatient Medications   Medication    acetaminophen (TYLENOL) 650  MG TbSR    apixaban (ELIQUIS) 5 mg Tab    ascorbic acid, vitamin C, (VITAMIN C) 500 MG tablet    bisoprolol (ZEBETA) 5 MG tablet    cholecalciferol, vitamin D3, (VITAMIN D3) 25 mcg (1,000 unit) capsule    coenzyme Q10 100 mg capsule    docusate sodium (COLACE) 100 MG capsule    furosemide (LASIX) 20 MG tablet    LIDOcaine-prilocaine (EMLA) cream    lipase-protease-amylase (CREON) 36,000-114,000- 180,000 unit CpDR    losartan (COZAAR) 100 MG tablet    magnesium 250 mg Tab    multivitamin (THERAGRAN) per tablet    ondansetron (ZOFRAN-ODT) 4 MG TbDL    pravastatin (PRAVACHOL) 40 MG tablet    saliva substitute combo no.9 (BIOTENE DRY MOUTH ORAL RINSE) Mwsh     No current facility-administered medications for this visit.     Facility-Administered Medications Ordered in Other Visits   Medication Frequency    gemcitabine (GEMZAR) 1,400 mg in sodium chloride 0.9% 250 mL chemo infusion 1 time in Clinic/HOD    PACLitaxel-protein bound (ABRAXANE) 100 mg/m2 = 180 mg in 36 mL infusion 1 time in Clinic/HOD    sodium chloride 0.9% flush 10 mL PRN              Social situation/Stressors: Nel Cui lives alone in [place], [place].  She retired at age 77 and previously was employed as a transcription technician at [employer].    Nel Cui is single and has 2 daughters (Brinda and Miracle).   The patient reports good social support from her daughters and community.  Nel Cui is an active member of the [employer].  Nel Cui's hobbies include shopping, spending time outside her apartment, and going out to eat.  Additional stressors: COVID-19, reduced social interactions, fatigue secondary to tx    Strengths:Housing stability, Values and traditions, Motivation, readiness for change, Setting and pursuing goals, hopes, dreams, aspirations, Vocational interests, hobbies and/or talents and Interpersonal relationships and supports available - family, relatives,  friends  Liabilities: Complicated medical illness    Current Evaluation:     Mental Status Exam: Nel Cui met for assessment during her scheduled chemotherapy.  The patient was fully cooperative throughout the interview and was an adequate historian   Appearance: age appropriate, casually  dressed, well groomed  Behavior/Cooperation: friendly and cooperative  Speech: normal in rate, volume, and tone and appropriate quality, quantity and organization of sentences  Mood: steady  Affect: mood congruent and appropriate  Thought Process: goal-directed, logical  Thought Content: normal, no suicidality, no homicidality, delusions, or paranoia;did not appear to be responding to internal stimuli during the interview.   Orientation: grossly intact  Memory: grossly intact  Attention Span/Concentration: Attends to interview without distraction; reports no difficulty  Fund of Knowledge: average  Estimate of Intelligence: average from verbal skills and history  Cognition: grossly intact  Insight: patient has awareness of illness; good insight into own behavior and behavior of others  Judgment: the patient's behavior is adequate to circumstances      History of present illness:    Oncology History   Primary pancreatic cancer with metastasis to other site   12/22/2021 Initial Diagnosis    Adenocarcinoma of pancreas     12/22/2021 Cancer Staged    Staging form: Exocrine Pancreas, AJCC 8th Edition  - Clinical: Stage IV (cT3, cN0, pM1)     1/12/2022 -  Chemotherapy    Treatment Summary   Plan Name: OP PANC NAB-PACLITAXEL + GEMCITABINE Q4W  Treatment Goal: Palliative  Status: Active  Start Date: 1/12/2022  End Date: 11/28/2022 (Planned)  Provider: Salvador Green MD  Chemotherapy: gemcitabine (GEMZAR) 1,375 mg in sodium chloride 0.9% 250 mL chemo infusion, 800 mg/m2 = 1,375 mg (100 % of original dose 800 mg/m2), Intravenous, Clinic/HOD 1 time, 3 of 12 cycles  Dose modification: 800 mg/m2 (original dose 800 mg/m2, Cycle  1)  Administration: 1,375 mg (1/12/2022), 1,385 mg (1/19/2022), 1,385 mg (1/26/2022), 1,400 mg (2/9/2022), 1,400 mg (2/23/2022), 1,400 mg (3/2/2022)           Nel Cui has adjusted to illness with slight difficulty primarily through passive coping strategies, focus on family and prayer. We discussed the importance of active coping and the potential for associated gains in mood, a topic which she seemed very interested in as it spoke to her greater independence.  She has engaged in appropriate information gathering.  The patient has good family/friend support.  Her support system is coping adequately with the diagnosis/treatment and is not interested in explicit description of prognostic details. Illness-related psychosocial stressors include difficulty meeting family responsibilities, changes in ability to engage in leisure activities and absence from home.  The patient has a excellent partnership with her [facility] treatment team. The patient reports the following barriers to cancer care:none.   Symptoms:   · Mood: fatigue and tearfulness;  no prior; no SI/HI  · Anxiety: Restlessness and Fear of unknown; no prior  · Substance abuse: denied  · Cognitive functioning: denied  · Health behaviors: reduced attempts at active coping since tx onset      Assessment - Diagnosis - Goals:       ICD-10-CM ICD-9-CM   1. Adjustment disorder with mixed anxiety and depressed mood  F43.23 309.28   2. Adenocarcinoma of pancreas  C25.9 157.9   3. Malignant neoplasm of tail of pancreas   C25.2 157.2         Plan:individual psychotherapy    Summary and Recommendations  Nel Cui is a 86 y.o. female referred by Salvador Green MD for psychological evaluation and treatment.  Ms. Cui appears to be having slight difficulty coping with her diagnosis and proposed treatment course.  She is interested in psychotherapeutic support and CBT to address adjustment to illness and will follow up with me for  that purpose. Mood protective strategies during cancer treatment were discussed and the availability to caregiver support services made known.   She was encouraged to continue with pleasant events scheduling and to increase exercise, as medically advised. She was further encouraged to adapt prior interests to current abilities in an effort to actively cope w/ illness/tx.    Return to clinic: 2 weeks    GOALS:   Increase exercise  Pleasant events scheduling   Adapt prior interests to present abilities               Chris Mandujano Psy.D.  Clinical Psychologist  [place] License #[id number]  [place] License #[id number]

## 2022-03-21 NOTE — PROGRESS NOTES
ONCOLOGY NUTRITION   FOLLOW UP VISIT        Nel Cui is a 86 y.o. female.  DATE: 03/21/2022        Oncology Diagnosis: Pancreatic Cancer with Metastasis to the liver     REFERRAL FROM:   [] Integrative Oncology   [] Med/Heme Oncology  [] Radiation Oncology  [] Surgical Oncology   [] Infusion Nurse    [x] Routine Nutrition follow up    TREATMENT PLAN:   [] Full treatment plan pending  [x] Chemotherapy  [] Immunotherapy  [] Radiation  [] Concurrent  [] Surgery  [] Treatment complete/post-treatment    ANTHROPOMETRICS:  Wt Readings from Last 10 Encounters:   03/21/22 70.9 kg (156 lb 4.9 oz)   03/21/22 70.9 kg (156 lb 4.9 oz)   03/18/22 69.4 kg (153 lb)   03/16/22 69.4 kg (153 lb)   03/09/22 70.1 kg (154 lb 8.7 oz)   03/08/22 65.8 kg (145 lb)   03/06/22 68.6 kg (151 lb 3.8 oz)   03/02/22 66.5 kg (146 lb 9.7 oz)   02/23/22 65.7 kg (144 lb 13.5 oz)   02/23/22 65.8 kg (145 lb)      Weight Changes: has increased 3 pounds over last 3 days    PHYSICAL EXAM:  Muscle Wasting Observed:  [] No Deficit   [x] Mild Deficit   [] Moderate   [] Severe    INTAKE:  [x] PO Intake [] TF Intake  Current Diet: regular diet  Dietary Patterns:  Eating meals/snacks as tolerated  [x] Oral nutritional supplements: Premier Protein drinks, 1-2x/day    SYMPTOMS/COMPLAINTS:   [x] No nutritional concerns at current  [] Diarrhea                    [] Constipation           [] Nausea                 [] Vomiting                [] Indigestion                [] Reflux              [] Poor Appetite            [] Anorexia                 [] Early Satiety         [] Gas                       [] Bloating                     [] Dry Mouth    [] Mucositis                   [] Mouth Sores           [] Poor Dentition      [] Difficulty chewing  [] Difficulty Swallowing   [] Pain with swallowing [] Change in taste      [] Change in smell   [] Pain (general)       [] Fatigue                      [] Sleep issues    [] Weight loss  [] other, please  specify    Nutrition Re-Assessment Risk: Mild    [x] Labs reviewed   [x] Meds reviewed    Education Provided:  [] No Education Needed at this time  [] Diarrhea                                              [] Constipation                          [] Nausea/Vomiting  [] Mucositis                [] Dry Mouth    [] Dealing with changes in Taste/Smell  [] Dealing with Poor Appetite   [] Soft/moist Diet      [] Weight Loss/Gain     [] Weight Maintenance                           [] Indigestion/GERD                 [] Gas/Bloating          [] Foods High/ Low in specific nutrients [] Increasing Calories/Protein   [] Milkshake/Smoothies Recipes   [] Nutrition Supplements                        [] Increasing Fluid Intakes         [] Foods that fight cancer    [] Evidence bases resources                 [] Fermented Foods/Probiotics  [] Mediterranean/Plant Based Diet     [x] Other, specify: Patient with questions regarding how frequency to eat                                 [] Handouts provided      [] Samples provided     RD NOTE:  RD met with patient at chairside during infusion treatment. Pt states she has been doing better nutritionally and states that she has figured out how to feel her best when it comes to food. RD and patient went over pt's dietary recall of what she ate yesterday- patient eating small meals every 1-3 hours. This helps prevent nausea and keeps her feeling stronger. Pt taking Creon as recommended.     RD Goals:   [x] Weight stable                  [] Weight gain                      [] Weight Loss                               [x] Continue adequate Kcal/protein   [] Increase Kcal/protein      [] Adjust Tube-feeding Rx   [] Tolerate Tube Feedings             [] Increase tube feedings to goal     [] Tolerate Supplements     [] Symptom Improvement   [] Understand nutrition Education  [] Offer supportive visits   [] other, please specify    RECOMMENDATIONS:  1. Continue current fluid intake to maintain  proper hydration  2. Continue current calorie/protein intake to maintain weight.     Follow up: Next infusion or PRN throughout tx    Debra Juárez MS, RD, LDN  03/21/2022  3:02 PM

## 2022-03-22 NOTE — TELEPHONE ENCOUNTER
Spoke with the daughter about the pt xray appt on 04/01. Pt daughter verbalized and understanding.  ----- Message from Lissett Bradford RN sent at 3/22/2022  1:48 PM CDT -----    ----- Message -----  From: Salvador Green MD  Sent: 3/22/2022   1:09 PM CDT  To: Norma BARRETO Staff    Please contact the patient's daughter and have the patient set up for a CXR on 4/01/22.

## 2022-03-22 NOTE — TELEPHONE ENCOUNTER
I called and spoke with the daughter and informed her taht the CXR showed the patient's right pleural effusion to be smaller.  I informed her that the patient could continue her lasix and that we would repeat a CXR on 4/01/22 prior to her next appt.  She expressed understanding.  All questions were answered to her satisfaction.    Salvador Green MD  [facility]  Hematology and Oncology  [facility]   [address] [facility]   [place] [id number]   O: [phone]  F: [phone]

## 2022-03-23 NOTE — PROGRESS NOTES
PATIENT: Nel Cui  MRN: [id number]  DATE: 3/23/2022      Diagnosis:   1. Fever, unspecified fever cause    2. Adenocarcinoma of pancreas    3. Metastasis to liver        Chief Complaint: Fever and Edema (Bilateral lower leg edema; no improvement )      Oncologic History:      Oncologic History 12/09/21 Ct Abdomen  12/15/21 EUS  1/04/22 PET/CT  1/07/22 PORT - Dr Acuña  1/11/22 MRI Brain  3/16/22 PET/CT    Oncologic Treatment 1/12/22 - current Gemcitabine and Paclitaxel s/p 3 cycles    Pathology 12/15/21 adenocarcinoma in the pancreatic body and adenocarcinoma in the liver       The patient initially presented to her PCP on 12/02/21 with complaint of abdominal pain.  She underwent a CT of the abdomen on 12/09/21 showing subcentimeter para-aortic, mesenteric lymph nodes; multiple hepatic infiltrative lesions present throughout the liver with 1 of the larger areas right liver lobe measuring approximately 2.2 cm in diameter; mass in the pancreatic tail measuring 9 x 3.3 cm.  The patient underwent EUS under the care of Dr Tse on 12/15/21 showing a mass in the pancreatic body and multiple liver lesions.  Path from the procedure showed adenocarcinoma in the pancreatic body and adenocarcinoma in the liver.    The patient underwent PET/CT on 1/04/22 showing hypermetabolic rounded mass at the junction of the body and tail of the pancreas measuring 2.5 x 2.4 cm, hypermetabolic left adrenal gland nodule measuring 1.9 cm, innumerable hypermetabolic nodular masses throughout the left and right hepatic lobes with largest lesions in the right liver measuring 2.5 x 1.9 cm and 5.2 x 4.4 cm, mildly enlarged hypermetabolic portacaval lymph node measuring 1.4 cm, 0.7 cm hypermetabolic focus at the umbilicus, lesion in the T9 vertebra measuring 1.8 x 1.7 cm and a lesion in the left iliac fossa measuring 1.7 x 1.2 cm.  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   The patient was in the hospital from 1/28/22-1/29/22 with fever and low sodium.  The patient's hydrochlorothiazide was discontinued.  The patient then presented to the hospital on 02/06/2022 for dyspnea on exertion.  CTA was done on 02/06/2022 showing no evidence of pulmonary embolism.  The patient underwent NT proBNP which was elevated at 2630 pg/mL.  The patient was started on Lasix every other day at the request of Cardiology.   The patient was admitted to the hospital from 2/12/22-2/14/22 for fever.  The patient was discharged on Levaquin for 5 days.  The patient saw Cardiology on 02/17/2022 for atrial fibrillation and was started on Eliquis and taken off of aspirin.     The the patient was admitted to the hospital from [id number] for fever.  During her hospitalization procalcitonin was checked and was normal.  All her cultures were negative.  Her fever was felt to be from chemotherapy.    The patient underwent PET-CT on 03/16/2022 showing a followed of mm pulmonary based nodule; decrease metabolic activity in the liver mass with stable right lobe mass measuring 5.2 x 4.2 cm; right lobe mass measuring 2.2 x 1.8 cm; adrenal gland thickening of 1.9 cm; decrease in pancreatic mass measuring 2 cm; decreased metabolic activity of hypermetabolic mesenteric foci and right-sided pleural effusion.  The patient underwent cardioversion on 03/17/2022 at which point constantine was performed.  Patient was put back in normal sinus rhythm.    Subjective:     Interval History: Ms. Cui is a 86 y.o. female with Osteopenia, HLD, HTN who presents for pancreatic cancer.  Since the last clinic visit the patient developed a fever today of over 101 and started to feel extremely run down.  She called her daughter to make her a sandwich as she was too weak do do so.  She denies dysuria, CP, cough, diarrhea, abdominal pain.  She is SOB with exertion.    Past Medical History:   Past Medical History:   Diagnosis Date    A-fib      Anemia     Anticoagulant long-term use     Arthritis     Cataracts, bilateral     CHF (congestive heart failure)     chronic diastolic    Coronary artery disease     Nonobstructive    Essential (primary) hypertension 10/05/2010    Hyperlipidemia     Hyponatremia     Mitral regurgitation     Osteopenia     Pancreatic insufficiency     Primary pancreatic cancer with metastasis to other site     Thrombocytopenia        Past Surgical HIstory:   Past Surgical History:   Procedure Laterality Date    CARPAL TUNNEL RELEASE Bilateral     wrist    CATARACT EXTRACTION, BILATERAL  2017    CORONARY ANGIOGRAPHY  09/01/2020    Procedure: ANGIOGRAM, CORONARY ARTERY;  Surgeon: Ivette Horne MD;  Location: [facility] CATH;  Service: Cardiology;;    DILATION AND CURETTAGE OF UTERUS      ENDOSCOPIC ULTRASOUND OF UPPER GASTROINTESTINAL TRACT Left 12/15/2021    Procedure: ULTRASOUND, UPPER GI TRACT, ENDOSCOPIC;  Surgeon: Lico Tse MD;  Location: [facility] ENDO;  Service: Endoscopy;  Laterality: Left;    ESOPHAGOGASTRODUODENOSCOPY N/A 12/15/2021    Procedure: EGD (ESOPHAGOGASTRODUODENOSCOPY);  Surgeon: Lico Tse MD;  Location: [facility] ENDO;  Service: Endoscopy;  Laterality: N/A;    INSERTION OF TUNNELED CENTRAL VENOUS CATHETER (CVC) WITH SUBCUTANEOUS PORT N/A 01/07/2022    Procedure: HEIRSDHTA-SEPN-A-CATH;  Surgeon: Cas Acuña MD;  Location: [facility] OR;  Service: General;  Laterality: N/A;    LEFT HEART CATHETERIZATION  09/01/2020    Procedure: Left heart cath;  Surgeon: Ivette Horne MD;  Location: [facility] CATH;  Service: Cardiology;;    MITRAL VALVE REPAIR      OOPHORECTOMY Left 1988    OTHER SURGICAL HISTORY      maze procedure and PHILL ligation    REPAIR OF MENISCUS OF KNEE Left     TONSILLECTOMY      vein removal         Family History:   Family History   Problem Relation Age of Onset    No Known Problems Mother     No Known Problems Father     Cancer Maternal Grandmother         Stomach    No Known  "Problems Maternal Grandfather     No Known Problems Paternal Grandmother     No Known Problems Paternal Grandfather     Breast cancer Sister         over 60    No Known Problems Brother        Social History:  reports that she quit smoking about 33 years ago. Her smoking use included cigarettes. She has a 30.00 pack-year smoking history. She has never used smokeless tobacco. She reports that she does not drink alcohol and does not use drugs.    Allergies:  Review of patient's allergies indicates:   Allergen Reactions    Amoxicillin-pot clavulanate Other (See Comments)     "Spaced out feeling- can't take it longer than 4 or 5 days"  Patient tolerated Zosyn    Breo ellipta [fluticasone furoate-vilanterol] Other (See Comments)     Feels jittery    Corticosteroids (glucocorticoids)      Other reaction(s): tachycardia    Erythromycin      "Spaced out"    Latex, natural rubber      Turns skin red around area where latex touches       Medications:  Current Outpatient Medications   Medication Sig Dispense Refill    acetaminophen (TYLENOL) 650 MG TbSR Take 650 mg by mouth every 6 (six) hours as needed (mild pain < 5 or temp > 100.5).      apixaban (ELIQUIS) 5 mg Tab Take 1 tablet (5 mg total) by mouth 2 (two) times daily. 60 tablet 11    ascorbic acid, vitamin C, (VITAMIN C) 500 MG tablet Take 500 mg by mouth once daily.      bisoprolol (ZEBETA) 5 MG tablet TAKE 1 TABLET(5 MG) BY MOUTH EVERY DAY (Patient taking differently: Take 5 mg by mouth every evening.) 90 tablet 3    cholecalciferol, vitamin D3, (VITAMIN D3) 25 mcg (1,000 unit) capsule Take 1,000 Units by mouth every morning.      coenzyme Q10 100 mg capsule Take 100 mg by mouth once daily.      docusate sodium (COLACE) 100 MG capsule Take 100 mg by mouth 2 (two) times daily.      furosemide (LASIX) 20 MG tablet Take 20 mg p.o. every other day.  Can utilize an additional 20 mg p.o. on alternating days as needed for a weight gain of > 3 lbs from " baseline (144-145 lbs) or swelling (Patient taking differently: Take 20 mg by mouth every other day.  Can utilize an additional 20 mg by mouth on alternating days as needed for a weight gain of > 3 lbs from baseline (144-145 lbs) or swelling) 30 tablet 5    LIDOcaine-prilocaine (EMLA) cream Apply topically as needed (30 to 60 minutes prior to chemo). 30 g 2    lipase-protease-amylase (CREON) 36,000-114,000- 180,000 unit CpDR Take 2 capsules by mouth 3 (three) times daily with meals. Take 2 capsules TID with meals and 1 capsule PRN with snacks (Patient taking differently: Take 2 capsules by mouth 3 (three) times daily with meals. Take 2 capsules by mouth 3x daily with meals and 1 capsule as needed with snacks) 240 capsule 11    losartan (COZAAR) 100 MG tablet Take 1 tablet (100 mg total) by mouth every evening. 90 tablet 3    magnesium 250 mg Tab Take 250 mg by mouth every evening.      multivitamin (THERAGRAN) per tablet Take 1 tablet by mouth every other day.      ondansetron (ZOFRAN-ODT) 4 MG TbDL Take 4 mg by mouth every 12 (twelve) hours as needed (nausea/vomiting).      pravastatin (PRAVACHOL) 40 MG tablet Take 1 tablet (40 mg total) by mouth once daily. (Patient taking differently: Take 40 mg by mouth every evening.) 90 tablet 3    saliva substitute combo no.9 (BIOTENE DRY MOUTH ORAL RINSE) Mwsh Take 5 mLs by mouth once daily. SWISH & SPIT       No current facility-administered medications for this visit.       Review of Systems   Constitutional: Positive for chills and fever.   Respiratory: Positive for shortness of breath (with exertion). Negative for cough.    Cardiovascular: Negative for chest pain and palpitations.   Gastrointestinal: Negative for abdominal pain, constipation, diarrhea, nausea and vomiting.   Genitourinary: Negative for difficulty urinating and dysuria.   Skin: Negative for rash.   Neurological: Negative for headaches.       ECOG Performance Status: 2   Objective:      Vitals:  "  Vitals:    03/23/22 1424   BP: (!) 148/90   BP Location: Left arm   Patient Position: Sitting   BP Method: Medium (Manual)   Pulse: 109   Resp: 12   Temp: (!) 100.6 °F (38.1 °C)   TempSrc: Oral   SpO2: 96%   Weight: 73.3 kg (161 lb 9.6 oz)   Height: 5' 5" (1.651 m)     Physical Exam  Constitutional:       General: She is not in acute distress.     Appearance: She is well-developed. She is not diaphoretic.   HENT:      Head: Normocephalic and atraumatic.   Cardiovascular:      Rate and Rhythm: Normal rate and regular rhythm.      Heart sounds: Normal heart sounds. No murmur heard.    No friction rub. No gallop.   Pulmonary:      Effort: Pulmonary effort is normal. No respiratory distress.      Breath sounds: Normal breath sounds. No wheezing or rales.   Chest:      Chest wall: No tenderness.   Breasts:      Right: No axillary adenopathy or supraclavicular adenopathy.      Left: No axillary adenopathy or supraclavicular adenopathy.       Abdominal:      General: Bowel sounds are normal. There is no distension.      Palpations: Abdomen is soft. There is no mass.      Tenderness: There is no abdominal tenderness. There is no guarding or rebound.   Musculoskeletal:         General: No tenderness. Normal range of motion.      Right lower leg: Edema present.      Left lower leg: Edema present.   Lymphadenopathy:      Cervical: No cervical adenopathy.      Upper Body:      Right upper body: No supraclavicular or axillary adenopathy.      Left upper body: No supraclavicular or axillary adenopathy.   Skin:     Findings: No erythema or rash.   Neurological:      Mental Status: She is alert and oriented to person, place, and time.   Psychiatric:         Behavior: Behavior normal.         Laboratory Data:  Office Visit on 03/23/2022   Component Date Value Ref Range Status    Specimen UA 03/23/2022 Urine, Clean Catch   Final    Color, UA 03/23/2022 Yellow  Yellow, Straw, Sherry Final    Appearance, UA 03/23/2022 Clear  Clear " Final    pH, UA 03/23/2022 6.0  5.0 - 8.0 Final    Specific Gravity, UA 03/23/2022 1.020  1.005 - 1.030 Final    Protein, UA 03/23/2022 Trace (A) Negative Final    Comment: Recommend a 24 hour urine protein or a urine   protein/creatinine ratio if globulin induced proteinuria is  clinically suspected.      Glucose, UA 03/23/2022 Negative  Negative Final    Ketones, UA 03/23/2022 Negative  Negative Final    Bilirubin (UA) 03/23/2022 Negative  Negative Final    Occult Blood UA 03/23/2022 Trace (A) Negative Final    Nitrite, UA 03/23/2022 Negative  Negative Final    Leukocytes, UA 03/23/2022 Negative  Negative Final   Lab Visit on 03/23/2022   Component Date Value Ref Range Status    WBC 03/23/2022 6.16  3.90 - 12.70 K/uL Final    RBC 03/23/2022 3.52 (A) 4.00 - 5.40 M/uL Final    Hemoglobin 03/23/2022 10.7 (A) 12.0 - 16.0 g/dL Final    Hematocrit 03/23/2022 33.2 (A) 37.0 - 48.5 % Final    MCV 03/23/2022 94  82 - 98 fL Final    MCH 03/23/2022 30.4  27.0 - 31.0 pg Final    MCHC 03/23/2022 32.2  32.0 - 36.0 g/dL Final    RDW 03/23/2022 19.4 (A) 11.5 - 14.5 % Final    Platelets 03/23/2022 290  150 - 450 K/uL Final    MPV 03/23/2022 8.8 (A) 9.2 - 12.9 fL Final    Immature Granulocytes 03/23/2022 0.5  0.0 - 0.5 % Final    Gran # (ANC) 03/23/2022 5.8  1.8 - 7.7 K/uL Final    Immature Grans (Abs) 03/23/2022 0.03  0.00 - 0.04 K/uL Final    Comment: Mild elevation in immature granulocytes is non specific and   can be seen in a variety of conditions including stress response,   acute inflammation, trauma and pregnancy. Correlation with other   laboratory and clinical findings is essential.      Lymph # 03/23/2022 0.2 (A) 1.0 - 4.8 K/uL Final    Mono # 03/23/2022 0.1 (A) 0.3 - 1.0 K/uL Final    Eos # 03/23/2022 0.0  0.0 - 0.5 K/uL Final    Baso # 03/23/2022 0.01  0.00 - 0.20 K/uL Final    nRBC 03/23/2022 0  0 /100 WBC Final    Gran % 03/23/2022 93.9 (A) 38.0 - 73.0 % Final    Lymph % 03/23/2022 3.1 (A)  18.0 - 48.0 % Final    Mono % 03/23/2022 2.3 (A) 4.0 - 15.0 % Final    Eosinophil % 03/23/2022 0.0  0.0 - 8.0 % Final    Basophil % 03/23/2022 0.2  0.0 - 1.9 % Final    Differential Method 03/23/2022 Automated   Final    Sodium 03/23/2022 130 (A) 136 - 145 mmol/L Final    Potassium 03/23/2022 4.2  3.5 - 5.1 mmol/L Final    Chloride 03/23/2022 92 (A) 95 - 110 mmol/L Final    CO2 03/23/2022 26  23 - 29 mmol/L Final    Glucose 03/23/2022 119 (A) 70 - 110 mg/dL Final    BUN 03/23/2022 18  8 - 23 mg/dL Final    Creatinine 03/23/2022 0.6  0.5 - 1.4 mg/dL Final    Calcium 03/23/2022 9.0  8.7 - 10.5 mg/dL Final    Total Protein 03/23/2022 6.4  6.0 - 8.4 g/dL Final    Albumin 03/23/2022 3.5  3.5 - 5.2 g/dL Final    Total Bilirubin 03/23/2022 1.3 (A) 0.1 - 1.0 mg/dL Final    Comment: For infants and newborns, interpretation of results should be based  on gestational age, weight and in agreement with clinical  observations.    Premature Infant recommended reference ranges:  Up to 24 hours.............<8.0 mg/dL  Up to 48 hours............<12.0 mg/dL  3-5 days..................<15.0 mg/dL  6-29 days.................<15.0 mg/dL      Alkaline Phosphatase 03/23/2022 57  55 - 135 U/L Final    AST 03/23/2022 35  10 - 40 U/L Final    ALT 03/23/2022 21  10 - 44 U/L Final    Anion Gap 03/23/2022 12  8 - 16 mmol/L Final    eGFR if African American 03/23/2022 >60  >60 mL/min/1.73 m^2 Final    eGFR if non African American 03/23/2022 >60  >60 mL/min/1.73 m^2 Final    Comment: Calculation used to obtain the estimated glomerular filtration  rate (eGFR) is the CKD-EPI equation.            Imaging:     PET/CT 3/16/22    Head and neck:     No hypermetabolic activity is noted within the head neck to suggest metastatic disease.     Atherosclerotic carotid calcifications are noted.     Thorax:     A port is noted overlying the left hemithorax with its tip in the superior vena cava.     Extensive mitral valve calcifications or  mitral valve replacement are noted.  Extensive coronary calcifications are noted increasing the patient's coronary risk.     A moderate sized joint effusion is developed since the prior.  Please correlate for etiologies.  This could relate to cardiogenic edema or metastatic fluid.  No obvious hypermetabolic activity is noted.     A pleural based density is noted of 5 mm image 50 sequence 3 similar since the prior.  Atelectasis, scarring, or pulmonary nodule is on the differential.  In a high-risk patient follow-up for long-term size stability would be suggested.     Sternotomy wires are noted     Abdomen and pelvis:     Significant improvement is noted since prior with the liver demonstrating near uniform metabolic activity.  Significantly less hypermetabolic activity is noted than was seen on the prior.  A hypodense region in the right lobe measured on the prior on CT is still thought to be seen image 131 without convincing detrimental change when measured in a similar manner as on the prior at 5.2 x 4.2 cm image 131 sequence 3.  Several other hypodense regions are noted without worrisome detrimental change since the prior.  The region in the right lobe of the liver that measured 2.5 x 1.9 cm on the prior is less easily visualized but can be measured similarly at 2.2 x 1.8 cm image 142 sequence 3     The adrenal thickening seen on the prior appears of 2 cm in short diameter measuring 1.9 cm on the prior unchanged.  In long dimension it was favored to be under measured on the prior but is unchanged when measured in a similar manner.  It is not significantly hypermetabolic on today's exam     The hypermetabolic pancreatic tail mass seen on the prior demonstrates near background activity on today's exam as well and no obvious focal mass can be measured on the CT portion of this exam.  The pancreas in this region measures 2.0 cm in thickness versus is 2.5 cm on the prior.  Decrease in size of the mass is suspected      Several hypermetabolic mesenteric foci were suspected on the prior suggestive of mesenteric metastasis.  No significant hypermetabolic activity is noted on today's exam.     Musculoskeletal:     Glenohumeral joint space loss is noted on the left greater than right suggestive of degenerative change likely associated with labral tearing.     Central canal stenosis is noted in the lower lumbar spine particularly at the L4-L5 level.  No obvious hypermetabolic osseous changes are noted on today's exam.  This is an improvement since the prior      Assessment:       1. Fever, unspecified fever cause    2. Adenocarcinoma of pancreas    3. Metastasis to liver           Plan:     Fever - Pt with a fever with normal WBC and no clear source  -May be drug related fever  -Pt instructed to take tylenol  -Will check UA and a CXR  -Will hold off on antibiotics until a clear source of infection identified  -Pt instructed to go to the ER if her symptoms worsen    Metastatic Pancreatic Cancer - Pt has metastatic pancreatic cancer on [place] Abraxane with last treatment 3/21/22  -Pt continues to have fevers after each treatment likely due to chemo    Advance Care Planning     Power of   I initiated the process of advance care planning today and explained the importance of this process to the patient.  I introduced the concept of advance directives to the patient, as well. Then the patient received detailed information about the importance of designating a Health Care Power of  (HCPOA). She was also instructed to communicate with this person about their wishes for future healthcare, should she become sick and lose decision-making capacity. The patient has not previously appointed a HCPOA. After our discussion, the patient has decided to complete a HCPOA and will bring the form completed to her next clinic appointment.            Follow Up - CXR today and urinalysis    Salvador Green MD  [facility]  Hematology and  Oncology  [facility]   [address] [place] [address]   O: [phone]  F: [phone]

## 2022-03-23 NOTE — Clinical Note
The patient will need  a CXR today.  She needs a urinalysis that was already collected by Lissett Bradford.  Her follow upas are already scheduled.

## 2022-03-24 NOTE — TELEPHONE ENCOUNTER
I called the patient's daughter and let her know that the CXR looked like the patient was fluid overloaded.  She stated that she had received a message from the cardiology department for her mother to take an additional lasix today.  I also informed her that the patient's UA did not show signs of infection.  She states he mother was feeling better but still felt run down.  I instructed her to give us a call if the fever worsened, her mother was feeling worse or for any other concerning issues.  The patient expressed understanding.  All questions were answered to her satisfaction.    Salvador Green MD  [facility]  Hematology and Oncology  [facility]   [address] [place] [address]   O: [phone]  F: [phone]

## 2022-03-25 PROBLEM — E87.6 HYPOKALEMIA: Status: ACTIVE | Noted: 2022-01-01

## 2022-03-25 PROBLEM — I48.0 PAF (PAROXYSMAL ATRIAL FIBRILLATION): Status: ACTIVE | Noted: 2022-01-01

## 2022-03-25 PROBLEM — R60.0 BILATERAL LEG EDEMA: Status: ACTIVE | Noted: 2022-01-01

## 2022-04-01 NOTE — PROGRESS NOTES
PATIENT: Nel Cui  MRN: [id number]  DATE: 4/4/2022      Diagnosis:   1. Adenocarcinoma of pancreas    2. Malignant neoplasm of body of pancreas     3. Metastasis to liver    4. 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 Atrial fibrillation, unspecified type    6. 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therapy    7. Pancreatic insufficiency    8. Cancer related pain    9. Hypertension, unspecified type    10. Hyperlipidemia, unspecified hyperlipidemia type    11. Chronic diastolic congestive heart failure        Chief Complaint: Pancreatic Cancer (1 week follow up )      Oncologic History:      Oncologic History 12/09/21 Ct Abdomen  12/15/21 EUS  1/04/22 PET/CT  1/07/22 PORT - Dr Acuña  1/11/22 MRI Brain  3/16/22 PET/CT    Oncologic Treatment 1/12/22 - current Gemcitabine and Paclitaxel s/p 2 cycles    Pathology 12/15/21 adenocarcinoma in the pancreatic body and adenocarcinoma in the liver       The patient initially presented to her PCP on 12/02/21 with complaint of abdominal pain.  She underwent a CT of the abdomen on 12/09/21 showing subcentimeter para-aortic, mesenteric lymph nodes; multiple hepatic infiltrative lesions present throughout the liver with 1 of the larger areas right liver lobe measuring approximately 2.2 cm in diameter; mass in the pancreatic tail measuring 9 x 3.3 cm.  The patient underwent EUS under the care of Dr Tse on 12/15/21 showing a mass in the pancreatic body and multiple liver lesions.  Path from the procedure showed adenocarcinoma in the pancreatic body and adenocarcinoma in the liver.    The patient underwent PET/CT on 1/04/22 showing hypermetabolic rounded mass at the junction of the body and tail of the pancreas measuring 2.5 x 2.4 cm, hypermetabolic left adrenal gland nodule measuring 1.9 cm, innumerable hypermetabolic nodular masses throughout the left and right hepatic lobes with largest lesions in the right liver measuring 2.5 x 1.9 cm and 5.2 x 4.4 cm, mildly  enlarged hypermetabolic portacaval lymph node measuring 1.4 cm, 0.7 cm hypermetabolic focus at the umbilicus, lesion in the T9 vertebra measuring 1.8 x 1.7 cm and a lesion in the left iliac fossa measuring 1.7 x 1.2 cm.  MRI of the brain on 01/11/2022 showed no evidence of metastases but did show an old right midbrain lacunar infarct.   The patient was in the hospital from 1/28/22-1/29/22 with fever and low sodium.  The patient's hydrochlorothiazide was discontinued.  The patient then presented to the hospital on 02/06/2022 for dyspnea on exertion.  CTA was done on 02/06/2022 showing no evidence of pulmonary embolism.  The patient underwent NT proBNP which was elevated at 2630 pg/mL.  The patient was started on Lasix every other day at the request of Cardiology.   The patient was admitted to the hospital from 2/12/22-2/14/22 for fever.  The patient was discharged on Levaquin for 5 days.  The patient saw Cardiology on 02/17/2022 for atrial fibrillation and was started on Eliquis and taken off of aspirin.     The the patient was admitted to the hospital from 4116-8524 for fever.  During her hospitalization procalcitonin was checked and was normal.  All her cultures were negative.  Her fever was felt to be from chemotherapy.    The patient underwent PET-CT on 03/16/2022 showing a followed of mm pulmonary based nodule; decrease metabolic activity in the liver mass with stable right lobe mass measuring 5.2 x 4.2 cm; right lobe mass measuring 2.2 x 1.8 cm; adrenal gland thickening of 1.9 cm; decrease in pancreatic mass measuring 2 cm; decreased metabolic activity of hypermetabolic mesenteric foci and right-sided pleural effusion.  The patient underwent cardioversion on 03/17/2022 at which point constantine was performed.  Patient was put back in normal sinus rhythm.    Subjective:     Interval History: Ms. Cui is a 86 y.o. female with Osteopenia, HLD, HTN who presents for pancreatic cancer.  Since the last clinic visit the  patient was admitted to the hospital for CHF exacerbation from 3/25/22 to 3/29/22 and was diuresed a little over 9.5L.  The patient states she is feeling well.  The patient denies CP, cough, SOB, abdominal pain, N/V, constipation, diarrhea.  The patient denies fever, chills, night sweats, weight loss, new lumps or bumps, easy bruising or bleeding.    Past Medical History:   Past Medical History:   Diagnosis Date    A-fib     Anemia     Anticoagulant long-term use     Arthritis     Cataracts, bilateral     CHF (congestive heart failure)     chronic diastolic    Coronary artery disease     Nonobstructive    Essential (primary) hypertension 10/05/2010    Hyperlipidemia     Hyponatremia     Mitral regurgitation     Osteopenia     Pancreatic insufficiency     Primary pancreatic cancer with metastasis to other site     Thrombocytopenia        Past Surgical HIstory:   Past Surgical History:   Procedure Laterality Date    CARPAL TUNNEL RELEASE Bilateral     wrist    CATARACT EXTRACTION, BILATERAL  2017    CORONARY ANGIOGRAPHY  09/01/2020    Procedure: ANGIOGRAM, CORONARY ARTERY;  Surgeon: Ivette Horne MD;  Location: [facility];  Service: Cardiology;;    DILATION AND CURETTAGE OF UTERUS      ENDOSCOPIC ULTRASOUND OF UPPER GASTROINTESTINAL TRACT Left 12/15/2021    Procedure: ULTRASOUND, UPPER GI TRACT, ENDOSCOPIC;  Surgeon: Lico Tse MD;  Location: [facility] ENDO;  Service: Endoscopy;  Laterality: Left;    ESOPHAGOGASTRODUODENOSCOPY N/A 12/15/2021    Procedure: EGD (ESOPHAGOGASTRODUODENOSCOPY);  Surgeon: Lico Tse MD;  Location: [facility] ENDO;  Service: Endoscopy;  Laterality: N/A;    INSERTION OF TUNNELED CENTRAL VENOUS CATHETER (CVC) WITH SUBCUTANEOUS PORT N/A 01/07/2022    Procedure: [id number]-LTEN-E-CATH;  Surgeon: Cas Acuña MD;  Location: [facility] OR;  Service: General;  Laterality: N/A;    LEFT HEART CATHETERIZATION  09/01/2020    Procedure: Left heart cath;  Surgeon: Ivette Horne MD;  " Location: [facility];  Service: Cardiology;;    MITRAL VALVE REPAIR      OOPHORECTOMY Left 1988    OTHER SURGICAL HISTORY      maze procedure and PHILL ligation    REPAIR OF MENISCUS OF KNEE Left     TONSILLECTOMY      vein removal         Family History:   Family History   Problem Relation Age of Onset    No Known Problems Mother     No Known Problems Father     Cancer Maternal Grandmother         Stomach    No Known Problems Maternal Grandfather     No Known Problems Paternal Grandmother     No Known Problems Paternal Grandfather     Breast cancer Sister         over 60    No Known Problems Brother        Social History:  reports that she quit smoking about 33 years ago. Her smoking use included cigarettes. She has a 30.00 pack-year smoking history. She has never used smokeless tobacco. She reports that she does not drink alcohol and does not use drugs.    Allergies:  Review of patient's allergies indicates:   Allergen Reactions    Amoxicillin-pot clavulanate Other (See Comments)     "Spaced out feeling- can't take it longer than 4 or 5 days"  Patient tolerated Zosyn    Breo ellipta [fluticasone furoate-vilanterol] Other (See Comments)     Feels jittery    Corticosteroids (glucocorticoids)      Other reaction(s): tachycardia    Erythromycin      "Spaced out"    Metoprolol succinate      "I dont feel good, it doesn't work"    Latex, natural rubber      Turns skin red around area where latex touches       Medications:  Current Outpatient Medications   Medication Sig Dispense Refill    apixaban (ELIQUIS) 5 mg Tab Take 1 tablet (5 mg total) by mouth 2 (two) times daily. 60 tablet 11    ascorbic acid, vitamin C, (VITAMIN C) 500 MG tablet Take 500 mg by mouth once daily.      bisoprolol (ZEBETA) 5 MG tablet TAKE 1 TABLET(5 MG) BY MOUTH EVERY DAY (Patient taking differently: Take 5 mg by mouth every evening.) 90 tablet 3    cholecalciferol, vitamin D3, (VITAMIN D3) 25 mcg (1,000 unit) capsule Take " 1,000 Units by mouth every morning.      coenzyme Q10 100 mg capsule Take 100 mg by mouth once daily.      docusate sodium (COLACE) 100 MG capsule Take 1 capsule (100 mg total) by mouth 2 (two) times daily.  0    furosemide (LASIX) 20 MG tablet Take 1 tablet (20 mg total) by mouth once daily. 90 tablet 3    LIDOcaine-prilocaine (EMLA) cream Apply topically as needed (30 to 60 minutes prior to chemo). 30 g 2    lipase-protease-amylase (CREON) 36,000-114,000- 180,000 unit CpDR Take 2 capsules by mouth 3 (three) times daily with meals. Take 2 capsules TID with meals and 1 capsule PRN with snacks (Patient taking differently: Take 2 capsules by mouth 3 (three) times daily with meals. and 1 capsule as needed with snacks) 240 capsule 11    lipase-protease-amylase (CREON) 36,000-114,000- 180,000 unit CpDR Take 1 capsule by mouth with snacks (as needed).      losartan (COZAAR) 100 MG tablet Take 1 tablet (100 mg total) by mouth every evening. 90 tablet 3    magnesium 250 mg Tab Take 250 mg by mouth every evening.      multivitamin (THERAGRAN) per tablet Take 1 tablet by mouth every other day.      ondansetron (ZOFRAN-ODT) 4 MG TbDL Take 4 mg by mouth every 12 (twelve) hours as needed (nausea/vomiting).      pravastatin (PRAVACHOL) 40 MG tablet Take 1 tablet (40 mg total) by mouth once daily. (Patient taking differently: Take 40 mg by mouth every evening.) 90 tablet 3    saliva substitute combo no.9 (BIOTENE DRY MOUTH ORAL RINSE) Mwsh Swish and spit 5 mLs every evening.       No current facility-administered medications for this visit.     Facility-Administered Medications Ordered in Other Visits   Medication Dose Route Frequency Provider Last Rate Last Admin    alteplase injection 2 mg  2 mg Intra-Catheter PRN Salvador Green MD        gemcitabine (GEMZAR) 1,400 mg in sodium chloride 0.9% 250 mL chemo infusion  1,400 mg Intravenous 1 time in Clinic/HOD Salvador Green MD        heparin, porcine (PF) 100  "unit/mL injection flush 500 Units  500 Units Intravenous PRN Salvador Green MD        PACLitaxel-protein bound (ABRAXANE) 100 mg/m2 = 180 mg in 36 mL infusion  100 mg/m2 (Treatment Plan Recorded) Intravenous 1 time in Clinic/HOD Salvador Green MD        sodium chloride 0.9% flush 10 mL  10 mL Intravenous PRN Salvador Green MD           Review of Systems   Constitutional: Negative for chills, fatigue, fever and unexpected weight change.   Respiratory: Negative for cough and shortness of breath.    Cardiovascular: Negative for chest pain, palpitations and leg swelling.   Gastrointestinal: Negative for abdominal pain, constipation, diarrhea, nausea and vomiting.   Skin: Negative for rash.   Neurological: Negative for headaches.   Hematological: Negative for adenopathy. Does not bruise/bleed easily.       ECOG Performance Status: 2   Objective:      Vitals:   Vitals:    04/04/22 1018   BP: 136/70   BP Location: Right arm   Patient Position: Sitting   BP Method: Medium (Manual)   Pulse: 83   Temp: 97.3 °F (36.3 °C)   TempSrc: Temporal   SpO2: 96%   Weight: 64.1 kg (141 lb 5 oz)   Height: 5' 5.5" (1.664 m)     Physical Exam  Constitutional:       General: She is not in acute distress.     Appearance: She is well-developed. She is not diaphoretic.   HENT:      Head: Normocephalic and atraumatic.   Cardiovascular:      Rate and Rhythm: Normal rate and regular rhythm.      Heart sounds: Normal heart sounds. No murmur heard.    No friction rub. No gallop.   Pulmonary:      Effort: Pulmonary effort is normal. No respiratory distress.      Breath sounds: Normal breath sounds. No wheezing or rales.   Chest:      Chest wall: No tenderness.   Breasts:      Right: No axillary adenopathy or supraclavicular adenopathy.      Left: No axillary adenopathy or supraclavicular adenopathy.       Abdominal:      General: Bowel sounds are normal. There is no distension.      Palpations: Abdomen is soft. There is no mass.      " Tenderness: There is no abdominal tenderness. There is no guarding or rebound.   Musculoskeletal:         General: No tenderness. Normal range of motion.      Right lower leg: Edema present.      Left lower leg: Edema present.   Lymphadenopathy:      Cervical: No cervical adenopathy.      Upper Body:      Right upper body: No supraclavicular or axillary adenopathy.      Left upper body: No supraclavicular or axillary adenopathy.   Skin:     Findings: No erythema or rash.   Neurological:      Mental Status: She is alert and oriented to person, place, and time.   Psychiatric:         Behavior: Behavior normal.         Laboratory Data:  Lab Visit on 04/01/2022   Component Date Value Ref Range Status    WBC 04/01/2022 7.05  3.90 - 12.70 K/uL Final    RBC 04/01/2022 3.67 (A) 4.00 - 5.40 M/uL Final    Hemoglobin 04/01/2022 11.4 (A) 12.0 - 16.0 g/dL Final    Hematocrit 04/01/2022 35.5 (A) 37.0 - 48.5 % Final    MCV 04/01/2022 97  82 - 98 fL Final    MCH 04/01/2022 31.1 (A) 27.0 - 31.0 pg Final    MCHC 04/01/2022 32.1  32.0 - 36.0 g/dL Final    RDW 04/01/2022 19.2 (A) 11.5 - 14.5 % Final    Platelets 04/01/2022 185  150 - 450 K/uL Final    MPV 04/01/2022 9.1 (A) 9.2 - 12.9 fL Final    Immature Granulocytes 04/01/2022 CANCELED  0.0 - 0.5 % Final    Result canceled by the ancillary.    Immature Grans (Abs) 04/01/2022 CANCELED  0.00 - 0.04 K/uL Final    Comment: Mild elevation in immature granulocytes is non specific and   can be seen in a variety of conditions including stress response,   acute inflammation, trauma and pregnancy. Correlation with other   laboratory and clinical findings is essential.    Result canceled by the ancillary.      Lymph # 04/01/2022 CANCELED  1.0 - 4.8 K/uL Final    Result canceled by the ancillary.    Mono # 04/01/2022 CANCELED  0.3 - 1.0 K/uL Final    Result canceled by the ancillary.    Eos # 04/01/2022 CANCELED  0.0 - 0.5 K/uL Final    Result canceled by the ancillary.    Baso #  04/01/2022 CANCELED  0.00 - 0.20 K/uL Final    Result canceled by the ancillary.    nRBC 04/01/2022 0  0 /100 WBC Final    Gran % 04/01/2022 71.0  38.0 - 73.0 % Final    Lymph % 04/01/2022 15.0 (A) 18.0 - 48.0 % Final    Mono % 04/01/2022 13.0  4.0 - 15.0 % Final    Eosinophil % 04/01/2022 1.0  0.0 - 8.0 % Final    Basophil % 04/01/2022 0.0  0.0 - 1.9 % Final    Platelet Estimate 04/01/2022 Appears normal   Final    Aniso 04/01/2022 Slight   Final    Poik 04/01/2022 Slight   Final    Poly 04/01/2022 Occasional   Final    Ovalocytes 04/01/2022 Occasional   Final    Danie Cells 04/01/2022 Occasional   Final    Toxic Granulation 04/01/2022 Present   Final    Differential Method 04/01/2022 Manual   Final    Sodium 04/01/2022 138  136 - 145 mmol/L Final    Potassium 04/01/2022 4.3  3.5 - 5.1 mmol/L Final    Chloride 04/01/2022 95  95 - 110 mmol/L Final    CO2 04/01/2022 33 (A) 23 - 29 mmol/L Final    Glucose 04/01/2022 96  70 - 110 mg/dL Final    BUN 04/01/2022 12  8 - 23 mg/dL Final    Creatinine 04/01/2022 0.6  0.5 - 1.4 mg/dL Final    Calcium 04/01/2022 9.3  8.7 - 10.5 mg/dL Final    Total Protein 04/01/2022 6.4  6.0 - 8.4 g/dL Final    Albumin 04/01/2022 3.3 (A) 3.5 - 5.2 g/dL Final    Total Bilirubin 04/01/2022 0.5  0.1 - 1.0 mg/dL Final    Comment: For infants and newborns, interpretation of results should be based  on gestational age, weight and in agreement with clinical  observations.    Premature Infant recommended reference ranges:  Up to 24 hours.............<8.0 mg/dL  Up to 48 hours............<12.0 mg/dL  3-5 days..................<15.0 mg/dL  6-29 days.................<15.0 mg/dL      Alkaline Phosphatase 04/01/2022 77  55 - 135 U/L Final    AST 04/01/2022 37  10 - 40 U/L Final    ALT 04/01/2022 30  10 - 44 U/L Final    Anion Gap 04/01/2022 10  8 - 16 mmol/L Final    eGFR if African American 04/01/2022 >60  >60 mL/min/1.73 m^2 Final    eGFR if non African American 04/01/2022  >60  >60 mL/min/1.73 m^2 Final    Comment: Calculation used to obtain the estimated glomerular filtration  rate (eGFR) is the CKD-EPI equation.       CA 19-9 04/01/2022 80.9 (A) 0.0 - 40.0 U/mL Final   No results displayed because visit has over 200 results.            Imaging:     PET/CT 3/16/22    Head and neck:     No hypermetabolic activity is noted within the head neck to suggest metastatic disease.     Atherosclerotic carotid calcifications are noted.     Thorax:     A port is noted overlying the left hemithorax with its tip in the superior vena cava.     Extensive mitral valve calcifications or mitral valve replacement are noted.  Extensive coronary calcifications are noted increasing the patient's coronary risk.     A moderate sized joint effusion is developed since the prior.  Please correlate for etiologies.  This could relate to cardiogenic edema or metastatic fluid.  No obvious hypermetabolic activity is noted.     A pleural based density is noted of 5 mm image 50 sequence 3 similar since the prior.  Atelectasis, scarring, or pulmonary nodule is on the differential.  In a high-risk patient follow-up for long-term size stability would be suggested.     Sternotomy wires are noted     Abdomen and pelvis:     Significant improvement is noted since prior with the liver demonstrating near uniform metabolic activity.  Significantly less hypermetabolic activity is noted than was seen on the prior.  A hypodense region in the right lobe measured on the prior on CT is still thought to be seen image 131 without convincing detrimental change when measured in a similar manner as on the prior at 5.2 x 4.2 cm image 131 sequence 3.  Several other hypodense regions are noted without worrisome detrimental change since the prior.  The region in the right lobe of the liver that measured 2.5 x 1.9 cm on the prior is less easily visualized but can be measured similarly at 2.2 x 1.8 cm image 142 sequence 3     The adrenal  thickening seen on the prior appears of 2 cm in short diameter measuring 1.9 cm on the prior unchanged.  In long dimension it was favored to be under measured on the prior but is unchanged when measured in a similar manner.  It is not significantly hypermetabolic on today's exam     The hypermetabolic pancreatic tail mass seen on the prior demonstrates near background activity on today's exam as well and no obvious focal mass can be measured on the CT portion of this exam.  The pancreas in this region measures 2.0 cm in thickness versus is 2.5 cm on the prior.  Decrease in size of the mass is suspected     Several hypermetabolic mesenteric foci were suspected on the prior suggestive of mesenteric metastasis.  No significant hypermetabolic activity is noted on today's exam.     Musculoskeletal:     Glenohumeral joint space loss is noted on the left greater than right suggestive of degenerative change likely associated with labral tearing.     Central canal stenosis is noted in the lower lumbar spine particularly at the L4-L5 level.  No obvious hypermetabolic osseous changes are noted on today's exam.  This is an improvement since the prior      Assessment:       1. Adenocarcinoma of pancreas    2. Malignant neoplasm of body of pancreas     3. Metastasis to liver    4. 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 Atrial fibrillation, unspecified type    6. 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therapy    7. Pancreatic insufficiency    8. Cancer related pain    9. Hypertension, unspecified type    10. Hyperlipidemia, unspecified hyperlipidemia type    11. Chronic diastolic congestive heart failure           Plan:     Metastatic Pancreatic Cancer - The patient was diagnosed with metastatic pancreatic cancer adenocarcinoma with mets to the liver on 12/15/21  -Ca19-9 was 7,581 on 12/15/21  -PET/CT on 1/04/22 shows mets to the liver, left adrenal gland, T9 vertebra and left iliac fossa.  -Potential mets are seen near  the umbilicus  -PT underwent PORT placement 1/07/22  -MRI brain on 1/11/22 showed no brain mets  -Pt consented for Gemcitabine and paclitaxel on 1/03/22  -Pt treated with 20% dose reduction in paclitaxel and Gemcitabine to 100mg/mm2 and 800mg/mm2 respectively  -C2D8 and C2D15 cancelled given her hospitalization from 2/12/22-2/14/22  -Patient completed C3D8 and was hospitalized again for a fever  -Treatment moving forward was discussed with the patient and decision was made to remove day 8 of treatment  -PET/CT on 3/16/22 shows stable disease  -Ca19-9 has dropped dramatically to 170 on 3/16/22  -C3D15 delayed until today given cardiac ablation on 03/17/2022  -Will continue [place]/Abraxane at 20% does reduction due to prior thrombocytopenia  -Pt to undergo C4D1 today and will undergo treatment every other week  -Pt up for [facility]    Recurrent Fever - pt with a fever several days after almost every chemotherapy treatment  -Pt instructed to call our office if it recurs and to take tylenol  -Fever felt to be due to chemo or tumor response to chemo    A-fib - pt now in NSR  -PT on Eliquis  -PT s/p Ablation on 3/17/22  -Management per cardiology    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 - pt with increased infection risk on chemo  -Will monitor    Pancreatic Insufficiency - pt on creon  -Will monitor    Cancer Related Pain - improved  -Will monitor    HTN - pt taking losartan, bisoprolol  -Cardiology managing  -Will monitor    HLD - pt on pravastatin  -Management per PCP    CHF Exacerbation - pt with recent CHF exacerbation and hospitalized from 3/25/22 to 3/29/22 wioth 9.5L removed  -Pt now taking lasix daily  -Management per cardiology    Advance Care Planning     Power of   I initiated the process of advance care planning today and explained the importance of this process to the patient.  I introduced the concept of advance directives to the patient, as well. Then the patient received detailed information  about the importance of designating a Health Care Power of  (HCPOA). She was also instructed to communicate with this person about their wishes for future healthcare, should she become sick and lose decision-making capacity. The patient has not previously appointed a HCPOA. After our discussion, the patient has decided to complete a HCPOA and will bring the form completed to her next clinic appointment.            Route Chart for Scheduling    Med Onc Chart Routing      Follow up with physician 4 weeks. The patient can proceed with treatment.  She will need a CBC, CMP and Ca19-9 on 4/18/22 with an appt with NP that day and treatment with chemo.  She will need a CBC, CMP and Ca19-9 on 4/29/22 with an appt with me and treatment on 5/02/22.   Follow up with IVONNE 2 weeks.   Labs CA 19-9, CBC and CMP   Lab interval:  Labs on 4/01/22   Imaging    Pharmacy appointment    Other referrals          Treatment Plan Information   OP PANC NAB-PACLITAXEL + GEMCITABINE Q4W   Salvador Green MD   Upcoming Treatment Dates - OP PANC NAB-PACLITAXEL + GEMCITABINE Q4W    4/18/2022       Chemotherapy       PACLitaxel-protein bound (ABRAXANE) 100 mg/m2 = 180 mg in 36 mL infusion       gemcitabine (GEMZAR) 1,440 mg in sodium chloride 0.9% 250 mL chemo infusion  5/2/2022       Chemotherapy       PACLitaxel-protein bound (ABRAXANE) 100 mg/m2 = 180 mg in 36 mL infusion       gemcitabine (GEMZAR) 1,440 mg in sodium chloride 0.9% 250 mL chemo infusion  5/16/2022       Chemotherapy       PACLitaxel-protein bound (ABRAXANE) 100 mg/m2 = 180 mg in 36 mL infusion       gemcitabine (GEMZAR) 1,440 mg in sodium chloride 0.9% 250 mL chemo infusion  5/30/2022       Chemotherapy       PACLitaxel-protein bound (ABRAXANE) 100 mg/m2 = 180 mg in 36 mL infusion       gemcitabine (GEMZAR) 1,440 mg in sodium chloride 0.9% 250 mL chemo infusion      Salvador Green MD  [facility]  Hematology and Oncology  [facility] [address]  [place] [address]   O: [phone]  F: [phone]

## 2022-04-06 NOTE — TELEPHONE ENCOUNTER
Received phone call from pt.  At 3:00p temp = 100.0 & took Tylenol  At 3:30p temp = 100.5  At 3:50p temp = 100.3    Pt asking @ what temp does she go to ER?    Also, what temp should she take the Tylenol?

## 2022-04-06 NOTE — TELEPHONE ENCOUNTER
I called the patient's daughter and informed her that the patient's fever is likely from the chemotherapy again given her prior pattern.  I informed her that the mother she could the emergency room if she developed a fever of 101 and starts to feel unwell.  I informed her that her mother continue taking Tylenol every 4 hours.  I also instructed her that the patient could take Atarax for the itching.  She expressed understanding.  All questions were answered to her satisfaction.    aSlvador Green MD  [facility]  Hematology and Oncology  [facility]   [address]   [place] [address]   O: [phone]  F: [phone]

## 2022-04-19 NOTE — PROGRESS NOTES
Oncology Nutrition   Chemotherapy Infusion Visit    Nutrition Follow Up   RD met w/ pt at chairside during infusion. Pt hospitalized on March 25, 2022 for swelling in her legs. She reports IV lasix was given at the hospital to reduce the fluid in her legs. Pt is to be on a low sodium diet. She lives alone and does not cook and heats up ready to eat meals. RD gave pt some low sodium quick meal ideas. Provided pt w/ handout on low sodium foods. Pt VU and RD will continue to follow.    Wt Readings from Last 10 Encounters:   04/19/22 63.6 kg (140 lb 3.4 oz)   04/19/22 63.6 kg (140 lb 3.4 oz)   04/08/22 71.2 kg (157 lb)   04/07/22 65 kg (143 lb 3.2 oz)   04/04/22 64.1 kg (141 lb 5 oz)   04/04/22 64.1 kg (141 lb 5 oz)   03/29/22 64.6 kg (142 lb 6.7 oz)   03/23/22 73.3 kg (161 lb 9.6 oz)   03/21/22 70.9 kg (156 lb 4.9 oz)   03/21/22 70.9 kg (156 lb 4.9 oz)       All other nutrition questions/concerns addressed as appropriate. Will continue to follow and monitor throughout treatment.     Alejandrina Ahuja, JOSE, LDN  04/19/2022  2:39 PM

## 2022-04-19 NOTE — PROGRESS NOTES
PSYCHO-ONCOLOGY NOTE/ Individual Psychotherapy     Date: 4/19/2022   Site:  [facility]      Therapeutic Intervention: Met with patient.  Outpatient - Supportive psychotherapy 30 min - CPT Code 60849      Patient was last seen by me on 3/21/2022    Problem list  Patient Active Problem List   Diagnosis    Pure hypercholesterolemia    Essential (primary) hypertension    ACP (advance care planning)    Osteopenia    Nutritional deficiency    Nerve disease, peripheral    Vitamin D deficiency    AR (allergic rhinitis)    Atrial fibrillation    Acute diastolic congestive heart failure    Stenosis of right carotid artery    Hyperglycemia    Coronary artery disease, moderate 50-60% LAD 2020    Hypophosphatemia    S/P MVR (mitral valve repair) 07/2016 for severe MR, Dr. Armando    S/P Maze operation for atrial fibrillation + PHILL resection Dr. Armando 07/2016    Fall    Primary pancreatic cancer with metastasis to other site    Metastasis to liver    Hyponatremia    Fever and chills    Venous stasis dermatitis    Thrombocytopenia    Fever    Other constipation    Bilateral leg edema    PAF (paroxysmal atrial fibrillation)    Hypokalemia       Chief complaint/reason for encounter: depression and anxiety   Met with patient to evaluate psychosocial adaptation to diagnosis/treatment/survivorship of pancreatic cancer    Current Medications  Current Outpatient Medications   Medication    acetaminophen (TYLENOL) 325 MG tablet    apixaban (ELIQUIS) 5 mg Tab    ascorbic acid, vitamin C, (VITAMIN C) 500 MG tablet    bisoprolol (ZEBETA) 5 MG tablet    cholecalciferol, vitamin D3, (VITAMIN D3) 25 mcg (1,000 unit) capsule    coenzyme Q10 100 mg capsule    docusate sodium (COLACE) 100 MG capsule    furosemide (LASIX) 20 MG tablet    hydrOXYzine HCL (ATARAX) 25 MG tablet    LIDOcaine-prilocaine (EMLA) cream    lipase-protease-amylase (CREON) 36,000-114,000- 180,000 unit CpDR    losartan (COZAAR) 100 MG  tablet    magnesium 250 mg Tab    multivitamin (THERAGRAN) per tablet    ondansetron (ZOFRAN-ODT) 4 MG TbDL    pravastatin (PRAVACHOL) 40 MG tablet    saliva substitute combo no.9 (BIOTENE DRY MOUTH ORAL RINSE) Mwsh     No current facility-administered medications for this visit.     Facility-Administered Medications Ordered in Other Visits   Medication Frequency    heparin, porcine (PF) 100 unit/mL injection flush 500 Units PRN    sodium chloride 0.9% flush 10 mL PRN       Objective:  Nel Cui arrived promptly for the session, meeting during her scheduled infusion. The patient was fully cooperative throughout the session and appeared to be in good spirits.  Appearance: age appropriate, casually  dressed, well groomed  Behavior/Cooperation: friendly and cooperative  Speech: normal in rate, volume, and tone and appropriate quality, quantity and organization of sentences  Mood: happy  Affect: euthymic  Thought Process: goal-directed, logical  Thought Content: normal,  No delusions or paranoia; did not appear to be responding to internal stimuli during the session  Orientation: grossly intact  Memory: grossly intact  Attention Span/Concentration: Attends to session without distraction; reports no difficulty  Fund of Knowledge: average  Estimate of Intelligence: average from verbal skills and history  Cognition: grossly intact  Insight: patient has awareness of illness; good insight into own behavior and behavior of others  Judgment: the patient's behavior is adequate to circumstances    Interval history and content of current session: Discussed current adaptation to disease and treatment status. Reports to be coping in an adequate manner noting that addition of behavioral activation strategies and enhanced social support had been efficacious to mood management. Noted increased social behaviors (grocery shopping w/ cart, seeing family, etc.). Evaluated cognitive response, paying particular attention to  negative intrusive thoughts of a persistent and detrimental nature. Thoughts of this type are in evidence with mild distress and are associated w/ her relationship w/ her one daughter. Provided cognitive behavioral therapy to address negative cognitions and provided additional psychoeducation of the mechanism of mood management through behavioral activation. Identified and evaluated psychosocial and environmental stressors secondary to diagnosis and treatment.  Examined proactive behaviors that may be implemented to minimize or ameliorate psychosocial stressors secondary to diagnosis and treatment. Pt requested additional follow up to ensure maintenance of enhanced mood/management of specific difficulties.     Risk parameters:   Patient reports no suicidal ideation  Patient reports no homicidal ideation  Patient reports no self-injurious behavior  Patient reports no violent behavior   Safety needs:  None at this time      Verbal deficits: None     Patient's response to intervention:The patient's response to intervention is accepting, motivated.     Progress toward goals and other mental status changes:  The patient's progress toward goals is excellent.      Progress to date:Progress as Expected      Goals from last visit: Met        Patient Strengths: verbal, intelligent, successful, good social support, good insight, commitment to wellness, strong clifford, strong cultural traditions      Treatment Plan:individual psychotherapy  · Target symptoms: depression, anxiety   · Why chosen therapy is appropriate versus another modality: relevant to diagnosis, patient responds to this modality, evidence based practice  · Outcome monitoring methods: self-report, observation, feedback from family  · Therapeutic intervention type: behavior modifying psychotherapy, supportive psychotherapy  · Prognosis: Excellent      Behavioral goals:    Social engagement: continued as per CDC COVID-19 contact guidelines   Therapy: behavioral  activation strategies, thought awareness (for discussion at follow up)    Return to clinic: 1 month     Length of Service (minutes direct face-to-face contact): 30    Diagnosis:     ICD-10-CM ICD-9-CM   1. Adjustment disorder with mixed anxiety and depressed mood  F43.23 309.28   2. Malignant neoplasm of tail of pancreas   C25.2 157.2                Beronica Roland License #[id number]  [place] License #[id number]

## 2022-04-19 NOTE — PLAN OF CARE
Problem: Adult Inpatient Plan of Care  Goal: Plan of Care Review  Outcome: Ongoing, Progressing  Flowsheets (Taken 4/19/2022 1348)  Plan of Care Reviewed With: patient  Goal: Patient-Specific Goal (Individualized)  Outcome: Ongoing, Progressing  Flowsheets (Taken 4/19/2022 1348)  Anxieties, Fears or Concerns: None  Individualized Care Needs: Recliner, warm blanket, pillow  Patient-Specific Goals (Include Timeframe): Free of S/S of reaction with infusions.  Goal: Optimal Comfort and Wellbeing  Outcome: Ongoing, Progressing     Problem: Fall Injury Risk  Goal: Absence of Fall and Fall-Related Injury  Outcome: Ongoing, Progressing  Intervention: Promote Injury-Free Environment  Flowsheets (Taken 4/19/2022 1100)  Safety Promotion/Fall Prevention:   assistive device/personal item within reach   in recliner, wheels locked   instructed to call staff for mobility     Patient to Infusion for Abraxane and Gemzar after her appointment with Justyn Hernandez NP. Accompanied by daughter. Treatment plan reviewed. VSS. Tolerated treatment. Also met with Dr. Mandujano during visit. Provided with copy of upcoming appointment schedule. Escorted to the front lobby for discharge to home.

## 2022-04-19 NOTE — PROGRESS NOTES
PATIENT: Nel Cui  MRN: [id number]  DATE: 4/19/2022      Diagnosis:   1. Primary pancreatic cancer with metastasis to other site    2. Metastasis to liver        Chief Complaint: Follow-up      Oncologic History:      Oncologic History 12/09/21 Ct Abdomen  12/15/21 EUS  1/04/22 PET/CT  1/07/22 PORT - Dr Acuña  1/11/22 MRI Brain  3/16/22 PET/CT    Oncologic Treatment 1/12/22 - current Gemcitabine and Paclitaxel s/p 2 cycles    Pathology 12/15/21 adenocarcinoma in the pancreatic body and adenocarcinoma in the liver       The patient initially presented to her PCP on 12/02/21 with complaint of abdominal pain.  She underwent a CT of the abdomen on 12/09/21 showing subcentimeter para-aortic, mesenteric lymph nodes; multiple hepatic infiltrative lesions present throughout the liver with 1 of the larger areas right liver lobe measuring approximately 2.2 cm in diameter; mass in the pancreatic tail measuring 9 x 3.3 cm.  The patient underwent EUS under the care of Dr Tse on 12/15/21 showing a mass in the pancreatic body and multiple liver lesions.  Path from the procedure showed adenocarcinoma in the pancreatic body and adenocarcinoma in the liver.    The patient underwent PET/CT on 1/04/22 showing hypermetabolic rounded mass at the junction of the body and tail of the pancreas measuring 2.5 x 2.4 cm, hypermetabolic left adrenal gland nodule measuring 1.9 cm, innumerable hypermetabolic nodular masses throughout the left and right hepatic lobes with largest lesions in the right liver measuring 2.5 x 1.9 cm and 5.2 x 4.4 cm, mildly enlarged hypermetabolic portacaval lymph node measuring 1.4 cm, 0.7 cm hypermetabolic focus at the umbilicus, lesion in the T9 vertebra measuring 1.8 x 1.7 cm and a lesion in the left iliac fossa measuring 1.7 x 1.2 cm.  MRI of the brain on 01/11/2022 showed no evidence of metastases but did show an old right midbrain lacunar infarct.   The patient was in the hospital from  1/28/22-1/29/22 with fever and low sodium.  The patient's hydrochlorothiazide was discontinued.  The patient then presented to the hospital on 02/06/2022 for dyspnea on exertion.  CTA was done on 02/06/2022 showing no evidence of pulmonary embolism.  The patient underwent NT proBNP which was elevated at 2630 pg/mL.  The patient was started on Lasix every other day at the request of Cardiology.   The patient was admitted to the hospital from 2/12/22-2/14/22 for fever.  The patient was discharged on Levaquin for 5 days.  The patient saw Cardiology on 02/17/2022 for atrial fibrillation and was started on Eliquis and taken off of aspirin.     The the patient was admitted to the hospital from [id number] for fever.  During her hospitalization procalcitonin was checked and was normal.  All her cultures were negative.  Her fever was felt to be from chemotherapy.    The patient underwent PET-CT on 03/16/2022 showing a followed of mm pulmonary based nodule; decrease metabolic activity in the liver mass with stable right lobe mass measuring 5.2 x 4.2 cm; right lobe mass measuring 2.2 x 1.8 cm; adrenal gland thickening of 1.9 cm; decrease in pancreatic mass measuring 2 cm; decreased metabolic activity of hypermetabolic mesenteric foci and right-sided pleural effusion.  The patient underwent cardioversion on 03/17/2022 at which point constantine was performed.  Patient was put back in normal sinus rhythm.    Subjective:     Interval History: Ms. Cui is a 86 y.o. female with Osteopenia, HLD, HTN who presents for pancreatic cancer.      She presents to the clinic today for clearance prior to C4D15 with her daughter.  The patient denies CP, cough, SOB, abdominal pain, N/V, constipation, diarrhea.  The patient denies fever, chills, night sweats, weight loss, new lumps or bumps, easy bruising or bleeding.  Discussion was had on getting outside and enjoying family with calculated risks.  Patient is fearful of getting sick.    Past  Medical History:   Past Medical History:   Diagnosis Date    A-fib     Anemia     Anticoagulant long-term use     Arthritis     Cataracts, bilateral     CHF (congestive heart failure)     chronic diastolic    Coronary artery disease     Nonobstructive    Essential (primary) hypertension 10/05/2010    Hyperlipidemia     Hyponatremia     Mitral regurgitation     Osteopenia     Pancreatic insufficiency     Primary pancreatic cancer with metastasis to other site     Thrombocytopenia        Past Surgical HIstory:   Past Surgical History:   Procedure Laterality Date    CARPAL TUNNEL RELEASE Bilateral     wrist    CATARACT EXTRACTION, BILATERAL  2017    CORONARY ANGIOGRAPHY  09/01/2020    Procedure: ANGIOGRAM, CORONARY ARTERY;  Surgeon: Ivette Horne MD;  [facility] C[facility];  Service: Cardiology;;    DILATION AND CURETTAGE OF UTERUS      ENDOSCOPIC ULTRASOUND OF UPPER GASTROINTESTINAL TRACT Left 12/15/2021    Procedure: ULTRASOUND, UPPER GI TRACT, ENDOSCOPIC;  Surgeon: Lico Tse MD;  Location: [facility] ENDO;  Service: Endoscopy;  Laterality: Left;    ESOPHAGOGASTRODUODENOSCOPY N/A 12/15/2021    Procedure: EGD (ESOPHAGOGASTRODUODENOSCOPY);  Surgeon: Lico Tse MD;  Location[facility] ENDO;  Service: Endoscopy;  Laterality: N/A;    INSERTION OF TUNNELED CENTRAL VENOUS CATHETER (CVC) WITH SUBCUTANEOUS PORT N/A 01/07/2022    Procedure: [id number]-L-CATH;  Surgeon: Cas Acuña MD;  [facility] OR;  Service: General;  Laterality: N/A;    LEFT HEART CATHETERIZATION  09/01/2020    Procedure: Left heart cath;  Surgeon: Ivette Horne MD;  [facility] CATH;  Service: Cardiology;;    MITRAL VALVE REPAIR      OOPHORECTOMY Left 1988    OTHER SURGICAL HISTORY      maze procedure and PHILL ligation    REPAIR OF MENISCUS OF KNEE Left     TONSILLECTOMY      vein removal         Family History:   Family History   Problem Relation Age of Onset    No Known Problems Mother     No Known  "Problems Father     Cancer Maternal Grandmother         Stomach    No Known Problems Maternal Grandfather     No Known Problems Paternal Grandmother     No Known Problems Paternal Grandfather     Breast cancer Sister         over 60    No Known Problems Brother        Social History:  reports that she quit smoking about 33 years ago. Her smoking use included cigarettes. She has a 30.00 pack-year smoking history. She has never used smokeless tobacco. She reports that she does not drink alcohol and does not use drugs.    Allergies:  Review of patient's allergies indicates:   Allergen Reactions    Amoxicillin-pot clavulanate Other (See Comments)     "Spaced out feeling- can't take it longer than 4 or 5 days"  Patient tolerated Zosyn    Breo ellipta [fluticasone furoate-vilanterol] Other (See Comments)     Feels jittery    Corticosteroids (glucocorticoids)      Other reaction(s): tachycardia    Erythromycin      "Spaced out"    Metoprolol succinate      "I dont feel good, it doesn't work"    Latex, natural rubber      Turns skin red around area where latex touches       Medications:  Current Outpatient Medications   Medication Sig Dispense Refill    acetaminophen (TYLENOL) 325 MG tablet Take 325 mg by mouth every 6 (six) hours as needed for Pain or Temperature greater than.      apixaban (ELIQUIS) 5 mg Tab Take 1 tablet (5 mg total) by mouth 2 (two) times daily. 60 tablet 11    ascorbic acid, vitamin C, (VITAMIN C) 500 MG tablet Take 500 mg by mouth once daily.      bisoprolol (ZEBETA) 5 MG tablet TAKE 1 TABLET(5 MG) BY MOUTH EVERY DAY (Patient taking differently: Take 5 mg by mouth every evening.) 90 tablet 3    cholecalciferol, vitamin D3, (VITAMIN D3) 25 mcg (1,000 unit) capsule Take 1,000 Units by mouth every morning.      coenzyme Q10 100 mg capsule Take 100 mg by mouth once daily.      docusate sodium (COLACE) 100 MG capsule Take 1 capsule (100 mg total) by mouth 2 (two) times daily. (Patient " taking differently: Take 100 mg by mouth 2 (two) times daily as needed.)  0    furosemide (LASIX) 20 MG tablet Take 1 tablet (20 mg total) by mouth once daily. 90 tablet 3    hydrOXYzine HCL (ATARAX) 25 MG tablet Take 1 tablet (25 mg total) by mouth 3 (three) times daily as needed for Itching. (Patient taking differently: Take 25 mg by mouth 3 (three) times daily as needed for Itching. Pt has not started yet. Will pick it up from pharmacy today and start if needed.) 30 tablet 3    LIDOcaine-prilocaine (EMLA) cream Apply topically as needed (30 to 60 minutes prior to chemo). 30 g 2    lipase-protease-amylase (CREON) 36,000-114,000- 180,000 unit CpDR Take 2 capsules by mouth 3 (three) times daily with meals. Take 2 capsules TID with meals and 1 capsule PRN with snacks (Patient taking differently: Take 2 capsules by mouth 3 (three) times daily with meals. and 1 capsule as needed with snacks) 240 capsule 11    losartan (COZAAR) 100 MG tablet Take 1 tablet (100 mg total) by mouth every evening. 90 tablet 3    magnesium 250 mg Tab Take 250 mg by mouth every evening.      multivitamin (THERAGRAN) per tablet Take 1 tablet by mouth every other day.      ondansetron (ZOFRAN-ODT) 4 MG TbDL Take 4 mg by mouth every 12 (twelve) hours as needed (nausea/vomiting).      pravastatin (PRAVACHOL) 40 MG tablet Take 1 tablet (40 mg total) by mouth once daily. (Patient taking differently: Take 40 mg by mouth every evening.) 90 tablet 3    saliva substitute combo no.9 (BIOTENE DRY MOUTH ORAL RINSE) Mwsh Swish and spit 5 mLs every evening.       No current facility-administered medications for this visit.       Review of Systems   Constitutional: Negative for chills, fatigue, fever and unexpected weight change.   Respiratory: Negative for cough and shortness of breath.    Cardiovascular: Negative for chest pain, palpitations and leg swelling.   Gastrointestinal: Negative for abdominal pain, constipation, diarrhea, nausea and  "vomiting.   Skin: Negative for rash.   Neurological: Negative for headaches.   Hematological: Negative for adenopathy. Does not bruise/bleed easily.       ECOG Performance Status: 2   Objective:      Vitals: Weight:  Loss of 1 pound in 2 weeks  Vitals:    04/19/22 0917   BP: (!) 145/70   BP Location: Left arm   Patient Position: Sitting   BP Method: Medium (Automatic)   Pulse: 79   Resp: 16   Temp: 96.9 °F (36.1 °C)   TempSrc: Temporal   SpO2: 98%   Weight: 63.6 kg (140 lb 3.4 oz)   Height: 5' 5.5" (1.664 m)     Physical Exam  Constitutional:       General: She is not in acute distress.     Appearance: She is well-developed. She is not diaphoretic.   HENT:      Head: Normocephalic and atraumatic.   Cardiovascular:      Rate and Rhythm: Normal rate and regular rhythm.      Heart sounds: Normal heart sounds. No murmur heard.    No friction rub. No gallop.   Pulmonary:      Effort: Pulmonary effort is normal. No respiratory distress.      Breath sounds: Normal breath sounds. No wheezing or rales.   Chest:      Chest wall: No tenderness.   Breasts:      Right: No axillary adenopathy or supraclavicular adenopathy.      Left: No axillary adenopathy or supraclavicular adenopathy.       Abdominal:      General: Bowel sounds are normal. There is no distension.      Palpations: Abdomen is soft. There is no mass.      Tenderness: There is no abdominal tenderness. There is no guarding or rebound.   Musculoskeletal:         General: No tenderness. Normal range of motion. Compression stockings intact.     Right lower leg: No edema present.      Left lower leg: No edema present.   Lymphadenopathy:      Cervical: No cervical adenopathy.      Upper Body:      Right upper body: No supraclavicular or axillary adenopathy.      Left upper body: No supraclavicular or axillary adenopathy.   Skin:     Findings: No erythema or rash.   Neurological:      Mental Status: She is alert and oriented to person, place, and time.   Psychiatric:    "      Behavior: Behavior normal.         Laboratory Data:  Lab Visit on 04/19/2022   Component Date Value Ref Range Status    WBC 04/19/2022 4.89  3.90 - 12.70 K/uL Final    RBC 04/19/2022 4.07  4.00 - 5.40 M/uL Final    Hemoglobin 04/19/2022 12.2  12.0 - 16.0 g/dL Final    Hematocrit 04/19/2022 38.5  37.0 - 48.5 % Final    MCV 04/19/2022 95  82 - 98 fL Final    MCH 04/19/2022 30.0  27.0 - 31.0 pg Final    MCHC 04/19/2022 31.7 (A) 32.0 - 36.0 g/dL Final    RDW 04/19/2022 18.1 (A) 11.5 - 14.5 % Final    Platelets 04/19/2022 236  150 - 450 K/uL Final    MPV 04/19/2022 8.9 (A) 9.2 - 12.9 fL Final    Immature Granulocytes 04/19/2022 0.4  0.0 - 0.5 % Final    Gran # (ANC) 04/19/2022 3.0  1.8 - 7.7 K/uL Final    Immature Grans (Abs) 04/19/2022 0.02  0.00 - 0.04 K/uL Final    Comment: Mild elevation in immature granulocytes is non specific and   can be seen in a variety of conditions including stress response,   acute inflammation, trauma and pregnancy. Correlation with other   laboratory and clinical findings is essential.      Lymph # 04/19/2022 0.9 (A) 1.0 - 4.8 K/uL Final    Mono # 04/19/2022 0.8  0.3 - 1.0 K/uL Final    Eos # 04/19/2022 0.1  0.0 - 0.5 K/uL Final    Baso # 04/19/2022 0.05  0.00 - 0.20 K/uL Final    nRBC 04/19/2022 0  0 /100 WBC Final    Gran % 04/19/2022 62.2  38.0 - 73.0 % Final    Lymph % 04/19/2022 18.2  18.0 - 48.0 % Final    Mono % 04/19/2022 16.4 (A) 4.0 - 15.0 % Final    Eosinophil % 04/19/2022 1.8  0.0 - 8.0 % Final    Basophil % 04/19/2022 1.0  0.0 - 1.9 % Final    Differential Method 04/19/2022 Automated   Final    Sodium 04/19/2022 138  136 - 145 mmol/L Final    Potassium 04/19/2022 5.0  3.5 - 5.1 mmol/L Final    Chloride 04/19/2022 97  95 - 110 mmol/L Final    CO2 04/19/2022 30 (A) 23 - 29 mmol/L Final    Glucose 04/19/2022 98  70 - 110 mg/dL Final    BUN 04/19/2022 20  8 - 23 mg/dL Final    Creatinine 04/19/2022 0.6  0.5 - 1.4 mg/dL Final    Calcium  04/19/2022 10.0  8.7 - 10.5 mg/dL Final    Total Protein 04/19/2022 6.8  6.0 - 8.4 g/dL Final    Albumin 04/19/2022 3.7  3.5 - 5.2 g/dL Final    Total Bilirubin 04/19/2022 0.5  0.1 - 1.0 mg/dL Final    Comment: For infants and newborns, interpretation of results should be based  on gestational age, weight and in agreement with clinical  observations.    Premature Infant recommended reference ranges:  Up to 24 hours.............<8.0 mg/dL  Up to 48 hours............<12.0 mg/dL  3-5 days..................<15.0 mg/dL  6-29 days.................<15.0 mg/dL      Alkaline Phosphatase 04/19/2022 74  55 - 135 U/L Final    AST 04/19/2022 28  10 - 40 U/L Final    ALT 04/19/2022 23  10 - 44 U/L Final    Anion Gap 04/19/2022 11  8 - 16 mmol/L Final    eGFR if African American 04/19/2022 >60  >60 mL/min/1.73 m^2 Final    eGFR if non African American 04/19/2022 >60  >60 mL/min/1.73 m^2 Final    Comment: Calculation used to obtain the estimated glomerular filtration  rate (eGFR) is the CKD-EPI equation.        CA 19-9 PENDING    Imaging:     PET/CT 3/16/22    Head and neck:     No hypermetabolic activity is noted within the head neck to suggest metastatic disease.     Atherosclerotic carotid calcifications are noted.     Thorax:     A port is noted overlying the left hemithorax with its tip in the superior vena cava.     Extensive mitral valve calcifications or mitral valve replacement are noted.  Extensive coronary calcifications are noted increasing the patient's coronary risk.     A moderate sized joint effusion is developed since the prior.  Please correlate for etiologies.  This could relate to cardiogenic edema or metastatic fluid.  No obvious hypermetabolic activity is noted.     A pleural based density is noted of 5 mm image 50 sequence 3 similar since the prior.  Atelectasis, scarring, or pulmonary nodule is on the differential.  In a high-risk patient follow-up for long-term size stability would be suggested.      Sternotomy wires are noted     Abdomen and pelvis:     Significant improvement is noted since prior with the liver demonstrating near uniform metabolic activity.  Significantly less hypermetabolic activity is noted than was seen on the prior.  A hypodense region in the right lobe measured on the prior on CT is still thought to be seen image 131 without convincing detrimental change when measured in a similar manner as on the prior at 5.2 x 4.2 cm image 131 sequence 3.  Several other hypodense regions are noted without worrisome detrimental change since the prior.  The region in the right lobe of the liver that measured 2.5 x 1.9 cm on the prior is less easily visualized but can be measured similarly at 2.2 x 1.8 cm image 142 sequence 3     The adrenal thickening seen on the prior appears of 2 cm in short diameter measuring 1.9 cm on the prior unchanged.  In long dimension it was favored to be under measured on the prior but is unchanged when measured in a similar manner.  It is not significantly hypermetabolic on today's exam     The hypermetabolic pancreatic tail mass seen on the prior demonstrates near background activity on today's exam as well and no obvious focal mass can be measured on the CT portion of this exam.  The pancreas in this region measures 2.0 cm in thickness versus is 2.5 cm on the prior.  Decrease in size of the mass is suspected     Several hypermetabolic mesenteric foci were suspected on the prior suggestive of mesenteric metastasis.  No significant hypermetabolic activity is noted on today's exam.     Musculoskeletal:     Glenohumeral joint space loss is noted on the left greater than right suggestive of degenerative change likely associated with labral tearing.     Central canal stenosis is noted in the lower lumbar spine particularly at the L4-L5 level.  No obvious hypermetabolic osseous changes are noted on today's exam.  This is an improvement since the prior      Assessment:       1.  Primary pancreatic cancer with metastasis to other site    2. Metastasis to liver           Plan:     Metastatic Pancreatic Cancer - The patient was diagnosed with metastatic pancreatic cancer adenocarcinoma with mets to the liver on 12/15/21  -Ca19-9 was 7,581 on 12/15/21  -PET/CT on 1/04/22 shows mets to the liver, left adrenal gland, T9 vertebra and left iliac fossa.  -Potential mets are seen near the umbilicus  -PT underwent PORT placement 1/07/22  -MRI brain on 1/11/22 showed no brain mets  -Pt consented for Gemcitabine and paclitaxel on 1/03/22  -Pt treated with 20% dose reduction in paclitaxel and Gemcitabine to 100mg/mm2 and 800mg/mm2 respectively  -C2D8 and C2D15 cancelled given her hospitalization from 2/12/22-2/14/22  -Patient completed C3D8 and was hospitalized again for a fever  -Treatment moving forward was discussed with the patient and decision was made to remove day 8 of treatment  -PET/CT on 3/16/22 shows stable disease  -Ca19-9 has dropped dramatically to 170 on 3/16/22  -C3D15 delayed until today given cardiac ablation on 03/17/2022  -Will continue [place]/Abraxane at 20% does reduction due to prior thrombocytopenia    -Pt to undergo C4D15 today and will undergo treatment every other week    A-fib - pt now in NSR  -PT on Eliquis  -PT s/p Ablation on 3/17/22  -Management per cardiology    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 Green in 2 weeks with labs prior for C5D1.    Treatment Plan Information   OP PANC NAB-PACLITAXEL + GEMCITABINE Q4W    Salvador Green MD   Upcoming Treatment Dates - OP PANC NAB-PACLITAXEL + GEMCITABINE Q4W    4/19/2022       Chemotherapy       PACLitaxel-protein bound (ABRAXANE) 100 mg/m2 = 180 mg in 36 mL infusion       gemcitabine (GEMZAR) 1,440 mg in sodium chloride 0.9% 250 mL chemo infusion  5/3/2022       Chemotherapy       PACLitaxel-protein bound (ABRAXANE) 100 mg/m2 = 180 mg in 36 mL infusion       gemcitabine (GEMZAR) 1,440 mg in sodium chloride 0.9% 250 mL chemo infusion  5/17/2022       Chemotherapy       PACLitaxel-protein bound (ABRAXANE) 100 mg/m2 = 180 mg in 36 mL infusion       gemcitabine (GEMZAR) 1,440 mg in sodium chloride 0.9% 250 mL chemo infusion  5/31/2022       Chemotherapy       PACLitaxel-protein bound (ABRAXANE) 100 mg/m2 = 180 mg in 36 mL infusion       gemcitabine (GEMZAR) 1,440 mg in sodium chloride 0.9% 250 mL chemo infusion

## 2022-04-19 NOTE — PROGRESS NOTES
11:41 AM    This LCSW met with this pt and her daughter, Brinda at chairside.     The pt said she does not like how she looks in the beanie and this LCSW reassured her that she looked really good in the blue beanie bc it matches her blue eyes. The pt smiled and said, that meant a lot.    The pt's daughter asked how to obtain an appointment with [facility] financial counselor and this LCSW informed that she can pass by her office today and see if she is available, but I will provide her contact information.     The pt and her daughter reported no other needs at this time.

## 2022-04-25 PROBLEM — E87.1 HYPONATREMIA: Status: RESOLVED | Noted: 2022-01-01 | Resolved: 2022-01-01

## 2022-04-25 PROBLEM — E78.2 MIXED HYPERLIPIDEMIA: Status: ACTIVE | Noted: 2022-01-01

## 2022-04-25 PROBLEM — R60.0 BILATERAL LEG EDEMA: Status: RESOLVED | Noted: 2022-01-01 | Resolved: 2022-01-01

## 2022-04-25 PROBLEM — I50.32 CHRONIC DIASTOLIC HEART FAILURE: Status: ACTIVE | Noted: 2022-01-01

## 2022-04-25 PROBLEM — E87.6 HYPOKALEMIA: Status: RESOLVED | Noted: 2022-01-01 | Resolved: 2022-01-01

## 2022-04-25 PROBLEM — R50.9 FEVER AND CHILLS: Status: RESOLVED | Noted: 2022-01-01 | Resolved: 2022-01-01

## 2022-04-25 PROBLEM — R50.9 FEVER: Status: RESOLVED | Noted: 2022-01-01 | Resolved: 2022-01-01

## 2022-04-29 NOTE — PROGRESS NOTES
PATIENT: Nel Cui  MRN: [id number]  DATE: 4/29/2022      Diagnosis:   1. Primary pancreatic cancer with metastasis to other site    2. Malignant neoplasm of body of pancreas     3. 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 Metastasis to liver    5. Recurrent fever    6. Atrial fibrillation, unspecified type    7. 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therapy    8. Pancreatic insufficiency    9. Cancer related pain    10. Hypertension, unspecified type    11. Hyperlipidemia, unspecified hyperlipidemia type    12. Chronic diastolic congestive heart failure        Chief Complaint: Pancreatic Cancer (3 week follow up )      Oncologic History:      Oncologic History 12/09/21 Ct Abdomen  12/15/21 EUS  1/04/22 PET/CT  1/07/22 PORT - Dr Acuña  1/11/22 MRI Brain  3/16/22 PET/CT    Oncologic Treatment 1/12/22 - current Gemcitabine and Paclitaxel s/p 4 cycles    Pathology 12/15/21 adenocarcinoma in the pancreatic body and adenocarcinoma in the liver       The patient initially presented to her PCP on 12/02/21 with complaint of abdominal pain.  She underwent a CT of the abdomen on 12/09/21 showing subcentimeter para-aortic, mesenteric lymph nodes; multiple hepatic infiltrative lesions present throughout the liver with 1 of the larger areas right liver lobe measuring approximately 2.2 cm in diameter; mass in the pancreatic tail measuring 9 x 3.3 cm.  The patient underwent EUS under the care of Dr Tse on 12/15/21 showing a mass in the pancreatic body and multiple liver lesions.  Path from the procedure showed adenocarcinoma in the pancreatic body and adenocarcinoma in the liver.    The patient underwent PET/CT on 1/04/22 showing hypermetabolic rounded mass at the junction of the body and tail of the pancreas measuring 2.5 x 2.4 cm, hypermetabolic left adrenal gland nodule measuring 1.9 cm, innumerable hypermetabolic nodular masses throughout the left and right hepatic lobes with largest lesions in the right  liver measuring 2.5 x 1.9 cm and 5.2 x 4.4 cm, mildly enlarged hypermetabolic portacaval lymph node measuring 1.4 cm, 0.7 cm hypermetabolic focus at the umbilicus, lesion in the T9 vertebra measuring 1.8 x 1.7 cm and a lesion in the left iliac fossa measuring 1.7 x 1.2 cm.  MRI of the brain on 01/11/2022 showed no evidence of metastases but did show an old right midbrain lacunar infarct.   The patient was in the hospital from 1/28/22-1/29/22 with fever and low sodium.  The patient's hydrochlorothiazide was discontinued.  The patient then presented to the hospital on 02/06/2022 for dyspnea on exertion.  CTA was done on 02/06/2022 showing no evidence of pulmonary embolism.  The patient underwent NT proBNP which was elevated at 2630 pg/mL.  The patient was started on Lasix every other day at the request of Cardiology.   The patient was admitted to the hospital from 2/12/22-2/14/22 for fever.  The patient was discharged on Levaquin for 5 days.  The patient saw Cardiology on 02/17/2022 for atrial fibrillation and was started on Eliquis and taken off of aspirin.     The the patient was admitted to the hospital from [id number] for fever.  During her hospitalization procalcitonin was checked and was normal.  All her cultures were negative.  Her fever was felt to be from chemotherapy.    The patient underwent PET-CT on 03/16/2022 showing a followed of mm pulmonary based nodule; decrease metabolic activity in the liver mass with stable right lobe mass measuring 5.2 x 4.2 cm; right lobe mass measuring 2.2 x 1.8 cm; adrenal gland thickening of 1.9 cm; decrease in pancreatic mass measuring 2 cm; decreased metabolic activity of hypermetabolic mesenteric foci and right-sided pleural effusion.  The patient underwent cardioversion on 03/17/2022 at which point constantine was performed.  Patient was put back in normal sinus rhythm.    The patient was admitted to the hospital for CHF exacerbation from 3/25/22 to 3/29/22 and was diuresed a  little over 9.5L.    Subjective:     Interval History: Ms. Cui is a 86 y.o. female with Osteopenia, HLD, HTN who presents for pancreatic cancer.  Since the last clinic visit the patient complains of fatigue. She states she does not regularly exercise.  The patient denies CP, cough, SOB, abdominal pain, N/V, constipation, diarrhea.  The patient denies fever, chills, night sweats, weight loss, new lumps or bumps, easy bruising or bleeding.    Past Medical History:   Past Medical History:   Diagnosis Date    A-fib     Anemia     Anticoagulant long-term use     Arthritis     Cataracts, bilateral     CHF (congestive heart failure)     chronic diastolic    Coronary artery disease     Nonobstructive    Essential (primary) hypertension 10/05/2010    Hyperlipidemia     Hyponatremia     Mitral regurgitation     Osteopenia     Pancreatic insufficiency     Primary pancreatic cancer with metastasis to other site     Thrombocytopenia        Past Surgical HIstory:   Past Surgical History:   Procedure Laterality Date    CARPAL TUNNEL RELEASE Bilateral     wrist    CATARACT EXTRACTION, BILATERAL  2017    CORONARY ANGIOGRAPHY  09/01/2020    Procedure: ANGIOGRAM, CORONARY ARTERY;  Surgeon: Ivette Horne MD;  Location: [facility] CATH;  Service: Cardiology;;    DILATION AND CURETTAGE OF UTERUS      ENDOSCOPIC ULTRASOUND OF UPPER GASTROINTESTINAL TRACT Left 12/15/2021    Procedure: ULTRASOUND, UPPER GI TRACT, ENDOSCOPIC;  Surgeon: Lico Tse MD;  Location: [facility] ENDO;  Service: Endoscopy;  Laterality: Left;    ESOPHAGOGASTRODUODENOSCOPY N/A 12/15/2021    Procedure: EGD (ESOPHAGOGASTRODUODENOSCOPY);  Surgeon: Lico Tse MD;  Location: [facility] ENDO;  Service: Endoscopy;  Laterality: N/A;    INSERTION OF TUNNELED CENTRAL VENOUS CATHETER (CVC) WITH SUBCUTANEOUS PORT N/A 01/07/2022    Procedure: [id number];  Surgeon: Cas Acuña MD;  Location: [facility] OR;  Service: General;  Laterality: N/A;  "   LEFT HEART CATHETERIZATION  09/01/2020    Procedure: Left heart cath;  Surgeon: Ivette Horne MD;  Location: [facility] CATH;  Service: Cardiology;;    MITRAL VALVE REPAIR      OOPHORECTOMY Left 1988    OTHER SURGICAL HISTORY      maze procedure and PHILL ligation    REPAIR OF MENISCUS OF KNEE Left     TONSILLECTOMY      vein removal         Family History:   Family History   Problem Relation Age of Onset    No Known Problems Mother     No Known Problems Father     Cancer Maternal Grandmother         Stomach    No Known Problems Maternal Grandfather     No Known Problems Paternal Grandmother     No Known Problems Paternal Grandfather     Breast cancer Sister         over 60    No Known Problems Brother        Social History:  reports that she quit smoking about 33 years ago. Her smoking use included cigarettes. She has a 30.00 pack-year smoking history. She has never used smokeless tobacco. She reports that she does not drink alcohol and does not use drugs.    Allergies:  Review of patient's allergies indicates:   Allergen Reactions    Amoxicillin-pot clavulanate Other (See Comments)     "Spaced out feeling- can't take it longer than 4 or 5 days"  Patient tolerated Zosyn    Breo ellipta [fluticasone furoate-vilanterol] Other (See Comments)     Feels jittery    Corticosteroids (glucocorticoids)      Other reaction(s): tachycardia    Erythromycin      "Spaced out"    Metoprolol succinate      "I dont feel good, it doesn't work"    Latex, natural rubber      Turns skin red around area where latex touches       Medications:  Current Outpatient Medications   Medication Sig Dispense Refill    acetaminophen (TYLENOL) 325 MG tablet Take 325 mg by mouth every 6 (six) hours as needed for Pain or Temperature greater than.      apixaban (ELIQUIS) 5 mg Tab Take 1 tablet (5 mg total) by mouth 2 (two) times daily. 60 tablet 11    ascorbic acid, vitamin C, (VITAMIN C) 500 MG tablet Take 500 mg by mouth once daily. "      bisoprolol (ZEBETA) 5 MG tablet TAKE 1 TABLET(5 MG) BY MOUTH EVERY DAY 90 tablet 3    cholecalciferol, vitamin D3, (VITAMIN D3) 25 mcg (1,000 unit) capsule Take 1,000 Units by mouth every morning.      coenzyme Q10 100 mg capsule Take 100 mg by mouth once daily.      docusate sodium (COLACE) 100 MG capsule Take 1 capsule (100 mg total) by mouth 2 (two) times daily. (Patient taking differently: Take 100 mg by mouth 2 (two) times daily as needed.)  0    furosemide (LASIX) 20 MG tablet Take 1 tablet (20 mg total) by mouth once daily. 90 tablet 3    hydrOXYzine HCL (ATARAX) 25 MG tablet Take 1 tablet (25 mg total) by mouth 3 (three) times daily as needed for Itching. 30 tablet 3    LIDOcaine-prilocaine (EMLA) cream Apply topically as needed (30 to 60 minutes prior to chemo). 30 g 2    lipase-protease-amylase (CREON) 36,000-114,000- 180,000 unit CpDR Take 2 capsules by mouth 3 (three) times daily with meals. Take 2 capsules TID with meals and 1 capsule PRN with snacks (Patient taking differently: Take 2 capsules by mouth 3 (three) times daily with meals. and 1 capsule as needed with snacks) 240 capsule 11    losartan (COZAAR) 100 MG tablet Take 1 tablet (100 mg total) by mouth every evening. 90 tablet 3    magnesium 250 mg Tab Take 250 mg by mouth every evening.      multivitamin (THERAGRAN) per tablet Take 1 tablet by mouth every other day.      ondansetron (ZOFRAN-ODT) 4 MG TbDL Take 4 mg by mouth every 12 (twelve) hours as needed (nausea/vomiting).      pravastatin (PRAVACHOL) 40 MG tablet Take 1 tablet (40 mg total) by mouth once daily. 90 tablet 3    saliva substitute combo no.9 (BIOTENE DRY MOUTH ORAL RINSE) Mwsh Swish and spit 5 mLs every evening.       No current facility-administered medications for this visit.       Review of Systems   Constitutional: Positive for fatigue. Negative for chills, fever and unexpected weight change.   Respiratory: Negative for cough and shortness of breath.   "  Cardiovascular: Negative for chest pain, palpitations and leg swelling.   Gastrointestinal: Negative for abdominal pain, constipation, diarrhea, nausea and vomiting.   Skin: Negative for rash.   Neurological: Negative for headaches.   Hematological: Negative for adenopathy. Does not bruise/bleed easily.       ECOG Performance Status: 2   Objective:      Vitals:   Vitals:    04/29/22 1055   BP: (!) 148/70   BP Location: Right arm   Patient Position: Sitting   BP Method: Medium (Manual)   Pulse: 86   Temp: 97.7 °F (36.5 °C)   TempSrc: Temporal   SpO2: 98%   Weight: 63.7 kg (140 lb 6.9 oz)   Height: 5' 5" (1.651 m)     Physical Exam  Constitutional:       General: She is not in acute distress.     Appearance: She is well-developed. She is not diaphoretic.   HENT:      Head: Normocephalic and atraumatic.   Cardiovascular:      Rate and Rhythm: Normal rate and regular rhythm.      Heart sounds: Normal heart sounds. No murmur heard.    No friction rub. No gallop.   Pulmonary:      Effort: Pulmonary effort is normal. No respiratory distress.      Breath sounds: Normal breath sounds. No wheezing or rales.   Chest:      Chest wall: No tenderness.   Breasts:      Right: No axillary adenopathy or supraclavicular adenopathy.      Left: No axillary adenopathy or supraclavicular adenopathy.       Abdominal:      General: Bowel sounds are normal. There is no distension.      Palpations: Abdomen is soft. There is no mass.      Tenderness: There is no abdominal tenderness. There is no guarding or rebound.   Musculoskeletal:         General: No tenderness. Normal range of motion.      Right lower leg: Edema present.      Left lower leg: Edema present.   Lymphadenopathy:      Cervical: No cervical adenopathy.      Upper Body:      Right upper body: No supraclavicular or axillary adenopathy.      Left upper body: No supraclavicular or axillary adenopathy.   Skin:     Findings: No erythema or rash.   Neurological:      Mental Status: " She is alert and oriented to person, place, and time.   Psychiatric:         Behavior: Behavior normal.         Laboratory Data:  Lab Visit on 04/29/2022   Component Date Value Ref Range Status    WBC 04/29/2022 4.69  3.90 - 12.70 K/uL Final    RBC 04/29/2022 3.96 (A) 4.00 - 5.40 M/uL Final    Hemoglobin 04/29/2022 11.9 (A) 12.0 - 16.0 g/dL Final    Hematocrit 04/29/2022 37.4  37.0 - 48.5 % Final    MCV 04/29/2022 94  82 - 98 fL Final    MCH 04/29/2022 30.1  27.0 - 31.0 pg Final    MCHC 04/29/2022 31.8 (A) 32.0 - 36.0 g/dL Final    RDW 04/29/2022 17.3 (A) 11.5 - 14.5 % Final    Platelets 04/29/2022 193  150 - 450 K/uL Final    MPV 04/29/2022 8.7 (A) 9.2 - 12.9 fL Final    Immature Granulocytes 04/29/2022 0.4  0.0 - 0.5 % Final    Gran # (ANC) 04/29/2022 2.8  1.8 - 7.7 K/uL Final    Immature Grans (Abs) 04/29/2022 0.02  0.00 - 0.04 K/uL Final    Comment: Mild elevation in immature granulocytes is non specific and   can be seen in a variety of conditions including stress response,   acute inflammation, trauma and pregnancy. Correlation with other   laboratory and clinical findings is essential.      Lymph # 04/29/2022 0.9 (A) 1.0 - 4.8 K/uL Final    Mono # 04/29/2022 0.8  0.3 - 1.0 K/uL Final    Eos # 04/29/2022 0.2  0.0 - 0.5 K/uL Final    Baso # 04/29/2022 0.03  0.00 - 0.20 K/uL Final    nRBC 04/29/2022 0  0 /100 WBC Final    Gran % 04/29/2022 60.4  38.0 - 73.0 % Final    Lymph % 04/29/2022 19.4  18.0 - 48.0 % Final    Mono % 04/29/2022 16.0 (A) 4.0 - 15.0 % Final    Eosinophil % 04/29/2022 3.2  0.0 - 8.0 % Final    Basophil % 04/29/2022 0.6  0.0 - 1.9 % Final    Differential Method 04/29/2022 Automated   Final    Sodium 04/29/2022 136  136 - 145 mmol/L Final    Potassium 04/29/2022 4.9  3.5 - 5.1 mmol/L Final    Chloride 04/29/2022 95  95 - 110 mmol/L Final    CO2 04/29/2022 29  23 - 29 mmol/L Final    Glucose 04/29/2022 92  70 - 110 mg/dL Final    BUN 04/29/2022 18  8 - 23 mg/dL Final     Creatinine 04/29/2022 0.6  0.5 - 1.4 mg/dL Final    Calcium 04/29/2022 9.6  8.7 - 10.5 mg/dL Final    Total Protein 04/29/2022 6.8  6.0 - 8.4 g/dL Final    Albumin 04/29/2022 3.7  3.5 - 5.2 g/dL Final    Total Bilirubin 04/29/2022 0.5  0.1 - 1.0 mg/dL Final    Comment: For infants and newborns, interpretation of results should be based  on gestational age, weight and in agreement with clinical  observations.    Premature Infant recommended reference ranges:  Up to 24 hours.............<8.0 mg/dL  Up to 48 hours............<12.0 mg/dL  3-5 days..................<15.0 mg/dL  6-29 days.................<15.0 mg/dL      Alkaline Phosphatase 04/29/2022 74  55 - 135 U/L Final    AST 04/29/2022 29  10 - 40 U/L Final    ALT 04/29/2022 25  10 - 44 U/L Final    Anion Gap 04/29/2022 12  8 - 16 mmol/L Final    eGFR if African American 04/29/2022 >60  >60 mL/min/1.73 m^2 Final    eGFR if non African American 04/29/2022 >60  >60 mL/min/1.73 m^2 Final    Comment: Calculation used to obtain the estimated glomerular filtration  rate (eGFR) is the CKD-EPI equation.            Imaging:     PET/CT 3/16/22    Head and neck:     No hypermetabolic activity is noted within the head neck to suggest metastatic disease.     Atherosclerotic carotid calcifications are noted.     Thorax:     A port is noted overlying the left hemithorax with its tip in the superior vena cava.     Extensive mitral valve calcifications or mitral valve replacement are noted.  Extensive coronary calcifications are noted increasing the patient's coronary risk.     A moderate sized joint effusion is developed since the prior.  Please correlate for etiologies.  This could relate to cardiogenic edema or metastatic fluid.  No obvious hypermetabolic activity is noted.     A pleural based density is noted of 5 mm image 50 sequence 3 similar since the prior.  Atelectasis, scarring, or pulmonary nodule is on the differential.  In a high-risk patient follow-up  for long-term size stability would be suggested.     Sternotomy wires are noted     Abdomen and pelvis:     Significant improvement is noted since prior with the liver demonstrating near uniform metabolic activity.  Significantly less hypermetabolic activity is noted than was seen on the prior.  A hypodense region in the right lobe measured on the prior on CT is still thought to be seen image 131 without convincing detrimental change when measured in a similar manner as on the prior at 5.2 x 4.2 cm image 131 sequence 3.  Several other hypodense regions are noted without worrisome detrimental change since the prior.  The region in the right lobe of the liver that measured 2.5 x 1.9 cm on the prior is less easily visualized but can be measured similarly at 2.2 x 1.8 cm image 142 sequence 3     The adrenal thickening seen on the prior appears of 2 cm in short diameter measuring 1.9 cm on the prior unchanged.  In long dimension it was favored to be under measured on the prior but is unchanged when measured in a similar manner.  It is not significantly hypermetabolic on today's exam     The hypermetabolic pancreatic tail mass seen on the prior demonstrates near background activity on today's exam as well and no obvious focal mass can be measured on the CT portion of this exam.  The pancreas in this region measures 2.0 cm in thickness versus is 2.5 cm on the prior.  Decrease in size of the mass is suspected     Several hypermetabolic mesenteric foci were suspected on the prior suggestive of mesenteric metastasis.  No significant hypermetabolic activity is noted on today's exam.     Musculoskeletal:     Glenohumeral joint space loss is noted on the left greater than right suggestive of degenerative change likely associated with labral tearing.     Central canal stenosis is noted in the lower lumbar spine particularly at the L4-L5 level.  No obvious hypermetabolic osseous changes are noted on today's exam.  This is an  improvement since the prior      Assessment:       1. Primary pancreatic cancer with metastasis to other site    2. Malignant neoplasm of body of pancreas     3. 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 Metastasis to liver    5. Recurrent fever    6. Atrial fibrillation, unspecified type    7. 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therapy    8. Pancreatic insufficiency    9. Cancer related pain    10. Hypertension, unspecified type    11. Hyperlipidemia, unspecified hyperlipidemia type    12. Chronic diastolic congestive heart failure           Plan:     Metastatic Pancreatic Cancer - The patient was diagnosed with metastatic pancreatic cancer adenocarcinoma with mets to the liver on 12/15/21  -Ca19-9 was 7,581 on 12/15/21  -PET/CT on 1/04/22 shows mets to the liver, left adrenal gland, T9 vertebra and left iliac fossa.  -Potential mets are seen near the umbilicus  -PT underwent PORT placement 1/07/22  -MRI brain on 1/11/22 showed no brain mets  -Pt consented for Gemcitabine and paclitaxel on 1/03/22  -Pt treated with 20% dose reduction in paclitaxel and Gemcitabine to 100mg/mm2 and 800mg/mm2 respectively  -C2D8 and C2D15 cancelled given her hospitalization from 2/12/22-2/14/22  -Patient completed C3D8 and was hospitalized again for a fever  -Treatment moving forward was discussed with the patient and decision was made to remove day 8 of treatment  -PET/CT on 3/16/22 shows stable disease  -Ca19-9 has dropped dramatically to 170 on 3/16/22  -C3D15 delayed given cardiac ablation on 03/17/2022  -Will continue [place]/Abraxane at 20% does reduction due to prior thrombocytopenia  -Pt to undergo C5D1 5/02/22  -Pt to return in 2 weeks for C5D8  -Guardant 360 testing 1/26/22 showed STACY [id number] 0.3% amplification, KRAS G12D 0.3% amplification, MSI-stable, ARID1A I2153W 0.4%, KRAS G12D 0.3%, STACY [id number], STK11 S59T 0.2%, and BRCA1 R979C 0.1%.  No BRCA2 was detected  -Pt up for My Chart Care Companion    Recurrent Fever -  pt with a fever several days after almost every chemotherapy treatment  -Fever felt to be due to chemo or tumor response to chemo  -Pt taking tylenol after each treatment  -Will monitor    A-fib - pt now in NSR  -PT on Eliquis  -PT s/p Ablation on 3/17/22  -Management per cardiology    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 - pt with increased infection risk on chemo  -Will monitor    Pancreatic Insufficiency - pt on creon  -Will monitor    Cancer Related Pain - improved  -Will monitor    HTN - pt taking losartan, bisoprolol  -Cardiology managing  -Will monitor    HLD - pt on pravastatin  -Management per PCP    CHF - pt with recent CHF exacerbation and hospitalized from 3/25/22 to 3/29/22 wioth 9.5L removed  -Pt now taking lasix daily  -Management per cardiology    Advance Care Planning     Power of   I initiated the process of advance care planning today and explained the importance of this process to the patient.  I introduced the concept of advance directives to the patient, as well. Then the patient received detailed information about the importance of designating a Health Care Power of  (HCPOA). She was also instructed to communicate with this person about their wishes for future healthcare, should she become sick and lose decision-making capacity. The patient has not previously appointed a HCPOA. After our discussion, the patient has decided to complete a HCPOA and will bring the form completed to her next clinic appointment.            Route Chart for Scheduling    Med Onc Chart Routing      Follow up with physician 4 weeks and 2 weeks. The patient can proceed with treatment on 5/02/22.  She will need a CBC, CMP and Ca19-9 on 5/16/22 with an appt with me that day.  We can scheduled her to see me on 5/30/22 prior to her treatment that day.   Follow up with IVONNE 2 weeks.   Labs CA 19-9, CBC and CMP   Lab interval:     Imaging    Pharmacy appointment    Other referrals          Treatment Plan Information    OP PANC NAB-PACLITAXEL + GEMCITABINE Q4W   Salvador Green MD   Upcoming Treatment Dates - OP PANC NAB-PACLITAXEL + GEMCITABINE Q4W    5/2/2022       Chemotherapy       PACLitaxeL protein-bound (ABRAXANE) 100 mg/m2 = 170 mg in 34 mL infusion       gemcitabine (GEMZAR) 1,370 mg in sodium chloride 0.9% 250 mL chemo infusion  5/16/2022       Chemotherapy       PACLitaxeL protein-bound (ABRAXANE) 100 mg/m2 = 170 mg in 34 mL infusion       gemcitabine (GEMZAR) 1,370 mg in sodium chloride 0.9% 250 mL chemo infusion  5/30/2022       Chemotherapy       PACLitaxeL protein-bound (ABRAXANE) 100 mg/m2 = 170 mg in 34 mL infusion       gemcitabine (GEMZAR) 1,370 mg in sodium chloride 0.9% 250 mL chemo infusion  6/13/2022       Chemotherapy       PACLitaxeL protein-bound (ABRAXANE) 100 mg/m2 = 170 mg in 34 mL infusion       gemcitabine (GEMZAR) 1,370 mg in sodium chloride 0.9% 250 mL chemo infusion      Salvador Green MD  [facility]  Hematology and Oncology  [facility]   [address] [place] [address]   O: [phone]  F: [phone]

## 2022-05-02 NOTE — TELEPHONE ENCOUNTER
Navigation chart review completed. Plan of care reviewed. Per Dr. Green's last note, pt proceeding w/ treatment- f/u appts scheduled as ordered.

## 2022-05-02 NOTE — PLAN OF CARE
Tolerated infusion well today Able to be d/c to home  Discharge instructions given with copy of avs and pt ambulated to private vehicle without difficulty NAD  Problem: Adult Inpatient Plan of Care  Goal: Plan of Care Review  Outcome: Ongoing, Progressing     Problem: Fatigue  Goal: Improved Activity Tolerance  Outcome: Ongoing, Progressing

## 2022-05-11 NOTE — PROGRESS NOTES
PATIENT: Nel Cui  MRN: [id number]  DATE: 5/11/2022      Diagnosis:   1. Fever, unspecified fever cause    2. Primary pancreatic cancer with metastasis to other site    3. Malignant neoplasm of body of pancreas     4. Metastasis to liver    5. 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  She underwent a CT of the abdomen on 12/09/21 showing subcentimeter para-aortic, mesenteric lymph nodes; multiple hepatic infiltrative lesions present throughout the liver with 1 of the larger areas right liver lobe measuring approximately 2.2 cm in diameter; mass in the pancreatic tail measuring 9 x 3.3 cm.  The patient underwent EUS under the care of Dr Tse on 12/15/21 showing a mass in the pancreatic body and multiple liver lesions.  Path from the procedure showed adenocarcinoma in the pancreatic body and adenocarcinoma in the liver.    The patient underwent PET/CT on 1/04/22 showing hypermetabolic rounded mass at the junction of the body and tail of the pancreas measuring 2.5 x 2.4 cm, hypermetabolic left adrenal gland nodule measuring 1.9 cm, innumerable hypermetabolic nodular masses throughout the left and right hepatic lobes with largest lesions in the right liver measuring 2.5 x 1.9 cm and 5.2 x 4.4 cm, mildly enlarged hypermetabolic portacaval lymph node measuring 1.4 cm, 0.7 cm hypermetabolic focus at the umbilicus, lesion in the T9 vertebra measuring 1.8 x 1.7 cm and a lesion in the left iliac fossa measuring 1.7 x 1.2 cm.  MRI of the brain  on 01/11/2022 showed no evidence of metastases but did show an old right midbrain lacunar infarct.   The patient was in the hospital from 1/28/22-1/29/22 with fever and low sodium.  The patient's hydrochlorothiazide was discontinued.  The patient then presented to the hospital on 02/06/2022 for dyspnea on exertion.  CTA was done on 02/06/2022 showing no evidence of pulmonary embolism.  The patient underwent NT proBNP which was elevated at 2630 pg/mL.  The patient was started on Lasix every other day at the request of Cardiology.   The patient was admitted to the hospital from 2/12/22-2/14/22 for fever.  The patient was discharged on Levaquin for 5 days.  The patient saw Cardiology on 02/17/2022 for atrial fibrillation and was started on Eliquis and taken off of aspirin.     The the patient was admitted to the hospital from [id number] for fever.  During her hospitalization procalcitonin was checked and was normal.  All her cultures were negative.  Her fever was felt to be from chemotherapy.    The patient underwent PET-CT on 03/16/2022 showing a followed of mm pulmonary based nodule; decrease metabolic activity in the liver mass with stable right lobe mass measuring 5.2 x 4.2 cm; right lobe mass measuring 2.2 x 1.8 cm; adrenal gland thickening of 1.9 cm; decrease in pancreatic mass measuring 2 cm; decreased metabolic activity of hypermetabolic mesenteric foci and right-sided pleural effusion.  The patient underwent cardioversion on 03/17/2022 at which point constantine was performed.  Patient was put back in normal sinus rhythm.    The patient was admitted to the hospital for CHF exacerbation from 3/25/22 to 3/29/22 and was diuresed a little over 9.5L.    Subjective:     Interval History: Ms. Cui is a 87 y.o. female with Osteopenia, HLD, HTN, pancreatic cancer who presents for fever.  The patient states that she woke up yesterday with fever 100.5 and took Tylenol.  She then had difficulty sleeping that night.  This  morning the patient woke up and had a temperature of a 101.1°.  The patient states that she has a little bit of a runny nose.  Patient denies chest pain, shortness a breath,, cough, nausea vomiting, diarrhea, headache, or difficulty urinating.  The patient denies abdominal pain, constipation, night sweats, weight loss, new lumps or bumps, easy bruising or bleeding.    Past Medical History:   Past Medical History:   Diagnosis Date    A-fib     Anemia     Anticoagulant long-term use     Arthritis     Cataracts, bilateral     CHF (congestive heart failure)     chronic diastolic    Coronary artery disease     Nonobstructive    Essential (primary) hypertension 10/05/2010    Hyperlipidemia     Hyponatremia     Mitral regurgitation     Osteopenia     Pancreatic insufficiency     Primary pancreatic cancer with metastasis to other site     Thrombocytopenia        Past Surgical HIstory:   Past Surgical History:   Procedure Laterality Date    CARPAL TUNNEL RELEASE Bilateral     wrist    CATARACT EXTRACTION, BILATERAL  2017    CORONARY ANGIOGRAPHY  09/01/2020    Procedure: ANGIOGRAM, CORONARY ARTERY;  Surgeon: Ivette Horne MD;  Location: [facility];  Service: Cardiology;;    DILATION AND CURETTAGE OF UTERUS      ENDOSCOPIC ULTRASOUND OF UPPER GASTROINTESTINAL TRACT Left 12/15/2021    Procedure: ULTRASOUND, UPPER GI TRACT, ENDOSCOPIC;  Surgeon: Lico Tse MD;  Location: [facility];  Service: Endoscopy;  Laterality: Left;    ESOPHAGOGASTRODUODENOSCOPY N/A 12/15/2021    Procedure: EGD (ESOPHAGOGASTRODUODENOSCOPY);  Surgeon: Lico Tse MD;  Location: [facility];  Service: Endoscopy;  Laterality: N/A;    INSERTION OF TUNNELED CENTRAL VENOUS CATHETER (CVC) WITH SUBCUTANEOUS PORT N/A 01/07/2022    Procedure: [id number];  Surgeon: Cas Acuña MD;  Location: [facility];  Service: General;  Laterality: N/A;    LEFT HEART CATHETERIZATION  09/01/2020    Procedure: Left heart cath;   "Surgeon: Ivette Horne MD;  Location: [facility];  Service: Cardiology;;    MITRAL VALVE REPAIR      OOPHORECTOMY Left 1988    OTHER SURGICAL HISTORY      maze procedure and PHILL ligation    REPAIR OF MENISCUS OF KNEE Left     TONSILLECTOMY      vein removal         Family History:   Family History   Problem Relation Age of Onset    No Known Problems Mother     No Known Problems Father     Cancer Maternal Grandmother         Stomach    No Known Problems Maternal Grandfather     No Known Problems Paternal Grandmother     No Known Problems Paternal Grandfather     Breast cancer Sister         over 60    No Known Problems Brother        Social History:  reports that she quit smoking about 33 years ago. Her smoking use included cigarettes. She has a 30.00 pack-year smoking history. She has never used smokeless tobacco. She reports that she does not drink alcohol and does not use drugs.    Allergies:  Review of patient's allergies indicates:   Allergen Reactions    Amoxicillin-pot clavulanate Other (See Comments)     "Spaced out feeling- can't take it longer than 4 or 5 days"  Patient tolerated Zosyn    Breo ellipta [fluticasone furoate-vilanterol] Other (See Comments)     Feels jittery    Corticosteroids (glucocorticoids)      Other reaction(s): tachycardia    Erythromycin      "Spaced out"    Metoprolol succinate      "I dont feel good, it doesn't work"    Latex, natural rubber      Turns skin red around area where latex touches       Medications:  Current Outpatient Medications   Medication Sig Dispense Refill    acetaminophen (TYLENOL) 325 MG tablet Take 325 mg by mouth every 6 (six) hours as needed for Pain or Temperature greater than.      apixaban (ELIQUIS) 5 mg Tab Take 1 tablet (5 mg total) by mouth 2 (two) times daily. 60 tablet 11    ascorbic acid, vitamin C, (VITAMIN C) 500 MG tablet Take 500 mg by mouth once daily.      bisoprolol (ZEBETA) 5 MG tablet TAKE 1 TABLET(5 MG) BY MOUTH EVERY " DAY 90 tablet 3    cholecalciferol, vitamin D3, (VITAMIN D3) 25 mcg (1,000 unit) capsule Take 1,000 Units by mouth every morning.      coenzyme Q10 100 mg capsule Take 100 mg by mouth once daily.      docusate sodium (COLACE) 100 MG capsule Take 1 capsule (100 mg total) by mouth 2 (two) times daily. (Patient taking differently: Take 100 mg by mouth 2 (two) times daily as needed.)  0    furosemide (LASIX) 20 MG tablet Take 1 tablet (20 mg total) by mouth once daily. 90 tablet 3    hydrOXYzine HCL (ATARAX) 25 MG tablet Take 1 tablet (25 mg total) by mouth 3 (three) times daily as needed for Itching. 30 tablet 3    LIDOcaine-prilocaine (EMLA) cream Apply topically as needed (30 to 60 minutes prior to chemo). 30 g 2    lipase-protease-amylase (CREON) 36,000-114,000- 180,000 unit CpDR Take 2 capsules by mouth 3 (three) times daily with meals. Take 2 capsules TID with meals and 1 capsule PRN with snacks (Patient taking differently: Take 2 capsules by mouth 3 (three) times daily with meals. and 1 capsule as needed with snacks) 240 capsule 11    losartan (COZAAR) 100 MG tablet Take 1 tablet (100 mg total) by mouth every evening. 90 tablet 3    magnesium 250 mg Tab Take 250 mg by mouth every evening.      multivitamin (THERAGRAN) per tablet Take 1 tablet by mouth every other day.      ondansetron (ZOFRAN-ODT) 4 MG TbDL Take 4 mg by mouth every 12 (twelve) hours as needed (nausea/vomiting).      pravastatin (PRAVACHOL) 40 MG tablet Take 1 tablet (40 mg total) by mouth once daily. 90 tablet 3    saliva substitute combo no.9 (BIOTENE DRY MOUTH ORAL RINSE) Mwsh Swish and spit 5 mLs every evening.       No current facility-administered medications for this visit.       Review of Systems   Constitutional: Positive for fever. Negative for chills, fatigue and unexpected weight change.   Respiratory: Negative for cough and shortness of breath.    Cardiovascular: Negative for chest pain, palpitations and leg swelling.  "  Gastrointestinal: Negative for abdominal pain, constipation, diarrhea, nausea and vomiting.   Genitourinary: Negative for difficulty urinating and dysuria.   Skin: Negative for rash.   Neurological: Negative for headaches.   Hematological: Negative for adenopathy. Does not bruise/bleed easily.       ECOG Performance Status: 2   Objective:      Vitals:   Vitals:    05/11/22 1234   BP: (!) 158/80   BP Location: Left arm   Patient Position: Sitting   BP Method: Medium (Manual)   Pulse: 91   Temp: 98.7 °F (37.1 °C)   TempSrc: Oral   SpO2: 97%   Weight: 61.9 kg (136 lb 7.4 oz)   Height: 5' 5" (1.651 m)     Physical Exam  Constitutional:       General: She is not in acute distress.     Appearance: She is well-developed. She is not diaphoretic.   HENT:      Head: Normocephalic and atraumatic.   Cardiovascular:      Rate and Rhythm: Normal rate and regular rhythm.      Heart sounds: Normal heart sounds. No murmur heard.    No friction rub. No gallop.   Pulmonary:      Effort: Pulmonary effort is normal. No respiratory distress.      Breath sounds: Normal breath sounds. No wheezing or rales.   Chest:      Chest wall: No tenderness.   Breasts:      Right: No axillary adenopathy or supraclavicular adenopathy.      Left: No axillary adenopathy or supraclavicular adenopathy.       Abdominal:      General: Bowel sounds are normal. There is no distension.      Palpations: Abdomen is soft. There is no mass.      Tenderness: There is no abdominal tenderness. There is no guarding or rebound.   Musculoskeletal:         General: No tenderness. Normal range of motion.      Right lower leg: Edema present.      Left lower leg: Edema present.   Lymphadenopathy:      Cervical: No cervical adenopathy.      Upper Body:      Right upper body: No supraclavicular or axillary adenopathy.      Left upper body: No supraclavicular or axillary adenopathy.   Skin:     Findings: No erythema or rash.   Neurological:      Mental Status: She is alert " and oriented to person, place, and time.   Psychiatric:         Behavior: Behavior normal.         Laboratory Data:  Lab Visit on 05/11/2022   Component Date Value Ref Range Status    SARS-CoV-2 RNA, Amplification, Qual 05/11/2022 Negative  Negative Final    Comment: This test utilizes isothermal nucleic acid amplification   technology to detect the SARS-CoV-2 RdRp nucleic acid segment.   The analytical sensitivity (limit of detection) is 125 genome   equivalents/mL.     A POSITIVE result implies infection with the SARS-CoV-2 virus;  the patient is presumed to be contagious.    A NEGATIVE result means that SARS-CoV-2 nucleic acids are not  present above the limit of detection. A NEGATIVE result should be   treated as presumptive. It does not rule out the possibility of   COVID-19 and should not be the sole basis for treatment decisions.   If COVID-19 is strongly suspected based on clinical and exposure   history, re-testing using an alternate molecular assay should be   considered.       This test is only for use under the Food and Drug   Administration s Emergency Use Authorization (EUA).   Commercial kits are provided by [employer].   Performance characteristics of the EUA have been independently  verified by [facility]                           ment of  Pathology and Laboratory Medicine.   _________________________________________________________________  The ID NOW COVID-19 Letter of Authorization, along with the   authorized Fact Sheet for Healthcare Providers, the authorized Fact  Sheet for Patients, and authorized labeling are available on the FDA   website:  www.fda.gov/MedicalDevices/Safety/EmergencySituations/nlh128482.htm     Lab Visit on 05/11/2022   Component Date Value Ref Range Status    Specimen UA 05/11/2022 Urine, Clean Catch   Final    Color, UA 05/11/2022 Yellow  Yellow, Straw, Sherry Final    Appearance, UA 05/11/2022 Clear  Clear Final    pH, UA 05/11/2022 6.0   5.0 - 8.0 Final    Specific Gravity, UA 05/11/2022 1.010  1.005 - 1.030 Final    Protein, UA 05/11/2022 Negative  Negative Final    Comment: Recommend a 24 hour urine protein or a urine   protein/creatinine ratio if globulin induced proteinuria is  clinically suspected.      Glucose, UA 05/11/2022 Negative  Negative Final    Ketones, UA 05/11/2022 Negative  Negative Final    Bilirubin (UA) 05/11/2022 Negative  Negative Final    Occult Blood UA 05/11/2022 2+ (A) Negative Final    Nitrite, UA 05/11/2022 Negative  Negative Final    Leukocytes, UA 05/11/2022 Negative  Negative Final    RBC, UA 05/11/2022 2  0 - 4 /hpf Final    WBC, UA 05/11/2022 0  0 - 5 /hpf Final    Bacteria 05/11/2022 None  None-Occ /hpf Final    Squam Epithel, UA 05/11/2022 1  /hpf Final    Microscopic Comment 05/11/2022 SEE COMMENT   Final    Comment: Other formed elements not mentioned in the report are not   present in the microscopic examination.      Lab Visit on 05/11/2022   Component Date Value Ref Range Status    WBC 05/11/2022 4.97  3.90 - 12.70 K/uL Final    RBC 05/11/2022 4.22  4.00 - 5.40 M/uL Final    Hemoglobin 05/11/2022 12.4  12.0 - 16.0 g/dL Final    Hematocrit 05/11/2022 39.0  37.0 - 48.5 % Final    MCV 05/11/2022 92  82 - 98 fL Final    MCH 05/11/2022 29.4  27.0 - 31.0 pg Final    MCHC 05/11/2022 31.8 (A) 32.0 - 36.0 g/dL Final    RDW 05/11/2022 17.2 (A) 11.5 - 14.5 % Final    Platelets 05/11/2022 207  150 - 450 K/uL Final    MPV 05/11/2022 9.1 (A) 9.2 - 12.9 fL Final    Immature Granulocytes 05/11/2022 0.4  0.0 - 0.5 % Final    Gran # (ANC) 05/11/2022 3.2  1.8 - 7.7 K/uL Final    Immature Grans (Abs) 05/11/2022 0.02  0.00 - 0.04 K/uL Final    Comment: Mild elevation in immature granulocytes is non specific and   can be seen in a variety of conditions including stress response,   acute inflammation, trauma and pregnancy. Correlation with other   laboratory and clinical findings is essential.      Lymph #  05/11/2022 0.7 (A) 1.0 - 4.8 K/uL Final    Mono # 05/11/2022 1.0  0.3 - 1.0 K/uL Final    Eos # 05/11/2022 0.1  0.0 - 0.5 K/uL Final    Baso # 05/11/2022 0.02  0.00 - 0.20 K/uL Final    nRBC 05/11/2022 0  0 /100 WBC Final    Gran % 05/11/2022 63.4  38.0 - 73.0 % Final    Lymph % 05/11/2022 13.7 (A) 18.0 - 48.0 % Final    Mono % 05/11/2022 20.7 (A) 4.0 - 15.0 % Final    Eosinophil % 05/11/2022 1.4  0.0 - 8.0 % Final    Basophil % 05/11/2022 0.4  0.0 - 1.9 % Final    Differential Method 05/11/2022 Automated   Final    Sodium 05/11/2022 132 (A) 136 - 145 mmol/L Final    Potassium 05/11/2022 4.5  3.5 - 5.1 mmol/L Final    Chloride 05/11/2022 91 (A) 95 - 110 mmol/L Final    CO2 05/11/2022 30 (A) 23 - 29 mmol/L Final    Glucose 05/11/2022 99  70 - 110 mg/dL Final    BUN 05/11/2022 16  8 - 23 mg/dL Final    Creatinine 05/11/2022 0.6  0.5 - 1.4 mg/dL Final    Calcium 05/11/2022 9.7  8.7 - 10.5 mg/dL Final    Total Protein 05/11/2022 7.3  6.0 - 8.4 g/dL Final    Albumin 05/11/2022 3.8  3.5 - 5.2 g/dL Final    Total Bilirubin 05/11/2022 0.5  0.1 - 1.0 mg/dL Final    Comment: For infants and newborns, interpretation of results should be based  on gestational age, weight and in agreement with clinical  observations.    Premature Infant recommended reference ranges:  Up to 24 hours.............<8.0 mg/dL  Up to 48 hours............<12.0 mg/dL  3-5 days..................<15.0 mg/dL  6-29 days.................<15.0 mg/dL      Alkaline Phosphatase 05/11/2022 78  55 - 135 U/L Final    AST 05/11/2022 30  10 - 40 U/L Final    ALT 05/11/2022 27  10 - 44 U/L Final    Anion Gap 05/11/2022 11  8 - 16 mmol/L Final    eGFR if African American 05/11/2022 >60  >60 mL/min/1.73 m^2 Final    eGFR if non African American 05/11/2022 >60  >60 mL/min/1.73 m^2 Final    Comment: Calculation used to obtain the estimated glomerular filtration  rate (eGFR) is the CKD-EPI equation.            Imaging:     PET/CT 3/16/22    Head  and neck:     No hypermetabolic activity is noted within the head neck to suggest metastatic disease.     Atherosclerotic carotid calcifications are noted.     Thorax:     A port is noted overlying the left hemithorax with its tip in the superior vena cava.     Extensive mitral valve calcifications or mitral valve replacement are noted.  Extensive coronary calcifications are noted increasing the patient's coronary risk.     A moderate sized joint effusion is developed since the prior.  Please correlate for etiologies.  This could relate to cardiogenic edema or metastatic fluid.  No obvious hypermetabolic activity is noted.     A pleural based density is noted of 5 mm image 50 sequence 3 similar since the prior.  Atelectasis, scarring, or pulmonary nodule is on the differential.  In a high-risk patient follow-up for long-term size stability would be suggested.     Sternotomy wires are noted     Abdomen and pelvis:     Significant improvement is noted since prior with the liver demonstrating near uniform metabolic activity.  Significantly less hypermetabolic activity is noted than was seen on the prior.  A hypodense region in the right lobe measured on the prior on CT is still thought to be seen image 131 without convincing detrimental change when measured in a similar manner as on the prior at 5.2 x 4.2 cm image 131 sequence 3.  Several other hypodense regions are noted without worrisome detrimental change since the prior.  The region in the right lobe of the liver that measured 2.5 x 1.9 cm on the prior is less easily visualized but can be measured similarly at 2.2 x 1.8 cm image 142 sequence 3     The adrenal thickening seen on the prior appears of 2 cm in short diameter measuring 1.9 cm on the prior unchanged.  In long dimension it was favored to be under measured on the prior but is unchanged when measured in a similar manner.  It is not significantly hypermetabolic on today's exam     The hypermetabolic  pancreatic tail mass seen on the prior demonstrates near background activity on today's exam as well and no obvious focal mass can be measured on the CT portion of this exam.  The pancreas in this region measures 2.0 cm in thickness versus is 2.5 cm on the prior.  Decrease in size of the mass is suspected     Several hypermetabolic mesenteric foci were suspected on the prior suggestive of mesenteric metastasis.  No significant hypermetabolic activity is noted on today's exam.     Musculoskeletal:     Glenohumeral joint space loss is noted on the left greater than right suggestive of degenerative change likely associated with labral tearing.     Central canal stenosis is noted in the lower lumbar spine particularly at the L4-L5 level.  No obvious hypermetabolic osseous changes are noted on today's exam.  This is an improvement since the prior      Assessment:       1. Fever, unspecified fever cause    2. Primary pancreatic cancer with metastasis to other site    3. Malignant neoplasm of body of pancreas     4. Metastasis to liver    5. 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           Plan:     Fever - the patient has fever without any other additional symptoms  -Patient typically has fever on the day after chemotherapy  -Last dose of chemotherapy was on 5/02/22  -COVID-19 checked today and was negative  -Urinalysis is not suggestive of a UTI  -CXR shows no clear signs of infection  -Fever is possibly due to underlying cancer   -Will treat empirically with Levofloxacin 500mg daily for 5 days    Metastatic Pancreatic Cancer - Last dose of [place]/Abraxane was 5/02/22  -Pt scheduled for next treatment 5/16/22    Advance Care Planning     Power of   I initiated the process of advance care planning today and explained the importance of this process to the patient.  I introduced the concept of advance directives to the patient, as well. Then the patient received detailed information about the importance of  designating a Health Care Power of  (HCPOA). She was also instructed to communicate with this person about their wishes for future healthcare, should she become sick and lose decision-making capacity. The patient has not previously appointed a HCPOA. After our discussion, the patient has decided to complete a HCPOA and will bring the form completed to her next clinic appointment.            Route Chart for Scheduling    Med Onc Chart Routing      Follow up with physician 1 week. The patient will need to be tested for COVID-19 today.  She also needs a CXR.  she already has an appt with me next week.   Follow up with IVONNE 2 weeks.   Labs    Imaging    Pharmacy appointment    Other referrals          Treatment Plan Information   OP PANC NAB-PACLITAXEL + GEMCITABINE Q4W   Salvador Green MD   Upcoming Treatment Dates - OP PANC NAB-PACLITAXEL + GEMCITABINE Q4W    5/16/2022       Chemotherapy       PACLitaxeL protein-bound (ABRAXANE) 100 mg/m2 = 170 mg in 34 mL infusion       gemcitabine (GEMZAR) 1,370 mg in sodium chloride 0.9% 250 mL chemo infusion  5/30/2022       Chemotherapy       PACLitaxeL protein-bound (ABRAXANE) 100 mg/m2 = 170 mg in 34 mL infusion       gemcitabine (GEMZAR) 1,370 mg in sodium chloride 0.9% 250 mL chemo infusion  6/13/2022       Chemotherapy       PACLitaxeL protein-bound (ABRAXANE) 100 mg/m2 = 170 mg in 34 mL infusion       gemcitabine (GEMZAR) 1,370 mg in sodium chloride 0.9% 250 mL chemo infusion  6/27/2022       Chemotherapy       PACLitaxeL protein-bound (ABRAXANE) 100 mg/m2 = 170 mg in 34 mL infusion       gemcitabine (GEMZAR) 1,370 mg in sodium chloride 0.9% 250 mL chemo infusion      Salvador Green MD  [facility]  Hematology and Oncology  [facility]   [address]   [place] [address]   O: [phone]  F: [phone]

## 2022-05-16 PROBLEM — R79.9 ELEVATED BUN: Status: ACTIVE | Noted: 2022-01-01

## 2022-05-16 PROBLEM — J96.01 ACUTE RESPIRATORY FAILURE WITH HYPOXIA: Status: ACTIVE | Noted: 2022-01-01

## 2022-05-16 NOTE — TELEPHONE ENCOUNTER
Tried calling pt about  pt is admitted and appt has been canceled as requested     ----- Message from Kayleigh Young sent at 5/16/2022  9:45 AM CDT -----  Type:Needs Medical Advice    Who Called:Brinda (Daughter)  Best call back number:[phone]  Additional Info: Requesting a call back regarding#PT needs to cancel her appts for today 5/16. She is currently in [facility]. Please call to reschedule her apt.  Please Advise- Thank you

## 2022-05-23 NOTE — TELEPHONE ENCOUNTER
Called the pt care giver to get the pt scheduled for labs and xray on 05/26. Pt care giver verbalized and understanding.  ----- Message from Lissett Bradford RN sent at 5/23/2022  1:15 PM CDT -----  Regarding: LABS & CXR    ----- Message -----  From: Salvador Green MD  Sent: 5/23/2022  11:42 AM CDT  To: Norma BARRETO Staff    Please have the patient undergo CBC, CMP, Ca19-9 and CXR this week with a return appt with me this week once labs and CXR completed.

## 2022-05-26 NOTE — TELEPHONE ENCOUNTER
I called the pt's daughter and informed her that the CXR showed improvement of the pt's interstitial lung disease.   I informed her that I had reached out to pulmonary to see what their thoughts were.  I instructed her to have her mother continue the prednisone and that we would further discuss on 5/30/22.  She expressed understanding.  All questions were answered to her satisfaction.    Salvador Green MD  [facility]  Hematology and Oncology  [facility]   [address]   [place] [address]   O: [phone]  F: [phone]

## 2022-05-26 NOTE — PROGRESS NOTES
2:22 PM    [facility] inquire about obtaining a portable home oxygen concentrator for this pt and assuring I am sending all of the correct papers/documation in order to achieve this.    Alejandrina with [facility] said I can send the face sheet an orders but there is a national shortage on portable home oxygen concentrators right now, so the patient will be placed on a waiting list.  Alejandrina said they will call the pt with they receive the concentrators.    [facility]'s daughter, Brinda to inform of the above mentioned information.

## 2022-05-30 NOTE — TELEPHONE ENCOUNTER
----- Message from Sara Izquierdo sent at 5/30/2022 10:16 AM CDT -----  Type: Needs Medical Advice  Who Called:  Patient   Symptoms (please be specific):    How long has patient had these symptoms:    Pharmacy name and phone #:    Best Call Back Number: [phone]  Additional Information: Patient called stating she may be a few min. late for appt. because she locked her keys in the car.

## 2022-05-30 NOTE — TELEPHONE ENCOUNTER
I called the patient and left her a message to call me back at [phone]     Salvador Green MD  [facility]  Hematology and Oncology  [facility]   [address] [place] [address]   O: [phone]  F: [phone]

## 2022-05-30 NOTE — PROGRESS NOTES
PATIENT: Nel Cui  MRN: [id number]  DATE: 6/1/2022      Diagnosis:   1. Primary pancreatic cancer with metastasis to other site    2. Malignant neoplasm of body of pancreas     3. Metastasis to liver    4. 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 Hypoxia    6. Atrial fibrillation, unspecified type    7. 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therapy    8. Pancreatic insufficiency    9. Cancer related pain    10. Hypertension, unspecified type    11. Hyperlipidemia, unspecified hyperlipidemia type    12. Chronic diastolic congestive heart failure        Chief Complaint: Follow-up (Pancreatic Cancer)      Oncologic History:      Oncologic History 12/09/21 Ct Abdomen  12/15/21 EUS  1/04/22 PET/CT  1/07/22 PORT - Dr Acuña  1/11/22 MRI Brain  3/16/22 PET/CT    Oncologic Treatment 1/12/22 - current Gemcitabine and Paclitaxel s/p 4 cycles    Pathology 12/15/21 adenocarcinoma in the pancreatic body and adenocarcinoma in the liver       The patient initially presented to her PCP on 12/02/21 with complaint of abdominal pain.  She underwent a CT of the abdomen on 12/09/21 showing subcentimeter para-aortic, mesenteric lymph nodes; multiple hepatic infiltrative lesions present throughout the liver with 1 of the larger areas right liver lobe measuring approximately 2.2 cm in diameter; mass in the pancreatic tail measuring 9 x 3.3 cm.  The patient underwent EUS under the care of Dr Tse on 12/15/21 showing a mass in the pancreatic body and multiple liver lesions.  Path from the procedure showed adenocarcinoma in the pancreatic body and adenocarcinoma in the liver.    The patient underwent PET/CT on 1/04/22 showing hypermetabolic rounded mass at the junction of the body and tail of the pancreas measuring 2.5 x 2.4 cm, hypermetabolic left adrenal gland nodule measuring 1.9 cm, innumerable hypermetabolic nodular masses throughout the left and right hepatic lobes with largest lesions in the right liver measuring  2.5 x 1.9 cm and 5.2 x 4.4 cm, mildly enlarged hypermetabolic portacaval lymph node measuring 1.4 cm, 0.7 cm hypermetabolic focus at the umbilicus, lesion in the T9 vertebra measuring 1.8 x 1.7 cm and a lesion in the left iliac fossa measuring 1.7 x 1.2 cm.  MRI of the brain on 01/11/2022 showed no evidence of metastases but did show an old right midbrain lacunar infarct.   The patient was in the hospital from 1/28/22-1/29/22 with fever and low sodium.  The patient's hydrochlorothiazide was discontinued.  The patient then presented to the hospital on 02/06/2022 for dyspnea on exertion.  CTA was done on 02/06/2022 showing no evidence of pulmonary embolism.  The patient underwent NT proBNP which was elevated at 2630 pg/mL.  The patient was started on Lasix every other day at the request of Cardiology.   The patient was admitted to the hospital from 2/12/22-2/14/22 for fever.  The patient was discharged on Levaquin for 5 days.  The patient saw Cardiology on 02/17/2022 for atrial fibrillation and was started on Eliquis and taken off of aspirin.     The the patient was admitted to the hospital from [id number] for fever.  During her hospitalization procalcitonin was checked and was normal.  All her cultures were negative.  Her fever was felt to be from chemotherapy.    The patient underwent PET-CT on 03/16/2022 showing a followed of mm pulmonary based nodule; decrease metabolic activity in the liver mass with stable right lobe mass measuring 5.2 x 4.2 cm; right lobe mass measuring 2.2 x 1.8 cm; adrenal gland thickening of 1.9 cm; decrease in pancreatic mass measuring 2 cm; decreased metabolic activity of hypermetabolic mesenteric foci and right-sided pleural effusion.  The patient underwent cardioversion on 03/17/2022 at which point constantine was performed.  Patient was put back in normal sinus rhythm.    The patient was admitted to the hospital for CHF exacerbation from 3/25/22 to 3/29/22 and was diuresed a little over  9.5L.    Subjective:     Interval History: Ms. Cui is a 87 y.o. female with Osteopenia, HLD, HTN, pancreatic cancer who presents for fever.  Since the last clinic visit the patient was admitted to the hospital from 5/15/22-5/20/22 for hypoxia.  CTA chest on 5/15/22 showed diffuse faint ground-glass opacities in the lungs bilaterally.   NT-ProNP on admission was elevated at 5,220pg/mL.  The patient was initially diuresed without much improvement.  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  The patient was eventually weaned to 1.5L O2.  Repeat CXR on 5/26/22 showed improvement in her intrapulmonary process.      The patient states that for the past 2 days she has felt extreme fatigue.  She denies shortness of breath.  She is currently taking prednisone 20mg until 6/02/22 and then 10mg daily until 6/07/22.  The patient denies CP, cough, abdominal pain, N/V, constipation, diarrhea.  The patient denies fever, chills, night sweats, weight loss, new lumps or bumps, easy bruising or bleeding.    Past Medical History:   Past Medical History:   Diagnosis Date    A-fib     Anemia     Anticoagulant long-term use     Arthritis     Cataracts, bilateral     CHF (congestive heart failure)     chronic diastolic    Coronary artery disease     Nonobstructive    Essential (primary) hypertension 10/05/2010    Hyperlipidemia     Hyponatremia     Mitral regurgitation     Osteopenia     Pancreatic insufficiency     Primary pancreatic cancer with metastasis to other site     Thrombocytopenia        Past Surgical HIstory:   Past Surgical History:   Procedure Laterality Date    CARPAL TUNNEL RELEASE Bilateral     wrist    CATARACT EXTRACTION, BILATERAL  2017    CORONARY ANGIOGRAPHY  09/01/2020    Procedure: ANGIOGRAM, CORONARY ARTERY;  Surgeon: Ivette Horne MD;  Location: [facility];  Service: Cardiology;;    DILATION AND CURETTAGE OF UTERUS      ENDOSCOPIC ULTRASOUND  "OF UPPER GASTROINTESTINAL TRACT Left 12/15/2021    Procedure: ULTRASOUND, UPPER GI TRACT, ENDOSCOPIC;  Surgeon: Lico Tse MD;  Location: [facility] ENDO;  Service: Endoscopy;  Laterality: Left;    ESOPHAGOGASTRODUODENOSCOPY N/A 12/15/2021    Procedure: EGD (ESOPHAGOGASTRODUODENOSCOPY);  Surgeon: Lico Tse MD;  Location: [facility] ENDO;  Service: Endoscopy;  Laterality: N/A;    INSERTION OF TUNNELED CENTRAL VENOUS CATHETER (CVC) WITH SUBCUTANEOUS PORT N/A 01/07/2022    Procedure: LTEBOLREL-BNXK-D-CATH;  Surgeon: Cas Acuña MD;  Location: [facility] OR;  Service: General;  Laterality: N/A;    LEFT HEART CATHETERIZATION  09/01/2020    Procedure: Left heart cath;  Surgeon: Ivette Horne MD;  Location: [facility] CATH;  Service: Cardiology;;    MITRAL VALVE REPAIR      OOPHORECTOMY Left 1988    OTHER SURGICAL HISTORY      maze procedure and PHILL ligation    REPAIR OF MENISCUS OF KNEE Left     TONSILLECTOMY      vein removal         Family History:   Family History   Problem Relation Age of Onset    No Known Problems Mother     No Known Problems Father     Cancer Maternal Grandmother         Stomach    No Known Problems Maternal Grandfather     No Known Problems Paternal Grandmother     No Known Problems Paternal Grandfather     Breast cancer Sister         over 60    No Known Problems Brother        Social History:  reports that she quit smoking about 33 years ago. Her smoking use included cigarettes. She has a 30.00 pack-year smoking history. She has never used smokeless tobacco. She reports that she does not drink alcohol and does not use drugs.    Allergies:  Review of patient's allergies indicates:   Allergen Reactions    Amoxicillin-pot clavulanate Other (See Comments)     "Spaced out feeling- can't take it longer than 4 or 5 days"  Patient tolerated Zosyn    Breo ellipta [fluticasone furoate-vilanterol] Other (See Comments)     Feels jittery    Corticosteroids (glucocorticoids)      Other " "reaction(s): tachycardia    Erythromycin      "Spaced out"    Metoprolol succinate      "I dont feel good, it doesn't work"    Latex, natural rubber      Turns skin red around area where latex touches       Medications:  Current Outpatient Medications   Medication Sig Dispense Refill    acetaminophen (TYLENOL) 325 MG tablet Take 325 mg by mouth every 6 (six) hours as needed for Pain or Temperature greater than.      apixaban (ELIQUIS) 2.5 mg Tab Take 1 tablet (2.5 mg total) by mouth 2 (two) times daily. 60 tablet 0    ascorbic acid, vitamin C, (VITAMIN C) 500 MG tablet Take 500 mg by mouth once daily.      cholecalciferol, vitamin D3, (VITAMIN D3) 25 mcg (1,000 unit) capsule Take 1,000 Units by mouth every morning.      coenzyme Q10 100 mg capsule Take 100 mg by mouth once daily.      docusate sodium (COLACE) 100 MG capsule Take 1 capsule (100 mg total) by mouth 2 (two) times daily. (Patient taking differently: Take 100 mg by mouth 2 (two) times daily as needed for Constipation.)  0    LIDOcaine-prilocaine (EMLA) cream Apply topically as needed (30 to 60 minutes prior to chemo). 30 g 2    lipase-protease-amylase (CREON) 36,000-114,000- 180,000 unit CpDR Take 2 capsules by mouth 3 (three) times daily with meals. Take 2 capsules TID with meals and 1 capsule PRN with snacks (Patient taking differently: Take 2 capsules by mouth 3 (three) times daily with meals. and 1 capsule as needed with snacks) 240 capsule 11    magnesium 250 mg Tab Take 250 mg by mouth every evening.      multivitamin (THERAGRAN) per tablet Take 1 tablet by mouth every other day.      ondansetron (ZOFRAN-ODT) 4 MG TbDL Take 4 mg by mouth every 12 (twelve) hours as needed (nausea/vomiting).      pravastatin (PRAVACHOL) 40 MG tablet TAKE 1 TABLET(40 MG) BY MOUTH EVERY DAY (Patient taking differently: Take 40 mg by mouth every evening.) 90 tablet 3    predniSONE (DELTASONE) 10 MG tablet Take 4 tablets (40 mg total) by mouth As " "instructed. Starting 5/24, 4 po daily x5days then 2po daily x5 days then 1 po daily x 5 days 35 tablet 0    saliva substitute combo no.9 (BIOTENE DRY MOUTH ORAL RINSE) Mwsh Swish and spit 5 mLs every evening.      bisoprolol (ZEBETA) 5 MG tablet Take 2 tablets (10 mg total) by mouth every evening. 60 tablet 0    furosemide (LASIX) 20 MG tablet Take 1 tablet (20 mg total) by mouth once daily. Hold until approved by PCP      losartan (COZAAR) 100 MG tablet Take 1 tablet (100 mg total) by mouth every evening. 90 tablet 3    OXYGEN-AIR DELIVERY SYSTEMS MISC Inhale 1.5 L/min into the lungs continuous.       No current facility-administered medications for this visit.       Review of Systems   Constitutional: Positive for fatigue. Negative for chills, fever and unexpected weight change.   Respiratory: Negative for cough and shortness of breath.    Cardiovascular: Negative for chest pain, palpitations and leg swelling.   Gastrointestinal: Negative for abdominal pain, constipation, diarrhea, nausea and vomiting.   Genitourinary: Negative for difficulty urinating and dysuria.   Skin: Negative for rash.   Neurological: Negative for headaches.   Hematological: Negative for adenopathy. Does not bruise/bleed easily.       ECOG Performance Status: 2   Objective:      Vitals:   Vitals:    05/30/22 1115 06/01/22 1636   BP: 126/60    BP Location: Left arm    Patient Position: Sitting    BP Method: Medium (Manual)    Pulse: (!) 113    Temp: 98.1 °F (36.7 °C)    TempSrc: Temporal    SpO2: 96% (!) 87%   Weight: 62.1 kg (136 lb 14.5 oz)    Height: 5' 5.5" (1.664 m)      Physical Exam  Constitutional:       General: She is not in acute distress.     Appearance: She is well-developed. She is not diaphoretic.   HENT:      Head: Normocephalic and atraumatic.   Cardiovascular:      Rate and Rhythm: Regular rhythm. Tachycardia present.      Heart sounds: No murmur heard.    No friction rub. No gallop.   Pulmonary:      Effort: Pulmonary " effort is normal. No respiratory distress.      Breath sounds: Normal breath sounds. No wheezing or rales.   Chest:      Chest wall: No tenderness.   Breasts:      Right: No axillary adenopathy or supraclavicular adenopathy.      Left: No axillary adenopathy or supraclavicular adenopathy.       Abdominal:      General: Bowel sounds are normal. There is no distension.      Palpations: Abdomen is soft. There is no mass.      Tenderness: There is no abdominal tenderness. There is no guarding or rebound.   Musculoskeletal:         General: No tenderness. Normal range of motion.      Right lower leg: Edema present.      Left lower leg: Edema present.   Lymphadenopathy:      Cervical: No cervical adenopathy.      Upper Body:      Right upper body: No supraclavicular or axillary adenopathy.      Left upper body: No supraclavicular or axillary adenopathy.   Skin:     Findings: No erythema or rash.   Neurological:      Mental Status: She is alert and oriented to person, place, and time.   Psychiatric:         Behavior: Behavior normal.         Laboratory Data:  Lab Visit on 05/26/2022   Component Date Value Ref Range Status    CA 19-9 05/26/2022 190.2 (A) 0.0 - 40.0 U/mL Final    Comment: The testing method is a chemiluminescent microparticle immunoassay   manufactured by [employer] and performed on the [employer]   or   eXelate system. Values obtained with different assay manufacturers   for   methods may be different and cannot be used interchangeably.      WBC 05/26/2022 10.40  3.90 - 12.70 K/uL Final    RBC 05/26/2022 4.43  4.00 - 5.40 M/uL Final    Hemoglobin 05/26/2022 12.5  12.0 - 16.0 g/dL Final    Hematocrit 05/26/2022 40.0  37.0 - 48.5 % Final    MCV 05/26/2022 90  82 - 98 fL Final    MCH 05/26/2022 28.2  27.0 - 31.0 pg Final    MCHC 05/26/2022 31.3 (A) 32.0 - 36.0 g/dL Final    RDW 05/26/2022 17.2 (A) 11.5 - 14.5 % Final    Platelets 05/26/2022 358  150 - 450 K/uL Final    MPV 05/26/2022 8.5  (A) 9.2 - 12.9 fL Final    Immature Granulocytes 05/26/2022 1.0 (A) 0.0 - 0.5 % Final    Gran # (ANC) 05/26/2022 8.8 (A) 1.8 - 7.7 K/uL Final    Immature Grans (Abs) 05/26/2022 0.10 (A) 0.00 - 0.04 K/uL Final    Comment: Mild elevation in immature granulocytes is non specific and   can be seen in a variety of conditions including stress response,   acute inflammation, trauma and pregnancy. Correlation with other   laboratory and clinical findings is essential.      Lymph # 05/26/2022 0.5 (A) 1.0 - 4.8 K/uL Final    Mono # 05/26/2022 0.9  0.3 - 1.0 K/uL Final    Eos # 05/26/2022 0.1  0.0 - 0.5 K/uL Final    Baso # 05/26/2022 0.03  0.00 - 0.20 K/uL Final    nRBC 05/26/2022 0  0 /100 WBC Final    Gran % 05/26/2022 84.0 (A) 38.0 - 73.0 % Final    Lymph % 05/26/2022 4.7 (A) 18.0 - 48.0 % Final    Mono % 05/26/2022 8.7  4.0 - 15.0 % Final    Eosinophil % 05/26/2022 1.3  0.0 - 8.0 % Final    Basophil % 05/26/2022 0.3  0.0 - 1.9 % Final    Differential Method 05/26/2022 Automated   Final    Sodium 05/26/2022 132 (A) 136 - 145 mmol/L Final    Potassium 05/26/2022 4.2  3.5 - 5.1 mmol/L Final    Chloride 05/26/2022 91 (A) 95 - 110 mmol/L Final    CO2 05/26/2022 32 (A) 23 - 29 mmol/L Final    Glucose 05/26/2022 123 (A) 70 - 110 mg/dL Final    BUN 05/26/2022 23  8 - 23 mg/dL Final    Creatinine 05/26/2022 0.5  0.5 - 1.4 mg/dL Final    Calcium 05/26/2022 9.2  8.7 - 10.5 mg/dL Final    Total Protein 05/26/2022 6.2  6.0 - 8.4 g/dL Final    Albumin 05/26/2022 3.1 (A) 3.5 - 5.2 g/dL Final    Total Bilirubin 05/26/2022 0.4  0.1 - 1.0 mg/dL Final    Comment: For infants and newborns, interpretation of results should be based  on gestational age, weight and in agreement with clinical  observations.    Premature Infant recommended reference ranges:  Up to 24 hours.............<8.0 mg/dL  Up to 48 hours............<12.0 mg/dL  3-5 days..................<15.0 mg/dL  6-29 days.................<15.0 mg/dL       Alkaline Phosphatase 05/26/2022 61  55 - 135 U/L Final    AST 05/26/2022 24  10 - 40 U/L Final    ALT 05/26/2022 27  10 - 44 U/L Final    Anion Gap 05/26/2022 9  8 - 16 mmol/L Final    eGFR if African American 05/26/2022 >60  >60 mL/min/1.73 m^2 Final    eGFR if non African American 05/26/2022 >60  >60 mL/min/1.73 m^2 Final    Comment: Calculation used to obtain the estimated glomerular filtration  rate (eGFR) is the CKD-EPI equation.            Imaging:     PET/CT 3/16/22    Head and neck:     No hypermetabolic activity is noted within the head neck to suggest metastatic disease.     Atherosclerotic carotid calcifications are noted.     Thorax:     A port is noted overlying the left hemithorax with its tip in the superior vena cava.     Extensive mitral valve calcifications or mitral valve replacement are noted.  Extensive coronary calcifications are noted increasing the patient's coronary risk.     A moderate sized joint effusion is developed since the prior.  Please correlate for etiologies.  This could relate to cardiogenic edema or metastatic fluid.  No obvious hypermetabolic activity is noted.     A pleural based density is noted of 5 mm image 50 sequence 3 similar since the prior.  Atelectasis, scarring, or pulmonary nodule is on the differential.  In a high-risk patient follow-up for long-term size stability would be suggested.     Sternotomy wires are noted     Abdomen and pelvis:     Significant improvement is noted since prior with the liver demonstrating near uniform metabolic activity.  Significantly less hypermetabolic activity is noted than was seen on the prior.  A hypodense region in the right lobe measured on the prior on CT is still thought to be seen image 131 without convincing detrimental change when measured in a similar manner as on the prior at 5.2 x 4.2 cm image 131 sequence 3.  Several other hypodense regions are noted without worrisome detrimental change since the prior.  The region  in the right lobe of the liver that measured 2.5 x 1.9 cm on the prior is less easily visualized but can be measured similarly at 2.2 x 1.8 cm image 142 sequence 3     The adrenal thickening seen on the prior appears of 2 cm in short diameter measuring 1.9 cm on the prior unchanged.  In long dimension it was favored to be under measured on the prior but is unchanged when measured in a similar manner.  It is not significantly hypermetabolic on today's exam     The hypermetabolic pancreatic tail mass seen on the prior demonstrates near background activity on today's exam as well and no obvious focal mass can be measured on the CT portion of this exam.  The pancreas in this region measures 2.0 cm in thickness versus is 2.5 cm on the prior.  Decrease in size of the mass is suspected     Several hypermetabolic mesenteric foci were suspected on the prior suggestive of mesenteric metastasis.  No significant hypermetabolic activity is noted on today's exam.     Musculoskeletal:     Glenohumeral joint space loss is noted on the left greater than right suggestive of degenerative change likely associated with labral tearing.     Central canal stenosis is noted in the lower lumbar spine particularly at the L4-L5 level.  No obvious hypermetabolic osseous changes are noted on today's exam.  This is an improvement since the prior      Assessment:       1. Primary pancreatic cancer with metastasis to other site    2. Malignant neoplasm of body of pancreas     3. Metastasis to liver    4. 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 Hypoxia    6. Atrial fibrillation, unspecified type    7. 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therapy    8. Pancreatic insufficiency    9. Cancer related pain    10. Hypertension, unspecified type    11. Hyperlipidemia, unspecified hyperlipidemia type    12. Chronic diastolic congestive heart failure           Plan:   Metastatic Pancreatic Cancer - The patient was diagnosed with metastatic  pancreatic cancer adenocarcinoma with mets to the liver on 12/15/21  -Ca19-9 was 7,581 on 12/15/21  -PET/CT on 1/04/22 shows mets to the liver, left adrenal gland, T9 vertebra and left iliac fossa.  -Potential mets are seen near the umbilicus  -PT underwent PORT placement 1/07/22  -MRI brain on 1/11/22 showed no brain mets  -Pt consented for Gemcitabine and paclitaxel on 1/03/22  -Pt treated with 20% dose reduction in paclitaxel and Gemcitabine to 100mg/mm2 and 800mg/mm2 respectively  -C2D8 and C2D15 cancelled given her hospitalization from 2/12/22-2/14/22  -Patient completed C3D8 and was hospitalized again for a fever  -Treatment moving forward was discussed with the patient and decision was made to remove day 8 of treatment  -PET/CT on 3/16/22 shows stable disease  -Ca19-9 has dropped dramatically to 170 on 3/16/22  -C3D15 delayed given cardiac ablation on 03/17/2022  -Will continue [place]/Abraxane at 20% does reduction due to prior thrombocytopenia  -Pt underwent C5D1 5/02/22  -Guardant 360 testing 1/26/22 showed STACY [id number] 0.3% amplification, KRAS G12D 0.3% amplification, MSI-stable, ARID1A [id number] 0.4%, KRAS G12D 0.3%, STACY [id number], STK11 S59T 0.2%, and BRCA1 R979C 0.1%.  No BRCA2 was detected  -Given tachycardia and unclear cause of the pt's hypoxia, will postpone chemo today  -Pt to see pulmonary and cardiology concerning continued hypoxia  -If ultimately the patient is felt to be suffering from chemotherapy induced pneumonitis, the patient would need to be taken off of [place]/Abraxane and switched to Liposomal Irinotecan and Fluorouracil  -Pt up for My Chart Care Companion     Hypoxia - pt being treated for chemo induced pneumonitis; however, her CXR cleared quickly  -Pt on prednisone 20mg daily until 6/02/22 at which point she will switch to prednisone 10mg daily  -Pt's saturation dipped down to 87% on 1.5L during clinic visit  -Pt increased to 2L of oxygen with sats of 93%.     A-fib - pt with tachycardia in  clinic today  -Pt to undergo EKG and see cardiology  -PT on Eliquis  -PT s/p Ablation on 3/17/22  -Management per cardiology     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  -No current signs of infection  -Will monitor     Pancreatic Insufficiency - pt on creon  -Will monitor     Cancer Related Pain - improved  -Will monitor     HTN - pt taking losartan, bisoprolol  -Cardiology managing  -Will monitor     HLD - pt on pravastatin  -Management per PCP     CHF - pt with CHF exacerbation and hospitalized from 3/25/22 to 3/29/22 wioth 9.5L removed  -Pt hospitalized again from 5/15/22 to 5/20/22 for possible CHF exacerbation  -Pt taking lasix daily  -Will have the patient follow up with Dr Cruz in Cardiology  -Management per cardiology    Advance Care Planning     Power of   I initiated the process of advance care planning today and explained the importance of this process to the patient.  I introduced the concept of advance directives to the patient, as well. Then the patient received detailed information about the importance of designating a Health Care Power of  (HCPOA). She was also instructed to communicate with this person about their wishes for future healthcare, should she become sick and lose decision-making capacity. The patient has not previously appointed a HCPOA. After our discussion, the patient has decided to complete a HCPOA and will bring the form completed to her next clinic appointment.            Route Chart for Scheduling    Med Onc Chart Routing      Follow up with physician Other. The patient will need an EKG today.  I would like the patient to see her cardiologist Dr Stahls ASAP for tachycardia and hypoxia.  The patient will also need an urgent appt this week with Dr Stark.  The patient will need CBC, CMP and Ca19-9 at on 6/03/22 with potential chemo that day.   Follow up with IVONNE    Labs    Imaging    Pharmacy appointment    Other referrals           Treatment Plan Information   OP PANC NAB-PACLITAXEL + GEMCITABINE Q4W   Salvador Green MD   Upcoming Treatment Dates - OP PANC NAB-PACLITAXEL + GEMCITABINE Q4W    5/30/2022       Chemotherapy       PACLitaxeL protein-bound (ABRAXANE) 100 mg/m2 = 170 mg in 34 mL infusion       gemcitabine (GEMZAR) 1,370 mg in sodium chloride 0.9% 250 mL chemo infusion  6/13/2022       Chemotherapy       PACLitaxeL protein-bound (ABRAXANE) 100 mg/m2 = 170 mg in 34 mL infusion       gemcitabine (GEMZAR) 1,370 mg in sodium chloride 0.9% 250 mL chemo infusion  6/27/2022       Chemotherapy       PACLitaxeL protein-bound (ABRAXANE) 100 mg/m2 = 170 mg in 34 mL infusion       gemcitabine (GEMZAR) 1,370 mg in sodium chloride 0.9% 250 mL chemo infusion  7/11/2022       Chemotherapy       PACLitaxeL protein-bound (ABRAXANE) 100 mg/m2 = 170 mg in 34 mL infusion       gemcitabine (GEMZAR) 1,370 mg in sodium chloride 0.9% 250 mL chemo infusion      Salvador Green MD  [facility]  Hematology and Oncology  [facility]   [address] [place] [address]   O: [phone]  F: [phone]

## 2022-06-01 NOTE — PROGRESS NOTES
4:39 PM    This LCSW received a call from this pt's daughter, Brinda. She expressed feeling overwhelmed and asked if we can get her a lightweight wheelchair for her mom.    This LCSW explained that I will order one for this pt but first I need to contact the nurse/doctor to get the orders placed first.    The pt's daughter was agreeable to this plan and this LCSW let the daughter I would call her to update her on the process of this order.    4:44 PM  This LCSW called JESSICA Galeana to inform of the above mentioned information and she said she plans to call the pt's daughter today to communicate with her about what specific wheelchair she would like to get for her mom.    4:46 PM  This LCSW called the pt's daughter, Brinda to let her know that I spoke to JESSICA Galeana and she plans to put the order in and I will work on getting a DME company and seeing if the insurance will cover it tomorrow.  Brinda said thank you and plans to buy it if the insurance does not cover it bc it is only 150.00, but if the insurance pays for it she would rather that happen.

## 2022-06-02 PROBLEM — J90 CHRONIC BILATERAL PLEURAL EFFUSIONS: Status: ACTIVE | Noted: 2022-01-01

## 2022-06-02 PROBLEM — Z79.899 HIGH RISK MEDICATION USE: Status: ACTIVE | Noted: 2022-01-01

## 2022-06-02 PROBLEM — R09.82 POSTNASAL DRIP: Status: ACTIVE | Noted: 2022-01-01

## 2022-06-02 NOTE — PROGRESS NOTES
3:53 PM    This LCSW faxed this pt's face sheet, orders for wheelchair and doctor's note to [facility] then followed up with a phone call to see if they received the fax.    Alejandrina @ [facility] reported they did receive the fax and are working on the orders.  She said they will call the pt to set up delivery which should be either tomorrow or Monday at the latest.

## 2022-06-03 NOTE — PROGRESS NOTES
SW followed up with pt's daughter Brinda [phone] regarding wheel chair. She confirmed that [facility] followed up with pt and will be delivering the wheelchair to the pt on Monday. Daughter and pt grateful for assistance.     Christy Martinez, VESNAW

## 2022-06-03 NOTE — TELEPHONE ENCOUNTER
I called the patient's daughter and informed her I reviewed the clinic visit with Dr. Missy Herrera. In his note he feels her shortness of breath is likely due to her atrial fibrillation as well as her chronic diastolic heart failure.  I informed her we would move her labs regarding her pancreatic cancer to next week along with clinic visit with me then chemo that day.  I would like to give her treatment after she has cardioverted next week.  The patient expressed understanding.  All questions were answered to her satisfaction.    Salvador Green MD  [facility]  Hematology and Oncology  [facility]   [address] [place] [address]   O: [phone]  F: [phone]

## 2022-06-08 NOTE — TELEPHONE ENCOUNTER
I called the patient's daughter Brinda and we discussed my conversation with Dr. Cruz.  We discussed that Dr. Jeronimo feels her recent hypoxia admission to the hospital was not not likely due to heart failure given her symptoms did not improve after the patient was euvolemic and in normal sinus rhythm.  I discussed with her that we would need to switch the patient's chemotherapy treatment and that we could further discuss this at the clinic appointment tomorrow.  She expressed understanding.  All questions were answered to her satisfaction.    Salvador Green MD  [facility]  Hematology and Oncology  [facility]   [address] [place] [address]   O: [phone]  F: [phone]

## 2022-06-09 NOTE — PROGRESS NOTES
PATIENT: Nel Cui  MRN: [id number]  DATE: 6/9/2022      Diagnosis:   1. Primary pancreatic cancer with metastasis to other site    2. Metastasis to liver    3. Malignant neoplasm of body of pancreas     4. 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therapy    5. Hypoxia    6. Atrial fibrillation, unspecified type    7. Pancreatic insufficiency    8. Cancer related pain    9. Hypertension, unspecified type    10. Hyperlipidemia, unspecified hyperlipidemia type    11. Chronic diastolic congestive heart failure        Chief Complaint: Pancreatic Cancer (2 week follow up )      Oncologic History:      Oncologic History 12/09/21 Ct Abdomen  12/15/21 EUS  1/04/22 PET/CT  1/07/22 [facility] - Dr Acuña  1/11/22 MRI Brain  3/16/22 PET/CT    Oncologic Treatment 1/12/22 - 5/02/22 Gemcitabine and Paclitaxel s/p C5D1    Pathology 12/15/21 adenocarcinoma in the pancreatic body and adenocarcinoma in the liver       The patient initially presented to her PCP on 12/02/21 with complaint of abdominal pain.  She underwent a CT of the abdomen on 12/09/21 showing subcentimeter para-aortic, mesenteric lymph nodes; multiple hepatic infiltrative lesions present throughout the liver with 1 of the larger areas right liver lobe measuring approximately 2.2 cm in diameter; mass in the pancreatic tail measuring 9 x 3.3 cm.  The patient underwent EUS under the care of Dr Tse on 12/15/21 showing a mass in the pancreatic body and multiple liver lesions.  Path from the procedure showed adenocarcinoma in the pancreatic body and adenocarcinoma in the liver.    The patient underwent PET/CT on 1/04/22 showing hypermetabolic rounded mass at the junction of the body and tail of the pancreas measuring 2.5 x 2.4 cm, hypermetabolic left adrenal gland nodule measuring 1.9 cm, innumerable hypermetabolic nodular masses throughout the left and right hepatic lobes with largest lesions in the right liver measuring 2.5 x 1.9 cm and 5.2 x 4.4 cm, mildly enlarged  hypermetabolic portacaval lymph node measuring 1.4 cm, 0.7 cm hypermetabolic focus at the umbilicus, lesion in the T9 vertebra measuring 1.8 x 1.7 cm and a lesion in the left iliac fossa measuring 1.7 x 1.2 cm.  MRI of the brain on 01/11/2022 showed no evidence of metastases but did show an old right midbrain lacunar infarct.   The patient was in the hospital from 1/28/22-1/29/22 with fever and low sodium.  The patient's hydrochlorothiazide was discontinued.  The patient then presented to the hospital on 02/06/2022 for dyspnea on exertion.  CTA was done on 02/06/2022 showing no evidence of pulmonary embolism.  The patient underwent NT proBNP which was elevated at 2630 pg/mL.  The patient was started on Lasix every other day at the request of Cardiology.   The patient was admitted to the hospital from 2/12/22-2/14/22 for fever.  The patient was discharged on Levaquin for 5 days.  The patient saw Cardiology on 02/17/2022 for atrial fibrillation and was started on Eliquis and taken off of aspirin.     The the patient was admitted to the hospital from [id number] for fever.  During her hospitalization procalcitonin was checked and was normal.  All her cultures were negative.  Her fever was felt to be from chemotherapy.    The patient underwent PET-CT on 03/16/2022 showing a followed of mm pulmonary based nodule; decrease metabolic activity in the liver mass with stable right lobe mass measuring 5.2 x 4.2 cm; right lobe mass measuring 2.2 x 1.8 cm; adrenal gland thickening of 1.9 cm; decrease in pancreatic mass measuring 2 cm; decreased metabolic activity of hypermetabolic mesenteric foci and right-sided pleural effusion.  The patient underwent cardioversion on 03/17/2022 at which point constantine was performed.  Patient was put back in normal sinus rhythm.    The patient was admitted to the hospital for CHF exacerbation from 3/25/22 to 3/29/22 and was diuresed a little over 9.5L.    The patient was admitted to the hospital from  5/15/22-5/20/22 for hypoxia.  CTA chest on 5/15/22 showed diffuse faint ground-glass opacities in the lungs bilaterally.   NT-ProNP on admission was elevated at 5,220pg/mL.  The patient was initially diuresed without much improvement.  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  The patient was eventually weaned to 1.5L O2.  Repeat CXR on 5/26/22 showed improvement in her intrapulmonary process.      Subjective:     Interval History: Ms. Cui is a 87 y.o. female with Osteopenia, HLD, HTN, pancreatic cancer who presents for fever.  Since the last clinic visit the pt's case was discussed with Dr Cruz on 6/08/22.  Dr Brewer felt the patient's hypoxia is not due to heart failure.  Currently the patient states her fatigue is better.  She still has SOB with exertion and occasional cough.  Pt also endorses diarrhea and starts her stool floats ion the commode. The patient denies CP, abdominal pain, N/V, constipation.  The patient denies fever, chills, night sweats, weight loss, new lumps or bumps, easy bruising or bleeding.    Past Medical History:   Past Medical History:   Diagnosis Date    A-fib     Anemia     Anticoagulant long-term use     Arthritis     Cataracts, bilateral     CHF (congestive heart failure)     chronic diastolic    Coronary artery disease     Nonobstructive    Essential (primary) hypertension 10/05/2010    Hyperlipidemia     Hyponatremia     Mitral regurgitation     Osteopenia     Pancreatic insufficiency     Primary pancreatic cancer with metastasis to other site     Thrombocytopenia        Past Surgical HIstory:   Past Surgical History:   Procedure Laterality Date    CARPAL TUNNEL RELEASE Bilateral     wrist    CATARACT EXTRACTION, BILATERAL  2017    CORONARY ANGIOGRAPHY  09/01/2020    Procedure: ANGIOGRAM, CORONARY ARTERY;  Surgeon: Ivette Horne MD;  Location: [facility];  Service: Cardiology;;    DILATION AND CURETTAGE OF  "UTERUS      ENDOSCOPIC ULTRASOUND OF UPPER GASTROINTESTINAL TRACT Left 12/15/2021    Procedure: ULTRASOUND, UPPER GI TRACT, ENDOSCOPIC;  Surgeon: Lico Tse MD;  [facility] ENDO;  Service: Endoscopy;  Laterality: Left;    ESOPHAGOGASTRODUODENOSCOPY N/A 12/15/2021    Procedure: EGD (ESOPHAGOGASTRODUODENOSCOPY);  Surgeon: Lico Tse MD;  Location[facility] ENDO;  Service: Endoscopy;  Laterality: N/A;    INSERTION OF TUNNELED CENTRAL VENOUS CATHETER (CVC) WITH SUBCUTANEOUS PORT N/A 01/07/2022    Procedure: [id number]-OWTP-L-CATH;  Surgeon: Cas Acuña MD;  [facility] OR;  Service: General;  Laterality: N/A;    LEFT HEART CATHETERIZATION  09/01/2020    Procedure: Left heart cath;  Surgeon: Ivette Horne MD;  [facility] CATH;  Service: Cardiology;;    MITRAL VALVE REPAIR      OOPHORECTOMY Left 1988    OTHER SURGICAL HISTORY      maze procedure and PHILL ligation    REPAIR OF MENISCUS OF KNEE Left     TONSILLECTOMY      vein removal         Family History:   Family History   Problem Relation Age of Onset    No Known Problems Mother     No Known Problems Father     Cancer Maternal Grandmother         Stomach    No Known Problems Maternal Grandfather     No Known Problems Paternal Grandmother     No Known Problems Paternal Grandfather     Breast cancer Sister         over 60    No Known Problems Brother        Social History:  reports that she quit smoking about 33 years ago. Her smoking use included cigarettes. She has a 30.00 pack-year smoking history. She has never used smokeless tobacco. She reports that she does not drink alcohol and does not use drugs.    Allergies:  Review of patient's allergies indicates:   Allergen Reactions    Amoxicillin-pot clavulanate Other (See Comments)     "Spaced out feeling- can't take it longer than 4 or 5 days"  Patient tolerated Zosyn    Breo ellipta [fluticasone furoate-vilanterol] Other (See Comments)     Feels jittery    Corticosteroids " "(glucocorticoids)      Other reaction(s): tachycardia    Erythromycin      "Spaced out"    Metoprolol succinate      "I dont feel good, it doesn't work"    Latex, natural rubber      Turns skin red around area where latex touches       Medications:  Current Outpatient Medications   Medication Sig Dispense Refill    acetaminophen (TYLENOL) 325 MG tablet Take 325 mg by mouth every 6 (six) hours as needed for Pain or Temperature greater than.      apixaban (ELIQUIS) 2.5 mg Tab Take 1 tablet (2.5 mg total) by mouth 2 (two) times daily. 60 tablet 0    ascorbic acid, vitamin C, (VITAMIN C) 500 MG tablet Take 500 mg by mouth once daily.      bisoprolol (ZEBETA) 5 MG tablet Take 2 tablets (10 mg total) by mouth every evening. 60 tablet 0    cholecalciferol, vitamin D3, (VITAMIN D3) 25 mcg (1,000 unit) capsule Take 1,000 Units by mouth every morning.      coenzyme Q10 100 mg capsule Take 100 mg by mouth once daily.      docusate sodium (COLACE) 100 MG capsule Take 1 capsule (100 mg total) by mouth 2 (two) times daily. (Patient taking differently: Take 100 mg by mouth 2 (two) times daily as needed for Constipation.)  0    furosemide (LASIX) 20 MG tablet Take 1 tablet (20 mg total) by mouth once daily. Hold until approved by PCP      LIDOcaine-prilocaine (EMLA) cream Apply topically as needed (30 to 60 minutes prior to chemo). 30 g 2    losartan (COZAAR) 100 MG tablet Take 1 tablet (100 mg total) by mouth every evening. 90 tablet 3    losartan-hydrochlorothiazide 100-12.5 mg (HYZAAR) 100-12.5 mg Tab Take 1 tablet by mouth once daily.      magnesium 250 mg Tab Take 250 mg by mouth every evening.      multivitamin (THERAGRAN) per tablet Take 1 tablet by mouth every other day.      ondansetron (ZOFRAN-ODT) 4 MG TbDL Take 4 mg by mouth every 12 (twelve) hours as needed (nausea/vomiting).      OXYGEN-AIR DELIVERY SYSTEMS MISC Inhale 1.5 L/min into the lungs continuous.      pravastatin (PRAVACHOL) 40 MG tablet " TAKE 1 TABLET(40 MG) BY MOUTH EVERY DAY (Patient taking differently: Take 40 mg by mouth every evening.) 90 tablet 3    saliva substitute combo no.9 (BIOTENE DRY MOUTH ORAL RINSE) Mwsh Swish and spit 5 mLs every evening.      [START ON 6/10/2022] dexAMETHasone (DECADRON) 4 MG Tab Take 2 tablets (8 mg total) by mouth once daily. Take as directed on days 2 and 3 of your chemotherapy cycle. 24 tablet 5    lipase-protease-amylase (CREON) 36,000-114,000- 180,000 unit CpDR Take 3 capsules by mouth 3 (three) times daily with meals. Take 3 capsules TID with meals and 2 capsule PRN with snacks 300 capsule 11    loperamide (IMODIUM A-D) 2 mg Tab Take 4 mg after first loose or frequent bowel movement, then 2 mg every 2 hours until 12 hours have passed without a bowel movement. 24 tablet 25    ondansetron (ZOFRAN-ODT) 8 MG TbDL Take 1 tablet (8 mg total) by mouth every 8 (eight) hours as needed (nausea/vomiting). 60 tablet 5     No current facility-administered medications for this visit.     Facility-Administered Medications Ordered in Other Visits   Medication Dose Route Frequency Provider Last Rate Last Admin    acetaminophen tablet 650 mg  650 mg Oral Once Salvador Green MD        albuterol inhaler 2 puff  2 puff Inhalation PRN Salvador Green MD        cilgavimab injection 300 mg  300 mg Intramuscular Q6 Months Salvador Green MD   300 mg at 06/09/22 1209    diphenhydrAMINE capsule 25 mg  25 mg Oral Once Salvador Green MD        EPINEPHrine (EPIPEN) 0.3 mg/0.3 mL pen injection 0.3 mg  0.3 mg Intramuscular Q15 Min PRN Salvador Green MD        ondansetron disintegrating tablet 4 mg  4 mg Oral Once Salvador Green MD        predniSONE tablet 40 mg  40 mg Oral Once PRN Salvador Green MD        tixagevimab injection 300 mg  300 mg Intramuscular Q6 Months Salvador Green MD   300 mg at 06/09/22 1209       Review of Systems   Constitutional: Positive for fatigue. Negative for chills, fever and unexpected  "weight change.   Respiratory: Positive for cough and shortness of breath.    Cardiovascular: Negative for chest pain, palpitations and leg swelling.   Gastrointestinal: Positive for diarrhea. Negative for abdominal pain, constipation, nausea and vomiting.   Genitourinary: Negative for difficulty urinating and dysuria.   Skin: Negative for rash.   Neurological: Negative for headaches.   Hematological: Negative for adenopathy. Does not bruise/bleed easily.       ECOG Performance Status: 2   Objective:      Vitals:   Vitals:    06/09/22 1034   BP: 130/70   BP Location: Left arm   Patient Position: Sitting   BP Method: Medium (Manual)   Pulse: 88   Temp: 96.5 °F (35.8 °C)   TempSrc: Temporal   SpO2: 95%   Weight: 62.1 kg (136 lb 14.5 oz)   Height: 5' 5.5" (1.664 m)     Physical Exam  Constitutional:       General: She is not in acute distress.     Appearance: She is well-developed. She is not diaphoretic.   HENT:      Head: Normocephalic and atraumatic.   Cardiovascular:      Rate and Rhythm: Rhythm irregular.      Heart sounds: No murmur heard.    No friction rub. No gallop.   Pulmonary:      Effort: Pulmonary effort is normal. No respiratory distress.      Breath sounds: Normal breath sounds. No wheezing or rales.   Chest:      Chest wall: No tenderness.   Breasts:      Right: No axillary adenopathy or supraclavicular adenopathy.      Left: No axillary adenopathy or supraclavicular adenopathy.       Abdominal:      General: Bowel sounds are normal. There is no distension.      Palpations: Abdomen is soft. There is no mass.      Tenderness: There is no abdominal tenderness. There is no guarding or rebound.   Musculoskeletal:         General: No tenderness. Normal range of motion.      Right lower leg: Edema present.      Left lower leg: Edema present.   Lymphadenopathy:      Cervical: No cervical adenopathy.      Upper Body:      Right upper body: No supraclavicular or axillary adenopathy.      Left upper body: No " supraclavicular or axillary adenopathy.   Skin:     Findings: No erythema or rash.   Neurological:      Mental Status: She is alert and oriented to person, place, and time.   Psychiatric:         Behavior: Behavior normal.         Laboratory Data:  Lab Visit on 06/03/2022   Component Date Value Ref Range Status    Uric Acid 06/03/2022 2.8  2.4 - 5.7 mg/dL Final    NT-proBNP 06/03/2022 1528 (A) <=263 pg/mL Final    Comment: NT-proBNP values less than 300 pg/mL have a 99% negative  predictive value for excluding acute congestive heart   failure. A cutoff of 1200 pg/mL for patients with an eGFR<60  yields a diagnostic sensitivity and specificity of 89% and   72% for acute congestive heart failure. A diagnostic   NT-proBNP cutoff of 1800 pg/mL has been suggested in adults  over 75 years of age in the absence of renal failure.    Test Performed by:  [place] [address]  : Russell Bell M.D. Ph.D.; CLIA# [id number]      CRP 06/03/2022 24.4 (A) 0.0 - 8.2 mg/L Final    LD 06/03/2022 273 (A) 110 - 260 U/L Final    Results are increased in hemolyzed samples.         Imaging:     PET/CT 3/16/22    Head and neck:     No hypermetabolic activity is noted within the head neck to suggest metastatic disease.     Atherosclerotic carotid calcifications are noted.     Thorax:     A port is noted overlying the left hemithorax with its tip in the superior vena cava.     Extensive mitral valve calcifications or mitral valve replacement are noted.  Extensive coronary calcifications are noted increasing the patient's coronary risk.     A moderate sized joint effusion is developed since the prior.  Please correlate for etiologies.  This could relate to cardiogenic edema or metastatic fluid.  No obvious hypermetabolic activity is noted.     A pleural based density is noted of 5 mm image 50 sequence 3 similar since the prior.  Atelectasis, scarring, or  pulmonary nodule is on the differential.  In a high-risk patient follow-up for long-term size stability would be suggested.     Sternotomy wires are noted     Abdomen and pelvis:     Significant improvement is noted since prior with the liver demonstrating near uniform metabolic activity.  Significantly less hypermetabolic activity is noted than was seen on the prior.  A hypodense region in the right lobe measured on the prior on CT is still thought to be seen image 131 without convincing detrimental change when measured in a similar manner as on the prior at 5.2 x 4.2 cm image 131 sequence 3.  Several other hypodense regions are noted without worrisome detrimental change since the prior.  The region in the right lobe of the liver that measured 2.5 x 1.9 cm on the prior is less easily visualized but can be measured similarly at 2.2 x 1.8 cm image 142 sequence 3     The adrenal thickening seen on the prior appears of 2 cm in short diameter measuring 1.9 cm on the prior unchanged.  In long dimension it was favored to be under measured on the prior but is unchanged when measured in a similar manner.  It is not significantly hypermetabolic on today's exam     The hypermetabolic pancreatic tail mass seen on the prior demonstrates near background activity on today's exam as well and no obvious focal mass can be measured on the CT portion of this exam.  The pancreas in this region measures 2.0 cm in thickness versus is 2.5 cm on the prior.  Decrease in size of the mass is suspected     Several hypermetabolic mesenteric foci were suspected on the prior suggestive of mesenteric metastasis.  No significant hypermetabolic activity is noted on today's exam.     Musculoskeletal:     Glenohumeral joint space loss is noted on the left greater than right suggestive of degenerative change likely associated with labral tearing.     Central canal stenosis is noted in the lower lumbar spine particularly at the L4-L5 level.  No obvious  hypermetabolic osseous changes are noted on today's exam.  This is an improvement since the prior      Assessment:       1. Primary pancreatic cancer with metastasis to other site    2. Metastasis to liver    3. Malignant neoplasm of body of pancreas     4. 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therapy    5. Hypoxia    6. Atrial fibrillation, unspecified type    7. Pancreatic insufficiency    8. Cancer related pain    9. Hypertension, unspecified type    10. Hyperlipidemia, unspecified hyperlipidemia type    11. Chronic diastolic congestive heart failure           Plan:   Metastatic Pancreatic Cancer - The patient was diagnosed with metastatic pancreatic cancer adenocarcinoma with mets to the liver on 12/15/21  -Ca19-9 was 7,581 on 12/15/21  -PET/CT on 1/04/22 shows mets to the liver, left adrenal gland, T9 vertebra and left iliac fossa.  -Potential mets are seen near the umbilicus  -PT underwent PORT placement 1/07/22  -MRI brain on 1/11/22 showed no brain mets  -Pt consented for Gemcitabine and paclitaxel on 1/03/22  -Pt treated with 20% dose reduction in paclitaxel and Gemcitabine to 100mg/mm2 and 800mg/mm2 respectively  -C2D8 and C2D15 cancelled given her hospitalization from 2/12/22-2/14/22  -Patient completed C3D8 and was hospitalized again for a fever  -Treatment moving forward was discussed with the patient and decision was made to remove day 8 of treatment  -PET/CT on 3/16/22 shows stable disease  -Ca19-9 has dropped dramatically to 170 on 3/16/22  -C3D15 delayed given cardiac ablation on 03/17/2022  -Will continue Winston/Abraxane at 20% does reduction due to prior thrombocytopenia  -Pt underwent C5D1 5/02/22  -Guardant 360 testing 1/26/22 showed STACY [id number] 0.3% amplification, KRAS G12D 0.3% amplification, MSI-stable, ARID1A Q7040Q 0.4%, KRAS G12D 0.3%, STACY [id number], STK11 S59T 0.2%, and BRCA1 R979C 0.1%.  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  -Pt currently on 2L O2  -Will monitor     A-fib - pt to undergo cardioversion 6/16/22  -A fib likely causing SOB given concomitant diastolic heart failure  -PT on Eliquis  -PT s/p Ablation on 3/17/22  -Management per cardiology     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  -No current signs of infection  -Will have the patient set up for Evusheld  -Will monitor     Pancreatic Insufficiency - pt on creon  -Pt instructed to increase dosing to 3 pills with meals and 2 with snacks given diarrhea  -Will monitor     Cancer Related Pain - improved  -Will monitor     HTN - pt taking losartan, bisoprolol  -Cardiology managing  -Will monitor     HLD - pt on pravastatin  -Management per PCP     CHF - pt with CHF exacerbation and hospitalized from 3/25/22 to 3/29/22 wioth 9.5L removed  -Pt hospitalized again from 5/15/22 to 5/20/22 for possible CHF exacerbation  -Pt taking lasix daily  -Management per cardiology    Advance Care Planning     Power of   I initiated the process of advance care planning today and explained the importance of this process to the patient.  I introduced the concept of advance directives to the patient, as well. Then the patient received detailed information about the importance of designating a Health Care Power of  (HCPOA). She was also instructed to communicate with this person about their wishes for future healthcare, should she become sick and lose decision-making capacity. The patient has not previously appointed a HCPOA. After our discussion, the patient has decided to complete a  SOPHIE and will bring the form completed to her next clinic appointment.            Route Chart for Scheduling    Med Onc Chart Routing      Follow up with physician 2 weeks. The patient will need to be set up for Evusheld today if possible.  She will need a PEt/CT next week.  She will need a chemno class for Liposomal Irinotecan and fluorouracil.  She will need a CBC, CMP, Phos, MG and Ca19-9 on 6/20/22 with treatment that week.   Follow up with IVONNE    Labs    Imaging    Pharmacy appointment    Other referrals          Treatment Plan Information   OP LIPOSOMAL IRINOTECAN FLUOROURACIL LEUCOVORIN Q2W   Salvador Green MD   Upcoming Treatment Dates - OP LIPOSOMAL IRINOTECAN FLUOROURACIL LEUCOVORIN Q2W    6/20/2022       Chemotherapy       irinotecan liposomaL (ONIVYDE) 70 mg/m2 = 118.293 mg in sodium chloride 0.9% 500 mL chemo infusion       leucovorin calcium 400 mg/m2 = 675 mg in dextrose 5 % 250 mL infusion       fluorouracil (ADRUCIL) 2,400 mg/m2 = 4,055 mg in sodium chloride 0.9% 100 mL chemo infusion       Supportive Care       atropine injection 0.4 mg  7/4/2022       Chemotherapy       irinotecan liposomaL (ONIVYDE) 70 mg/m2 = 118.293 mg in sodium chloride 0.9% 500 mL chemo infusion       leucovorin calcium 400 mg/m2 = 675 mg in dextrose 5 % 250 mL infusion       fluorouracil (ADRUCIL) 2,400 mg/m2 = 4,055 mg in sodium chloride 0.9% 100 mL chemo infusion       Supportive Care       atropine injection 0.4 mg  7/18/2022       Chemotherapy       irinotecan liposomaL (ONIVYDE) 70 mg/m2 = 118.293 mg in sodium chloride 0.9% 500 mL chemo infusion       leucovorin calcium 400 mg/m2 = 675 mg in dextrose 5 % 250 mL infusion       fluorouracil (ADRUCIL) 2,400 mg/m2 = 4,055 mg in sodium chloride 0.9% 100 mL chemo infusion       Supportive Care       atropine injection 0.4 mg  8/1/2022       Chemotherapy       irinotecan liposomaL (ONIVYDE) 70 mg/m2 = 118.293 mg in sodium chloride 0.9% 500 mL chemo infusion       leucovorin  calcium 400 mg/m2 = 675 mg in dextrose 5 % 250 mL infusion       fluorouracil (ADRUCIL) 2,400 mg/m2 = 4,055 mg in sodium chloride 0.9% 100 mL chemo infusion       Supportive Care       atropine injection 0.4 mg    Therapy Plan Information  diphenhydrAMINE capsule 25 mg  25 mg, Oral, PRN  predniSONE tablet 40 mg  40 mg, Oral, PRN  EPINEPHrine (EPIPEN) 0.3 mg/0.3 mL pen injection 0.3 mg  0.3 mg, Intramuscular, PRN  ondansetron disintegrating tablet 4 mg  4 mg, Oral, PRN  acetaminophen tablet 650 mg  650 mg, Oral, PRN  albuterol inhaler 2 puff  2 puff, Inhalation, PRN      Salvador Green MD  [facility]  Hematology and Oncology  [facility]   [address]   [place] [address]   O: [phone]  F: [phone]

## 2022-06-09 NOTE — PLAN OF CARE
DISCONTINUE ON PATHWAY REGIMEN - Pancreatic Adenocarcinoma    PANOS51        Nab-paclitaxel (protein bound) (Abraxane)       Gemcitabine (Gemzar)     **Always confirm dose/schedule in your pharmacy ordering system**    REASON: Toxicities / Adverse Event  PRIOR TREATMENT: PANOS51  TREATMENT RESPONSE: Stable Disease (SD)    START ON PATHWAY REGIMEN - Pancreatic Adenocarcinoma    PANOS74        Liposomal irinotecan (Onivyde)       Leucovorin       Fluorouracil     **Always confirm dose/schedule in your pharmacy ordering system**    Patient Characteristics:  Metastatic Disease, Second Line, TL/pMMR or MSI Unknown, Gemcitabine-Based   Therapy First Line  Therapeutic Status: Metastatic Disease  Line of Therapy: Second Line  Microsatellite/Mismatch Repair Status: TL/pMMR  Intent of Therapy:  Non-Curative / Palliative Intent, Discussed with Patient

## 2022-06-09 NOTE — PLAN OF CARE
Patient received two gluteal intramuscular injections (left and right) of tixagevimab/cilgavimab (EVUSHELD) with no difficulties tolerating the medication. Pt was observed for one hour post injections.

## 2022-06-09 NOTE — NURSING
Treatment change.  Reviewed chart.  Spoke with pt's daughterBrinda.  Scheduled chemo class and MD f/u to begin new treatment.  Reviewed other appointments.... PET, lab, Infusion.  Daughter asked to try and consolidate appts to same day if possible.  Rescheduled Dr. Mandujano.  Briefly reviewed treatment plan and pre cycle medications that were sent to [facility].  Will have SW and RD visit during Infusion.   Contact information was given and she was encouraged to call with any questions or concerns.  Daughter thanked me and verbalized an understanding.

## 2022-06-13 NOTE — PROGRESS NOTES
PATIENT: Nel Cui  MRN: [id number]  DATE: 6/14/2022    Diagnosis:   1. Primary pancreatic cancer with metastasis to other site    2. Metastasis to liver      Chief Complaint: Patient Education (Chemo school )    Subjective:   HPI: Ms. Cui is a 87 y.o. female with Osteopenia, HLD, HTN who is known to Dr. Green for diagnosis of pancreatic cancer. Here today for education prior to starting a new regimen of Irinotecan and 5-FU with a 20% dose reduction. She is accompanied by her daughter.    Patient has an appointment for cardioversion on 6/16/22 and PET/CT on 6/20/22. Plans to follow up with Dr. Green on 6/22/22.  She has picked up prescriptions for Dexamethasone, has Ondansetron 4 mg tabs at home so she did not  the Rx for 8 mg tabs yet.   Is feeling well overall, fatigue has improved.   The patient denies CP, abdominal pain, N/V, constipation, fever, chills, night sweats, weight loss, new lumps or bumps, easy bruising or bleeding fever, chills, night sweats, weight loss, new lumps or bumps, easy bruising or bleeding.    Prior History:   Oncologic History:       Oncologic History 12/09/21 Ct Abdomen  12/15/21 EUS  1/04/22 PET/CT  1/07/22 PORT - Dr Acuña  1/11/22 MRI Brain    Oncologic Treatment 1/12/22 - current Gemcitabine and Paclitaxel    Pathology 12/15/21 adenocarcinoma in the pancreatic body and adenocarcinoma in the liver       The patient initially presented to her PCP on 12/02/21 with complaint of abdominal pain.  She underwent a CT of the abdomen on 12/09/21 showing subcentimeter para-aortic, mesenteric lymph nodes; multiple hepatic infiltrative lesions present throughout the liver with 1 of the larger areas right liver lobe measuring approximately 2.2 cm in diameter; mass in the pancreatic tail measuring 9 x 3.3 cm.  The patient underwent EUS under the care of Dr Tse on 12/15/21 showing a mass in the pancreatic body and multiple liver lesions.  Path from the procedure showed  adenocarcinoma in the pancreatic body and adenocarcinoma in the liver.               The patient underwent PET/CT on 1/04/22 showing hypermetabolic rounded mass at the junction of the body and tail of the pancreas measuring 2.5 x 2.4 cm, hypermetabolic left adrenal gland nodule measuring 1.9 cm, innumerable hypermetabolic nodular masses throughout the left and right hepatic lobes with largest lesions in the right liver measuring 2.5 x 1.9 cm and 5.2 x 4.4 cm, mildly enlarged hypermetabolic portacaval lymph node measuring 1.4 cm, 0.7 cm hypermetabolic focus at the umbilicus, lesion in the T9 vertebra measuring 1.8 x 1.7 cm and a lesion in the left iliac fossa measuring 1.7 x 1.2 cm.  MRI of the brain on 01/11/2022 showed no evidence of metastases but did show an old right midbrain lacunar infarct.      Past Medical History:   Past Medical History:   Diagnosis Date    A-fib     Anemia     Anticoagulant long-term use     Arthritis     Cataracts, bilateral     CHF (congestive heart failure)     chronic diastolic    Coronary artery disease     Nonobstructive    Essential (primary) hypertension 10/05/2010    Hyperlipidemia     Hyponatremia     Mitral regurgitation     Osteopenia     Pancreatic insufficiency     Primary pancreatic cancer with metastasis to other site     Thrombocytopenia        Past Surgical HIstory:   Past Surgical History:   Procedure Laterality Date    CARPAL TUNNEL RELEASE Bilateral     wrist    CATARACT EXTRACTION, BILATERAL  2017    CORONARY ANGIOGRAPHY  09/01/2020    Procedure: ANGIOGRAM, CORONARY ARTERY;  Surgeon: Ivette Horne MD;  Location: [facility] CATH;  Service: Cardiology;;    DILATION AND CURETTAGE OF UTERUS      ENDOSCOPIC ULTRASOUND OF UPPER GASTROINTESTINAL TRACT Left 12/15/2021    Procedure: ULTRASOUND, UPPER GI TRACT, ENDOSCOPIC;  Surgeon: Lico Tse MD;  Location: [facility] ENDO;  Service: Endoscopy;  Laterality: Left;    ESOPHAGOGASTRODUODENOSCOPY N/A 12/15/2021  "   Procedure: EGD (ESOPHAGOGASTRODUODENOSCOPY);  Surgeon: Lico Tse MD;  Location: [facility] ENDO;  Service: Endoscopy;  Laterality: N/A;    INSERTION OF TUNNELED CENTRAL VENOUS CATHETER (CVC) WITH SUBCUTANEOUS PORT N/A 01/07/2022    Procedure: [id number];  Surgeon: Cas Acuña MD;  Location: [facility] OR;  Service: General;  Laterality: N/A;    LEFT HEART CATHETERIZATION  09/01/2020    Procedure: Left heart cath;  Surgeon: Ivette Horne MD;  Location: [facility] CATH;  Service: Cardiology;;    MITRAL VALVE REPAIR      OOPHORECTOMY Left 1988    OTHER SURGICAL HISTORY      maze procedure and PHILL ligation    REPAIR OF MENISCUS OF KNEE Left     TONSILLECTOMY      vein removal         Family History:   Family History   Problem Relation Age of Onset    No Known Problems Mother     No Known Problems Father     Cancer Maternal Grandmother         Stomach    No Known Problems Maternal Grandfather     No Known Problems Paternal Grandmother     No Known Problems Paternal Grandfather     Breast cancer Sister         over 60    No Known Problems Brother        Social History:  reports that she quit smoking about 33 years ago. Her smoking use included cigarettes. She has a 30.00 pack-year smoking history. She has never used smokeless tobacco. She reports that she does not drink alcohol and does not use drugs.    Allergies:  Review of patient's allergies indicates:   Allergen Reactions    Amoxicillin-pot clavulanate Other (See Comments)     "Spaced out feeling- can't take it longer than 4 or 5 days"  Patient tolerated Zosyn    Breo ellipta [fluticasone furoate-vilanterol] Other (See Comments)     Feels jittery    Corticosteroids (glucocorticoids)      Other reaction(s): tachycardia    Erythromycin      "Spaced out"    Metoprolol succinate      "I dont feel good, it doesn't work"    Latex, natural rubber      Turns skin red around area where latex touches       Medications:  Current Outpatient " Medications   Medication Sig Dispense Refill    acetaminophen (TYLENOL) 325 MG tablet Take 325 mg by mouth every 6 (six) hours as needed for Pain or Temperature greater than.      apixaban (ELIQUIS) 2.5 mg Tab Take 1 tablet (2.5 mg total) by mouth 2 (two) times daily. 60 tablet 0    ascorbic acid, vitamin C, (VITAMIN C) 500 MG tablet Take 500 mg by mouth once daily.      bisoprolol (ZEBETA) 5 MG tablet Take 2 tablets (10 mg total) by mouth every evening. 60 tablet 0    cholecalciferol, vitamin D3, (VITAMIN D3) 25 mcg (1,000 unit) capsule Take 1,000 Units by mouth every morning.      coenzyme Q10 100 mg capsule Take 100 mg by mouth once daily.      dexAMETHasone (DECADRON) 4 MG Tab Take 2 tablets (8 mg total) by mouth once daily. Take as directed on days 2 and 3 of your chemotherapy cycle. 24 tablet 5    docusate sodium (COLACE) 100 MG capsule Take 1 capsule (100 mg total) by mouth 2 (two) times daily. (Patient taking differently: Take 100 mg by mouth 2 (two) times daily as needed for Constipation.)  0    furosemide (LASIX) 20 MG tablet Take 1 tablet (20 mg total) by mouth once daily. Hold until approved by PCP      LIDOcaine-prilocaine (EMLA) cream Apply topically as needed (30 to 60 minutes prior to chemo). 30 g 2    lipase-protease-amylase (CREON) 36,000-114,000- 180,000 unit CpDR Take 3 capsules by mouth 3 (three) times daily with meals. Take 3 capsules TID with meals and 2 capsule PRN with snacks 300 capsule 11    loperamide (IMODIUM A-D) 2 mg Tab Take 4 mg after first loose or frequent bowel movement, then 2 mg every 2 hours until 12 hours have passed without a bowel movement. 24 tablet 25    losartan (COZAAR) 100 MG tablet Take 1 tablet (100 mg total) by mouth every evening. 90 tablet 3    losartan-hydrochlorothiazide 100-12.5 mg (HYZAAR) 100-12.5 mg Tab Take 1 tablet by mouth once daily.      magnesium 250 mg Tab Take 250 mg by mouth every evening.      multivitamin (THERAGRAN) per tablet Take  "1 tablet by mouth every other day.      ondansetron (ZOFRAN-ODT) 4 MG TbDL Take 4 mg by mouth every 12 (twelve) hours as needed (nausea/vomiting).      ondansetron (ZOFRAN-ODT) 8 MG TbDL Take 1 tablet (8 mg total) by mouth every 8 (eight) hours as needed (nausea/vomiting). 60 tablet 5    OXYGEN-AIR DELIVERY SYSTEMS MISC Inhale 1.5 L/min into the lungs continuous.      pravastatin (PRAVACHOL) 40 MG tablet TAKE 1 TABLET(40 MG) BY MOUTH EVERY DAY (Patient taking differently: Take 40 mg by mouth every evening.) 90 tablet 3    saliva substitute combo no.9 (BIOTENE DRY MOUTH ORAL RINSE) Mwsh Swish and spit 5 mLs every evening.       No current facility-administered medications for this visit.       Review of Systems   Constitutional: Positive for fatigue.   Respiratory: Positive for shortness of breath. Negative for cough.    Cardiovascular: Negative for chest pain.   Gastrointestinal: Negative for abdominal pain, constipation, diarrhea and nausea.   Genitourinary: Negative for dysuria.   Musculoskeletal: Negative for arthralgias.   Skin: Negative for rash.   Neurological: Negative for headaches.   Hematological: Negative for adenopathy.   Psychiatric/Behavioral: The patient is not nervous/anxious.        ECOG Performance Status:   ECOG SCORE    2 - Capable of all selfcare but unable to carry out any work activities, active > 50% of hours         Objective:      Vitals:   Vitals:    06/14/22 1106   BP: 114/70   BP Location: Left arm   Patient Position: Sitting   BP Method: Medium (Manual)   Pulse: 101   Temp: 98.5 °F (36.9 °C)   TempSrc: Temporal   SpO2: 97%   Height: 5' 5.5" (1.664 m)     BMI: Body mass index is 22.44 kg/m².    Physical Exam  Vitals reviewed.   Constitutional:       General: She is not in acute distress.     Appearance: She is not diaphoretic.   HENT:      Head: Normocephalic and atraumatic.   Eyes:      General: No scleral icterus.  Pulmonary:      Effort: Pulmonary effort is normal. No respiratory " distress.   Abdominal:      General: Bowel sounds are normal. There is no distension.   Musculoskeletal:      Right lower leg: No edema.      Left lower leg: No edema.   Skin:     General: Skin is warm and dry.      Findings: No bruising or rash.   Neurological:      Mental Status: She is alert and oriented to person, place, and time.   Psychiatric:         Mood and Affect: Mood normal.         Behavior: Behavior normal.         Laboratory Data:  Lab Results   Component Value Date    WBC 10.40 05/26/2022    HGB 12.5 05/26/2022    HCT 40.0 05/26/2022    MCV 90 05/26/2022     05/26/2022     Assessment:       1. Primary pancreatic cancer with metastasis to other site    2. Metastasis to liver         Plan:   Metastatic Pancreatic cancer   -Previously treated with 20% dose reduced Gemcitabine and paclitaxel  -Concern there was a reaction to Gemcitabine with pneumonitis causing hypoxia   -Plan to switch patient to Liposomal irinotecan and Fluorouracil with a 20% dose reduction   -Treatment plan of Irinotecan IV day 1 and 5-FU via continuous 46 hour infusion d1-3 of a 14 day cycle discussed with patient and daughter  -Dexamethasone 4 mg tabs; take 2 tabs PO on days 2-3 of each cycle. Patient has this medication at home.  -Handouts provided from [email or web address] on chemotherapy agents.    -Discussed the mechanism of action, potential side effects of this treatment as well as ways to manage them at home. Some of these side effects include but not limited to fever, nausea, vomiting, decreased appetite, fatigue, weakness, cytopenias, myalgia/arthralgia, constipation, diarrhea, bleeding, headache, nail changes, taste change, hair thinning/loss, peripheral neuropathy, kidney toxicity, or ototoxicity.   -Dietary modifications discussed.  Detail instructions to be provided by dietician.   -Chemotherapy Binder supplied with contact information provided at a previous appointment; information reviewed today  -Discussed  follow-up with the physician for toxicity monitoring throughout treatment.    - No refill requests at this time; patient has Ondansetron 4 mg tabs at home and will use these before picking up the 8 mg tabs. Has Biotene at home, will call with sx of mucositis for Magic Mouthwash Rx if needed.   -The patient and daughter verbalized understanding. All questions were answered and an informed consent was obtained.  -Patient declines Chemo ; she does not have a smart phone    -Requested appointment with Dr. Mandujano next week; she already established but would like more frequent appointments  -6/16/2022 cardioversion planned   -Keep PET/CT appointment on 6/20/22  -Follow up with Dr. Green on 6/22/22 as planned     Patient queried and all questions were answered.  Assessment/Plan reviewed and approved by Dr. Green     Route Chart for Scheduling    Med Onc Chart Routing      Follow up with physician Other. Would like to see Dr. Mandujano next week if possible on same day as labs or MD; keep follow up with Dr. Green as planned    Follow up with IVONNE    Labs    Imaging    Pharmacy appointment    Other referrals          Treatment Plan Information   OP LIPOSOMAL IRINOTECAN FLUOROURACIL LEUCOVORIN Q2W   Salvador Green MD   Upcoming Treatment Dates - OP LIPOSOMAL IRINOTECAN FLUOROURACIL LEUCOVORIN Q2W    6/20/2022       Chemotherapy       irinotecan liposomaL (ONIVYDE) 70 mg/m2 = 118.293 mg in sodium chloride 0.9% 500 mL chemo infusion       leucovorin calcium 400 mg/m2 = 675 mg in dextrose 5 % 250 mL infusion       fluorouracil (ADRUCIL) 2,400 mg/m2 = 4,055 mg in sodium chloride 0.9% 100 mL chemo infusion       Supportive Care       atropine injection 0.4 mg  7/4/2022       Chemotherapy       irinotecan liposomaL (ONIVYDE) 70 mg/m2 = 118.293 mg in sodium chloride 0.9% 500 mL chemo infusion       leucovorin calcium 400 mg/m2 = 675 mg in dextrose 5 % 250 mL infusion       fluorouracil (ADRUCIL) 2,400 mg/m2 = 4,055 mg in  sodium chloride 0.9% 100 mL chemo infusion       Supportive Care       atropine injection 0.4 mg  7/18/2022       Chemotherapy       irinotecan liposomaL (ONIVYDE) 70 mg/m2 = 118.293 mg in sodium chloride 0.9% 500 mL chemo infusion       leucovorin calcium 400 mg/m2 = 675 mg in dextrose 5 % 250 mL infusion       fluorouracil (ADRUCIL) 2,400 mg/m2 = 4,055 mg in sodium chloride 0.9% 100 mL chemo infusion       Supportive Care       atropine injection 0.4 mg  8/1/2022       Chemotherapy       irinotecan liposomaL (ONIVYDE) 70 mg/m2 = 118.293 mg in sodium chloride 0.9% 500 mL chemo infusion       leucovorin calcium 400 mg/m2 = 675 mg in dextrose 5 % 250 mL infusion       fluorouracil (ADRUCIL) 2,400 mg/m2 = 4,055 mg in sodium chloride 0.9% 100 mL chemo infusion       Supportive Care       atropine injection 0.4 mg    Therapy Plan Information  diphenhydrAMINE capsule 25 mg  25 mg, Oral, PRN  predniSONE tablet 40 mg  40 mg, Oral, PRN  EPINEPHrine (EPIPEN) 0.3 mg/0.3 mL pen injection 0.3 mg  0.3 mg, Intramuscular, PRN  ondansetron disintegrating tablet 4 mg  4 mg, Oral, PRN  acetaminophen tablet 650 mg  650 mg, Oral, PRN  albuterol inhaler 2 puff  2 puff, Inhalation, PRN

## 2022-06-14 NOTE — PROGRESS NOTES
PSYCHO-ONCOLOGY NOTE/ Individual Psychotherapy     Date: 6/14/2022   Site:  [facility]      Therapeutic Intervention: Met with patient.  Outpatient - Insight oriented psychotherapy 45 min - CPT code 60892, Outpatient - Behavior modifying psychotherapy 45 min - CPT code 21691 and Outpatient - Supportive psychotherapy 45 min - CPT Code 78832    This includes face to face time and non-face to face time preparing to see the patient, obtaining and/or reviewing separately obtained history, documenting clinical information in the electronic or other health record, independently interpreting results and communicating results to the patient/family/caregiver, or care coordinator.      Patient was last seen by me on 4/19/2022    Problem list  Patient Active Problem List   Diagnosis    Pure hypercholesterolemia    Essential (primary) hypertension    ACP (advance care planning)    Osteopenia    Nutritional deficiency    Nerve disease, peripheral    Vitamin D deficiency    Elevated brain natriuretic peptide (BNP) level    AR (allergic rhinitis)    Stenosis of right carotid artery    Hyperglycemia    Non-occlusive coronary artery disease    S/P MVR (mitral valve repair) 07/2016 for severe MR, Dr. Armando    S/P Maze operation for atrial fibrillation + PHILL resection Dr. rAmando 07/2016    Fall    Primary pancreatic cancer with metastasis to other site    Metastasis to liver    Hyponatremia    Venous stasis dermatitis    Thrombocytopenia    Fever    Other constipation    PAF (paroxysmal atrial fibrillation)    Mixed hyperlipidemia    Chronic diastolic heart failure    Acute respiratory failure with hypoxia    Elevated BUN    Chronic bilateral pleural effusions    Postnasal drip    High risk medication use       Chief complaint/reason for encounter: depression and anxiety     Met with patient to evaluate psychosocial adaptation to diagnosis/treatment/survivorship of pancreatic cancer    Current  Medications  Current Outpatient Medications   Medication    acetaminophen (TYLENOL) 325 MG tablet    apixaban (ELIQUIS) 2.5 mg Tab    ascorbic acid, vitamin C, (VITAMIN C) 500 MG tablet    bisoprolol (ZEBETA) 5 MG tablet    cholecalciferol, vitamin D3, (VITAMIN D3) 25 mcg (1,000 unit) capsule    coenzyme Q10 100 mg capsule    dexAMETHasone (DECADRON) 4 MG Tab    docusate sodium (COLACE) 100 MG capsule    furosemide (LASIX) 20 MG tablet    LIDOcaine-prilocaine (EMLA) cream    lipase-protease-amylase (CREON) 36,000-114,000- 180,000 unit CpDR    loperamide (IMODIUM A-D) 2 mg Tab    losartan (COZAAR) 100 MG tablet    losartan-hydrochlorothiazide 100-12.5 mg (HYZAAR) 100-12.5 mg Tab    magnesium 250 mg Tab    multivitamin (THERAGRAN) per tablet    ondansetron (ZOFRAN-ODT) 4 MG TbDL    ondansetron (ZOFRAN-ODT) 8 MG TbDL    OXYGEN-AIR DELIVERY SYSTEMS MISC    pravastatin (PRAVACHOL) 40 MG tablet    saliva substitute combo no.9 (BIOTENE DRY MOUTH ORAL RINSE) Mw     No current facility-administered medications for this visit.       Objective:  Nel Cui arrived promptly for the session. Ms. Cui was mobile w/ the use of a wheelchair at the time of session. The patient was fully cooperative throughout the session.  Appearance: age appropriate, casually  dressed, well groomed  Behavior/Cooperation: friendly and cooperative  Speech: normal in rate, volume, and tone and appropriate quality, quantity and organization of sentences  Mood: steady, stressed  Affect: mood congruent and appropriate  Thought Process: goal-directed, logical  Thought Content: normal,  No delusions or paranoia; did not appear to be responding to internal stimuli during the session  Orientation: grossly intact  Memory: grossly intact  Attention Span/Concentration: Attends to session without distraction; reports no difficulty  Fund of Knowledge: average  Estimate of Intelligence: average from verbal skills and  history  Cognition: grossly intact  Insight: patient has awareness of illness; good insight into own behavior and behavior of others  Judgment: the patient's behavior is adequate to circumstances    Interval history and content of current session:  Discussed treatment and family's adaptation to disease and treatment status. Reports to be coping with moderate difficulty, noting that she feels as though she is taxing on her family at times. Further expressed experience of cathartic crying (approx. 1x/week) but noted that she often embelishes distress as a means to feel better about requesting aid. Evaluated cognitive response, paying particular attention to negative intrusive thoughts of a persistent and detrimental nature. Thoughts of this type are in evidence with moderate distress. Provided cognitive behavioral therapy to address negative cognitions. Further explored being direct w/ caregivers regarding needs in place of embellishing distress, as well as, to be accurate in her use of language to reduce stress on the patient-caregiver dyad. Identified and evaluated psychosocial and environmental stressors secondary to diagnosis and treatment.  Examined proactive behaviors that may be implemented to minimize or ameliorate psychosocial stressors secondary to diagnosis and treatment.     Risk parameters:   Patient reports no suicidal ideation  Patient reports no homicidal ideation  Patient reports no self-injurious behavior  Patient reports no violent behavior   Safety needs:  None at this time      Verbal deficits: None     Patient's response to intervention:The patient's response to intervention is accepting, motivated.     Progress toward goals and other mental status changes:  The patient's progress toward goals is good.      Progress to date:Progress as Expected and Progress - Ongoing, but Slow      Goals from last visit: Attempted, partially met        Patient Strengths: verbal, intelligent, successful, good  social support, good insight, commitment to wellness, strong clifford, strong cultural traditions        Treatment Plan:individual psychotherapy  ? Target symptoms: depression, anxiety   ? Why chosen therapy is appropriate versus another modality: relevant to diagnosis, patient responds to this modality, evidence based practice  ? Outcome monitoring methods: self-report, observation, feedback from family  ? Therapeutic intervention type: behavior modifying psychotherapy, supportive psychotherapy  ? Prognosis: Excellent                            Behavioral goals:               Social engagement: continued as per [facility] COVID-19 contact guidelines              Therapy: behavioral activation strategies, thought awareness (for discussion at follow up)    Return to clinic: 3 weeks     Length of Service (minutes direct face-to-face contact): 45    Diagnosis:     ICD-10-CM ICD-9-CM   1. Adjustment disorder with mixed anxiety and depressed mood  F43.23 309.28   2. Malignant neoplasm of tail of pancreas   C25.2 157.2                Beronica Roland License #[id number]  [place] License #[id number]

## 2022-06-20 NOTE — PROGRESS NOTES
PET Imaging Questionnaire    1. Are you a Diabetic? Recent Blood Sugar level? No    2. Are you anemic? Bone Marrow Stimulation Meds? No    3. Have you had a CT Scan, if so when & where was your last one? Yes -     4. Have you had a PET Scan, if so when & where was your last one? Yes -     5. Chemotherapy or currently on Chemotherapy? Yes    6. Radiation therapy? No    Surgical History:   Past Surgical History:   Procedure Laterality Date    CARPAL TUNNEL RELEASE Bilateral     wrist    CATARACT EXTRACTION, BILATERAL  2017    CORONARY ANGIOGRAPHY  09/01/2020    Procedure: ANGIOGRAM, CORONARY ARTERY;  Surgeon: Ivette Horne MD;  Location: [facility] CATH;  Service: Cardiology;;    DILATION AND CURETTAGE OF UTERUS      ENDOSCOPIC ULTRASOUND OF UPPER GASTROINTESTINAL TRACT Left 12/15/2021    Procedure: ULTRASOUND, UPPER GI TRACT, ENDOSCOPIC;  Surgeon: Lico Tse MD;  Location: [facility] ENDO;  Service: Endoscopy;  Laterality: Left;    ESOPHAGOGASTRODUODENOSCOPY N/A 12/15/2021    Procedure: EGD (ESOPHAGOGASTRODUODENOSCOPY);  Surgeon: Lico Tse MD;  Location: [facility] ENDO;  Service: Endoscopy;  Laterality: N/A;    INSERTION OF TUNNELED CENTRAL VENOUS CATHETER (CVC) WITH SUBCUTANEOUS PORT N/A 01/07/2022    Procedure: [id number]-A-CATH;  Surgeon: Cas Acuña MD;  Location: [facility] OR;  Service: General;  Laterality: N/A;    LEFT HEART CATHETERIZATION  09/01/2020    Procedure: Left heart cath;  Surgeon: Ivette Horne MD;  Location: [facility] CATH;  Service: Cardiology;;    MITRAL VALVE REPAIR      OOPHORECTOMY Left 1988    OTHER SURGICAL HISTORY      maze procedure and PHILL ligation    REPAIR OF MENISCUS OF KNEE Left     TONSILLECTOMY      vein removal     7.      8. Have you been fasting for at least 6 hours? Yes    9. Is there any chance you may be pregnant or breastfeeding? No    Assay: 13.4 MCi@:7.54   Injection Site:rt ac     Residual: .798 mCi@: 7.56   Technologist: Sherrie Benton Injected:12.6mCi

## 2022-06-21 NOTE — PROGRESS NOTES
Pharmacy Treatment Plan Review    Patient  Nel Cui, 87 y.o.  1935    Indication: Pancreatic Cancer     History:  -Metastatic to liver  -prior treatment with gemcitabine/abraxane    Labs:  CBC  Lab Results   Component Value Date    WBC 4.72 06/20/2022    HGB 11.9 (L) 06/20/2022    HCT 38.3 06/20/2022    MCV 90 06/20/2022     06/20/2022       BMP  Lab Results   Component Value Date     06/20/2022    K 4.7 06/20/2022    CL 96 06/20/2022    CO2 34 (H) 06/20/2022    BUN 21 06/20/2022    CREATININE 0.6 06/20/2022    CALCIUM 9.1 06/20/2022    ANIONGAP 8 06/20/2022    ESTGFRAFRICA >60 06/20/2022    EGFRNONAA >60 06/20/2022       LFTs  Lab Results   Component Value Date    ALT 17 06/20/2022    AST 22 06/20/2022    GGT 29 02/12/2022    ALKPHOS 69 06/20/2022    BILITOT 0.6 06/20/2022       The patient is scheduled to start the following infusion:   OP LIPOSOMAL IRINOTECAN FLUOROURACIL LEUCOVORIN Q2W    14-day cycle for 24 cycles of:    -Liposomal irinotecan 70 mg/m2 in sodium chloride 0.9% 500 mL, infusion, intravneous, on day 1    -Leucovorin 400 mg/m2 in D5W 250 mL, infusion, intravneous, on day 1    -Fluorouracil 2400 mg/m2 in sodium chloride 0.9% 100 mL, infusion, intravneous, on day 1    Premeds  -Palonosetron 0.25 mg/Dexamethasone 12 mg     Supportive meds  -Atropine IV as needed for stomach cramps/diarrhea    The treatment plan is per [facility] guidelines    Theodore AshD

## 2022-06-21 NOTE — NURSING
Spoke to Brinda, pt's daughter.  Confirmed appt and treatment tomorrow.  They have the Dex, Zofran and Immodium.   Encouraged her to call with any questions or concerns.  Daughter verbalized an understanding.

## 2022-06-22 NOTE — PLAN OF CARE
Problem: Infection  Goal: Absence of Infection Signs and Symptoms  Outcome: Ongoing, Progressing     Pt in this shift for new start onivyde/leuco/5fu. Pt c/o decreased memory and focus lately. Pt on 2L O2 with SOB with exertion. Port accessed, flushed and positive blood return noted; biopatch in place.   All questions and concerns addressed at this time. Will continue to monitor.

## 2022-06-22 NOTE — PROGRESS NOTES
Oncology Nutrition   Chemotherapy Infusion Visit    Nutrition Follow Up   RD met w/ pt at chairside during infusion. Pt had a question for the dietitian but could not remember. RD gave pt her contact information for when she does remember.  Pt drinking 1-2 Premier Protein ONS a day. Weight has been stable. Pt denies any other nutrition related side effects. Pt very pleasant to speak with.    Wt Readings from Last 10 Encounters:   06/22/22 64.2 kg (141 lb 8.6 oz)   06/22/22 64.2 kg (141 lb 8.6 oz)   06/17/22 59.4 kg (131 lb)   06/15/22 59.4 kg (131 lb)   06/09/22 62.1 kg (136 lb 14.5 oz)   06/09/22 62.1 kg (136 lb 14.5 oz)   06/04/22 60.4 kg (133 lb 3 oz)   06/02/22 61.6 kg (135 lb 12.8 oz)   06/01/22 61.5 kg (135 lb 9.6 oz)   05/30/22 62.1 kg (136 lb 14.5 oz)       All other nutrition questions/concerns addressed as appropriate. Will continue to monitor prn throughout treatment.     Alejandrina Ahuja, JOSE, LDN  06/22/2022  1:11 PM

## 2022-06-22 NOTE — PROGRESS NOTES
12:12 PM    This LCSW met with this pt to check in and provide support.     The pt requested some juice without ice and it was provided to the pt.    She reported wanting to ask the dietitian something, but does not know what it was bc she forgot.  I provided paper and a pen to the pt so when she remember her question she can ask the nurse to message the dietitian and she will come talk to her then.    The pt was agreeable to this plan and thanked me for coming to check on her today.

## 2022-06-22 NOTE — PLAN OF CARE
Problem: Adult Inpatient Plan of Care  Goal: Plan of Care Review  6/22/2022 1451 by Tracie Guzman RN  Outcome: Ongoing, Progressing  6/22/2022 1158 by Tarcie Guzman RN  Outcome: Ongoing, Progressing  Goal: Patient-Specific Goal (Individualized)  6/22/2022 1451 by Tracie Guzman RN  Outcome: Ongoing, Progressing  6/22/2022 1158 by Tracie Guzman RN  Outcome: Ongoing, Progressing     Problem: Infection  Goal: Absence of Infection Signs and Symptoms  Outcome: Ongoing, Progressing       Pt tolerated treatment well this shift. VSS. Pump connected and connectors secured. Pump video watched and spill kit provided to patient. Pt is safe for discharge.

## 2022-06-22 NOTE — PROGRESS NOTES
PATIENT: Nel Cui  MRN: [id number]  DATE: 6/22/2022      Diagnosis:   1. Primary pancreatic cancer with metastasis to other site    2. Metastasis to liver    3. Malignant neoplasm of body of pancreas     4. 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 Bone metastasis    6. 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therapy    7. Hypoxia    8. Atrial fibrillation, unspecified type    9. Pancreatic insufficiency    10. Cancer related pain    11. Hypertension, unspecified type    12. Hyperlipidemia, unspecified hyperlipidemia type    13. Chronic diastolic congestive heart failure        Chief Complaint: Pancreatic Cancer (2 week follow up )      Oncologic History:      Oncologic History 12/09/21 Ct Abdomen  12/15/21 EUS  1/04/22 PET/CT  1/07/22 [facility] - Dr Acuña  1/11/22 MRI Brain  3/16/22 PET/CT  6/20/22 PET/CT    Oncologic Treatment 1/12/22 - 5/02/22 Gemcitabine and Paclitaxel s/p C5D1  6/22/22 - current Liposomal Irinotecan and Fluorouracil     Pathology 12/15/21 adenocarcinoma in the pancreatic body and adenocarcinoma in the liver       The patient initially presented to her PCP on 12/02/21 with complaint of abdominal pain.  She underwent a CT of the abdomen on 12/09/21 showing subcentimeter para-aortic, mesenteric lymph nodes; multiple hepatic infiltrative lesions present throughout the liver with 1 of the larger areas right liver lobe measuring approximately 2.2 cm in diameter; mass in the pancreatic tail measuring 9 x 3.3 cm.  The patient underwent EUS under the care of Dr Tse on 12/15/21 showing a mass in the pancreatic body and multiple liver lesions.  Path from the procedure showed adenocarcinoma in the pancreatic body and adenocarcinoma in the liver.    The patient underwent PET/CT on 1/04/22 showing hypermetabolic rounded mass at the junction of the body and tail of the pancreas measuring 2.5 x 2.4 cm, hypermetabolic left adrenal gland nodule measuring 1.9 cm, innumerable hypermetabolic nodular masses  throughout the left and right hepatic lobes with largest lesions in the right liver measuring 2.5 x 1.9 cm and 5.2 x 4.4 cm, mildly enlarged hypermetabolic portacaval lymph node measuring 1.4 cm, 0.7 cm hypermetabolic focus at the umbilicus, lesion in the T9 vertebra measuring 1.8 x 1.7 cm and a lesion in the left iliac fossa measuring 1.7 x 1.2 cm.  MRI of the brain on 01/11/2022 showed no evidence of metastases but did show an old right midbrain lacunar infarct.   The patient was in the hospital from 1/28/22-1/29/22 with fever and low sodium.  The patient's hydrochlorothiazide was discontinued.  The patient then presented to the hospital on 02/06/2022 for dyspnea on exertion.  CTA was done on 02/06/2022 showing no evidence of pulmonary embolism.  The patient underwent NT proBNP which was elevated at 2630 pg/mL.  The patient was started on Lasix every other day at the request of Cardiology.   The patient was admitted to the hospital from 2/12/22-2/14/22 for fever.  The patient was discharged on Levaquin for 5 days.  The patient saw Cardiology on 02/17/2022 for atrial fibrillation and was started on Eliquis and taken off of aspirin.     The the patient was admitted to the hospital from [id number] for fever.  During her hospitalization procalcitonin was checked and was normal.  All her cultures were negative.  Her fever was felt to be from chemotherapy.    The patient underwent PET-CT on 03/16/2022 showing a followed of mm pulmonary based nodule; decrease metabolic activity in the liver mass with stable right lobe mass measuring 5.2 x 4.2 cm; right lobe mass measuring 2.2 x 1.8 cm; adrenal gland thickening of 1.9 cm; decrease in pancreatic mass measuring 2 cm; decreased metabolic activity of hypermetabolic mesenteric foci and right-sided pleural effusion.  The patient underwent cardioversion on 03/17/2022 at which point constantine was performed.  Patient was put back in normal sinus rhythm.    The patient was admitted to the  hospital for CHF exacerbation from 3/25/22 to 3/29/22 and was diuresed a little over 9.5L.    The patient was admitted to the hospital from 5/15/22-5/20/22 for hypoxia.  CTA chest on 5/15/22 showed diffuse faint ground-glass opacities in the lungs bilaterally.   NT-ProNP on admission was elevated at 5,220pg/mL.  The patient was initially diuresed without much improvement.  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  The patient was eventually weaned to 1.5L O2.  Repeat CXR on 5/26/22 showed improvement in her intrapulmonary process.      Subjective:     Interval History: Ms. Cui is a 87 y.o. female with Osteopenia, HLD, HTN, pancreatic cancer who presents for fever.  Since the last clinic visit the pt underwent cardioversion on 6/16/22 and was started on amiodarone.  PET/CT completed on 6/20/22 showed a new to 1.2 cm subpleural nodule in the right upper lobe; enlarging right upper lobe nodule measuring 8 mm; resolution of right pleural effusion compared to prior studies; enlargement of pancreatic mass now 2.2 x 1.2 cm; stable 2 cm left adrenal nodule; new hypermetabolic nodule in the right hepatic lobe measuring 1.9 x 1.5 cm; decrease in size of separate nodule in the right hepatic lobe measuring 3 x 2.4 cm; new hypermetabolic focus in the L1 vertebral body measuring 2.7 x 2 cm; and 2.6 x 1.3 new hypermetabolic lesion in the left iliac bone.  The patient states she feels well.    Past Medical History:   Past Medical History:   Diagnosis Date    A-fib     Anemia     Anticoagulant long-term use     Arthritis     Cataracts, bilateral     CHF (congestive heart failure)     chronic diastolic    Coronary artery disease     Nonobstructive    Essential (primary) hypertension 10/05/2010    Hyperlipidemia     Hyponatremia     Mitral regurgitation     Osteopenia     Pancreatic insufficiency     Primary pancreatic cancer with metastasis to other site      Thrombocytopenia        Past Surgical HIstory:   Past Surgical History:   Procedure Laterality Date    CARPAL TUNNEL RELEASE Bilateral     wrist    CATARACT EXTRACTION, BILATERAL  2017    CORONARY ANGIOGRAPHY  09/01/2020    Procedure: ANGIOGRAM, CORONARY ARTERY;  Surgeon: Ivette Horne MD;  Location: [facility] CATH;  Service: Cardiology;;    DILATION AND CURETTAGE OF UTERUS      ENDOSCOPIC ULTRASOUND OF UPPER GASTROINTESTINAL TRACT Left 12/15/2021    Procedure: ULTRASOUND, UPPER GI TRACT, ENDOSCOPIC;  Surgeon: Lico Tse MD;  Location: [facility] ENDO;  Service: Endoscopy;  Laterality: Left;    ESOPHAGOGASTRODUODENOSCOPY N/A 12/15/2021    Procedure: EGD (ESOPHAGOGASTRODUODENOSCOPY);  Surgeon: Lico Tse MD;  Location: [facility] ENDO;  Service: Endoscopy;  Laterality: N/A;    INSERTION OF TUNNELED CENTRAL VENOUS CATHETER (CVC) WITH SUBCUTANEOUS PORT N/A 01/07/2022    Procedure: [id number];  Surgeon: Cas Acuña MD;  Location: [facility] OR;  Service: General;  Laterality: N/A;    LEFT HEART CATHETERIZATION  09/01/2020    Procedure: Left heart cath;  Surgeon: Ivette Horne MD;  Location: [facility] CATH;  Service: Cardiology;;    MITRAL VALVE REPAIR      OOPHORECTOMY Left 1988    OTHER SURGICAL HISTORY      maze procedure and PHILL ligation    REPAIR OF MENISCUS OF KNEE Left     TONSILLECTOMY      vein removal         Family History:   Family History   Problem Relation Age of Onset    No Known Problems Mother     No Known Problems Father     Cancer Maternal Grandmother         Stomach    No Known Problems Maternal Grandfather     No Known Problems Paternal Grandmother     No Known Problems Paternal Grandfather     Breast cancer Sister         over 60    No Known Problems Brother        Social History:  reports that she quit smoking about 34 years ago. Her smoking use included cigarettes. She has a 30.00 pack-year smoking history. She has never used smokeless tobacco. She reports that she does  "not drink alcohol and does not use drugs.    Allergies:  Review of patient's allergies indicates:   Allergen Reactions    Amoxicillin-pot clavulanate Other (See Comments)     "Spaced out feeling- can't take it longer than 4 or 5 days"  Patient tolerated Zosyn    Breo ellipta [fluticasone furoate-vilanterol] Other (See Comments)     Feels jittery    Corticosteroids (glucocorticoids)      Other reaction(s): tachycardia    Erythromycin      "Spaced out"    Metoprolol succinate      "I dont feel good, it doesn't work"    Latex, natural rubber      Turns skin red around area where latex touches       Medications:  Current Outpatient Medications   Medication Sig Dispense Refill    acetaminophen (TYLENOL) 325 MG tablet Take 325 mg by mouth every 6 (six) hours as needed for Pain or Temperature greater than.      amiodarone (PACERONE) 200 MG Tab Take 2 tablets (400 mg total) by mouth 2 (two) times daily for 14 days, THEN 1 tablet (200 mg total) once daily. 86 tablet 0    apixaban (ELIQUIS) 2.5 mg Tab Take 1 tablet (2.5 mg total) by mouth 2 (two) times daily. 60 tablet 0    ascorbic acid, vitamin C, (VITAMIN C) 500 MG tablet Take 500 mg by mouth once daily.      bisoprolol (ZEBETA) 5 MG tablet Take 2 tablets (10 mg total) by mouth every evening. 60 tablet 0    cholecalciferol, vitamin D3, (VITAMIN D3) 25 mcg (1,000 unit) capsule Take 1,000 Units by mouth every morning.      coenzyme Q10 100 mg capsule Take 100 mg by mouth once daily.      dexAMETHasone (DECADRON) 4 MG Tab Take 2 tablets (8 mg total) by mouth once daily. Take as directed on days 2 and 3 of your chemotherapy cycle. 24 tablet 5    docusate sodium (COLACE) 100 MG capsule Take 1 capsule (100 mg total) by mouth 2 (two) times daily. (Patient taking differently: Take 100 mg by mouth 2 (two) times daily as needed for Constipation.)  0    furosemide (LASIX) 20 MG tablet Take 1 tablet (20 mg total) by mouth once daily. Hold until approved by PCP      " LIDOcaine-prilocaine (EMLA) cream Apply topically as needed (30 to 60 minutes prior to chemo). 30 g 2    lipase-protease-amylase (CREON) 36,000-114,000- 180,000 unit CpDR Take 3 capsules by mouth 3 (three) times daily with meals. Take 3 capsules TID with meals and 2 capsule PRN with snacks 300 capsule 11    loperamide (IMODIUM A-D) 2 mg Tab Take 4 mg after first loose or frequent bowel movement, then 2 mg every 2 hours until 12 hours have passed without a bowel movement. (Patient taking differently: Take 2 mg by mouth 3 (three) times daily as needed. Take 4 mg after first loose or frequent bowel movement, then 2 mg every 2 hours until 12 hours have passed without a bowel movement.) 24 tablet 25    losartan (COZAAR) 100 MG tablet Take 1 tablet (100 mg total) by mouth every evening. 90 tablet 3    magnesium 250 mg Tab Take 250 mg by mouth Daily.      multivitamin (THERAGRAN) per tablet Take 1 tablet by mouth every other day.      ondansetron (ZOFRAN-ODT) 4 MG TbDL Take 4 mg by mouth every 12 (twelve) hours as needed (nausea/vomiting).      ondansetron (ZOFRAN-ODT) 8 MG TbDL Take 1 tablet (8 mg total) by mouth every 8 (eight) hours as needed (nausea/vomiting). 60 tablet 5    OXYGEN-AIR DELIVERY SYSTEMS MISC Inhale 1.5 L/min into the lungs continuous.      pravastatin (PRAVACHOL) 40 MG tablet TAKE 1 TABLET(40 MG) BY MOUTH EVERY DAY (Patient taking differently: Take 40 mg by mouth every evening.) 90 tablet 3    saliva substitute combo no.9 (BIOTENE DRY MOUTH ORAL RINSE) Mwsh Swish and spit 5 mLs every evening.       No current facility-administered medications for this visit.     Facility-Administered Medications Ordered in Other Visits   Medication Dose Route Frequency Provider Last Rate Last Admin    alteplase injection 2 mg  2 mg Intra-Catheter PRN Salvador Green MD        atropine injection 0.4 mg  0.4 mg Intravenous Once PRN Salvador Green MD        diphenhydrAMINE injection 50 mg  50 mg Intravenous  Once PRN Salvador Green MD        EPINEPHrine (EPIPEN) 0.3 mg/0.3 mL pen injection 0.3 mg  0.3 mg Intramuscular Once PRN Salvador Green MD        fluorouracil (ADRUCIL) 1,920 mg/m2 = 3,245 mg in sodium chloride 0.9% 100 mL chemo infusion  1,920 mg/m2 (Treatment Plan Recorded) Intravenous over 46 hr Salvador Green MD        heparin, porcine (PF) 100 unit/mL injection flush 500 Units  500 Units Intravenous PRN Salvador Green MD        hydrocortisone sodium succinate injection 100 mg  100 mg Intravenous Once PRN Salvador Green MD        irinotecan liposomaL (ONIVYDE) 56 mg/m2 = 94.643 mg in sodium chloride 0.9% 500 mL chemo infusion  56 mg/m2 (Treatment Plan Recorded) Intravenous 1 time in Clinic/HOD Salvador Green MD        leucovorin calcium 320 mg/m2 = 540 mg in dextrose 5 % 250 mL infusion  320 mg/m2 (Treatment Plan Recorded) Intravenous 1 time in Clinic/HOD Salvador Green MD        palonosetron 0.25mg/dexamethasone 12mg in NS IVPB 0.25 mg  0.25 mg Intravenous 1 time in Clinic/HOD Salvador Green MD        sodium chloride 0.9% 250 mL flush bag   Intravenous 1 time in Clinic/HOD Salvador Green MD        sodium chloride 0.9% flush 10 mL  10 mL Intravenous PRN Salvador Green MD           Review of Systems   Constitutional: Negative for chills, fatigue, fever and unexpected weight change.   Respiratory: Negative for cough and shortness of breath.    Cardiovascular: Negative for chest pain and palpitations.   Gastrointestinal: Negative for abdominal pain, constipation, diarrhea, nausea and vomiting.   Skin: Negative for rash.   Neurological: Negative for headaches.   Hematological: Negative for adenopathy. Does not bruise/bleed easily.       ECOG Performance Status: 2   Objective:      Vitals:   Vitals:    06/22/22 0942   BP: 130/70   BP Location: Left arm   Patient Position: Sitting   BP Method: Medium (Manual)   Pulse: 79   Temp: 97.6 °F (36.4 °C)   TempSrc: Temporal   SpO2: 98%   Weight:  "64.2 kg (141 lb 8.6 oz)   Height: 5' 5.5" (1.664 m)     Physical Exam  Constitutional:       General: She is not in acute distress.     Appearance: She is well-developed. She is not diaphoretic.   HENT:      Head: Normocephalic and atraumatic.   Cardiovascular:      Rate and Rhythm: Normal rate and regular rhythm.      Heart sounds: No murmur heard.    No friction rub. No gallop.   Pulmonary:      Effort: Pulmonary effort is normal. No respiratory distress.      Breath sounds: Normal breath sounds. No wheezing or rales.   Chest:      Chest wall: No tenderness.   Breasts:      Right: No axillary adenopathy or supraclavicular adenopathy.      Left: No axillary adenopathy or supraclavicular adenopathy.       Abdominal:      General: Bowel sounds are normal. There is no distension.      Palpations: Abdomen is soft. There is no mass.      Tenderness: There is no abdominal tenderness. There is no guarding or rebound.   Musculoskeletal:         General: No tenderness. Normal range of motion.      Right lower leg: No edema.      Left lower leg: No edema.   Lymphadenopathy:      Cervical: No cervical adenopathy.      Upper Body:      Right upper body: No supraclavicular or axillary adenopathy.      Left upper body: No supraclavicular or axillary adenopathy.   Skin:     Findings: No erythema or rash.   Neurological:      Mental Status: She is alert and oriented to person, place, and time.   Psychiatric:         Behavior: Behavior normal.         Laboratory Data:  Hospital Outpatient Visit on 06/20/2022   Component Date Value Ref Range Status    POC Glucose 06/20/2022 113 (A) 70 - 110 MG/DL Final   Lab Visit on 06/20/2022   Component Date Value Ref Range Status    WBC 06/20/2022 4.72  3.90 - 12.70 K/uL Final    RBC 06/20/2022 4.25  4.00 - 5.40 M/uL Final    Hemoglobin 06/20/2022 11.9 (A) 12.0 - 16.0 g/dL Final    Hematocrit 06/20/2022 38.3  37.0 - 48.5 % Final    MCV 06/20/2022 90  82 - 98 fL Final    MCH 06/20/2022 " 28.0  27.0 - 31.0 pg Final    MCHC 06/20/2022 31.1 (A) 32.0 - 36.0 g/dL Final    RDW 06/20/2022 17.2 (A) 11.5 - 14.5 % Final    Platelets 06/20/2022 224  150 - 450 K/uL Final    MPV 06/20/2022 9.0 (A) 9.2 - 12.9 fL Final    Immature Granulocytes 06/20/2022 0.4  0.0 - 0.5 % Final    Gran # (ANC) 06/20/2022 3.1  1.8 - 7.7 K/uL Final    Immature Grans (Abs) 06/20/2022 0.02  0.00 - 0.04 K/uL Final    Comment: Mild elevation in immature granulocytes is non specific and   can be seen in a variety of conditions including stress response,   acute inflammation, trauma and pregnancy. Correlation with other   laboratory and clinical findings is essential.      Lymph # 06/20/2022 0.9 (A) 1.0 - 4.8 K/uL Final    Mono # 06/20/2022 0.6  0.3 - 1.0 K/uL Final    Eos # 06/20/2022 0.1  0.0 - 0.5 K/uL Final    Baso # 06/20/2022 0.02  0.00 - 0.20 K/uL Final    nRBC 06/20/2022 0  0 /100 WBC Final    Gran % 06/20/2022 65.3  38.0 - 73.0 % Final    Lymph % 06/20/2022 19.1  18.0 - 48.0 % Final    Mono % 06/20/2022 13.1  4.0 - 15.0 % Final    Eosinophil % 06/20/2022 1.7  0.0 - 8.0 % Final    Basophil % 06/20/2022 0.4  0.0 - 1.9 % Final    Differential Method 06/20/2022 Automated   Final    Sodium 06/20/2022 138  136 - 145 mmol/L Final    Potassium 06/20/2022 4.7  3.5 - 5.1 mmol/L Final    Chloride 06/20/2022 96  95 - 110 mmol/L Final    CO2 06/20/2022 34 (A) 23 - 29 mmol/L Final    Glucose 06/20/2022 103  70 - 110 mg/dL Final    BUN 06/20/2022 21  8 - 23 mg/dL Final    Creatinine 06/20/2022 0.6  0.5 - 1.4 mg/dL Final    Calcium 06/20/2022 9.1  8.7 - 10.5 mg/dL Final    Total Protein 06/20/2022 6.7  6.0 - 8.4 g/dL Final    Albumin 06/20/2022 3.3 (A) 3.5 - 5.2 g/dL Final    Total Bilirubin 06/20/2022 0.6  0.1 - 1.0 mg/dL Final    Comment: For infants and newborns, interpretation of results should be based  on gestational age, weight and in agreement with clinical  observations.    Premature Infant recommended  reference ranges:  Up to 24 hours.............<8.0 mg/dL  Up to 48 hours............<12.0 mg/dL  3-5 days..................<15.0 mg/dL  6-29 days.................<15.0 mg/dL      Alkaline Phosphatase 06/20/2022 69  55 - 135 U/L Final    AST 06/20/2022 22  10 - 40 U/L Final    ALT 06/20/2022 17  10 - 44 U/L Final    Anion Gap 06/20/2022 8  8 - 16 mmol/L Final    eGFR if African American 06/20/2022 >60  >60 mL/min/1.73 m^2 Final    eGFR if non African American 06/20/2022 >60  >60 mL/min/1.73 m^2 Final    Comment: Calculation used to obtain the estimated glomerular filtration  rate (eGFR) is the CKD-EPI equation.       CA 19-9 06/20/2022 527.7 (A) 0.0 - 40.0 U/mL Final    Comment: The testing method is a chemiluminescent microparticle immunoassay   manufactured by [employer] and performed on the [employer]   or   [employer] system. Values obtained with different assay manufacturers   for   methods may be different and cannot be used interchangeably.      Magnesium 06/20/2022 2.1  1.6 - 2.6 mg/dL Final    Phosphorus 06/20/2022 4.3  2.7 - 4.5 mg/dL Final         Imaging:     PET/CT 3/16/22    Head and neck:     No hypermetabolic activity is noted within the head neck to suggest metastatic disease.     Atherosclerotic carotid calcifications are noted.     Thorax:     A port is noted overlying the left hemithorax with its tip in the superior vena cava.     Extensive mitral valve calcifications or mitral valve replacement are noted.  Extensive coronary calcifications are noted increasing the patient's coronary risk.     A moderate sized joint effusion is developed since the prior.  Please correlate for etiologies.  This could relate to cardiogenic edema or metastatic fluid.  No obvious hypermetabolic activity is noted.     A pleural based density is noted of 5 mm image 50 sequence 3 similar since the prior.  Atelectasis, scarring, or pulmonary nodule is on the differential.  In a high-risk patient follow-up  for long-term size stability would be suggested.     Sternotomy wires are noted     Abdomen and pelvis:     Significant improvement is noted since prior with the liver demonstrating near uniform metabolic activity.  Significantly less hypermetabolic activity is noted than was seen on the prior.  A hypodense region in the right lobe measured on the prior on CT is still thought to be seen image 131 without convincing detrimental change when measured in a similar manner as on the prior at 5.2 x 4.2 cm image 131 sequence 3.  Several other hypodense regions are noted without worrisome detrimental change since the prior.  The region in the right lobe of the liver that measured 2.5 x 1.9 cm on the prior is less easily visualized but can be measured similarly at 2.2 x 1.8 cm image 142 sequence 3     The adrenal thickening seen on the prior appears of 2 cm in short diameter measuring 1.9 cm on the prior unchanged.  In long dimension it was favored to be under measured on the prior but is unchanged when measured in a similar manner.  It is not significantly hypermetabolic on today's exam     The hypermetabolic pancreatic tail mass seen on the prior demonstrates near background activity on today's exam as well and no obvious focal mass can be measured on the CT portion of this exam.  The pancreas in this region measures 2.0 cm in thickness versus is 2.5 cm on the prior.  Decrease in size of the mass is suspected     Several hypermetabolic mesenteric foci were suspected on the prior suggestive of mesenteric metastasis.  No significant hypermetabolic activity is noted on today's exam.     Musculoskeletal:     Glenohumeral joint space loss is noted on the left greater than right suggestive of degenerative change likely associated with labral tearing.     Central canal stenosis is noted in the lower lumbar spine particularly at the L4-L5 level.  No obvious hypermetabolic osseous changes are noted on today's exam.  This is an  improvement since the prior      Assessment:       1. Primary pancreatic cancer with metastasis to other site    2. Metastasis to liver    3. Malignant neoplasm of body of pancreas     4. 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 Bone metastasis    6. 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therapy    7. Hypoxia    8. Atrial fibrillation, unspecified type    9. Pancreatic insufficiency    10. Cancer related pain    11. Hypertension, unspecified type    12. Hyperlipidemia, unspecified hyperlipidemia type    13. Chronic diastolic congestive heart failure           Plan:   Metastatic Pancreatic Cancer - The patient was diagnosed with metastatic pancreatic cancer adenocarcinoma with mets to the liver on 12/15/21  -Ca19-9 was 7,581 on 12/15/21  -PET/CT on 1/04/22 shows mets to the liver, left adrenal gland, T9 vertebra and left iliac fossa.  -Potential mets are seen near the umbilicus  -PT underwent PORT placement 1/07/22  -MRI brain on 1/11/22 showed no brain mets  -Pt consented for Gemcitabine and paclitaxel on 1/03/22  -Pt treated with 20% dose reduction in paclitaxel and Gemcitabine to 100mg/mm2 and 800mg/mm2 respectively  -C2D8 and C2D15 cancelled given her hospitalization from 2/12/22-2/14/22  -Patient completed C3D8 and was hospitalized again for a fever  -Treatment moving forward was discussed with the patient and decision was made to remove day 8 of treatment  -PET/CT on 3/16/22 shows stable disease  -Ca19-9 has dropped dramatically to 170 on 3/16/22  -C3D15 delayed given cardiac ablation on 03/17/2022  -Will continue [place]/Abraxane at 20% does reduction due to prior thrombocytopenia  -Pt underwent C5D1 5/02/22  -Guardant 360 testing 1/26/22 showed STACY [id number] 0.3% amplification, KRAS G12D 0.3% amplification, MSI-stable, ARID1A G7188I 0.4%, KRAS G12D 0.3%, STACY [id number], STK11 S59T 0.2%, and BRCA1 R979C 0.1%.  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  -Pt currently on 2L O2  -Pt's oxygen saturations dropped into the 80's in clinic with ambulation when off of oxygen  -Will monitor     A-fib - pt to underwent cardioversion 6/16/22  -Pt on amiodarone and now is is NSR  -PT on Eliquis  -PT s/p Ablation on 3/17/22  -Management per cardiology     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  -No current signs of infection  -Pt given Evusheld on 6/09/22  -Will monitor     Pancreatic Insufficiency - pt on creon  -Pt taking 3 pills with meals and 2 with snacks with improvement in diarrhea  -Will monitor     Cancer Related Pain - improved  -Will monitor     HTN - pt taking losartan, bisoprolol  -Cardiology managing  -Will monitor     HLD - pt on pravastatin  -Management per PCP     CHF - pt with CHF exacerbation and hospitalized from 3/25/22 to 3/29/22 wioth 9.5L removed  -Pt hospitalized again from 5/15/22 to 5/20/22 for possible CHF exacerbation  -Pt taking lasix daily  -Management per cardiology    Advance Care Planning     Power of   I initiated the process of advance care planning today and explained the importance of this process to the patient.  I introduced the concept of advance directives to the patient, as well. Then the patient received detailed information about the importance of designating a Health Care Power of  (HCPOA). She was also instructed to communicate with this person about their wishes for future healthcare, should she become sick and lose decision-making capacity. The patient has not  previously appointed a HCPOA. After our discussion, the patient has decided to complete a HCPOA and will bring the form completed to her next clinic appointment.            Route Chart for Scheduling    Med Onc Chart Routing      Follow up with physician 2 weeks. The patient can proceed with treatment.  she needs labs CBC, CMP, Mg Ca19-9 on 7/05/22 with an appt with me and treatment on 7/06/22.   Follow up with IVONNE    Labs    Imaging    Pharmacy appointment    Other referrals          Treatment Plan Information   OP LIPOSOMAL IRINOTECAN FLUOROURACIL LEUCOVORIN Q2W   Salvador Green MD   Upcoming Treatment Dates - OP LIPOSOMAL IRINOTECAN FLUOROURACIL LEUCOVORIN Q2W    7/5/2022       Chemotherapy       irinotecan liposomaL (ONIVYDE) 56 mg/m2 = 94.643 mg in sodium chloride 0.9% 500 mL chemo infusion       leucovorin calcium 320 mg/m2 = 540 mg in dextrose 5 % 250 mL infusion       fluorouracil (ADRUCIL) 1,920 mg/m2 = 3,245 mg in sodium chloride 0.9% 100 mL chemo infusion       Supportive Care       atropine injection 0.4 mg  7/19/2022       Chemotherapy       irinotecan liposomaL (ONIVYDE) 56 mg/m2 = 94.643 mg in sodium chloride 0.9% 500 mL chemo infusion       leucovorin calcium 320 mg/m2 = 540 mg in dextrose 5 % 250 mL infusion       fluorouracil (ADRUCIL) 1,920 mg/m2 = 3,245 mg in sodium chloride 0.9% 100 mL chemo infusion       Supportive Care       atropine injection 0.4 mg  8/2/2022       Chemotherapy       irinotecan liposomaL (ONIVYDE) 56 mg/m2 = 94.643 mg in sodium chloride 0.9% 500 mL chemo infusion       leucovorin calcium 320 mg/m2 = 540 mg in dextrose 5 % 250 mL infusion       fluorouracil (ADRUCIL) 1,920 mg/m2 = 3,245 mg in sodium chloride 0.9% 100 mL chemo infusion       Supportive Care       atropine injection 0.4 mg  8/16/2022       Chemotherapy       irinotecan liposomaL (ONIVYDE) 56 mg/m2 = 94.643 mg in sodium chloride 0.9% 500 mL chemo infusion       leucovorin calcium 320 mg/m2 = 540 mg in dextrose  5 % 250 mL infusion       fluorouracil (ADRUCIL) 1,920 mg/m2 = 3,245 mg in sodium chloride 0.9% 100 mL chemo infusion       Supportive Care       atropine injection 0.4 mg    Therapy Plan Information  diphenhydrAMINE capsule 25 mg  25 mg, Oral, PRN  predniSONE tablet 40 mg  40 mg, Oral, PRN  EPINEPHrine (EPIPEN) 0.3 mg/0.3 mL pen injection 0.3 mg  0.3 mg, Intramuscular, PRN  ondansetron disintegrating tablet 4 mg  4 mg, Oral, PRN  acetaminophen tablet 650 mg  650 mg, Oral, PRN  albuterol inhaler 2 puff  2 puff, Inhalation, PRN      Salvador Green MD  [facility]  Hematology and Oncology  [facility]   [address] [place] [address]   O: [phone]  F: [phone]

## 2022-06-24 NOTE — PLAN OF CARE
.Pt came in for pump D/C. Pt tolerated home 5FU well and had no complaints. PAC flushed w/ NS  and deaccessed. Pt education reinforced and explained what to expect in the next couple of days. Pt verbalized understanding.

## 2022-06-26 NOTE — TELEPHONE ENCOUNTER
Reason for Disposition   Unable to have a bowel movement (BM) without manually removing stool (using finger to pull out stool or perform disimpaction)    Additional Information   Negative: [1] Abdomen pain is main symptom AND [2] male   Negative: [1] Abdomen pain is main symptom AND [2] adult female   Negative: Rectal bleeding or blood in stool is main symptom   Negative: Rectal pain or itching is main symptom   Negative: Constipation in a cancer patient who is currently (or recently) receiving chemotherapy or radiation therapy, or cancer patient who has metastatic or end-stage cancer and is receiving palliative care   Negative: Patient sounds very sick or weak to the triager   Negative: [1] Vomiting AND [2] abdomen looks much more swollen than usual   Negative: [1] Vomiting AND [2] contains bile (green color)   Negative: [1] Constant abdominal pain AND [2] present > 2 hours   Negative: [1] Rectal pain or fullness from fecal impaction (rectum full of stool) AND [2] NOT better after SITZ bath, suppository or enema   Negative: [1] Intermittent mild abdominal pain AND [2] fever   Negative: Abdomen is more swollen than usual   Negative: Last bowel movement (BM) > 4 days ago   Negative: Leaking stool    Protocols used: CONSTIPATION-A-AH  Pt had abdominal pain but it has subsided since stool have moved down into the rectum. Pt states she can feel stool at the rectal opening but can't get it to come out.no N/V, no flatus. Just ate. Pt states she is currently on chemo. rec to take supp. Follow up with  in am. Pt agrees. Call back with questions

## 2022-07-05 NOTE — TELEPHONE ENCOUNTER
"Received call from daughter, Brinda.  "I noticed all of my mother's labs are not in yet.  Do you know why?"    Instructed Ms. Parry this RN would call the [facility].  Spoke to Aliyah, manager, re:  missing lab results.  Aliyah will F/U let notify this RN  "

## 2022-07-05 NOTE — TELEPHONE ENCOUNTER
Patient's daughter, Brinda, notified labs results are now entered    Ms. Parry verbally acknowledges understanding

## 2022-07-06 NOTE — PLAN OF CARE
Problem: Adult Inpatient Plan of Care  Goal: Patient-Specific Goal (Individualized)  Outcome: Ongoing, Progressing  Flowsheets (Taken 7/6/2022 1130)  Anxieties, Fears or Concerns: didn't sleep well last night  Individualized Care Needs: recliner, blanket, pillow, oxygen  Patient-Specific Goals (Include Timeframe): no s/s rxn during txt     Problem: Fatigue  Goal: Improved Activity Tolerance  Intervention: Promote Improved Energy  Flowsheets (Taken 7/6/2022 1130)  Fatigue Management: frequent rest breaks encouraged  Sleep/Rest Enhancement:   natural light exposure provided   noise level reduced  Activity Management:   Ambulated -L4   Ambulated in villarreal - L4

## 2022-07-06 NOTE — PROGRESS NOTES
PATIENT: Nel Ciu  MRN: [id number]  DATE: 7/6/2022      Diagnosis:   1. Primary pancreatic cancer with metastasis to other site    2. Metastasis to liver    3. Malignant neoplasm of body of pancreas     4. 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 Bone metastasis    6. 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therapy    7. Fatigue, unspecified type    8. Hypoxia    9. Atrial fibrillation, unspecified type    10. Pancreatic insufficiency    11. Bloating    12. Cancer related pain    13. Hypertension, unspecified type    14. Hyperlipidemia, unspecified hyperlipidemia type    15. Chronic diastolic congestive heart failure        Chief Complaint: Pancreatic Cancer (2 week follow up )      Oncologic History:      Oncologic History 12/09/21 Ct Abdomen  12/15/21 EUS  1/04/22 PET/CT  1/07/22 [facility] - Dr Acuña  1/11/22 MRI Brain  3/16/22 PET/CT  6/20/22 PET/CT    Oncologic Treatment 1/12/22 - 5/02/22 Gemcitabine and Paclitaxel s/p C5D1  6/22/22 - current s/p cycle 1 Liposomal Irinotecan and Fluorouracil     Pathology 12/15/21 adenocarcinoma in the pancreatic body and adenocarcinoma in the liver       The patient initially presented to her PCP on 12/02/21 with complaint of abdominal pain.  She underwent a CT of the abdomen on 12/09/21 showing subcentimeter para-aortic, mesenteric lymph nodes; multiple hepatic infiltrative lesions present throughout the liver with 1 of the larger areas right liver lobe measuring approximately 2.2 cm in diameter; mass in the pancreatic tail measuring 9 x 3.3 cm.  The patient underwent EUS under the care of Dr Tse on 12/15/21 showing a mass in the pancreatic body and multiple liver lesions.  Path from the procedure showed adenocarcinoma in the pancreatic body and adenocarcinoma in the liver.    The patient underwent PET/CT on 1/04/22 showing hypermetabolic rounded mass at the junction of the body and tail of the pancreas measuring 2.5 x 2.4 cm, hypermetabolic left adrenal gland nodule  measuring 1.9 cm, innumerable hypermetabolic nodular masses throughout the left and right hepatic lobes with largest lesions in the right liver measuring 2.5 x 1.9 cm and 5.2 x 4.4 cm, mildly enlarged hypermetabolic portacaval lymph node measuring 1.4 cm, 0.7 cm hypermetabolic focus at the umbilicus, lesion in the T9 vertebra measuring 1.8 x 1.7 cm and a lesion in the left iliac fossa measuring 1.7 x 1.2 cm.  MRI of the brain on 01/11/2022 showed no evidence of metastases but did show an old right midbrain lacunar infarct.   The patient was in the hospital from 1/28/22-1/29/22 with fever and low sodium.  The patient's hydrochlorothiazide was discontinued.  The patient then presented to the hospital on 02/06/2022 for dyspnea on exertion.  CTA was done on 02/06/2022 showing no evidence of pulmonary embolism.  The patient underwent NT proBNP which was elevated at 2630 pg/mL.  The patient was started on Lasix every other day at the request of Cardiology.   The patient was admitted to the hospital from 2/12/22-2/14/22 for fever.  The patient was discharged on Levaquin for 5 days.  The patient saw Cardiology on 02/17/2022 for atrial fibrillation and was started on Eliquis and taken off of aspirin.     The the patient was admitted to the hospital from 1396-1037 for fever.  During her hospitalization procalcitonin was checked and was normal.  All her cultures were negative.  Her fever was felt to be from chemotherapy.    The patient underwent PET-CT on 03/16/2022 showing a followed of mm pulmonary based nodule; decrease metabolic activity in the liver mass with stable right lobe mass measuring 5.2 x 4.2 cm; right lobe mass measuring 2.2 x 1.8 cm; adrenal gland thickening of 1.9 cm; decrease in pancreatic mass measuring 2 cm; decreased metabolic activity of hypermetabolic mesenteric foci and right-sided pleural effusion.  The patient underwent cardioversion on 03/17/2022 at which point constantine was performed.  Patient was put  back in normal sinus rhythm.    The patient was admitted to the hospital for CHF exacerbation from 3/25/22 to 3/29/22 and was diuresed a little over 9.5L.    The patient was admitted to the hospital from 5/15/22-5/20/22 for hypoxia.  CTA chest on 5/15/22 showed diffuse faint ground-glass opacities in the lungs bilaterally.   NT-ProNP on admission was elevated at 5,220pg/mL.  The patient was initially diuresed without much improvement.  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  The patient was eventually weaned to 1.5L O2.  Repeat CXR on 5/26/22 showed improvement in her intrapulmonary process.      The pt underwent cardioversion on 6/16/22 and was started on amiodarone.  PET/CT completed on 6/20/22 showed a new to 1.2 cm subpleural nodule in the right upper lobe; enlarging right upper lobe nodule measuring 8 mm; resolution of right pleural effusion compared to prior studies; enlargement of pancreatic mass now 2.2 x 1.2 cm; stable 2 cm left adrenal nodule; new hypermetabolic nodule in the right hepatic lobe measuring 1.9 x 1.5 cm; decrease in size of separate nodule in the right hepatic lobe measuring 3 x 2.4 cm; new hypermetabolic focus in the L1 vertebral body measuring 2.7 x 2 cm; and 2.6 x 1.3 new hypermetabolic lesion in the left iliac bone.    Subjective:     Interval History: Ms. Cui is a 87 y.o. female with Osteopenia, HLD, HTN, pancreatic cancer who presents for fever.  Since the last clinic visit the patient states she has had severe fatigue with treatment.  She also endorses excess gas with associated pressure in her pelvis. The patient denies CP, cough, SOB, abdominal pain, N/V, constipation, diarrhea.  The patient denies fever, chills, night sweats, weight loss, new lumps or bumps, easy bruising or bleeding.    Past Medical History:   Past Medical History:   Diagnosis Date    A-fib     Anemia     Anticoagulant long-term use     Arthritis      Cataracts, bilateral     CHF (congestive heart failure)     chronic diastolic    Coronary artery disease     Nonobstructive    Essential (primary) hypertension 10/05/2010    Hyperlipidemia     Hyponatremia     Mitral regurgitation     Osteopenia     Pancreatic insufficiency     Primary pancreatic cancer with metastasis to other site     Thrombocytopenia        Past Surgical HIstory:   Past Surgical History:   Procedure Laterality Date    CARPAL TUNNEL RELEASE Bilateral     wrist    CATARACT EXTRACTION, BILATERAL  2017    CORONARY ANGIOGRAPHY  09/01/2020    Procedure: ANGIOGRAM, CORONARY ARTERY;  Surgeon: Ivette Horne MD;  Location: [facility] CATH;  Service: Cardiology;;    DILATION AND CURETTAGE OF UTERUS      ENDOSCOPIC ULTRASOUND OF UPPER GASTROINTESTINAL TRACT Left 12/15/2021    Procedure: ULTRASOUND, UPPER GI TRACT, ENDOSCOPIC;  Surgeon: Lico Tse MD;  Location: [facility] ENDO;  Service: Endoscopy;  Laterality: Left;    ESOPHAGOGASTRODUODENOSCOPY N/A 12/15/2021    Procedure: EGD (ESOPHAGOGASTRODUODENOSCOPY);  Surgeon: Lico Tse MD;  Location: [facility] ENDO;  Service: Endoscopy;  Laterality: N/A;    INSERTION OF TUNNELED CENTRAL VENOUS CATHETER (CVC) WITH SUBCUTANEOUS PORT N/A 01/07/2022    Procedure: [id number];  Surgeon: Cas Acuña MD;  Location: [facility] OR;  Service: General;  Laterality: N/A;    LEFT HEART CATHETERIZATION  09/01/2020    Procedure: Left heart cath;  Surgeon: Ivette Horne MD;  Location: [facility] CATH;  Service: Cardiology;;    MITRAL VALVE REPAIR      OOPHORECTOMY Left 1988    OTHER SURGICAL HISTORY      maze procedure and PHILL ligation    REPAIR OF MENISCUS OF KNEE Left     TONSILLECTOMY      vein removal         Family History:   Family History   Problem Relation Age of Onset    No Known Problems Mother     No Known Problems Father     Cancer Maternal Grandmother         Stomach    No Known Problems Maternal Grandfather     No Known Problems  "Paternal Grandmother     No Known Problems Paternal Grandfather     Breast cancer Sister         over 60    No Known Problems Brother        Social History:  reports that she quit smoking about 34 years ago. Her smoking use included cigarettes. She has a 30.00 pack-year smoking history. She has never used smokeless tobacco. She reports that she does not drink alcohol and does not use drugs.    Allergies:  Review of patient's allergies indicates:   Allergen Reactions    Amoxicillin-pot clavulanate Other (See Comments)     "Spaced out feeling- can't take it longer than 4 or 5 days"  Patient tolerated Zosyn    Breo ellipta [fluticasone furoate-vilanterol] Other (See Comments)     Feels jittery    Corticosteroids (glucocorticoids)      Other reaction(s): tachycardia    Erythromycin      "Spaced out"    Metoprolol succinate      "I dont feel good, it doesn't work"    Latex, natural rubber      Turns skin red around area where latex touches       Medications:  Current Outpatient Medications   Medication Sig Dispense Refill    acetaminophen (TYLENOL) 325 MG tablet Take 325 mg by mouth every 6 (six) hours as needed for Pain or Temperature greater than.      amiodarone (PACERONE) 200 MG Tab Take 2 tablets (400 mg total) by mouth 2 (two) times daily for 14 days, THEN 1 tablet (200 mg total) once daily. 86 tablet 0    apixaban (ELIQUIS) 2.5 mg Tab Take 1 tablet (2.5 mg total) by mouth 2 (two) times daily. 60 tablet 0    ascorbic acid, vitamin C, (VITAMIN C) 500 MG tablet Take 500 mg by mouth once daily.      bisoprolol (ZEBETA) 5 MG tablet Take 2 tablets (10 mg total) by mouth every evening. 60 tablet 0    cholecalciferol, vitamin D3, (VITAMIN D3) 25 mcg (1,000 unit) capsule Take 1,000 Units by mouth every morning.      coenzyme Q10 100 mg capsule Take 100 mg by mouth once daily.      dexAMETHasone (DECADRON) 4 MG Tab Take 2 tablets (8 mg total) by mouth once daily. Take as directed on days 2 and 3 of your " chemotherapy cycle. 24 tablet 5    docusate sodium (COLACE) 100 MG capsule Take 1 capsule (100 mg total) by mouth 2 (two) times daily. (Patient taking differently: Take 100 mg by mouth 2 (two) times daily as needed for Constipation.)  0    furosemide (LASIX) 20 MG tablet Take 1 tablet (20 mg total) by mouth once daily. Hold until approved by PCP      LIDOcaine-prilocaine (EMLA) cream Apply topically as needed (30 to 60 minutes prior to chemo). 30 g 2    lipase-protease-amylase (CREON) 36,000-114,000- 180,000 unit CpDR Take 3 capsules by mouth 3 (three) times daily with meals. Take 3 capsules TID with meals and 2 capsule PRN with snacks 300 capsule 11    loperamide (IMODIUM A-D) 2 mg Tab Take 4 mg after first loose or frequent bowel movement, then 2 mg every 2 hours until 12 hours have passed without a bowel movement. (Patient taking differently: Take 2 mg by mouth 3 (three) times daily as needed. Take 4 mg after first loose or frequent bowel movement, then 2 mg every 2 hours until 12 hours have passed without a bowel movement.) 24 tablet 25    losartan (COZAAR) 100 MG tablet Take 1 tablet (100 mg total) by mouth every evening. 90 tablet 3    magnesium 250 mg Tab Take 250 mg by mouth Daily.      multivitamin (THERAGRAN) per tablet Take 1 tablet by mouth every other day.      ondansetron (ZOFRAN-ODT) 4 MG TbDL Take 4 mg by mouth every 12 (twelve) hours as needed (nausea/vomiting).      ondansetron (ZOFRAN-ODT) 8 MG TbDL Take 1 tablet (8 mg total) by mouth every 8 (eight) hours as needed (nausea/vomiting). 60 tablet 5    OXYGEN-AIR DELIVERY SYSTEMS MISC Inhale 1.5 L/min into the lungs continuous.      pravastatin (PRAVACHOL) 40 MG tablet TAKE 1 TABLET(40 MG) BY MOUTH EVERY DAY (Patient taking differently: Take 40 mg by mouth every evening.) 90 tablet 3    saliva substitute combo no.9 (BIOTENE DRY MOUTH ORAL RINSE) Mwsh Swish and spit 5 mLs every evening.      sodium chloride (OCEAN) 0.65 % nasal spray 2  sprays by Nasal route 6 (six) times daily. PRN  Indications: dryness of the nose       No current facility-administered medications for this visit.     Facility-Administered Medications Ordered in Other Visits   Medication Dose Route Frequency Provider Last Rate Last Admin    alteplase injection 2 mg  2 mg Intra-Catheter PRN Salvador Green MD        atropine injection 0.4 mg  0.4 mg Intravenous Once PRN Salvador Green MD        diphenhydrAMINE injection 50 mg  50 mg Intravenous Once PRN Salvador Green MD        EPINEPHrine (EPIPEN) 0.3 mg/0.3 mL pen injection 0.3 mg  0.3 mg Intramuscular Once PRN Salvador Green MD        fluorouracil (ADRUCIL) 1,920 mg/m2 = 3,320 mg in sodium chloride 0.9% 100 mL chemo infusion  1,920 mg/m2 (Treatment Plan Recorded) Intravenous over 46 hr Salvador Green MD        hydrocortisone sodium succinate injection 100 mg  100 mg Intravenous Once PRN Salvador Green MD        irinotecan liposomaL (ONIVYDE) 56 mg/m2 = 96.879 mg in sodium chloride 0.9% 500 mL chemo infusion  56 mg/m2 (Treatment Plan Recorded) Intravenous 1 time in Clinic/HOD Salvador Green MD        leucovorin calcium 320 mg/m2 = 555 mg in dextrose 5 % 250 mL infusion  320 mg/m2 (Treatment Plan Recorded) Intravenous 1 time in Clinic/HOD Salvador Green MD        palonosetron 0.25mg/dexamethasone 12mg in NS IVPB 0.25 mg  0.25 mg Intravenous 1 time in Clinic/HOD Salvador Green MD        sodium chloride 0.9% flush 10 mL  10 mL Intravenous PRN Salvador Green MD           Review of Systems   Constitutional: Positive for fatigue. Negative for chills, fever and unexpected weight change.   Respiratory: Negative for cough and shortness of breath.    Cardiovascular: Negative for chest pain and palpitations.   Gastrointestinal: Negative for abdominal pain, constipation, diarrhea, nausea and vomiting.        Excess gas   Genitourinary:        Occasional pressure in pelvis   Skin: Negative for rash.  "  Neurological: Negative for headaches.   Hematological: Negative for adenopathy. Does not bruise/bleed easily.       ECOG Performance Status: 2   Objective:      Vitals:   Vitals:    07/06/22 0929   BP: 122/70   BP Location: Right arm   Patient Position: Sitting   BP Method: Medium (Manual)   Pulse: 75   Temp: 97.6 °F (36.4 °C)   TempSrc: Temporal   SpO2: 99%   Weight: 64.7 kg (142 lb 10.2 oz)   Height: 5' 5.5" (1.664 m)     Physical Exam  Constitutional:       General: She is not in acute distress.     Appearance: She is well-developed. She is not diaphoretic.   HENT:      Head: Normocephalic and atraumatic.   Cardiovascular:      Rate and Rhythm: Normal rate and regular rhythm.      Heart sounds: No murmur heard.    No friction rub. No gallop.   Pulmonary:      Effort: Pulmonary effort is normal. No respiratory distress.      Breath sounds: Normal breath sounds. No wheezing or rales.   Chest:      Chest wall: No tenderness.   Breasts:      Right: No axillary adenopathy or supraclavicular adenopathy.      Left: No axillary adenopathy or supraclavicular adenopathy.       Abdominal:      General: Bowel sounds are normal. There is distension.      Palpations: Abdomen is soft. There is no mass.      Tenderness: There is no abdominal tenderness. There is no guarding or rebound.   Musculoskeletal:         General: No tenderness. Normal range of motion.      Right lower leg: No edema.      Left lower leg: No edema.   Lymphadenopathy:      Cervical: No cervical adenopathy.      Upper Body:      Right upper body: No supraclavicular or axillary adenopathy.      Left upper body: No supraclavicular or axillary adenopathy.   Skin:     Findings: No erythema or rash.   Neurological:      Mental Status: She is alert and oriented to person, place, and time.   Psychiatric:         Behavior: Behavior normal.         Laboratory Data:  Lab Visit on 07/01/2022   Component Date Value Ref Range Status    WBC 07/01/2022 4.38  3.90 - " 12.70 K/uL Final    RBC 07/01/2022 4.40  4.00 - 5.40 M/uL Final    Hemoglobin 07/01/2022 12.3  12.0 - 16.0 g/dL Final    Hematocrit 07/01/2022 40.1  37.0 - 48.5 % Final    MCV 07/01/2022 91  82 - 98 fL Final    MCH 07/01/2022 28.0  27.0 - 31.0 pg Final    MCHC 07/01/2022 30.7 (A) 32.0 - 36.0 g/dL Final    RDW 07/01/2022 17.2 (A) 11.5 - 14.5 % Final    Platelets 07/01/2022 205  150 - 450 K/uL Final    MPV 07/01/2022 10.7  9.2 - 12.9 fL Final    Immature Granulocytes 07/01/2022 1.4 (A) 0.0 - 0.5 % Final    Gran # (ANC) 07/01/2022 2.7  1.8 - 7.7 K/uL Final    Immature Grans (Abs) 07/01/2022 0.06 (A) 0.00 - 0.04 K/uL Final    Comment: Mild elevation in immature granulocytes is non specific and   can be seen in a variety of conditions including stress response,   acute inflammation, trauma and pregnancy. Correlation with other   laboratory and clinical findings is essential.      Lymph # 07/01/2022 0.9 (A) 1.0 - 4.8 K/uL Final    Mono # 07/01/2022 0.7  0.3 - 1.0 K/uL Final    Eos # 07/01/2022 0.1  0.0 - 0.5 K/uL Final    Baso # 07/01/2022 0.03  0.00 - 0.20 K/uL Final    nRBC 07/01/2022 0  0 /100 WBC Final    Gran % 07/01/2022 61.4  38.0 - 73.0 % Final    Lymph % 07/01/2022 19.4  18.0 - 48.0 % Final    Mono % 07/01/2022 14.8  4.0 - 15.0 % Final    Eosinophil % 07/01/2022 2.3  0.0 - 8.0 % Final    Basophil % 07/01/2022 0.7  0.0 - 1.9 % Final    Platelet Estimate 07/01/2022 Appears normal   Final    Differential Method 07/01/2022 Automated   Final    Sodium 07/01/2022 136  136 - 145 mmol/L Final    Potassium 07/01/2022 5.3 (A) 3.5 - 5.1 mmol/L Final    Chloride 07/01/2022 94 (A) 95 - 110 mmol/L Final    CO2 07/01/2022 31 (A) 23 - 29 mmol/L Final    Glucose 07/01/2022 89  70 - 110 mg/dL Final    BUN 07/01/2022 23  8 - 23 mg/dL Final    Creatinine 07/01/2022 0.6  0.5 - 1.4 mg/dL Final    Calcium 07/01/2022 9.2  8.7 - 10.5 mg/dL Final    Total Protein 07/01/2022 6.7  6.0 - 8.4 g/dL Final     Albumin 07/01/2022 3.6  3.5 - 5.2 g/dL Final    Total Bilirubin 07/01/2022 0.4  0.1 - 1.0 mg/dL Final    Comment: For infants and newborns, interpretation of results should be based  on gestational age, weight and in agreement with clinical  observations.    Premature Infant recommended reference ranges:  Up to 24 hours.............<8.0 mg/dL  Up to 48 hours............<12.0 mg/dL  3-5 days..................<15.0 mg/dL  6-29 days.................<15.0 mg/dL      Alkaline Phosphatase 07/01/2022 64  55 - 135 U/L Final    AST 07/01/2022 20  10 - 40 U/L Final    ALT 07/01/2022 20  10 - 44 U/L Final    Anion Gap 07/01/2022 11  8 - 16 mmol/L Final    eGFR if African American 07/01/2022 >60  >60 mL/min/1.73 m^2 Final    eGFR if non African American 07/01/2022 >60  >60 mL/min/1.73 m^2 Final    Comment: Calculation used to obtain the estimated glomerular filtration  rate (eGFR) is the CKD-EPI equation.       Phosphorus 07/01/2022 4.5  2.7 - 4.5 mg/dL Final    Magnesium 07/01/2022 2.3  1.6 - 2.6 mg/dL Final    CA 19-9 07/01/2022 1048.4 (A) 0.0 - 40.0 U/mL Final    Comment: The testing method is a chemiluminescent microparticle immunoassay   manufactured by [employer] and performed on the    or   [employer] system. Values obtained with different assay manufacturers   for   methods may be different and cannot be used interchangeably.      Hemoglobin A1C 07/01/2022 6.0 (A) 4.0 - 5.6 % Final    Comment: ADA Screening Guidelines:  5.7-6.4%  Consistent with prediabetes  >or=6.5%  Consistent with diabetes    High levels of fetal hemoglobin interfere with the HbA1C  assay. Heterozygous hemoglobin variants (HbS, HgC, etc)do  not significantly interfere with this assay.   However, presence of multiple variants may affect accuracy.      Estimated Avg Glucose 07/01/2022 126  68 - 131 mg/dL Final         Imaging:     PET/CT 3/16/22    Head and neck:     No hypermetabolic activity is noted within the head neck  to suggest metastatic disease.     Atherosclerotic carotid calcifications are noted.     Thorax:     A port is noted overlying the left hemithorax with its tip in the superior vena cava.     Extensive mitral valve calcifications or mitral valve replacement are noted.  Extensive coronary calcifications are noted increasing the patient's coronary risk.     A moderate sized joint effusion is developed since the prior.  Please correlate for etiologies.  This could relate to cardiogenic edema or metastatic fluid.  No obvious hypermetabolic activity is noted.     A pleural based density is noted of 5 mm image 50 sequence 3 similar since the prior.  Atelectasis, scarring, or pulmonary nodule is on the differential.  In a high-risk patient follow-up for long-term size stability would be suggested.     Sternotomy wires are noted     Abdomen and pelvis:     Significant improvement is noted since prior with the liver demonstrating near uniform metabolic activity.  Significantly less hypermetabolic activity is noted than was seen on the prior.  A hypodense region in the right lobe measured on the prior on CT is still thought to be seen image 131 without convincing detrimental change when measured in a similar manner as on the prior at 5.2 x 4.2 cm image 131 sequence 3.  Several other hypodense regions are noted without worrisome detrimental change since the prior.  The region in the right lobe of the liver that measured 2.5 x 1.9 cm on the prior is less easily visualized but can be measured similarly at 2.2 x 1.8 cm image 142 sequence 3     The adrenal thickening seen on the prior appears of 2 cm in short diameter measuring 1.9 cm on the prior unchanged.  In long dimension it was favored to be under measured on the prior but is unchanged when measured in a similar manner.  It is not significantly hypermetabolic on today's exam     The hypermetabolic pancreatic tail mass seen on the prior demonstrates near background activity on  today's exam as well and no obvious focal mass can be measured on the CT portion of this exam.  The pancreas in this region measures 2.0 cm in thickness versus is 2.5 cm on the prior.  Decrease in size of the mass is suspected     Several hypermetabolic mesenteric foci were suspected on the prior suggestive of mesenteric metastasis.  No significant hypermetabolic activity is noted on today's exam.     Musculoskeletal:     Glenohumeral joint space loss is noted on the left greater than right suggestive of degenerative change likely associated with labral tearing.     Central canal stenosis is noted in the lower lumbar spine particularly at the L4-L5 level.  No obvious hypermetabolic osseous changes are noted on today's exam.  This is an improvement since the prior      Assessment:       1. Primary pancreatic cancer with metastasis to other site    2. Metastasis to liver    3. Malignant neoplasm of body of pancreas     4. 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 Bone metastasis    6. 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therapy    7. Fatigue, unspecified type    8. Hypoxia    9. Atrial fibrillation, unspecified type    10. Pancreatic insufficiency    11. Bloating    12. Cancer related pain    13. Hypertension, unspecified type    14. Hyperlipidemia, unspecified hyperlipidemia type    15. Chronic diastolic congestive heart failure           Plan:   Metastatic Pancreatic Cancer - The patient was diagnosed with metastatic pancreatic cancer adenocarcinoma with mets to the liver on 12/15/21  -Ca19-9 was 7,581 on 12/15/21  -PET/CT on 1/04/22 shows mets to the liver, left adrenal gland, T9 vertebra and left iliac fossa.  -Potential mets are seen near the umbilicus  -PT underwent PORT placement 1/07/22  -MRI brain on 1/11/22 showed no brain mets  -Pt consented for Gemcitabine and paclitaxel on 1/03/22  -Pt treated with 20% dose reduction in paclitaxel and Gemcitabine to 100mg/mm2 and 800mg/mm2 respectively  -C2D8 and C2D15  cancelled given her hospitalization from 2/12/22-2/14/22  -Patient completed C3D8 and was hospitalized again for a fever  -Treatment moving forward was discussed with the patient and decision was made to remove day 8 of treatment  -PET/CT on 3/16/22 shows stable disease  -Ca19-9 has dropped dramatically to 170 on 3/16/22  -C3D15 delayed given cardiac ablation on 03/17/2022  -Will continue Lupton/Abraxane at 20% does reduction due to prior thrombocytopenia  -Pt underwent C5D1 5/02/22  -Guardant 360 testing 1/26/22 showed STACY [id number] 0.3% amplification, KRAS G12D 0.3% amplification, MSI-stable, ARID1A R7664T 0.4%, KRAS G12D 0.3%, STACY [id number], STK11 S59T 0.2%, and BRCA1 R979C 0.1%.  No BRCA2 was detected  -Concern was the pt's hypoxia was a reaction to Gemcitabine with pneumonitis  -PET/CT 6/20/22 showed progression of disease with new pulmonary nodules, enlarging pancreatic mass, new hepatic nodules and bony lesions in the L1 vertebral body and left iliac bone  -Pt started on Liposomal irinotecan and Fllorouracil with 20% dose reduction  -The patient would like to stay at the current dose  -Pt to take dexamethasone on days 2 and 3 of treatment  -Pt set up for [facility]     Fatigue -likely stephenson to underlying cancer and chemo  -Will monitor    Hypoxia - 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  -Pt currently on 2L O2  -Pt's oxygen saturations dropped into the 80's again in clinic with ambulation when off of oxygen  -Will continue oxygen  -Will monitor     A-fib - pt to underwent cardioversion 6/16/22  -Pt on amiodarone and now is is NSR  -PT on Eliquis  -PT s/p Ablation on 3/17/22  -Stable  -Management per cardiology     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  -No current signs of infection  -Pt given Evusheld on 6/09/22  -Will monitor     Pancreatic Insufficiency - pt on creon  -Pt taking 3 pills with meals and 2 with snacks with improvement in  diarrhea  -Pt encouraged to keep the current dose  -Will monitor     Bloating - likely related to pancreatic insufficiency  -Pt instructed to take Gas-X    Cancer Related Pain - improved  -Will monitor     HTN - pt taking losartan, bisoprolol  -Cardiology managing  -Will monitor     HLD - pt on pravastatin  -Management per PCP     CHF - pt with CHF exacerbation and hospitalized from 3/25/22 to 3/29/22 wioth 9.5L removed  -Pt hospitalized again from 5/15/22 to 5/20/22 for possible CHF exacerbation  -Pt taking lasix daily  -Management per cardiology    Advance Care Planning     Power of   I initiated the process of advance care planning today and explained the importance of this process to the patient.  I introduced the concept of advance directives to the patient, as well. Then the patient received detailed information about the importance of designating a Health Care Power of  (HCPOA). She was also instructed to communicate with this person about their wishes for future healthcare, should she become sick and lose decision-making capacity. The patient has not previously appointed a HCPOA. After our discussion, the patient has decided to complete a HCPOA and will bring the form completed to her next clinic appointment.            Route Chart for Scheduling    Med Onc Chart Routing      Follow up with physician 2 weeks. The patient can proceed with treatment.  She needs labs CBC, CMP, Mg, phos, Ca19-9 on 7/20/22 with an appt with me and treatment that day.   Follow up with IVONNE    Infusion scheduling note    Injection scheduling note    Labs    Imaging    Pharmacy appointment    Other referrals          Treatment Plan Information   OP LIPOSOMAL IRINOTECAN FLUOROURACIL LEUCOVORIN Q2W   Salvador Green MD   Upcoming Treatment Dates - OP LIPOSOMAL IRINOTECAN FLUOROURACIL LEUCOVORIN Q2W    7/20/2022       Chemotherapy       irinotecan liposomaL (ONIVYDE) 56 mg/m2 = 96.879 mg in sodium chloride 0.9% 500 mL  chemo infusion       leucovorin calcium 320 mg/m2 = 555 mg in dextrose 5 % 250 mL infusion       fluorouracil (ADRUCIL) 1,920 mg/m2 = 3,320 mg in sodium chloride 0.9% 100 mL chemo infusion       Supportive Care       atropine injection 0.4 mg       Antiemetics       palonosetron 0.25mg/dexamethasone 12mg in NS IVPB 0.25 mg  8/3/2022       Chemotherapy       irinotecan liposomaL (ONIVYDE) 56 mg/m2 = 96.879 mg in sodium chloride 0.9% 500 mL chemo infusion       leucovorin calcium 320 mg/m2 = 555 mg in dextrose 5 % 250 mL infusion       fluorouracil (ADRUCIL) 1,920 mg/m2 = 3,320 mg in sodium chloride 0.9% 100 mL chemo infusion       Supportive Care       atropine injection 0.4 mg       Antiemetics       palonosetron 0.25mg/dexamethasone 12mg in NS IVPB 0.25 mg  8/17/2022       Chemotherapy       irinotecan liposomaL (ONIVYDE) 56 mg/m2 = 96.879 mg in sodium chloride 0.9% 500 mL chemo infusion       leucovorin calcium 320 mg/m2 = 555 mg in dextrose 5 % 250 mL infusion       fluorouracil (ADRUCIL) 1,920 mg/m2 = 3,320 mg in sodium chloride 0.9% 100 mL chemo infusion       Supportive Care       atropine injection 0.4 mg       Antiemetics       palonosetron 0.25mg/dexamethasone 12mg in NS IVPB 0.25 mg  8/31/2022       Chemotherapy       irinotecan liposomaL (ONIVYDE) 56 mg/m2 = 96.879 mg in sodium chloride 0.9% 500 mL chemo infusion       leucovorin calcium 320 mg/m2 = 555 mg in dextrose 5 % 250 mL infusion       fluorouracil (ADRUCIL) 1,920 mg/m2 = 3,320 mg in sodium chloride 0.9% 100 mL chemo infusion       Supportive Care       atropine injection 0.4 mg       Antiemetics       palonosetron 0.25mg/dexamethasone 12mg in NS IVPB 0.25 mg    Therapy Plan Information  diphenhydrAMINE capsule 25 mg  25 mg, Oral, PRN  predniSONE tablet 40 mg  40 mg, Oral, PRN  EPINEPHrine (EPIPEN) 0.3 mg/0.3 mL pen injection 0.3 mg  0.3 mg, Intramuscular, PRN  ondansetron disintegrating tablet 4 mg  4 mg, Oral, PRN  acetaminophen tablet 650  mg  650 mg, Oral, PRN  albuterol inhaler 2 puff  2 puff, Inhalation, PRN      Salvador Green MD  [facility]  Hematology and Oncology  [facility]   [address] [address]   O: [phone]  F: [phone]

## 2022-07-06 NOTE — PROGRESS NOTES
3:10 PM    This LCSW met with this pt at chairside to check in and provide support.    The pt reported waiting for her daughter to pick her up. She said her daughter went to the store and at times cooks for her.     The pt reported doing well, but is just tired and wants to go home to rest after this long day.    The pt reported no needs at this time.

## 2022-07-06 NOTE — PLAN OF CARE
Problem: Adult Inpatient Plan of Care  Goal: Plan of Care Review  Outcome: Ongoing, Progressing  Flowsheets (Taken 7/6/2022 3475)  Plan of Care Reviewed With:   patient   daughter     Patient tolerated treatment. VSS. Port flushed with blood return. 5FU pump initiated at 2.2 ml/hr for the next 46 hours. Connections tightened and secured with coban. Biopatch and central line dressing in place to port. Patient instructed not to shower while port is accessed.   Patient will return on 7/8/22 for pump d/c.  AVS provided and reviewed. Patient ambulated per w/c upon discharge from infusion center. Patient accompanied by daughter.

## 2022-07-08 NOTE — PLAN OF CARE
5-FU pump d/c.  Tolerated well.  No questions or concerns at this time.  Left unit in NAD via w/c.

## 2022-07-19 NOTE — PROGRESS NOTES
PATIENT: Nel Cui  MRN: [id number]  DATE: 7/20/2022      Diagnosis:   1. Primary pancreatic cancer with metastasis to other site    2. Metastasis to liver    3. 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 Bone metastasis    5. Fatigue, unspecified type    6. Atrial fibrillation, unspecified type    7. Pancreatic insufficiency    8. 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therapy    9. Hypertension, unspecified type    10. Hyperlipidemia, unspecified hyperlipidemia type    11. Chronic diastolic congestive heart failure        Chief Complaint: No chief complaint on file.      Subjective:   HPI: Ms. Cui is a 87 y.o. female with Osteopenia, HLD, HTN, CHF who is known to Dr. Green for diagnosis of pancreatic cancer. Here today for consideration of Cycle 3 Irinotecan and 5-FU with a 20% dose reduction. She is accompanied by her daughter.    Patient felt more fatigued with her last treatment. Did not have as much problems with bloating and gas. Last week, 7/14, she tested positive for COVID but was and has remained asymptomatic.   Continues to follow with cardiology and had TOREY with cardioversion yesterday. Discussed that some of her fatigue could be coming from a-fib as well.    Weight remains stable from last visit. Has a tooth that chipped but this is not causing her any discomfort.   The patient denies CP, abdominal pain, N/V, constipation, fever, chills, night sweats, weight loss, new lumps or bumps, easy bruising or bleeding fever, chills, night sweats, weight loss, new lumps or bumps, easy bruising or bleeding.    Oncologic History:    Oncologic History 12/09/21 Ct Abdomen  12/15/21 EUS  1/04/22 PET/CT  1/07/22 PORT - Dr Acuña  1/11/22 MRI Brain    Oncologic Treatment 1/12/22 - current Gemcitabine and Paclitaxel    Pathology 12/15/21 adenocarcinoma in the pancreatic body and adenocarcinoma in the liver       The patient initially presented to her PCP on 12/02/21 with complaint of abdominal pain.  She  underwent a CT of the abdomen on 12/09/21 showing subcentimeter para-aortic, mesenteric lymph nodes; multiple hepatic infiltrative lesions present throughout the liver with 1 of the larger areas right liver lobe measuring approximately 2.2 cm in diameter; mass in the pancreatic tail measuring 9 x 3.3 cm.  The patient underwent EUS under the care of Dr Tse on 12/15/21 showing a mass in the pancreatic body and multiple liver lesions.  Path from the procedure showed adenocarcinoma in the pancreatic body and adenocarcinoma in the liver.               The patient underwent PET/CT on 1/04/22 showing hypermetabolic rounded mass at the junction of the body and tail of the pancreas measuring 2.5 x 2.4 cm, hypermetabolic left adrenal gland nodule measuring 1.9 cm, innumerable hypermetabolic nodular masses throughout the left and right hepatic lobes with largest lesions in the right liver measuring 2.5 x 1.9 cm and 5.2 x 4.4 cm, mildly enlarged hypermetabolic portacaval lymph node measuring 1.4 cm, 0.7 cm hypermetabolic focus at the umbilicus, lesion in the T9 vertebra measuring 1.8 x 1.7 cm and a lesion in the left iliac fossa measuring 1.7 x 1.2 cm.  MRI of the brain on 01/11/2022 showed no evidence of metastases but did show an old right midbrain lacunar infarct.  The patient was in the hospital from 1/28/22-1/29/22 with fever and low sodium.  The patient's hydrochlorothiazide was discontinued.  The patient then presented to the hospital on 02/06/2022 for dyspnea on exertion.  CTA was done on 02/06/2022 showing no evidence of pulmonary embolism.  The patient underwent NT proBNP which was elevated at 2630 pg/mL.  The patient was started on Lasix every other day at the request of Cardiology.              The patient was admitted to the hospital from 2/12/22-2/14/22 for fever.  The patient was discharged on Levaquin for 5 days.  The patient saw Cardiology on 02/17/2022 for atrial fibrillation and was started on Eliquis and  taken off of aspirin.                The the patient was admitted to the hospital from [id number] for fever.  During her hospitalization procalcitonin was checked and was normal.  All her cultures were negative.  Her fever was felt to be from chemotherapy.               The patient underwent PET-CT on 03/16/2022 showing a followed of mm pulmonary based nodule; decrease metabolic activity in the liver mass with stable right lobe mass measuring 5.2 x 4.2 cm; right lobe mass measuring 2.2 x 1.8 cm; adrenal gland thickening of 1.9 cm; decrease in pancreatic mass measuring 2 cm; decreased metabolic activity of hypermetabolic mesenteric foci and right-sided pleural effusion.  The patient underwent cardioversion on 03/17/2022 at which point constantine was performed.  Patient was put back in normal sinus rhythm.               The patient was admitted to the hospital for CHF exacerbation from 3/25/22 to 3/29/22 and was diuresed a little over 9.5L.               The patient was admitted to the hospital from 5/15/22-5/20/22 for hypoxia.  CTA chest on 5/15/22 showed diffuse faint ground-glass opacities in the lungs bilaterally.   NT-ProNP on admission was elevated at 5,220pg/mL.  The patient was initially diuresed without much improvement.  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  The patient was eventually weaned to 1.5L O2.  Repeat CXR on 5/26/22 showed improvement in her intrapulmonary process.                 The pt underwent cardioversion on 6/16/22 and was started on amiodarone.  PET/CT completed on 6/20/22 showed a new to 1.2 cm subpleural nodule in the right upper lobe; enlarging right upper lobe nodule measuring 8 mm; resolution of right pleural effusion compared to prior studies; enlargement of pancreatic mass now 2.2 x 1.2 cm; stable 2 cm left adrenal nodule; new hypermetabolic nodule in the right hepatic lobe measuring 1.9 x 1.5 cm; decrease in size of separate nodule in the  right hepatic lobe measuring 3 x 2.4 cm; new hypermetabolic focus in the L1 vertebral body measuring 2.7 x 2 cm; and 2.6 x 1.3 new hypermetabolic lesion in the left iliac bone  7/19/2022: Cardioversion per Dr. Cruz     Past Medical History:   Past Medical History:   Diagnosis Date    A-fib     Anemia     Anticoagulant long-term use     Arthritis     Cataracts, bilateral     CHF (congestive heart failure)     chronic diastolic    Coronary artery disease     Nonobstructive    Essential (primary) hypertension 10/05/2010    Hyperlipidemia     Hyponatremia     Mitral regurgitation     Osteopenia     Pancreatic insufficiency     Primary pancreatic cancer with metastasis to other site     Thrombocytopenia        Past Surgical HIstory:   Past Surgical History:   Procedure Laterality Date    CARPAL TUNNEL RELEASE Bilateral     wrist    CATARACT EXTRACTION, BILATERAL  2017    CORONARY ANGIOGRAPHY  09/01/2020    Procedure: ANGIOGRAM, CORONARY ARTERY;  Surgeon: Ivette Horne MD;  Location: [facility] CATH;  Service: Cardiology;;    DILATION AND CURETTAGE OF UTERUS      ENDOSCOPIC ULTRASOUND OF UPPER GASTROINTESTINAL TRACT Left 12/15/2021    Procedure: ULTRASOUND, UPPER GI TRACT, ENDOSCOPIC;  Surgeon: Lico Tse MD;  Location: [facility] ENDO;  Service: Endoscopy;  Laterality: Left;    ESOPHAGOGASTRODUODENOSCOPY N/A 12/15/2021    Procedure: EGD (ESOPHAGOGASTRODUODENOSCOPY);  Surgeon: Lico Tse MD;  Location: [facility] ENDO;  Service: Endoscopy;  Laterality: N/A;    INSERTION OF TUNNELED CENTRAL VENOUS CATHETER (CVC) WITH SUBCUTANEOUS PORT N/A 01/07/2022    Procedure: [id number];  Surgeon: Cas Acuña MD;  Location: [facility] OR;  Service: General;  Laterality: N/A;    LEFT HEART CATHETERIZATION  09/01/2020    Procedure: Left heart cath;  Surgeon: Ivette Horne MD;  Location: [facility] CATH;  Service: Cardiology;;    MITRAL VALVE REPAIR      OOPHORECTOMY Left 1988    OTHER SURGICAL HISTORY       "maze procedure and PHILL ligation    REPAIR OF MENISCUS OF KNEE Left     TONSILLECTOMY      vein removal         Family History:   Family History   Problem Relation Age of Onset    No Known Problems Mother     No Known Problems Father     Cancer Maternal Grandmother         Stomach    No Known Problems Maternal Grandfather     No Known Problems Paternal Grandmother     No Known Problems Paternal Grandfather     Breast cancer Sister         over 60    No Known Problems Brother        Social History:  reports that she quit smoking about 34 years ago. Her smoking use included cigarettes. She has a 30.00 pack-year smoking history. She has never used smokeless tobacco. She reports that she does not drink alcohol and does not use drugs.    Allergies:  Review of patient's allergies indicates:   Allergen Reactions    Amoxicillin-pot clavulanate Other (See Comments)     "Spaced out feeling- can't take it longer than 4 or 5 days"  Patient tolerated Zosyn    Breo ellipta [fluticasone furoate-vilanterol] Other (See Comments)     Feels jittery    Corticosteroids (glucocorticoids)      Other reaction(s): tachycardia    Erythromycin      "Spaced out"    Metoprolol succinate      "I dont feel good, it doesn't work"    Latex, natural rubber      Turns skin red around area where latex touches       Medications:  Current Outpatient Medications   Medication Sig Dispense Refill    acetaminophen (TYLENOL) 325 MG tablet Take 325 mg by mouth every 6 (six) hours as needed for Pain or Temperature greater than.      amiodarone (PACERONE) 200 MG Tab Take 2 tablets (400 mg total) by mouth 2 (two) times daily for 14 days, THEN 1 tablet (200 mg total) once daily. (Patient taking differently: Take 2 tablets (400 mg total) by mouth 2 (two) times daily for 14 days, THEN 1 tablet (200 mg total) once nightly) 86 tablet 0    apixaban (ELIQUIS) 2.5 mg Tab Take 1 tablet (2.5 mg total) by mouth 2 (two) times daily. 60 tablet 0    " ascorbic acid, vitamin C, (VITAMIN C) 500 MG tablet Take 500 mg by mouth once daily.      bisoprolol (ZEBETA) 5 MG tablet Take 2 tablets (10 mg total) by mouth every evening. 60 tablet 0    cholecalciferol, vitamin D3, (VITAMIN D3) 25 mcg (1,000 unit) capsule Take 1,000 Units by mouth every morning.      coenzyme Q10 100 mg capsule Take 100 mg by mouth once daily.      dexAMETHasone (DECADRON) 4 MG Tab Take 2 tablets (8 mg total) by mouth once daily. Take as directed on days 2 and 3 of your chemotherapy cycle. 24 tablet 5    docusate sodium (COLACE) 100 MG capsule Take 1 capsule (100 mg total) by mouth 2 (two) times daily.  0    furosemide (LASIX) 20 MG tablet Take 1 tablet (20 mg total) by mouth once daily. Hold until approved by PCP      LIDOcaine-prilocaine (EMLA) cream Apply topically as needed (30 to 60 minutes prior to chemo). 30 g 2    lipase-protease-amylase (CREON) 36,000-114,000- 180,000 unit CpDR Take 3 capsules by mouth 3 (three) times daily with meals. Take 3 capsules TID with meals and 2 capsule PRN with snacks 300 capsule 11    loperamide (IMODIUM A-D) 2 mg Tab Take 4 mg after first loose or frequent bowel movement, then 2 mg every 2 hours until 12 hours have passed without a bowel movement. (Patient taking differently: Take 2 mg by mouth every 12 (twelve) hours as needed. Take 4 mg after first loose or frequent bowel movement, then 2 mg every 2 hours until 12 hours have passed without a bowel movement.) 24 tablet 25    ondansetron (ZOFRAN-ODT) 8 MG TbDL Take 1 tablet (8 mg total) by mouth every 8 (eight) hours as needed (nausea/vomiting). 60 tablet 5    saliva substitute combo no.9 (BIOTENE DRY MOUTH ORAL RINSE) Mwsh Swish and spit 5 mLs every evening.      sod chlor,sod bicarb/neti pot (NEILMED NASAFLO MIKE) 2 sprays by Nasal route 4 (four) times daily as needed (nare dryness ). Aly-Med saile nose gel spray      sodium chloride (OCEAN) 0.65 % nasal spray 2 sprays by Nasal route 6 (six)  times daily. PRN  Indications: dryness of the nose      losartan (COZAAR) 100 MG tablet Take 1 tablet (100 mg total) by mouth every evening. 90 tablet 3    magnesium 250 mg Tab Take 250 mg by mouth Daily.      multivitamin (THERAGRAN) per tablet Take 1 tablet by mouth every other day.      ondansetron (ZOFRAN-ODT) 4 MG TbDL Take 4 mg by mouth every 12 (twelve) hours as needed (nausea/vomiting).      OXYGEN-AIR DELIVERY SYSTEMS MISC Inhale 1.5 L/min into the lungs continuous.      pravastatin (PRAVACHOL) 40 MG tablet TAKE 1 TABLET(40 MG) BY MOUTH EVERY DAY (Patient taking differently: Take 40 mg by mouth every evening.) 90 tablet 3     No current facility-administered medications for this visit.     Facility-Administered Medications Ordered in Other Visits   Medication Dose Route Frequency Provider Last Rate Last Admin    alteplase injection 2 mg  2 mg Intra-Catheter PRN Zamzam Zee NP        diphenhydrAMINE injection 50 mg  50 mg Intravenous Once PRN Zamzam Zee NP        EPINEPHrine (EPIPEN) 0.3 mg/0.3 mL pen injection 0.3 mg  0.3 mg Intramuscular Once PRN Zamzam Zee NP        fluorouracil (ADRUCIL) 1,920 mg/m2 = 3,320 mg in sodium chloride 0.9% 100 mL chemo infusion  1,920 mg/m2 (Treatment Plan Recorded) Intravenous over 46 hr Zamzam Zee NP   3,320 mg at 07/20/22 1421    heparin, porcine (PF) 100 unit/mL injection flush 500 Units  500 Units Intravenous PRN Zamzam Zee NP        hydrocortisone sodium succinate injection 100 mg  100 mg Intravenous Once PRN Zamzam Zee NP        sodium chloride 0.9% flush 10 mL  10 mL Intravenous PRN Zamzam TRISTIN Zee           Review of Systems   Constitutional: Positive for fatigue. Negative for appetite change, chills and fever.   HENT: Negative for nosebleeds, sore throat and trouble swallowing.    Respiratory: Negative for cough and shortness of breath.    Cardiovascular: Negative for chest pain and leg swelling.   Gastrointestinal: Negative for  "abdominal pain, constipation, diarrhea and nausea.   Genitourinary: Negative for dysuria and hematuria.   Musculoskeletal: Negative for arthralgias and myalgias.   Skin: Negative for rash.   Neurological: Negative for headaches.   Hematological: Negative for adenopathy.   Psychiatric/Behavioral: The patient is not nervous/anxious.        ECOG Performance Status:   ECOG SCORE    2 - Capable of all selfcare but unable to carry out any work activities, active > 50% of hours         Objective:      Vitals:   Vitals:    07/20/22 1010   BP: 127/67   BP Location: Right arm   Patient Position: Sitting   BP Method: Medium (Automatic)   Pulse: 69   Resp: 20   Temp: 97.5 °F (36.4 °C)   TempSrc: Temporal   SpO2: 97%   Weight: 62.9 kg (138 lb 10.7 oz)   Height: 5' 5" (1.651 m)     BMI: Body mass index is 23.08 kg/m².    Physical Exam  Vitals reviewed.   Constitutional:       General: She is not in acute distress.     Appearance: She is not diaphoretic.   HENT:      Head: Normocephalic and atraumatic.      Mouth/Throat:      Mouth: Mucous membranes are moist.      Pharynx: No oropharyngeal exudate.   Eyes:      General: No scleral icterus.  Cardiovascular:      Rate and Rhythm: Normal rate and regular rhythm.      Heart sounds: Normal heart sounds. No murmur heard.  Pulmonary:      Effort: Pulmonary effort is normal. No respiratory distress.      Breath sounds: Normal breath sounds.   Abdominal:      General: Bowel sounds are normal. There is no distension.      Palpations: Abdomen is soft.      Tenderness: There is no abdominal tenderness.   Musculoskeletal:      Right lower leg: No edema.      Left lower leg: No edema.   Lymphadenopathy:      Cervical: No cervical adenopathy.   Skin:     General: Skin is warm and dry.      Coloration: Skin is not jaundiced.      Findings: No rash.   Neurological:      Mental Status: She is alert and oriented to person, place, and time.   Psychiatric:         Behavior: Behavior normal. "         Laboratory Data:  Lab Results   Component Value Date    WBC 3.91 07/18/2022    HGB 12.3 07/18/2022    HCT 38.7 07/18/2022    MCV 88 07/18/2022     07/18/2022        Assessment:       1. Primary pancreatic cancer with metastasis to other site    2. Metastasis to liver    3. 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 Bone metastasis    5. Fatigue, unspecified type    6. Atrial fibrillation, unspecified type    7. Pancreatic insufficiency    8. 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therapy    9. Hypertension, unspecified type    10. Hyperlipidemia, unspecified hyperlipidemia type    11. Chronic diastolic congestive heart failure         Plan:   Metastatic Pancreatic Cancer   -The patient was diagnosed with metastatic pancreatic cancer adenocarcinoma with mets to the liver on 12/15/21  -Ca19-9 was 7,581 on 12/15/21  -PET/CT on 1/04/22 shows mets to the liver, left adrenal gland, T9 vertebra and left iliac fossa.  -Potential mets are seen near the umbilicus  -PT underwent PORT placement 1/07/22  -MRI brain on 1/11/22 showed no brain mets  -Pt consented for Gemcitabine and paclitaxel on 1/03/22  -Pt treated with 20% dose reduction in paclitaxel and Gemcitabine to 100mg/mm2 and 800mg/mm2 respectively  -C2D8 and C2D15 cancelled given her hospitalization from 2/12/22-2/14/22  -Patient completed C3D8 and was hospitalized again for a fever  -Treatment moving forward was discussed with the patient and decision was made to remove day 8 of treatment  -PET/CT on 3/16/22 shows stable disease  -Ca19-9 has dropped dramatically to 170 on 3/16/22  -C3D15 delayed given cardiac ablation on 03/17/2022  -Will continue [place]/Abraxane at 20% does reduction due to prior thrombocytopenia  -Pt underwent C5D1 5/02/22  -Guardant 360 testing 1/26/22 showed STACY G2942dm 0.3% amplification, KRAS G12D 0.3% amplification, MSI-stable, ARID1A U9185E 0.4%, KRAS G12D 0.3%, STACY [id number], STK11 S59T 0.2%, and BRCA1 R979C 0.1%.  No BRCA2 was  detected  -Concern was the pt's hypoxia was a reaction to Gemcitabine with pneumonitis  -PET/CT 6/20/22 showed progression of disease with new pulmonary nodules, enlarging pancreatic mass, new hepatic nodules and bony lesions in the L1 vertebral body and left iliac bone  -6/22/22 Pt started on Liposomal irinotecan and Fllorouracil with 20% dose reduction  -The patient would like to stay at the current dose  -Pt to take dexamethasone on days 2 and 3 of treatment  -Proceed with C3 Liposomal irinotecan and Fllorouracil with 20% dose reduction today  -Follow up with Dr. Green in 2 weeks with labs 2 days prior to visit      Fatigue   -Likely stephenson to underlying cancer and chemo  -Will monitor    A-fib   -pt underwent cardioversion 6/16/22, repeated 7/19/2022  -Pt on amiodarone   -PT on Eliquis  -PT s/p Ablation on 3/17/22  -Management per cardiology     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   -pt at increased risk of infection  -No current signs of infection  -Pt given Evusheld on 6/09/22  -Recent COVID infection, asymptomatic  -Will monitor     Pancreatic Insufficiency   -pt on creon  -Pt taking 3 pills with meals and 2 with snacks with improvement in diarrhea  -Pt encouraged to keep the current dose  -Will monitor     HTN   -pt taking losartan, bisoprolol  -Cardiology managing  -Will monitor     HLD - pt on pravastatin  -Management per PCP     CHF    -pt with CHF exacerbation and hospitalized from 3/25/22 to 3/29/22 wioth 9.5L removed  -Pt hospitalized again from 5/15/22 to 5/20/22 for possible CHF exacerbation  -Pt taking lasix daily  -Management per cardiology    Patient queried and all questions were answered.  Assessment/Plan reviewed and approved by Dr. Green     Route Chart for Scheduling    Med Onc Chart Routing      Follow up with physician 2 weeks. With Dr. Green and plan for C4 that day    Follow up with IVONNE    Infusion scheduling note Proceed with C3 Onivyde/5FU (20% dose reduction)/Leucovorin today    Injection  scheduling note    Labs CBC, CMP, magnesium, phosphorus and CA 19-9   Lab interval:  On 8/1/22    Imaging    Pharmacy appointment    Other referrals          Treatment Plan Information   OP LIPOSOMAL IRINOTECAN FLUOROURACIL LEUCOVORIN Q2W   Salvador Green MD   Upcoming Treatment Dates - OP LIPOSOMAL IRINOTECAN FLUOROURACIL LEUCOVORIN Q2W    8/3/2022       Chemotherapy       irinotecan liposomaL (ONIVYDE) 56 mg/m2 = 96.879 mg in sodium chloride 0.9% 500 mL chemo infusion       leucovorin calcium 320 mg/m2 = 555 mg in dextrose 5 % 250 mL infusion       fluorouracil (ADRUCIL) 1,920 mg/m2 = 3,320 mg in sodium chloride 0.9% 100 mL chemo infusion       Supportive Care       atropine injection 0.4 mg       Antiemetics       palonosetron 0.25mg/dexamethasone 12mg in NS IVPB 0.25 mg  8/17/2022       Chemotherapy       irinotecan liposomaL (ONIVYDE) 56 mg/m2 = 96.879 mg in sodium chloride 0.9% 500 mL chemo infusion       leucovorin calcium 320 mg/m2 = 555 mg in dextrose 5 % 250 mL infusion       fluorouracil (ADRUCIL) 1,920 mg/m2 = 3,320 mg in sodium chloride 0.9% 100 mL chemo infusion       Supportive Care       atropine injection 0.4 mg       Antiemetics       palonosetron 0.25mg/dexamethasone 12mg in NS IVPB 0.25 mg  8/31/2022       Chemotherapy       irinotecan liposomaL (ONIVYDE) 56 mg/m2 = 96.879 mg in sodium chloride 0.9% 500 mL chemo infusion       leucovorin calcium 320 mg/m2 = 555 mg in dextrose 5 % 250 mL infusion       fluorouracil (ADRUCIL) 1,920 mg/m2 = 3,320 mg in sodium chloride 0.9% 100 mL chemo infusion       Supportive Care       atropine injection 0.4 mg       Antiemetics       palonosetron 0.25mg/dexamethasone 12mg in NS IVPB 0.25 mg  9/14/2022       Chemotherapy       irinotecan liposomaL (ONIVYDE) 56 mg/m2 = 96.879 mg in sodium chloride 0.9% 500 mL chemo infusion       leucovorin calcium 320 mg/m2 = 555 mg in dextrose 5 % 250 mL infusion       fluorouracil (ADRUCIL) 1,920 mg/m2 = 3,320 mg in sodium  chloride 0.9% 100 mL chemo infusion       Supportive Care       atropine injection 0.4 mg       Antiemetics       palonosetron 0.25mg/dexamethasone 12mg in NS IVPB 0.25 mg    Therapy Plan Information  diphenhydrAMINE capsule 25 mg  25 mg, Oral, PRN  predniSONE tablet 40 mg  40 mg, Oral, PRN  EPINEPHrine (EPIPEN) 0.3 mg/0.3 mL pen injection 0.3 mg  0.3 mg, Intramuscular, PRN  ondansetron disintegrating tablet 4 mg  4 mg, Oral, PRN  acetaminophen tablet 650 mg  650 mg, Oral, PRN  albuterol inhaler 2 puff  2 puff, Inhalation, PRN

## 2022-07-20 NOTE — PLAN OF CARE
Problem: Adult Inpatient Plan of Care  Goal: Patient-Specific Goal (Individualized)  Outcome: Ongoing, Progressing  Flowsheets (Taken 7/20/2022 1105)  Anxieties, Fears or Concerns: none  Individualized Care Needs: recliner, warm blanket, pillow  Patient-Specific Goals (Include Timeframe): no s/s of rx during tx     Problem: Fatigue  Goal: Improved Activity Tolerance  Outcome: Ongoing, Progressing  Intervention: Promote Improved Energy  Flowsheets (Taken 7/20/2022 1105)  Fatigue Management: frequent rest breaks encouraged  Sleep/Rest Enhancement: relaxation techniques promoted  Activity Management:   Up in chair - L3   Standing - L3   Ambulated in room - L4

## 2022-07-20 NOTE — PROGRESS NOTES
ONCOLOGY NUTRITION   FOLLOW UP VISIT        Nel Cui is a 87 y.o. female.  DATE: 07/20/2022        Oncology Diagnosis: Pancreatic Cancer w/ Metastatic to liver      REFERRAL FROM:   [] Integrative Oncology   [] Med/Heme Oncology  [] Radiation Oncology  [] Surgical Oncology   [] Infusion Nurse    [x] Routine Nutrition follow up    TREATMENT PLAN:   [] Full treatment plan pending  [x] Chemotherapy  [] Immunotherapy  [] Radiation  [] Concurrent  [] Surgery  [] Treatment complete/post-treatment    ANTHROPOMETRICS:  Wt Readings from Last 10 Encounters:   07/20/22 62.9 kg (138 lb 10.7 oz)   07/20/22 62.9 kg (138 lb 10.7 oz)   07/18/22 61.2 kg (135 lb)   07/16/22 61.3 kg (135 lb 3 oz)   07/14/22 63 kg (139 lb)   07/09/22 63.2 kg (139 lb 4 oz)   07/06/22 64.7 kg (142 lb 10.2 oz)   07/06/22 64.7 kg (142 lb 10.2 oz)   06/25/22 62.7 kg (138 lb 4 oz)   06/24/22 64.2 kg (141 lb 8.6 oz)      Weight Changes: has decreased 3 pounds over last month    PHYSICAL EXAM:  Muscle Wasting Observed:  [x] No Deficit   [] Mild Deficit   [] Moderate   [] Severe    INTAKE:  [x] PO Intake [] TF Intake  Current Diet: cardiac diet  Dietary Patterns:  Eating meals/snacks as tolerated  [x] Oral nutritional supplements: Premier Protein    SYMPTOMS/COMPLAINTS:   [x] No nutritional concerns at current  [] Diarrhea                    [] Constipation           [] Nausea                 [] Vomiting                [] Indigestion                [] Reflux              [] Poor Appetite            [] Anorexia                 [] Early Satiety         [] Gas                       [] Bloating                     [] Dry Mouth    [] Mucositis                   [] Mouth Sores           [] Poor Dentition      [] Difficulty chewing  [] Difficulty Swallowing   [] Pain with swallowing [] Change in taste      [] Change in smell   [] Pain (general)       [] Fatigue                      [] Sleep issues    [] Weight loss  [] other, please specify-     Nutrition  Re-Assessment Risk: Low nutrition risk    [x] Labs reviewed   [x] Meds reviewed    Education Provided:   [x] No Education Needed at this time  [] Diarrhea                                              [] Constipation                          [] Nausea/Vomiting  [] Mucositis                [] Dry Mouth    [] Dealing with changes in Taste/Smell  [] Dealing with Poor Appetite   [] Soft/moist Diet      [] Weight Loss/Gain     [] Weight Maintenance                           [] Indigestion/GERD                 [] Gas/Bloating          [] Foods High/ Low in specific nutrients [] Increasing Calories/Protein   [] Milkshake/Smoothies Recipes   [] Nutrition Supplements                        [] Increasing Fluid Intakes         [] Foods that fight cancer    [] Evidence bases resources                 [] Fermented Foods/Probiotics  [] Mediterranean/Plant Based Diet     [] Other, specify                                   [] Handouts provided      [] Samples provided     RD NOTE:  RD met w/ pt at chairside during infusion treatment. Pt denies any nutrition related side effects or any food intolerances. Pt reports she has a healthy appetite. Pt weight has been trending down despite good appetite.    RD Goals:   [x] Weight stable                  [] Weight gain                      [] Weight Loss                               [] Continue adequate Kcal/protein   [] Increase Kcal/protein      [] Adjust Tube-feeding Rx   [] Tolerate Tube Feedings             [] Increase tube feedings to goal     [] Tolerate Supplements     [] Symptom Improvement   [] Understand nutrition Education  [x] Offer supportive visits   [] other, please specify    RECOMMENDATIONS:  1. Continue current calorie/protein intake to maintain current body weight  2. Continue current fluid intake to maintain proper hydration     Follow up: PRN or next infusion treatment    Alejandrina Ahuja, JOSE, LDN  07/20/2022  12:03 PM

## 2022-07-20 NOTE — PROGRESS NOTES
12:49 PM    This LCSW met with this pt to check in and provide support and encouragement.    The pt reported her daughter bringing her today and then going home while she is here for treatment. The pt said it makes sense bc her daughter goes to the grocery, runs errands or cooks for her while she is getting her infusion.    The pt said he james is too large and this LCSW said I would be happy to bring her some more to look through while she is receiving treatment.    The pt also inquired about when her next appointment with Dr. Mandujano was bc her last one scheduled was cancelled. This LCSW agreed to check into this for this pt and let her know.

## 2022-07-20 NOTE — TELEPHONE ENCOUNTER
Called and spoke with pt's daughter in regards to letting the pt know that Mrs. Zee will not be in until 10am due to hospital rounding. Patient's appointment has been correctly rescheduled to reflect that. Patient's daughter voiced understanding.

## 2022-07-20 NOTE — PLAN OF CARE
Problem: Adult Inpatient Plan of Care  Goal: Plan of Care Review  Outcome: Ongoing, Progressing  Flowsheets (Taken 7/20/2022 5703)  Plan of Care Reviewed With:   patient   daughter   Pt tolerated onyvide and leucovorin well. No adverse reaction noted. Pt education reinforced on chemo regimen, side effects, what to expect, and when to call physician. Pt verbalized understanding. I reviewed pt calendar w/ pt and understanding verbalized. PAC remains accessed with 5FU infusing at 2.2.cc/hr over 46 hours. Patent upon discharge in NAD.

## 2022-07-25 NOTE — TELEPHONE ENCOUNTER
I called the patient and instructed her to continue using the nosogel.  I instructed her that she could also consider Afrin.  If her symptoms continued or worsened, I recommended she see an ENT.  The patient expressed understanding.  All questions were answered to her satisfaction.    Salvador Green MD  [facility]  Hematology and Oncology  [facility]   [address]   [place] [id number]   O: [phone]  F: [phone]           Size Of Lesion In Cm: 0

## 2022-07-25 NOTE — TELEPHONE ENCOUNTER
"Received phone call from patient.  States has been "stuffy" & "real dry" chhaya to nares.  No bleeding but did have "small amount crusted bloody" mucous.    Has been using Nasogel daily.  "It's been helping."  "I'm worried that because my nose is so red at the opening I'm going to get an infection."  "Is there anything else I should put there?"    Emotional support given.  Instructed patient sometimes the oxygen can dry the nose.    Also instructed patient this RN will pass along message to Dr. Green.  Patient verbally acknowledges understanding  "

## 2022-08-02 NOTE — PROGRESS NOTES
PATIENT: Nel Cui  MRN: [id number]  DATE: 8/2/2022      Diagnosis:   No diagnosis found.    Chief Complaint: No chief complaint on file.      Oncologic History:      Oncologic History 12/09/21 Ct Abdomen  12/15/21 EUS  1/04/22 PET/CT  1/07/22 PORT - Dr Acuña  1/11/22 MRI Brain  3/16/22 PET/CT  6/20/22 PET/CT    Oncologic Treatment 1/12/22 - 5/02/22 Gemcitabine and Paclitaxel s/p C5D1  6/22/22 - current s/p cycle 1 Liposomal Irinotecan and Fluorouracil     Pathology 12/15/21 adenocarcinoma in the pancreatic body and adenocarcinoma in the liver       The patient initially presented to her PCP on 12/02/21 with complaint of abdominal pain.  She underwent a CT of the abdomen on 12/09/21 showing subcentimeter para-aortic, mesenteric lymph nodes; multiple hepatic infiltrative lesions present throughout the liver with 1 of the larger areas right liver lobe measuring approximately 2.2 cm in diameter; mass in the pancreatic tail measuring 9 x 3.3 cm.  The patient underwent EUS under the care of Dr Tse on 12/15/21 showing a mass in the pancreatic body and multiple liver lesions.  Path from the procedure showed adenocarcinoma in the pancreatic body and adenocarcinoma in the liver.    The patient underwent PET/CT on 1/04/22 showing hypermetabolic rounded mass at the junction of the body and tail of the pancreas measuring 2.5 x 2.4 cm, hypermetabolic left adrenal gland nodule measuring 1.9 cm, innumerable hypermetabolic nodular masses throughout the left and right hepatic lobes with largest lesions in the right liver measuring 2.5 x 1.9 cm and 5.2 x 4.4 cm, mildly enlarged hypermetabolic portacaval lymph node measuring 1.4 cm, 0.7 cm hypermetabolic focus at the umbilicus, lesion in the T9 vertebra measuring 1.8 x 1.7 cm and a lesion in the left iliac fossa measuring 1.7 x 1.2 cm.  MRI of the brain on 01/11/2022 showed no evidence of metastases but did show an old right midbrain lacunar infarct.   The patient was  in the hospital from 1/28/22-1/29/22 with fever and low sodium.  The patient's hydrochlorothiazide was discontinued.  The patient then presented to the hospital on 02/06/2022 for dyspnea on exertion.  CTA was done on 02/06/2022 showing no evidence of pulmonary embolism.  The patient underwent NT proBNP which was elevated at 2630 pg/mL.  The patient was started on Lasix every other day at the request of Cardiology.   The patient was admitted to the hospital from 2/12/22-2/14/22 for fever.  The patient was discharged on Levaquin for 5 days.  The patient saw Cardiology on 02/17/2022 for atrial fibrillation and was started on Eliquis and taken off of aspirin.     The the patient was admitted to the hospital from [id number] for fever.  During her hospitalization procalcitonin was checked and was normal.  All her cultures were negative.  Her fever was felt to be from chemotherapy.    The patient underwent PET-CT on 03/16/2022 showing a followed of mm pulmonary based nodule; decrease metabolic activity in the liver mass with stable right lobe mass measuring 5.2 x 4.2 cm; right lobe mass measuring 2.2 x 1.8 cm; adrenal gland thickening of 1.9 cm; decrease in pancreatic mass measuring 2 cm; decreased metabolic activity of hypermetabolic mesenteric foci and right-sided pleural effusion.  The patient underwent cardioversion on 03/17/2022 at which point constantine was performed.  Patient was put back in normal sinus rhythm.    The patient was admitted to the hospital for CHF exacerbation from 3/25/22 to 3/29/22 and was diuresed a little over 9.5L.    The patient was admitted to the hospital from 5/15/22-5/20/22 for hypoxia.  CTA chest on 5/15/22 showed diffuse faint ground-glass opacities in the lungs bilaterally.   NT-ProNP on admission was elevated at 5,220pg/mL.  The patient was initially diuresed without much improvement.  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  The patient was  eventually weaned to 1.5L O2.  Repeat CXR on 5/26/22 showed improvement in her intrapulmonary process.      The pt underwent cardioversion on 6/16/22 and was started on amiodarone.  PET/CT completed on 6/20/22 showed a new to 1.2 cm subpleural nodule in the right upper lobe; enlarging right upper lobe nodule measuring 8 mm; resolution of right pleural effusion compared to prior studies; enlargement of pancreatic mass now 2.2 x 1.2 cm; stable 2 cm left adrenal nodule; new hypermetabolic nodule in the right hepatic lobe measuring 1.9 x 1.5 cm; decrease in size of separate nodule in the right hepatic lobe measuring 3 x 2.4 cm; new hypermetabolic focus in the L1 vertebral body measuring 2.7 x 2 cm; and 2.6 x 1.3 new hypermetabolic lesion in the left iliac bone.    Subjective:     Interval History: Ms. Cui is a 87 y.o. female with Osteopenia, HLD, HTN, pancreatic cancer who presents for fever.  Since the last clinic visit the patient states she has had severe fatigue with treatment.  She also endorses excess gas with associated pressure in her pelvis. The patient denies CP, cough, SOB, abdominal pain, N/V, constipation, diarrhea.  The patient denies fever, chills, night sweats, weight loss, new lumps or bumps, easy bruising or bleeding.    Past Medical History:   Past Medical History:   Diagnosis Date    A-fib     Anemia     Anticoagulant long-term use     Arthritis     Cataracts, bilateral     CHF (congestive heart failure)     chronic diastolic    Coronary artery disease     Nonobstructive    Essential (primary) hypertension 10/05/2010    Hyperlipidemia     Hyponatremia     Mitral regurgitation     Osteopenia     Pancreatic insufficiency     Primary pancreatic cancer with metastasis to other site     Thrombocytopenia        Past Surgical HIstory:   Past Surgical History:   Procedure Laterality Date    CARPAL TUNNEL RELEASE Bilateral     wrist    CATARACT EXTRACTION, BILATERAL  2017    CORONARY  "ANGIOGRAPHY  09/01/2020    Procedure: ANGIOGRAM, CORONARY ARTERY;  Surgeon: Ivette Horne MD;  Location: [facility] CATH;  Service: Cardiology;;    DILATION AND CURETTAGE OF UTERUS      ENDOSCOPIC ULTRASOUND OF UPPER GASTROINTESTINAL TRACT Left 12/15/2021    Procedure: ULTRASOUND, UPPER GI TRACT, ENDOSCOPIC;  Surgeon: Lico Tse MD;  Location: [facility] ENDO;  Service: Endoscopy;  Laterality: Left;    ESOPHAGOGASTRODUODENOSCOPY N/A 12/15/2021    Procedure: EGD (ESOPHAGOGASTRODUODENOSCOPY);  Surgeon: Lico Tse MD;  Location: [facility] ENDO;  Service: Endoscopy;  Laterality: N/A;    INSERTION OF TUNNELED CENTRAL VENOUS CATHETER (CVC) WITH SUBCUTANEOUS PORT N/A 01/07/2022    Procedure: CHAMSLCLJ-BIQU-A-CATH;  Surgeon: Cas Acuña MD;  Location: [facility] OR;  Service: General;  Laterality: N/A;    LEFT HEART CATHETERIZATION  09/01/2020    Procedure: Left heart cath;  Surgeon: Ivette Horne MD;  Location: [facility] CATH;  Service: Cardiology;;    MITRAL VALVE REPAIR      OOPHORECTOMY Left 1988    OTHER SURGICAL HISTORY      maze procedure and PHILL ligation    REPAIR OF MENISCUS OF KNEE Left     TONSILLECTOMY      vein removal         Family History:   Family History   Problem Relation Age of Onset    No Known Problems Mother     No Known Problems Father     Cancer Maternal Grandmother         Stomach    No Known Problems Maternal Grandfather     No Known Problems Paternal Grandmother     No Known Problems Paternal Grandfather     Breast cancer Sister         over 60    No Known Problems Brother        Social History:  reports that she quit smoking about 34 years ago. Her smoking use included cigarettes. She has a 30.00 pack-year smoking history. She has never used smokeless tobacco. She reports that she does not drink alcohol and does not use drugs.    Allergies:  Review of patient's allergies indicates:   Allergen Reactions    Amoxicillin-pot clavulanate Other (See Comments)     "Spaced out feeling- " "can't take it longer than 4 or 5 days"  Patient tolerated Zosyn    Breo ellipta [fluticasone furoate-vilanterol] Other (See Comments)     Feels jittery    Corticosteroids (glucocorticoids)      Other reaction(s): tachycardia    Erythromycin      "Spaced out"    Metoprolol succinate      "I dont feel good, it doesn't work"    Latex, natural rubber      Turns skin red around area where latex touches       Medications:  Current Outpatient Medications   Medication Sig Dispense Refill    acetaminophen (TYLENOL) 325 MG tablet Take 325 mg by mouth every 6 (six) hours as needed for Pain or Temperature greater than.      amiodarone (PACERONE) 200 MG Tab Take 1 tablet (200 mg total) by mouth once daily. 90 tablet 3    apixaban (ELIQUIS) 2.5 mg Tab Take 1 tablet (2.5 mg total) by mouth 2 (two) times daily. 60 tablet 0    ascorbic acid, vitamin C, (VITAMIN C) 500 MG tablet Take 500 mg by mouth once daily.      bisoprolol (ZEBETA) 5 MG tablet Take 2 tablets (10 mg total) by mouth every evening. 60 tablet 0    cholecalciferol, vitamin D3, (VITAMIN D3) 25 mcg (1,000 unit) capsule Take 1,000 Units by mouth every morning.      coenzyme Q10 100 mg capsule Take 100 mg by mouth once daily.      dexAMETHasone (DECADRON) 4 MG Tab Take 2 tablets (8 mg total) by mouth once daily. Take as directed on days 2 and 3 of your chemotherapy cycle. 24 tablet 5    docusate sodium (COLACE) 100 MG capsule Take 1 capsule (100 mg total) by mouth 2 (two) times daily.  0    furosemide (LASIX) 20 MG tablet Take 1 tablet (20 mg total) by mouth once daily. Hold until approved by PCP      LIDOcaine-prilocaine (EMLA) cream Apply topically as needed (30 to 60 minutes prior to chemo). 30 g 2    lipase-protease-amylase (CREON) 36,000-114,000- 180,000 unit CpDR Take 3 capsules by mouth 3 (three) times daily with meals. Take 3 capsules TID with meals and 2 capsule PRN with snacks 300 capsule 11    loperamide (IMODIUM A-D) 2 mg Tab Take 4 mg after " first loose or frequent bowel movement, then 2 mg every 2 hours until 12 hours have passed without a bowel movement. (Patient taking differently: Take 2 mg by mouth every 12 (twelve) hours as needed. Take 4 mg after first loose or frequent bowel movement, then 2 mg every 2 hours until 12 hours have passed without a bowel movement.) 24 tablet 25    losartan (COZAAR) 100 MG tablet Take 1 tablet (100 mg total) by mouth every evening. 90 tablet 3    magnesium 250 mg Tab Take 250 mg by mouth Daily.      multivitamin (THERAGRAN) per tablet Take 1 tablet by mouth every other day.      ondansetron (ZOFRAN-ODT) 4 MG TbDL Take 4 mg by mouth every 12 (twelve) hours as needed (nausea/vomiting).      ondansetron (ZOFRAN-ODT) 8 MG TbDL Take 1 tablet (8 mg total) by mouth every 8 (eight) hours as needed (nausea/vomiting). 60 tablet 5    OXYGEN-AIR DELIVERY SYSTEMS MISC Inhale 1.5 L/min into the lungs continuous.      pravastatin (PRAVACHOL) 40 MG tablet TAKE 1 TABLET(40 MG) BY MOUTH EVERY DAY (Patient taking differently: Take 40 mg by mouth every evening.) 90 tablet 3    saliva substitute combo no.9 (BIOTENE DRY MOUTH ORAL RINSE) Mwsh Swish and spit 5 mLs every evening.      sod chlor,sod bicarb/neti pot (NEILMED NASAFLO MIKE) 2 sprays by Nasal route 6 (six) times daily. Aly-Med saile nose gel spray      sodium chloride (OCEAN) 0.65 % nasal spray 2 sprays by Nasal route 6 (six) times daily. PRN  Indications: dryness of the nose       No current facility-administered medications for this visit.       Review of Systems   Constitutional: Positive for fatigue. Negative for chills, fever and unexpected weight change.   Respiratory: Negative for cough and shortness of breath.    Cardiovascular: Negative for chest pain and palpitations.   Gastrointestinal: Negative for abdominal pain, constipation, diarrhea, nausea and vomiting.        Excess gas   Genitourinary:        Occasional pressure in pelvis   Skin: Negative for rash.    Neurological: Negative for headaches.   Hematological: Negative for adenopathy. Does not bruise/bleed easily.       ECOG Performance Status: 2   Objective:      Vitals:   There were no vitals filed for this visit.  Physical Exam  Constitutional:       General: She is not in acute distress.     Appearance: She is well-developed. She is not diaphoretic.   HENT:      Head: Normocephalic and atraumatic.   Cardiovascular:      Rate and Rhythm: Normal rate and regular rhythm.      Heart sounds: No murmur heard.    No friction rub. No gallop.   Pulmonary:      Effort: Pulmonary effort is normal. No respiratory distress.      Breath sounds: Normal breath sounds. No wheezing or rales.   Chest:      Chest wall: No tenderness.   Breasts:      Right: No axillary adenopathy or supraclavicular adenopathy.      Left: No axillary adenopathy or supraclavicular adenopathy.       Abdominal:      General: Bowel sounds are normal. There is distension.      Palpations: Abdomen is soft. There is no mass.      Tenderness: There is no abdominal tenderness. There is no guarding or rebound.   Musculoskeletal:         General: No tenderness. Normal range of motion.      Right lower leg: No edema.      Left lower leg: No edema.   Lymphadenopathy:      Cervical: No cervical adenopathy.      Upper Body:      Right upper body: No supraclavicular or axillary adenopathy.      Left upper body: No supraclavicular or axillary adenopathy.   Skin:     Findings: No erythema or rash.   Neurological:      Mental Status: She is alert and oriented to person, place, and time.   Psychiatric:         Behavior: Behavior normal.         Laboratory Data:  Lab Visit on 08/01/2022   Component Date Value Ref Range Status    WBC 08/01/2022 3.70 (A) 3.90 - 12.70 K/uL Final    RBC 08/01/2022 4.15  4.00 - 5.40 M/uL Final    Hemoglobin 08/01/2022 11.8 (A) 12.0 - 16.0 g/dL Final    Hematocrit 08/01/2022 38.1  37.0 - 48.5 % Final    MCV 08/01/2022 92  82 - 98 fL Final     MCH 08/01/2022 28.4  27.0 - 31.0 pg Final    MCHC 08/01/2022 31.0 (A) 32.0 - 36.0 g/dL Final    RDW 08/01/2022 19.0 (A) 11.5 - 14.5 % Final    Platelets 08/01/2022 167  150 - 450 K/uL Final    MPV 08/01/2022 8.7 (A) 9.2 - 12.9 fL Final    Immature Granulocytes 08/01/2022 0.5  0.0 - 0.5 % Final    Gran # (ANC) 08/01/2022 2.4  1.8 - 7.7 K/uL Final    Immature Grans (Abs) 08/01/2022 0.02  0.00 - 0.04 K/uL Final    Comment: Mild elevation in immature granulocytes is non specific and   can be seen in a variety of conditions including stress response,   acute inflammation, trauma and pregnancy. Correlation with other   laboratory and clinical findings is essential.      Lymph # 08/01/2022 0.7 (A) 1.0 - 4.8 K/uL Final    Mono # 08/01/2022 0.5  0.3 - 1.0 K/uL Final    Eos # 08/01/2022 0.1  0.0 - 0.5 K/uL Final    Baso # 08/01/2022 0.03  0.00 - 0.20 K/uL Final    nRBC 08/01/2022 0  0 /100 WBC Final    Gran % 08/01/2022 65.2  38.0 - 73.0 % Final    Lymph % 08/01/2022 17.8 (A) 18.0 - 48.0 % Final    Mono % 08/01/2022 14.1  4.0 - 15.0 % Final    Eosinophil % 08/01/2022 1.6  0.0 - 8.0 % Final    Basophil % 08/01/2022 0.8  0.0 - 1.9 % Final    Differential Method 08/01/2022 Automated   Final    Sodium 08/01/2022 138  136 - 145 mmol/L Final    Potassium 08/01/2022 4.4  3.5 - 5.1 mmol/L Final    Chloride 08/01/2022 95  95 - 110 mmol/L Final    CO2 08/01/2022 33 (A) 23 - 29 mmol/L Final    Glucose 08/01/2022 90  70 - 110 mg/dL Final    BUN 08/01/2022 17  8 - 23 mg/dL Final    Creatinine 08/01/2022 0.6  0.5 - 1.4 mg/dL Final    Calcium 08/01/2022 9.6  8.7 - 10.5 mg/dL Final    Total Protein 08/01/2022 6.6  6.0 - 8.4 g/dL Final    Albumin 08/01/2022 3.5  3.5 - 5.2 g/dL Final    Total Bilirubin 08/01/2022 0.5  0.1 - 1.0 mg/dL Final    Comment: For infants and newborns, interpretation of results should be based  on gestational age, weight and in agreement with clinical  observations.    Premature Infant  recommended reference ranges:  Up to 24 hours.............<8.0 mg/dL  Up to 48 hours............<12.0 mg/dL  3-5 days..................<15.0 mg/dL  6-29 days.................<15.0 mg/dL      Alkaline Phosphatase 08/01/2022 66  55 - 135 U/L Final    AST 08/01/2022 22  10 - 40 U/L Final    ALT 08/01/2022 16  10 - 44 U/L Final    Anion Gap 08/01/2022 10  8 - 16 mmol/L Final    eGFR 08/01/2022 >60  >60 mL/min/1.73 m^2 Final    Magnesium 08/01/2022 2.1  1.6 - 2.6 mg/dL Final    CA 19-9 08/01/2022 732.4 (A) 0.0 - 40.0 U/mL Final    Comment: The testing method is a chemiluminescent microparticle immunoassay   manufactured by [employer] and performed on the    or   [employer] system. Values obtained with different assay manufacturers   for   methods may be different and cannot be used interchangeably.      Phosphorus 08/01/2022 4.2  2.7 - 4.5 mg/dL Final         Imaging:     PET/CT 3/16/22    Head and neck:     No hypermetabolic activity is noted within the head neck to suggest metastatic disease.     Atherosclerotic carotid calcifications are noted.     Thorax:     A port is noted overlying the left hemithorax with its tip in the superior vena cava.     Extensive mitral valve calcifications or mitral valve replacement are noted.  Extensive coronary calcifications are noted increasing the patient's coronary risk.     A moderate sized joint effusion is developed since the prior.  Please correlate for etiologies.  This could relate to cardiogenic edema or metastatic fluid.  No obvious hypermetabolic activity is noted.     A pleural based density is noted of 5 mm image 50 sequence 3 similar since the prior.  Atelectasis, scarring, or pulmonary nodule is on the differential.  In a high-risk patient follow-up for long-term size stability would be suggested.     Sternotomy wires are noted     Abdomen and pelvis:     Significant improvement is noted since prior with the liver demonstrating near uniform  metabolic activity.  Significantly less hypermetabolic activity is noted than was seen on the prior.  A hypodense region in the right lobe measured on the prior on CT is still thought to be seen image 131 without convincing detrimental change when measured in a similar manner as on the prior at 5.2 x 4.2 cm image 131 sequence 3.  Several other hypodense regions are noted without worrisome detrimental change since the prior.  The region in the right lobe of the liver that measured 2.5 x 1.9 cm on the prior is less easily visualized but can be measured similarly at 2.2 x 1.8 cm image 142 sequence 3     The adrenal thickening seen on the prior appears of 2 cm in short diameter measuring 1.9 cm on the prior unchanged.  In long dimension it was favored to be under measured on the prior but is unchanged when measured in a similar manner.  It is not significantly hypermetabolic on today's exam     The hypermetabolic pancreatic tail mass seen on the prior demonstrates near background activity on today's exam as well and no obvious focal mass can be measured on the CT portion of this exam.  The pancreas in this region measures 2.0 cm in thickness versus is 2.5 cm on the prior.  Decrease in size of the mass is suspected     Several hypermetabolic mesenteric foci were suspected on the prior suggestive of mesenteric metastasis.  No significant hypermetabolic activity is noted on today's exam.     Musculoskeletal:     Glenohumeral joint space loss is noted on the left greater than right suggestive of degenerative change likely associated with labral tearing.     Central canal stenosis is noted in the lower lumbar spine particularly at the L4-L5 level.  No obvious hypermetabolic osseous changes are noted on today's exam.  This is an improvement since the prior      Assessment:       No diagnosis found.       Plan:   Metastatic Pancreatic Cancer - The patient was diagnosed with metastatic pancreatic cancer adenocarcinoma with mets  to the liver on 12/15/21  -Ca19-9 was 7,581 on 12/15/21  -PET/CT on 1/04/22 shows mets to the liver, left adrenal gland, T9 vertebra and left iliac fossa.  -Potential mets are seen near the umbilicus  -PT underwent PORT placement 1/07/22  -MRI brain on 1/11/22 showed no brain mets  -Pt consented for Gemcitabine and paclitaxel on 1/03/22  -Pt treated with 20% dose reduction in paclitaxel and Gemcitabine to 100mg/mm2 and 800mg/mm2 respectively  -C2D8 and C2D15 cancelled given her hospitalization from 2/12/22-2/14/22  -Patient completed C3D8 and was hospitalized again for a fever  -Treatment moving forward was discussed with the patient and decision was made to remove day 8 of treatment  -PET/CT on 3/16/22 shows stable disease  -Ca19-9 has dropped dramatically to 170 on 3/16/22  -C3D15 delayed given cardiac ablation on 03/17/2022  -Will continue [place]/Abraxane at 20% does reduction due to prior thrombocytopenia  -Pt underwent C5D1 5/02/22  -Guardant 360 testing 1/26/22 showed STACY [id number] 0.3% amplification, KRAS G12D 0.3% amplification, MSI-stable, ARID1A [id number] 0.4%, KRAS G12D 0.3%, STACY [id number], STK11 S59T 0.2%, and BRCA1 R979C 0.1%.  No BRCA2 was detected  -Concern was the pt's hypoxia was a reaction to Gemcitabine with pneumonitis  -PET/CT 6/20/22 showed progression of disease with new pulmonary nodules, enlarging pancreatic mass, new hepatic nodules and bony lesions in the L1 vertebral body and left iliac bone  -Pt started on Liposomal irinotecan and Fllorouracil with 20% dose reduction  -The patient would like to stay at the current dose  -Pt to take dexamethasone on days 2 and 3 of treatment  -Pt set up for [facility]     Fatigue -likely stephenson to underlying cancer and chemo  -Will monitor    Hypoxia - 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  -Pt currently on 2L O2  -Pt's oxygen saturations dropped into the 80's again in clinic with ambulation when off of oxygen  -Will  continue oxygen  -Will monitor     A-fib - pt to underwent cardioversion 6/16/22  -Pt on amiodarone and now is is NSR  -PT on Eliquis  -PT s/p Ablation on 3/17/22  -Stable  -Management per cardiology     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  -No current signs of infection  -Pt given Evusheld on 6/09/22  -Will monitor     Pancreatic Insufficiency - pt on creon  -Pt taking 3 pills with meals and 2 with snacks with improvement in diarrhea  -Pt encouraged to keep the current dose  -Will monitor     Bloating - likely related to pancreatic insufficiency  -Pt instructed to take Gas-X    Cancer Related Pain - improved  -Will monitor     HTN - pt taking losartan, bisoprolol  -Cardiology managing  -Will monitor     HLD - pt on pravastatin  -Management per PCP     CHF - pt with CHF exacerbation and hospitalized from 3/25/22 to 3/29/22 wioth 9.5L removed  -Pt hospitalized again from 5/15/22 to 5/20/22 for possible CHF exacerbation  -Pt taking lasix daily  -Management per cardiology    Advance Care Planning     Power of   I initiated the process of advance care planning today and explained the importance of this process to the patient.  I introduced the concept of advance directives to the patient, as well. Then the patient received detailed information about the importance of designating a Health Care Power of  (HCPOA). She was also instructed to communicate with this person about their wishes for future healthcare, should she become sick and lose decision-making capacity. The patient has not previously appointed a HCPOA. After our discussion, the patient has decided to complete a HCPOA and will bring the form completed to her next clinic appointment.            Route Chart for Scheduling    Med Onc Chart Routing      Follow up with physician 2 weeks. The patient can proceed with treatment.  She needs labs CBC, CMP, Mg, phos, Ca19-9 on 7/20/22 with an appt with me and treatment that  day.   Follow up with IVONNE    Infusion scheduling note    Injection scheduling note    Labs    Imaging    Pharmacy appointment    Other referrals          Treatment Plan Information   OP LIPOSOMAL IRINOTECAN FLUOROURACIL LEUCOVORIN Q2W   Salvador Green MD   Upcoming Treatment Dates - OP LIPOSOMAL IRINOTECAN FLUOROURACIL LEUCOVORIN Q2W    8/3/2022       Chemotherapy       irinotecan liposomaL (ONIVYDE) 56 mg/m2 = 96.879 mg in sodium chloride 0.9% 500 mL chemo infusion       leucovorin calcium 320 mg/m2 = 555 mg in dextrose 5 % 250 mL infusion       fluorouracil (ADRUCIL) 1,920 mg/m2 = 3,320 mg in sodium chloride 0.9% 100 mL chemo infusion       Supportive Care       atropine injection 0.4 mg       Antiemetics       palonosetron 0.25mg/dexamethasone 12mg in NS IVPB 0.25 mg  8/17/2022       Chemotherapy       irinotecan liposomaL (ONIVYDE) 56 mg/m2 = 96.879 mg in sodium chloride 0.9% 500 mL chemo infusion       leucovorin calcium 320 mg/m2 = 555 mg in dextrose 5 % 250 mL infusion       fluorouracil (ADRUCIL) 1,920 mg/m2 = 3,320 mg in sodium chloride 0.9% 100 mL chemo infusion       Supportive Care       atropine injection 0.4 mg       Antiemetics       palonosetron 0.25mg/dexamethasone 12mg in NS IVPB 0.25 mg  8/31/2022       Chemotherapy       irinotecan liposomaL (ONIVYDE) 56 mg/m2 = 96.879 mg in sodium chloride 0.9% 500 mL chemo infusion       leucovorin calcium 320 mg/m2 = 555 mg in dextrose 5 % 250 mL infusion       fluorouracil (ADRUCIL) 1,920 mg/m2 = 3,320 mg in sodium chloride 0.9% 100 mL chemo infusion       Supportive Care       atropine injection 0.4 mg       Antiemetics       palonosetron 0.25mg/dexamethasone 12mg in NS IVPB 0.25 mg  9/14/2022       Chemotherapy       irinotecan liposomaL (ONIVYDE) 56 mg/m2 = 96.879 mg in sodium chloride 0.9% 500 mL chemo infusion       leucovorin calcium 320 mg/m2 = 555 mg in dextrose 5 % 250 mL infusion       fluorouracil (ADRUCIL) 1,920 mg/m2 = 3,320 mg in sodium  chloride 0.9% 100 mL chemo infusion       Supportive Care       atropine injection 0.4 mg       Antiemetics       palonosetron 0.25mg/dexamethasone 12mg in NS IVPB 0.25 mg    Therapy Plan Information  diphenhydrAMINE capsule 25 mg  25 mg, Oral, PRN  predniSONE tablet 40 mg  40 mg, Oral, PRN  EPINEPHrine (EPIPEN) 0.3 mg/0.3 mL pen injection 0.3 mg  0.3 mg, Intramuscular, PRN  ondansetron disintegrating tablet 4 mg  4 mg, Oral, PRN  acetaminophen tablet 650 mg  650 mg, Oral, PRN  albuterol inhaler 2 puff  2 puff, Inhalation, PRN      Salvador Green MD  [facility]  Hematology and Oncology  [facility]   [address]   [place] [id number]   O: [phone]  F: [phone]

## 2022-08-03 NOTE — PLAN OF CARE
Problem: Adult Inpatient Plan of Care  Goal: Plan of Care Review  Outcome: Ongoing, Progressing  Flowsheets (Taken 8/3/2022 4405)  Plan of Care Reviewed With:   patient   daughter   Pt tolerated Onivyde, leucovorin well. No adverse reaction noted. Pt education reinforced on chemo regimen, side effects, what to expect, and when to call physician. Pt verbalized understanding. I reviewed pt calendar w/ pt and understanding verbalized. PAC remains accessed with 5FU infusing at 2.2cc/hr over 46 hours. Patent upon discharge in NAD

## 2022-08-03 NOTE — PLAN OF CARE
Problem: Fatigue  Goal: Improved Activity Tolerance  Outcome: Ongoing, Progressing  Intervention: Promote Improved Energy  Flowsheets (Taken 8/3/2022 1020)  Fatigue Management: frequent rest breaks encouraged  Sleep/Rest Enhancement: relaxation techniques promoted  Activity Management: Ambulated -L4     Problem: Adult Inpatient Plan of Care  Goal: Patient-Specific Goal (Individualized)  Outcome: Ongoing, Progressing  Flowsheets (Taken 8/3/2022 1020)  Anxieties, Fears or Concerns: none  Individualized Care Needs: recliner,2 blankets, pillow, oxygen  Patient-Specific Goals (Include Timeframe): no s/s of rx during tx

## 2022-08-03 NOTE — PROGRESS NOTES
PATIENT: Nel Cui  MRN: [id number]  DATE: 8/3/2022      Diagnosis:   1. Primary pancreatic cancer with metastasis to other site    2. Metastasis to liver    3. 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 Bone metastasis    5. Fatigue, unspecified type    6. Insomnia, unspecified type    7. Left hip pain    8. 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therapy    9. Atrial fibrillation, unspecified type    10. Pancreatic insufficiency    11. Cancer related pain    12. Hypertension, unspecified type    13. Hyperlipidemia, unspecified hyperlipidemia type    14. Chronic diastolic congestive heart failure        Chief Complaint: Pancreatic Cancer (3 week follow up )      Oncologic History:      Oncologic History 12/09/21 Ct Abdomen  12/15/21 EUS  1/04/22 PET/CT  1/07/22 [facility] - Dr Acuña  1/11/22 MRI Brain  3/16/22 PET/CT  6/20/22 PET/CT    Oncologic Treatment 1/12/22 - 5/02/22 Gemcitabine and Paclitaxel s/p C5D1  6/22/22 - current s/p cycle 3 Liposomal Irinotecan and Fluorouracil     Pathology 12/15/21 adenocarcinoma in the pancreatic body and adenocarcinoma in the liver       The patient initially presented to her PCP on 12/02/21 with complaint of abdominal pain.  She underwent a CT of the abdomen on 12/09/21 showing subcentimeter para-aortic, mesenteric lymph nodes; multiple hepatic infiltrative lesions present throughout the liver with 1 of the larger areas right liver lobe measuring approximately 2.2 cm in diameter; mass in the pancreatic tail measuring 9 x 3.3 cm.  The patient underwent EUS under the care of Dr Tse on 12/15/21 showing a mass in the pancreatic body and multiple liver lesions.  Path from the procedure showed adenocarcinoma in the pancreatic body and adenocarcinoma in the liver.    The patient underwent PET/CT on 1/04/22 showing hypermetabolic rounded mass at the junction of the body and tail of the pancreas measuring 2.5 x 2.4 cm, hypermetabolic left adrenal gland nodule measuring 1.9 cm,  innumerable hypermetabolic nodular masses throughout the left and right hepatic lobes with largest lesions in the right liver measuring 2.5 x 1.9 cm and 5.2 x 4.4 cm, mildly enlarged hypermetabolic portacaval lymph node measuring 1.4 cm, 0.7 cm hypermetabolic focus at the umbilicus, lesion in the T9 vertebra measuring 1.8 x 1.7 cm and a lesion in the left iliac fossa measuring 1.7 x 1.2 cm.  MRI of the brain on 01/11/2022 showed no evidence of metastases but did show an old right midbrain lacunar infarct.   The patient was in the hospital from 1/28/22-1/29/22 with fever and low sodium.  The patient's hydrochlorothiazide was discontinued.  The patient then presented to the hospital on 02/06/2022 for dyspnea on exertion.  CTA was done on 02/06/2022 showing no evidence of pulmonary embolism.  The patient underwent NT proBNP which was elevated at 2630 pg/mL.  The patient was started on Lasix every other day at the request of Cardiology.   The patient was admitted to the hospital from 2/12/22-2/14/22 for fever.  The patient was discharged on Levaquin for 5 days.  The patient saw Cardiology on 02/17/2022 for atrial fibrillation and was started on Eliquis and taken off of aspirin.     The the patient was admitted to the hospital from 1071-1327 for fever.  During her hospitalization procalcitonin was checked and was normal.  All her cultures were negative.  Her fever was felt to be from chemotherapy.    The patient underwent PET-CT on 03/16/2022 showing a followed of mm pulmonary based nodule; decrease metabolic activity in the liver mass with stable right lobe mass measuring 5.2 x 4.2 cm; right lobe mass measuring 2.2 x 1.8 cm; adrenal gland thickening of 1.9 cm; decrease in pancreatic mass measuring 2 cm; decreased metabolic activity of hypermetabolic mesenteric foci and right-sided pleural effusion.  The patient underwent cardioversion on 03/17/2022 at which point constantine was performed.  Patient was put back in normal sinus  rhythm.    The patient was admitted to the hospital for CHF exacerbation from 3/25/22 to 3/29/22 and was diuresed a little over 9.5L.    The patient was admitted to the hospital from 5/15/22-5/20/22 for hypoxia.  CTA chest on 5/15/22 showed diffuse faint ground-glass opacities in the lungs bilaterally.   NT-ProNP on admission was elevated at 5,220pg/mL.  The patient was initially diuresed without much improvement.  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  The patient was eventually weaned to 1.5L O2.  Repeat CXR on 5/26/22 showed improvement in her intrapulmonary process.      The pt underwent cardioversion on 6/16/22 and was started on amiodarone.  PET/CT completed on 6/20/22 showed a new to 1.2 cm subpleural nodule in the right upper lobe; enlarging right upper lobe nodule measuring 8 mm; resolution of right pleural effusion compared to prior studies; enlargement of pancreatic mass now 2.2 x 1.2 cm; stable 2 cm left adrenal nodule; new hypermetabolic nodule in the right hepatic lobe measuring 1.9 x 1.5 cm; decrease in size of separate nodule in the right hepatic lobe measuring 3 x 2.4 cm; new hypermetabolic focus in the L1 vertebral body measuring 2.7 x 2 cm; and 2.6 x 1.3 new hypermetabolic lesion in the left iliac bone.    Subjective:     Interval History: Ms. Cui is a 87 y.o. female with Osteopenia, HLD, HTN, pancreatic cancer who presents for fever.  Since the last clinic visit the patient states she has had difficulty sleeping and states she is very fatigued during the day.  She endorses occasional diarrhea.  She also endorses pain in the left lower pelvis over the past week worse with raising her left leg.  The patient denies CP, cough, SOB, abdominal pain, N/V, constipation.  The patient denies fever, chills, night sweats, weight loss, new lumps or bumps, easy bruising or bleeding.    Past Medical History:   Past Medical History:   Diagnosis Date     A-fib     Anemia     Anticoagulant long-term use     Arthritis     Cataracts, bilateral     CHF (congestive heart failure)     chronic diastolic    Coronary artery disease     Nonobstructive    Essential (primary) hypertension 10/05/2010    Hyperlipidemia     Hyponatremia     Mitral regurgitation     Osteopenia     Pancreatic insufficiency     Primary pancreatic cancer with metastasis to other site     Thrombocytopenia        Past Surgical HIstory:   Past Surgical History:   Procedure Laterality Date    CARPAL TUNNEL RELEASE Bilateral     wrist    CATARACT EXTRACTION, BILATERAL  2017    CORONARY ANGIOGRAPHY  09/01/2020    Procedure: ANGIOGRAM, CORONARY ARTERY;  Surgeon: Ivette Horne MD;  Location: [facility] CATH;  Service: Cardiology;;    DILATION AND CURETTAGE OF UTERUS      ENDOSCOPIC ULTRASOUND OF UPPER GASTROINTESTINAL TRACT Left 12/15/2021    Procedure: ULTRASOUND, UPPER GI TRACT, ENDOSCOPIC;  Surgeon: Lico Tse MD;  Location: [facility] ENDO;  Service: Endoscopy;  Laterality: Left;    ESOPHAGOGASTRODUODENOSCOPY N/A 12/15/2021    Procedure: EGD (ESOPHAGOGASTRODUODENOSCOPY);  Surgeon: Lico Tse MD;  Location: [facility] ENDO;  Service: Endoscopy;  Laterality: N/A;    INSERTION OF TUNNELED CENTRAL VENOUS CATHETER (CVC) WITH SUBCUTANEOUS PORT N/A 01/07/2022    Procedure: [id number];  Surgeon: Cas Acuña MD;  Location: [facility] OR;  Service: General;  Laterality: N/A;    LEFT HEART CATHETERIZATION  09/01/2020    Procedure: Left heart cath;  Surgeon: Ivette Horne MD;  Location: [facility] CATH;  Service: Cardiology;;    MITRAL VALVE REPAIR      OOPHORECTOMY Left 1988    OTHER SURGICAL HISTORY      maze procedure and PHILL ligation    REPAIR OF MENISCUS OF KNEE Left     TONSILLECTOMY      vein removal         Family History:   Family History   Problem Relation Age of Onset    No Known Problems Mother     No Known Problems Father     Cancer Maternal Grandmother         Stomach  "   No Known Problems Maternal Grandfather     No Known Problems Paternal Grandmother     No Known Problems Paternal Grandfather     Breast cancer Sister         over 60    No Known Problems Brother        Social History:  reports that she quit smoking about 34 years ago. Her smoking use included cigarettes. She has a 30.00 pack-year smoking history. She has never used smokeless tobacco. She reports that she does not drink alcohol and does not use drugs.    Allergies:  Review of patient's allergies indicates:   Allergen Reactions    Amoxicillin-pot clavulanate Other (See Comments)     "Spaced out feeling- can't take it longer than 4 or 5 days"  Patient tolerated Zosyn    Breo ellipta [fluticasone furoate-vilanterol] Other (See Comments)     Feels jittery    Corticosteroids (glucocorticoids)      Other reaction(s): tachycardia    Erythromycin      "Spaced out"    Metoprolol succinate      "I dont feel good, it doesn't work"    Latex, natural rubber      Turns skin red around area where latex touches       Medications:  Current Outpatient Medications   Medication Sig Dispense Refill    acetaminophen (TYLENOL) 325 MG tablet Take 325 mg by mouth every 6 (six) hours as needed for Pain or Temperature greater than.      amiodarone (PACERONE) 200 MG Tab Take 1 tablet (200 mg total) by mouth once daily. 90 tablet 3    apixaban (ELIQUIS) 2.5 mg Tab Take 1 tablet (2.5 mg total) by mouth 2 (two) times daily. 60 tablet 0    ascorbic acid, vitamin C, (VITAMIN C) 500 MG tablet Take 500 mg by mouth once daily.      bisoprolol (ZEBETA) 5 MG tablet Take 2 tablets (10 mg total) by mouth every evening. 60 tablet 0    cholecalciferol, vitamin D3, (VITAMIN D3) 25 mcg (1,000 unit) capsule Take 1,000 Units by mouth every morning.      coenzyme Q10 100 mg capsule Take 100 mg by mouth once daily.      dexAMETHasone (DECADRON) 4 MG Tab Take 2 tablets (8 mg total) by mouth once daily. Take as directed on days 2 and 3 of your " chemotherapy cycle. 24 tablet 5    docusate sodium (COLACE) 100 MG capsule Take 1 capsule (100 mg total) by mouth 2 (two) times daily.  0    furosemide (LASIX) 20 MG tablet Take 1 tablet (20 mg total) by mouth once daily. Hold until approved by PCP      LIDOcaine-prilocaine (EMLA) cream Apply topically as needed (30 to 60 minutes prior to chemo). 30 g 2    lipase-protease-amylase (CREON) 36,000-114,000- 180,000 unit CpDR Take 3 capsules by mouth 3 (three) times daily with meals. Take 3 capsules TID with meals and 2 capsule PRN with snacks 300 capsule 11    loperamide (IMODIUM A-D) 2 mg Tab Take 4 mg after first loose or frequent bowel movement, then 2 mg every 2 hours until 12 hours have passed without a bowel movement. (Patient taking differently: Take 2 mg by mouth every 12 (twelve) hours as needed. Take 4 mg after first loose or frequent bowel movement, then 2 mg every 2 hours until 12 hours have passed without a bowel movement.) 24 tablet 25    losartan (COZAAR) 100 MG tablet Take 1 tablet (100 mg total) by mouth every evening. 90 tablet 3    magnesium 250 mg Tab Take 250 mg by mouth Daily.      multivitamin (THERAGRAN) per tablet Take 1 tablet by mouth every other day.      ondansetron (ZOFRAN-ODT) 4 MG TbDL Take 4 mg by mouth every 12 (twelve) hours as needed (nausea/vomiting).      ondansetron (ZOFRAN-ODT) 8 MG TbDL Take 1 tablet (8 mg total) by mouth every 8 (eight) hours as needed (nausea/vomiting). 60 tablet 5    OXYGEN-AIR DELIVERY SYSTEMS MISC Inhale 1.5 L/min into the lungs continuous.      pravastatin (PRAVACHOL) 40 MG tablet TAKE 1 TABLET(40 MG) BY MOUTH EVERY DAY (Patient taking differently: Take 40 mg by mouth every evening.) 90 tablet 3    saliva substitute combo no.9 (BIOTENE DRY MOUTH ORAL RINSE) Mwsh Swish and spit 5 mLs every evening.      sod chlor,sod bicarb/neti pot (NEILMED NASAFLO MIKE) 2 sprays by Nasal route 6 (six) times daily. Aly-Med saile nose gel spray      sodium  chloride (OCEAN) 0.65 % nasal spray 2 sprays by Nasal route 6 (six) times daily. PRN  Indications: dryness of the nose       No current facility-administered medications for this visit.     Facility-Administered Medications Ordered in Other Visits   Medication Dose Route Frequency Provider Last Rate Last Admin    alteplase injection 2 mg  2 mg Intra-Catheter PRN Salvador Green MD        diphenhydrAMINE injection 50 mg  50 mg Intravenous Once PRN Salvador Green MD        EPINEPHrine (EPIPEN) 0.3 mg/0.3 mL pen injection 0.3 mg  0.3 mg Intramuscular Once PRN Salvador Green MD        fluorouracil (ADRUCIL) 1,920 mg/m2 = 3,320 mg in sodium chloride 0.9% 100 mL chemo infusion  1,920 mg/m2 (Treatment Plan Recorded) Intravenous over 46 hr Salvador Green MD   3,320 mg at 08/03/22 1410    heparin, porcine (PF) 100 unit/mL injection flush 500 Units  500 Units Intravenous PRN Salvador Green MD        hydrocortisone sodium succinate injection 100 mg  100 mg Intravenous Once PRN Salvador Green MD        sodium chloride 0.9% 250 mL flush bag   Intravenous 1 time in Clinic/HOD Salvador Green MD        sodium chloride 0.9% flush 10 mL  10 mL Intravenous PRN Salvador Green MD           Review of Systems   Constitutional: Positive for fatigue. Negative for chills, fever and unexpected weight change.   Respiratory: Negative for cough and shortness of breath.    Cardiovascular: Negative for chest pain and palpitations.   Gastrointestinal: Positive for diarrhea. Negative for abdominal pain, constipation, nausea and vomiting.   Musculoskeletal:        Left lower pelvic pain   Skin: Negative for rash.   Neurological: Negative for headaches.   Hematological: Negative for adenopathy. Does not bruise/bleed easily.       ECOG Performance Status: 2   Objective:      Vitals:   Vitals:    08/03/22 0925   BP: 134/60   BP Location: Right arm   Patient Position: Sitting   BP Method: Medium (Manual)   Pulse: 71   Temp: 97.6 °F  "(36.4 °C)   TempSrc: Temporal   SpO2: 98%   Weight: 64.7 kg (142 lb 10.2 oz)   Height: 5' 5" (1.651 m)     Physical Exam  Constitutional:       General: She is not in acute distress.     Appearance: She is well-developed. She is not diaphoretic.   HENT:      Head: Normocephalic and atraumatic.   Cardiovascular:      Rate and Rhythm: Normal rate and regular rhythm.      Heart sounds: No murmur heard.    No friction rub. No gallop.   Pulmonary:      Effort: Pulmonary effort is normal. No respiratory distress.      Breath sounds: Normal breath sounds. No wheezing or rales.   Chest:      Chest wall: No tenderness.   Breasts:      Right: No axillary adenopathy or supraclavicular adenopathy.      Left: No axillary adenopathy or supraclavicular adenopathy.       Abdominal:      General: Bowel sounds are normal. There is distension.      Palpations: Abdomen is soft. There is no mass.      Tenderness: There is no abdominal tenderness. There is no guarding or rebound.   Musculoskeletal:         General: No tenderness. Normal range of motion.      Right lower leg: No edema.      Left lower leg: No edema.   Lymphadenopathy:      Cervical: No cervical adenopathy.      Upper Body:      Right upper body: No supraclavicular or axillary adenopathy.      Left upper body: No supraclavicular or axillary adenopathy.   Skin:     Findings: No erythema or rash.   Neurological:      Mental Status: She is alert and oriented to person, place, and time.   Psychiatric:         Behavior: Behavior normal.         Laboratory Data:  Lab Visit on 08/01/2022   Component Date Value Ref Range Status    WBC 08/01/2022 3.70 (A) 3.90 - 12.70 K/uL Final    RBC 08/01/2022 4.15  4.00 - 5.40 M/uL Final    Hemoglobin 08/01/2022 11.8 (A) 12.0 - 16.0 g/dL Final    Hematocrit 08/01/2022 38.1  37.0 - 48.5 % Final    MCV 08/01/2022 92  82 - 98 fL Final    MCH 08/01/2022 28.4  27.0 - 31.0 pg Final    MCHC 08/01/2022 31.0 (A) 32.0 - 36.0 g/dL Final    RDW " 08/01/2022 19.0 (A) 11.5 - 14.5 % Final    Platelets 08/01/2022 167  150 - 450 K/uL Final    MPV 08/01/2022 8.7 (A) 9.2 - 12.9 fL Final    Immature Granulocytes 08/01/2022 0.5  0.0 - 0.5 % Final    Gran # (ANC) 08/01/2022 2.4  1.8 - 7.7 K/uL Final    Immature Grans (Abs) 08/01/2022 0.02  0.00 - 0.04 K/uL Final    Comment: Mild elevation in immature granulocytes is non specific and   can be seen in a variety of conditions including stress response,   acute inflammation, trauma and pregnancy. Correlation with other   laboratory and clinical findings is essential.      Lymph # 08/01/2022 0.7 (A) 1.0 - 4.8 K/uL Final    Mono # 08/01/2022 0.5  0.3 - 1.0 K/uL Final    Eos # 08/01/2022 0.1  0.0 - 0.5 K/uL Final    Baso # 08/01/2022 0.03  0.00 - 0.20 K/uL Final    nRBC 08/01/2022 0  0 /100 WBC Final    Gran % 08/01/2022 65.2  38.0 - 73.0 % Final    Lymph % 08/01/2022 17.8 (A) 18.0 - 48.0 % Final    Mono % 08/01/2022 14.1  4.0 - 15.0 % Final    Eosinophil % 08/01/2022 1.6  0.0 - 8.0 % Final    Basophil % 08/01/2022 0.8  0.0 - 1.9 % Final    Differential Method 08/01/2022 Automated   Final    Sodium 08/01/2022 138  136 - 145 mmol/L Final    Potassium 08/01/2022 4.4  3.5 - 5.1 mmol/L Final    Chloride 08/01/2022 95  95 - 110 mmol/L Final    CO2 08/01/2022 33 (A) 23 - 29 mmol/L Final    Glucose 08/01/2022 90  70 - 110 mg/dL Final    BUN 08/01/2022 17  8 - 23 mg/dL Final    Creatinine 08/01/2022 0.6  0.5 - 1.4 mg/dL Final    Calcium 08/01/2022 9.6  8.7 - 10.5 mg/dL Final    Total Protein 08/01/2022 6.6  6.0 - 8.4 g/dL Final    Albumin 08/01/2022 3.5  3.5 - 5.2 g/dL Final    Total Bilirubin 08/01/2022 0.5  0.1 - 1.0 mg/dL Final    Comment: For infants and newborns, interpretation of results should be based  on gestational age, weight and in agreement with clinical  observations.    Premature Infant recommended reference ranges:  Up to 24 hours.............<8.0 mg/dL  Up to 48 hours............<12.0  mg/dL  3-5 days..................<15.0 mg/dL  6-29 days.................<15.0 mg/dL      Alkaline Phosphatase 08/01/2022 66  55 - 135 U/L Final    AST 08/01/2022 22  10 - 40 U/L Final    ALT 08/01/2022 16  10 - 44 U/L Final    Anion Gap 08/01/2022 10  8 - 16 mmol/L Final    eGFR 08/01/2022 >60  >60 mL/min/1.73 m^2 Final    Magnesium 08/01/2022 2.1  1.6 - 2.6 mg/dL Final    CA 19-9 08/01/2022 732.4 (A) 0.0 - 40.0 U/mL Final    Comment: The testing method is a chemiluminescent microparticle immunoassay   manufactured by [employer] and performed on the [employer]   or   IZI-collecte system. Values obtained with different assay manufacturers   for   methods may be different and cannot be used interchangeably.      Phosphorus 08/01/2022 4.2  2.7 - 4.5 mg/dL Final         Imaging:     PET/CT 3/16/22    Head and neck:     No hypermetabolic activity is noted within the head neck to suggest metastatic disease.     Atherosclerotic carotid calcifications are noted.     Thorax:     A port is noted overlying the left hemithorax with its tip in the superior vena cava.     Extensive mitral valve calcifications or mitral valve replacement are noted.  Extensive coronary calcifications are noted increasing the patient's coronary risk.     A moderate sized joint effusion is developed since the prior.  Please correlate for etiologies.  This could relate to cardiogenic edema or metastatic fluid.  No obvious hypermetabolic activity is noted.     A pleural based density is noted of 5 mm image 50 sequence 3 similar since the prior.  Atelectasis, scarring, or pulmonary nodule is on the differential.  In a high-risk patient follow-up for long-term size stability would be suggested.     Sternotomy wires are noted     Abdomen and pelvis:     Significant improvement is noted since prior with the liver demonstrating near uniform metabolic activity.  Significantly less hypermetabolic activity is noted than was seen on the prior.  A  hypodense region in the right lobe measured on the prior on CT is still thought to be seen image 131 without convincing detrimental change when measured in a similar manner as on the prior at 5.2 x 4.2 cm image 131 sequence 3.  Several other hypodense regions are noted without worrisome detrimental change since the prior.  The region in the right lobe of the liver that measured 2.5 x 1.9 cm on the prior is less easily visualized but can be measured similarly at 2.2 x 1.8 cm image 142 sequence 3     The adrenal thickening seen on the prior appears of 2 cm in short diameter measuring 1.9 cm on the prior unchanged.  In long dimension it was favored to be under measured on the prior but is unchanged when measured in a similar manner.  It is not significantly hypermetabolic on today's exam     The hypermetabolic pancreatic tail mass seen on the prior demonstrates near background activity on today's exam as well and no obvious focal mass can be measured on the CT portion of this exam.  The pancreas in this region measures 2.0 cm in thickness versus is 2.5 cm on the prior.  Decrease in size of the mass is suspected     Several hypermetabolic mesenteric foci were suspected on the prior suggestive of mesenteric metastasis.  No significant hypermetabolic activity is noted on today's exam.     Musculoskeletal:     Glenohumeral joint space loss is noted on the left greater than right suggestive of degenerative change likely associated with labral tearing.     Central canal stenosis is noted in the lower lumbar spine particularly at the L4-L5 level.  No obvious hypermetabolic osseous changes are noted on today's exam.  This is an improvement since the prior      Assessment:       1. Primary pancreatic cancer with metastasis to other site    2. Metastasis to liver    3. 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 Bone metastasis    5. Fatigue, unspecified type    6. Insomnia, unspecified type    7. Left hip pain    8.  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therapy    9. Atrial fibrillation, unspecified type    10. Pancreatic insufficiency    11. Cancer related pain    12. Hypertension, unspecified type    13. Hyperlipidemia, unspecified hyperlipidemia type    14. Chronic diastolic congestive heart failure           Plan:   Metastatic Pancreatic Cancer - The patient was diagnosed with metastatic pancreatic cancer adenocarcinoma with mets to the liver on 12/15/21  -Ca19-9 was 7,581 on 12/15/21  -PET/CT on 1/04/22 shows mets to the liver, left adrenal gland, T9 vertebra and left iliac fossa.  -Potential mets are seen near the umbilicus  -PT underwent PORT placement 1/07/22  -MRI brain on 1/11/22 showed no brain mets  -Pt consented for Gemcitabine and paclitaxel on 1/03/22  -Pt treated with 20% dose reduction in paclitaxel and Gemcitabine to 100mg/mm2 and 800mg/mm2 respectively  -C2D8 and C2D15 cancelled given her hospitalization from 2/12/22-2/14/22  -Patient completed C3D8 and was hospitalized again for a fever  -Treatment moving forward was discussed with the patient and decision was made to remove day 8 of treatment  -PET/CT on 3/16/22 shows stable disease  -Ca19-9 has dropped dramatically to 170 on 3/16/22  -C3D15 delayed given cardiac ablation on 03/17/2022  -Will continue [place]/Abraxane at 20% does reduction due to prior thrombocytopenia  -Pt underwent C5D1 5/02/22  -Guardant 360 testing 1/26/22 showed STACY [id number] 0.3% amplification, KRAS G12D 0.3% amplification, MSI-stable, ARID1A [id number] 0.4%, KRAS G12D 0.3%, STACY [id number], STK11 S59T 0.2%, and BRCA1 R979C 0.1%.  No BRCA2 was detected  -Concern was the pt's hypoxia was a reaction to Gemcitabine with pneumonitis  -PET/CT 6/20/22 showed progression of disease with new pulmonary nodules, enlarging pancreatic mass, new hepatic nodules and bony lesions in the L1 vertebral body and left iliac bone  -Pt started on Liposomal irinotecan and Fllorouracil with 20% dose reduction  -Will proceed with  cycle 4  -Pt to take dexamethasone on days 2 and 3 of treatment  -Pt set up for [facility]     Fatigue -likely stephenson to underlying cancer, chemo, and insomnia  -Pt instructed on proper sleep hygeine  -Will monitor    Insomnia - pt states she sleeps occasionally throughout the day  -She sleeps in a recliner from 2AM to 4PM  -Pt instructed to avoid watching TV at night and to try sleeping in her bed  -Will monitor    Left Hip Pain - pt with pain in the left lower pelvis when moving her left thigh  -Pain is likely musculoskeletal pain  -Pt with relief using tylenol  -Will monitor    Hypoxia - 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  -Pt currently on 2L O2  -Will monitor     A-fib - pt to underwent cardioversion 7/19/22  -Pt on amiodarone and now is is NSR  -PT on Eliquis  -Stable  -Management per cardiology     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  -No current signs of infection  -Pt given Evusheld on 6/09/22  -Will monitor     Pancreatic Insufficiency - pt on creon  -Pt taking 3 pills with meals and 2 with snacks with improvement in diarrhea  -Pt encouraged to keep the current dose  -Will monitor    Cancer Related Pain - improved  -Will monitor     HTN - pt taking losartan, bisoprolol  -Cardiology managing  -Will monitor     HLD - pt on pravastatin  -Management per PCP     CHF - pt with CHF exacerbation and hospitalized from 3/25/22 to 3/29/22 wioth 9.5L removed  -Pt hospitalized again from 5/15/22 to 5/20/22 for possible CHF exacerbation  -Pt taking lasix daily  -Management per cardiology    Advance Care Planning     Power of   I initiated the process of advance care planning today and explained the importance of this process to the patient.  I introduced the concept of advance directives to the patient, as well. Then the patient received detailed information about the importance of designating a Health Care Power of  (HCPOA). She  was also instructed to communicate with this person about their wishes for future healthcare, should she become sick and lose decision-making capacity. The patient has not previously appointed a HCPOA. After our discussion, the patient has decided to complete a HCPOA and will bring the form completed to her next clinic appointment.            Route Chart for Scheduling    Med Onc Chart Routing      Follow up with physician 2 weeks. The patient can proceed with treatment.  She needs labs CBC, CMP, Mg, phos, Ca19-9 on 8/15/22 with an appt with me and treatment on 8/17/22.   Follow up with IVONNE    Infusion scheduling note    Injection scheduling note    Labs    Imaging    Pharmacy appointment    Other referrals          Treatment Plan Information   OP LIPOSOMAL IRINOTECAN FLUOROURACIL LEUCOVORIN Q2W   Salvador Green MD   Upcoming Treatment Dates - OP LIPOSOMAL IRINOTECAN FLUOROURACIL LEUCOVORIN Q2W    8/17/2022       Chemotherapy       irinotecan liposomaL (ONIVYDE) 56 mg/m2 = 96.879 mg in sodium chloride 0.9% 500 mL chemo infusion       leucovorin calcium 320 mg/m2 = 555 mg in dextrose 5 % 250 mL infusion       fluorouracil (ADRUCIL) 1,920 mg/m2 = 3,320 mg in sodium chloride 0.9% 100 mL chemo infusion       Supportive Care       atropine injection 0.4 mg       Antiemetics       palonosetron 0.25mg/dexamethasone 12mg in NS IVPB 0.25 mg  8/31/2022       Chemotherapy       irinotecan liposomaL (ONIVYDE) 56 mg/m2 = 96.879 mg in sodium chloride 0.9% 500 mL chemo infusion       leucovorin calcium 320 mg/m2 = 555 mg in dextrose 5 % 250 mL infusion       fluorouracil (ADRUCIL) 1,920 mg/m2 = 3,320 mg in sodium chloride 0.9% 100 mL chemo infusion       Supportive Care       atropine injection 0.4 mg       Antiemetics       palonosetron 0.25mg/dexamethasone 12mg in NS IVPB 0.25 mg  9/14/2022       Chemotherapy       irinotecan liposomaL (ONIVYDE) 56 mg/m2 = 96.879 mg in sodium chloride 0.9% 500 mL chemo infusion        leucovorin calcium 320 mg/m2 = 555 mg in dextrose 5 % 250 mL infusion       fluorouracil (ADRUCIL) 1,920 mg/m2 = 3,320 mg in sodium chloride 0.9% 100 mL chemo infusion       Supportive Care       atropine injection 0.4 mg       Antiemetics       palonosetron 0.25mg/dexamethasone 12mg in NS IVPB 0.25 mg  9/28/2022       Chemotherapy       irinotecan liposomaL (ONIVYDE) 56 mg/m2 = 96.879 mg in sodium chloride 0.9% 500 mL chemo infusion       leucovorin calcium 320 mg/m2 = 555 mg in dextrose 5 % 250 mL infusion       fluorouracil (ADRUCIL) 1,920 mg/m2 = 3,320 mg in sodium chloride 0.9% 100 mL chemo infusion       Supportive Care       atropine injection 0.4 mg       Antiemetics       palonosetron 0.25mg/dexamethasone 12mg in NS IVPB 0.25 mg    Therapy Plan Information  diphenhydrAMINE capsule 25 mg  25 mg, Oral, PRN  predniSONE tablet 40 mg  40 mg, Oral, PRN  EPINEPHrine (EPIPEN) 0.3 mg/0.3 mL pen injection 0.3 mg  0.3 mg, Intramuscular, PRN  ondansetron disintegrating tablet 4 mg  4 mg, Oral, PRN  acetaminophen tablet 650 mg  650 mg, Oral, PRN  albuterol inhaler 2 puff  2 puff, Inhalation, PRN      Salvador Green MD  [facility]  Hematology and Oncology  [facility]   [address] [place] [address]   O: [phone]  F: [phone]

## 2022-08-15 NOTE — PROGRESS NOTES
Nutrition Note:     This RD met with patient and her daughter d/t questions regarding ONS and coupons. Pt states she is drinking 2-3 Premier Protein shakes per day and is wondering if we had any coupons. Unfortunately, we do not. Offered Ensure or Boost coupons but patient states she only likes Premier Protein.   Pt states her appetite has been good and she is eating many small meals throughout the day. Pt denies any further concerns at this time.    Debra Juárez 08/15/2022   3:45 PM

## 2022-08-15 NOTE — PROGRESS NOTES
12:46 PM    This LCSW met with this pt and her daughter as they were inquiring about gas cards.  This LCSW explained how the gas card assistance program works and because the pt lives in [place] she is not eligible at this time. The pt voiced understanding.  The pt also was requesting samples or coupons for Premier Protein drinks.   CATRINA Tinoco provided some ensure coupons if the pt wanted to use those, however explained we do not have premier protein samples or coupons because we don't carry those here.    The pt expressed understanding and reported no other needs at this time.

## 2022-08-17 NOTE — PLAN OF CARE
"Tolerated Onivyde/LV well.  No reactions noted.  No questions or concerns at this time.  5-FU pump infusing upon d/c'd.  Instructed pt to check pump screen twice daily for "RUN", RTC this Friday for pump d/c, verbalized understanding.  Pump setting verified by 2 RNs.  Ambulated off unit in NAD.  "

## 2022-08-17 NOTE — PROGRESS NOTES
PATIENT: Nel Ciu  MRN: [id number]  DATE: 8/17/2022      Diagnosis:   1. Primary pancreatic cancer with metastasis to other site    2. Metastasis to liver    3. 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 Bone metastasis    5. Fatigue, unspecified type    6. Debility    7. Pressure injury of left buttock, stage 2    8. Insomnia, unspecified type    9. Left lower quadrant abdominal pain    10. 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therapy    11. Atrial fibrillation, unspecified type    12. Pancreatic insufficiency    13. Hypertension, unspecified type    14. Hyperlipidemia, unspecified hyperlipidemia type    15. Chronic diastolic congestive heart failure        Chief Complaint: Follow-up      Oncologic History:      Oncologic History 12/09/21 Ct Abdomen  12/15/21 EUS  1/04/22 PET/CT  1/07/22 [facility] - Dr Acuña  1/11/22 MRI Brain  3/16/22 PET/CT  6/20/22 PET/CT    Oncologic Treatment 1/12/22 - 5/02/22 Gemcitabine and Paclitaxel s/p C5D1  6/22/22 - current s/p cycle 4 Liposomal Irinotecan and Fluorouracil     Pathology 12/15/21 adenocarcinoma in the pancreatic body and adenocarcinoma in the liver       The patient initially presented to her PCP on 12/02/21 with complaint of abdominal pain.  She underwent a CT of the abdomen on 12/09/21 showing subcentimeter para-aortic, mesenteric lymph nodes; multiple hepatic infiltrative lesions present throughout the liver with 1 of the larger areas right liver lobe measuring approximately 2.2 cm in diameter; mass in the pancreatic tail measuring 9 x 3.3 cm.  The patient underwent EUS under the care of Dr Tse on 12/15/21 showing a mass in the pancreatic body and multiple liver lesions.  Path from the procedure showed adenocarcinoma in the pancreatic body and adenocarcinoma in the liver.    The patient underwent PET/CT on 1/04/22 showing hypermetabolic rounded mass at the junction of the body and tail of the pancreas measuring 2.5 x 2.4 cm, hypermetabolic left adrenal gland  nodule measuring 1.9 cm, innumerable hypermetabolic nodular masses throughout the left and right hepatic lobes with largest lesions in the right liver measuring 2.5 x 1.9 cm and 5.2 x 4.4 cm, mildly enlarged hypermetabolic portacaval lymph node measuring 1.4 cm, 0.7 cm hypermetabolic focus at the umbilicus, lesion in the T9 vertebra measuring 1.8 x 1.7 cm and a lesion in the left iliac fossa measuring 1.7 x 1.2 cm.  MRI of the brain on 01/11/2022 showed no evidence of metastases but did show an old right midbrain lacunar infarct.   The patient was in the hospital from 1/28/22-1/29/22 with fever and low sodium.  The patient's hydrochlorothiazide was discontinued.  The patient then presented to the hospital on 02/06/2022 for dyspnea on exertion.  CTA was done on 02/06/2022 showing no evidence of pulmonary embolism.  The patient underwent NT proBNP which was elevated at 2630 pg/mL.  The patient was started on Lasix every other day at the request of Cardiology.   The patient was admitted to the hospital from 2/12/22-2/14/22 for fever.  The patient was discharged on Levaquin for 5 days.  The patient saw Cardiology on 02/17/2022 for atrial fibrillation and was started on Eliquis and taken off of aspirin.     The the patient was admitted to the hospital from [id number] for fever.  During her hospitalization procalcitonin was checked and was normal.  All her cultures were negative.  Her fever was felt to be from chemotherapy.    The patient underwent PET-CT on 03/16/2022 showing a followed of mm pulmonary based nodule; decrease metabolic activity in the liver mass with stable right lobe mass measuring 5.2 x 4.2 cm; right lobe mass measuring 2.2 x 1.8 cm; adrenal gland thickening of 1.9 cm; decrease in pancreatic mass measuring 2 cm; decreased metabolic activity of hypermetabolic mesenteric foci and right-sided pleural effusion.  The patient underwent cardioversion on 03/17/2022 at which point constantine was performed.  Patient was  put back in normal sinus rhythm.    The patient was admitted to the hospital for CHF exacerbation from 3/25/22 to 3/29/22 and was diuresed a little over 9.5L.    The patient was admitted to the hospital from 5/15/22-5/20/22 for hypoxia.  CTA chest on 5/15/22 showed diffuse faint ground-glass opacities in the lungs bilaterally.   NT-ProNP on admission was elevated at 5,220pg/mL.  The patient was initially diuresed without much improvement.  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  The patient was eventually weaned to 1.5L O2.  Repeat CXR on 5/26/22 showed improvement in her intrapulmonary process.      The pt underwent cardioversion on 6/16/22 and was started on amiodarone.  PET/CT completed on 6/20/22 showed a new to 1.2 cm subpleural nodule in the right upper lobe; enlarging right upper lobe nodule measuring 8 mm; resolution of right pleural effusion compared to prior studies; enlargement of pancreatic mass now 2.2 x 1.2 cm; stable 2 cm left adrenal nodule; new hypermetabolic nodule in the right hepatic lobe measuring 1.9 x 1.5 cm; decrease in size of separate nodule in the right hepatic lobe measuring 3 x 2.4 cm; new hypermetabolic focus in the L1 vertebral body measuring 2.7 x 2 cm; and 2.6 x 1.3 new hypermetabolic lesion in the left iliac bone.    Subjective:     Interval History: Ms. Cui is a 87 y.o. female with Osteopenia, HLD, HTN, pancreatic cancer who presents for fever.  Since the last clinic visit the patient continues to have fatigeu, occasional pain in the LLQ and occasional diarrhea.  The patient denies CP, cough, SOB, N/V, constipation.  The patient denies fever, chills, night sweats, weight loss, new lumps or bumps, easy bruising or bleeding.    Past Medical History:   Past Medical History:   Diagnosis Date    A-fib     Anemia     Anticoagulant long-term use     Arthritis     Cataracts, bilateral     CHF (congestive heart failure)      chronic diastolic    Coronary artery disease     Nonobstructive    Essential (primary) hypertension 10/05/2010    Hyperlipidemia     Hyponatremia     Mitral regurgitation     Osteopenia     Pancreatic insufficiency     Primary pancreatic cancer with metastasis to other site     Thrombocytopenia        Past Surgical HIstory:   Past Surgical History:   Procedure Laterality Date    CARPAL TUNNEL RELEASE Bilateral     wrist    CATARACT EXTRACTION, BILATERAL  2017    CORONARY ANGIOGRAPHY  09/01/2020    Procedure: ANGIOGRAM, CORONARY ARTERY;  Surgeon: Ivette Horne MD;  Location: [facility] CATH;  Service: Cardiology;;    DILATION AND CURETTAGE OF UTERUS      ENDOSCOPIC ULTRASOUND OF UPPER GASTROINTESTINAL TRACT Left 12/15/2021    Procedure: ULTRASOUND, UPPER GI TRACT, ENDOSCOPIC;  Surgeon: Lico Tse MD;  Location: [facility] ENDO;  Service: Endoscopy;  Laterality: Left;    ESOPHAGOGASTRODUODENOSCOPY N/A 12/15/2021    Procedure: EGD (ESOPHAGOGASTRODUODENOSCOPY);  Surgeon: Lico Tse MD;  Location: [facility] ENDO;  Service: Endoscopy;  Laterality: N/A;    INSERTION OF TUNNELED CENTRAL VENOUS CATHETER (CVC) WITH SUBCUTANEOUS PORT N/A 01/07/2022    Procedure: UYGAITUGO-DPSE-Y-CATH;  Surgeon: Cas Acuña MD;  Location: [facility] OR;  Service: General;  Laterality: N/A;    LEFT HEART CATHETERIZATION  09/01/2020    Procedure: Left heart cath;  Surgeon: Ivette Horne MD;  Location: [facility] CATH;  Service: Cardiology;;    MITRAL VALVE REPAIR      OOPHORECTOMY Left 1988    OTHER SURGICAL HISTORY      maze procedure and PHILL ligation    REPAIR OF MENISCUS OF KNEE Left     TONSILLECTOMY      vein removal         Family History:   Family History   Problem Relation Age of Onset    No Known Problems Mother     No Known Problems Father     Cancer Maternal Grandmother         Stomach    No Known Problems Maternal Grandfather     No Known Problems Paternal Grandmother     No Known Problems Paternal Grandfather   "   Breast cancer Sister         over 60    No Known Problems Brother        Social History:  reports that she quit smoking about 34 years ago. Her smoking use included cigarettes. She has a 30.00 pack-year smoking history. She has never used smokeless tobacco. She reports that she does not drink alcohol and does not use drugs.    Allergies:  Review of patient's allergies indicates:   Allergen Reactions    Amoxicillin-pot clavulanate Other (See Comments)     "Spaced out feeling- can't take it longer than 4 or 5 days"  Patient tolerated Zosyn    Breo ellipta [fluticasone furoate-vilanterol] Other (See Comments)     Feels jittery    Corticosteroids (glucocorticoids)      Other reaction(s): tachycardia    Erythromycin      "Spaced out"    Metoprolol succinate      "I dont feel good, it doesn't work"    Latex, natural rubber      Turns skin red around area where latex touches       Medications:  Current Outpatient Medications   Medication Sig Dispense Refill    acetaminophen (TYLENOL) 325 MG tablet Take 325 mg by mouth every 6 (six) hours as needed for Pain or Temperature greater than.      amiodarone (PACERONE) 200 MG Tab Take 1 tablet (200 mg total) by mouth once daily. 90 tablet 3    apixaban (ELIQUIS) 2.5 mg Tab Take 1 tablet (2.5 mg total) by mouth 2 (two) times daily. 60 tablet 0    ascorbic acid, vitamin C, (VITAMIN C) 500 MG tablet Take 500 mg by mouth once daily.      bisoprolol (ZEBETA) 5 MG tablet Take 2 tablets (10 mg total) by mouth every evening. 60 tablet 0    cholecalciferol, vitamin D3, (VITAMIN D3) 25 mcg (1,000 unit) capsule Take 1,000 Units by mouth every morning.      coenzyme Q10 100 mg capsule Take 100 mg by mouth once daily.      dexAMETHasone (DECADRON) 4 MG Tab Take 2 tablets (8 mg total) by mouth once daily. Take as directed on days 2 and 3 of your chemotherapy cycle. 24 tablet 5    docusate sodium (COLACE) 100 MG capsule Take 1 capsule (100 mg total) by mouth 2 (two) times " daily.  0    furosemide (LASIX) 20 MG tablet Take 1 tablet (20 mg total) by mouth once daily. Hold until approved by PCP (Patient taking differently: Take 20 mg by mouth once daily.)      LIDOcaine-prilocaine (EMLA) cream Apply topically as needed (30 to 60 minutes prior to chemo). 30 g 2    lipase-protease-amylase (CREON) 36,000-114,000- 180,000 unit CpDR Take 3 capsules by mouth 3 (three) times daily with meals. Take 3 capsules TID with meals and 2 capsule PRN with snacks 300 capsule 11    loperamide (IMODIUM A-D) 2 mg Tab Take 4 mg after first loose or frequent bowel movement, then 2 mg every 2 hours until 12 hours have passed without a bowel movement. (Patient taking differently: Take 2 mg by mouth every 12 (twelve) hours as needed. Take 4 mg after first loose or frequent bowel movement, then 2 mg every 2 hours until 12 hours have passed without a bowel movement.) 24 tablet 25    losartan (COZAAR) 100 MG tablet Take 1 tablet (100 mg total) by mouth every evening. 90 tablet 3    magnesium 250 mg Tab Take 250 mg by mouth Daily.      multivitamin (THERAGRAN) per tablet Take 1 tablet by mouth every other day.      ondansetron (ZOFRAN-ODT) 4 MG TbDL Take 4 mg by mouth every 12 (twelve) hours as needed (nausea/vomiting).      ondansetron (ZOFRAN-ODT) 8 MG TbDL Take 1 tablet (8 mg total) by mouth every 8 (eight) hours as needed (nausea/vomiting). 60 tablet 5    OXYGEN-AIR DELIVERY SYSTEMS MISC Inhale 1.5 L/min into the lungs continuous.      pravastatin (PRAVACHOL) 40 MG tablet TAKE 1 TABLET(40 MG) BY MOUTH EVERY DAY (Patient taking differently: Take 40 mg by mouth every evening.) 90 tablet 3    saliva substitute combo no.9 (BIOTENE DRY MOUTH ORAL RINSE) Mwsh Swish and spit 5 mLs every evening.      sod chlor,sod bicarb/neti pot (NEILMED NASAFLO MIKE) 2 sprays by Nasal route 6 (six) times daily. Aly-Med saile nose gel spray      sodium chloride (OCEAN) 0.65 % nasal spray 2 sprays by Nasal route 6 (six)  times daily. PRN  Indications: dryness of the nose       No current facility-administered medications for this visit.     Facility-Administered Medications Ordered in Other Visits   Medication Dose Route Frequency Provider Last Rate Last Admin    alteplase injection 2 mg  2 mg Intra-Catheter PRN Salvador Green MD        atropine injection 0.4 mg  0.4 mg Intravenous Once PRN Salvador Green MD        diphenhydrAMINE injection 50 mg  50 mg Intravenous Once PRN Salvador Green MD        EPINEPHrine (EPIPEN) 0.3 mg/0.3 mL pen injection 0.3 mg  0.3 mg Intramuscular Once PRN Salvador Green MD        fluorouracil (ADRUCIL) 1,920 mg/m2 = 3,320 mg in sodium chloride 0.9% 100 mL chemo infusion  1,920 mg/m2 (Treatment Plan Recorded) Intravenous over 46 hr Salvador Green MD        heparin, porcine (PF) 100 unit/mL injection flush 500 Units  500 Units Intravenous PRN Salvador Green MD        hydrocortisone sodium succinate injection 100 mg  100 mg Intravenous Once PRN Salvador Green MD        irinotecan liposomaL (ONIVYDE) 56 mg/m2 = 96.879 mg in sodium chloride 0.9% 500 mL chemo infusion  56 mg/m2 (Treatment Plan Recorded) Intravenous 1 time in Clinic/GRAEME Green MD        leucovorin calcium 320 mg/m2 = 555 mg in dextrose 5 % 250 mL infusion  320 mg/m2 (Treatment Plan Recorded) Intravenous 1 time in Clinic/GRAEME Green MD        palonosetron 0.25mg/dexamethasone 12mg in NS IVPB 0.25 mg  0.25 mg Intravenous 1 time in Clinic/GRAEME Green  mL/hr at 08/17/22 0948 0.25 mg at 08/17/22 0948    sodium chloride 0.9% flush 10 mL  10 mL Intravenous PRN Salvador Green MD           Review of Systems   Constitutional: Positive for fatigue. Negative for chills, fever and unexpected weight change.   Respiratory: Negative for cough and shortness of breath.    Cardiovascular: Negative for chest pain and palpitations.   Gastrointestinal: Positive for abdominal pain (LLQ) and diarrhea.  "Negative for constipation, nausea and vomiting.   Skin: Negative for rash.        Sore on left buttock   Neurological: Negative for headaches.   Hematological: Negative for adenopathy. Does not bruise/bleed easily.       ECOG Performance Status: 2   Objective:      Vitals:   Vitals:    08/17/22 0837   BP: 129/64   BP Location: Left arm   Patient Position: Sitting   BP Method: Medium (Automatic)   Pulse: 65   Resp: 16   Temp: 97.4 °F (36.3 °C)   TempSrc: Temporal   SpO2: 98%   Weight: 64.7 kg (142 lb 10.2 oz)   Height: 5' 5" (1.651 m)     Physical Exam  Constitutional:       General: She is not in acute distress.     Appearance: She is well-developed. She is not diaphoretic.   HENT:      Head: Normocephalic and atraumatic.   Cardiovascular:      Rate and Rhythm: Normal rate and regular rhythm.      Heart sounds: No murmur heard.    No friction rub. No gallop.   Pulmonary:      Effort: Pulmonary effort is normal. No respiratory distress.      Breath sounds: Normal breath sounds. No wheezing or rales.   Chest:      Chest wall: No tenderness.   Breasts:      Right: No axillary adenopathy or supraclavicular adenopathy.      Left: No axillary adenopathy or supraclavicular adenopathy.       Abdominal:      General: Bowel sounds are normal. There is distension.      Palpations: Abdomen is soft. There is no mass.      Tenderness: There is no abdominal tenderness. There is no guarding or rebound.   Musculoskeletal:         General: No tenderness. Normal range of motion.      Right lower leg: No edema.      Left lower leg: No edema.   Lymphadenopathy:      Cervical: No cervical adenopathy.      Upper Body:      Right upper body: No supraclavicular or axillary adenopathy.      Left upper body: No supraclavicular or axillary adenopathy.   Skin:     Findings: Lesion (pt with eraser size stage 2 ulcer on the left buttock) present. No erythema or rash.   Neurological:      Mental Status: She is alert and oriented to person, place, " and time.   Psychiatric:         Behavior: Behavior normal.         Laboratory Data:  Lab Visit on 08/15/2022   Component Date Value Ref Range Status    WBC 08/15/2022 7.04  3.90 - 12.70 K/uL Final    RBC 08/15/2022 4.43  4.00 - 5.40 M/uL Final    Hemoglobin 08/15/2022 12.9  12.0 - 16.0 g/dL Final    Hematocrit 08/15/2022 40.0  37.0 - 48.5 % Final    MCV 08/15/2022 90  82 - 98 fL Final    MCH 08/15/2022 29.1  27.0 - 31.0 pg Final    MCHC 08/15/2022 32.3  32.0 - 36.0 g/dL Final    RDW 08/15/2022 19.8 (A) 11.5 - 14.5 % Final    Platelets 08/15/2022 191  150 - 450 K/uL Final    MPV 08/15/2022 9.0 (A) 9.2 - 12.9 fL Final    Immature Granulocytes 08/15/2022 0.7 (A) 0.0 - 0.5 % Final    Gran # (ANC) 08/15/2022 5.4  1.8 - 7.7 K/uL Final    Immature Grans (Abs) 08/15/2022 0.05 (A) 0.00 - 0.04 K/uL Final    Comment: Mild elevation in immature granulocytes is non specific and   can be seen in a variety of conditions including stress response,   acute inflammation, trauma and pregnancy. Correlation with other   laboratory and clinical findings is essential.      Lymph # 08/15/2022 0.8 (A) 1.0 - 4.8 K/uL Final    Mono # 08/15/2022 0.7  0.3 - 1.0 K/uL Final    Eos # 08/15/2022 0.1  0.0 - 0.5 K/uL Final    Baso # 08/15/2022 0.02  0.00 - 0.20 K/uL Final    nRBC 08/15/2022 0  0 /100 WBC Final    Gran % 08/15/2022 76.9 (A) 38.0 - 73.0 % Final    Lymph % 08/15/2022 10.9 (A) 18.0 - 48.0 % Final    Mono % 08/15/2022 10.5  4.0 - 15.0 % Final    Eosinophil % 08/15/2022 0.7  0.0 - 8.0 % Final    Basophil % 08/15/2022 0.3  0.0 - 1.9 % Final    Differential Method 08/15/2022 Automated   Final    Sodium 08/15/2022 139  136 - 145 mmol/L Final    Potassium 08/15/2022 4.7  3.5 - 5.1 mmol/L Final    Chloride 08/15/2022 95  95 - 110 mmol/L Final    CO2 08/15/2022 32 (A) 23 - 29 mmol/L Final    Glucose 08/15/2022 93  70 - 110 mg/dL Final    BUN 08/15/2022 26 (A) 8 - 23 mg/dL Final    Creatinine 08/15/2022 0.6  0.5 -  1.4 mg/dL Final    Calcium 08/15/2022 10.2  8.7 - 10.5 mg/dL Final    Total Protein 08/15/2022 6.9  6.0 - 8.4 g/dL Final    Albumin 08/15/2022 3.8  3.5 - 5.2 g/dL Final    Total Bilirubin 08/15/2022 0.5  0.1 - 1.0 mg/dL Final    Comment: For infants and newborns, interpretation of results should be based  on gestational age, weight and in agreement with clinical  observations.    Premature Infant recommended reference ranges:  Up to 24 hours.............<8.0 mg/dL  Up to 48 hours............<12.0 mg/dL  3-5 days..................<15.0 mg/dL  6-29 days.................<15.0 mg/dL      Alkaline Phosphatase 08/15/2022 65  55 - 135 U/L Final    AST 08/15/2022 22  10 - 40 U/L Final    ALT 08/15/2022 19  10 - 44 U/L Final    Anion Gap 08/15/2022 12  8 - 16 mmol/L Final    eGFR 08/15/2022 >60.0  >60 mL/min/1.73 m^2 Final    CA 19-9 08/15/2022 882.6 (A) 0.0 - 40.0 U/mL Final    Comment: The testing method is a chemiluminescent microparticle immunoassay   manufactured by [employer] and performed on the    or   [employer] system. Values obtained with different assay manufacturers   for   methods may be different and cannot be used interchangeably.      Magnesium 08/15/2022 2.1  1.6 - 2.6 mg/dL Final    Phosphorus 08/15/2022 4.3  2.7 - 4.5 mg/dL Final         Imaging:     PET/CT 3/16/22    Head and neck:     No hypermetabolic activity is noted within the head neck to suggest metastatic disease.     Atherosclerotic carotid calcifications are noted.     Thorax:     A port is noted overlying the left hemithorax with its tip in the superior vena cava.     Extensive mitral valve calcifications or mitral valve replacement are noted.  Extensive coronary calcifications are noted increasing the patient's coronary risk.     A moderate sized joint effusion is developed since the prior.  Please correlate for etiologies.  This could relate to cardiogenic edema or metastatic fluid.  No obvious hypermetabolic  activity is noted.     A pleural based density is noted of 5 mm image 50 sequence 3 similar since the prior.  Atelectasis, scarring, or pulmonary nodule is on the differential.  In a high-risk patient follow-up for long-term size stability would be suggested.     Sternotomy wires are noted     Abdomen and pelvis:     Significant improvement is noted since prior with the liver demonstrating near uniform metabolic activity.  Significantly less hypermetabolic activity is noted than was seen on the prior.  A hypodense region in the right lobe measured on the prior on CT is still thought to be seen image 131 without convincing detrimental change when measured in a similar manner as on the prior at 5.2 x 4.2 cm image 131 sequence 3.  Several other hypodense regions are noted without worrisome detrimental change since the prior.  The region in the right lobe of the liver that measured 2.5 x 1.9 cm on the prior is less easily visualized but can be measured similarly at 2.2 x 1.8 cm image 142 sequence 3     The adrenal thickening seen on the prior appears of 2 cm in short diameter measuring 1.9 cm on the prior unchanged.  In long dimension it was favored to be under measured on the prior but is unchanged when measured in a similar manner.  It is not significantly hypermetabolic on today's exam     The hypermetabolic pancreatic tail mass seen on the prior demonstrates near background activity on today's exam as well and no obvious focal mass can be measured on the CT portion of this exam.  The pancreas in this region measures 2.0 cm in thickness versus is 2.5 cm on the prior.  Decrease in size of the mass is suspected     Several hypermetabolic mesenteric foci were suspected on the prior suggestive of mesenteric metastasis.  No significant hypermetabolic activity is noted on today's exam.     Musculoskeletal:     Glenohumeral joint space loss is noted on the left greater than right suggestive of degenerative change likely  associated with labral tearing.     Central canal stenosis is noted in the lower lumbar spine particularly at the L4-L5 level.  No obvious hypermetabolic osseous changes are noted on today's exam.  This is an improvement since the prior      Assessment:       1. Primary pancreatic cancer with metastasis to other site    2. Metastasis to liver    3. 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 Bone metastasis    5. Fatigue, unspecified type    6. Debility    7. Pressure injury of left buttock, stage 2    8. Insomnia, unspecified type    9. Left lower quadrant abdominal pain    10. 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therapy    11. Atrial fibrillation, unspecified type    12. Pancreatic insufficiency    13. Hypertension, unspecified type    14. Hyperlipidemia, unspecified hyperlipidemia type    15. Chronic diastolic congestive heart failure           Plan:   Metastatic Pancreatic Cancer - The patient was diagnosed with metastatic pancreatic cancer adenocarcinoma with mets to the liver on 12/15/21  -Ca19-9 was 7,581 on 12/15/21  -PET/CT on 1/04/22 shows mets to the liver, left adrenal gland, T9 vertebra and left iliac fossa.  -Potential mets are seen near the umbilicus  -PT underwent PORT placement 1/07/22  -MRI brain on 1/11/22 showed no brain mets  -Pt consented for Gemcitabine and paclitaxel on 1/03/22  -Pt treated with 20% dose reduction in paclitaxel and Gemcitabine to 100mg/mm2 and 800mg/mm2 respectively  -C2D8 and C2D15 cancelled given her hospitalization from 2/12/22-2/14/22  -Patient completed C3D8 and was hospitalized again for a fever  -Treatment moving forward was discussed with the patient and decision was made to remove day 8 of treatment  -PET/CT on 3/16/22 shows stable disease  -Ca19-9 has dropped dramatically to 170 on 3/16/22  -C3D15 delayed given cardiac ablation on 03/17/2022  -Will continue McDowell/Abraxane at 20% does reduction due to prior thrombocytopenia  -Pt underwent C5D1 5/02/22  -Guardant 360  testing 1/26/22 showed STACY [id number] 0.3% amplification, KRAS G12D 0.3% amplification, MSI-stable, ARID1A N0960Q 0.4%, KRAS G12D 0.3%, STACY [id number], STK11 S59T 0.2%, and BRCA1 R979C 0.1%.  No BRCA2 was detected  -Concern was the pt's hypoxia was a reaction to Gemcitabine with pneumonitis  -PET/CT 6/20/22 showed progression of disease with new pulmonary nodules, enlarging pancreatic mass, new hepatic nodules and bony lesions in the L1 vertebral body and left iliac bone  -Pt started on Liposomal irinotecan and Fllorouracil with 20% dose reduction  -Will proceed with cycle 5  -Pt to take dexamethasone on days 2 and 3 of treatment  -Given increasing Ca19-9 and persistetn LLQ abdominal pain, will repeat PET/CT prior to the patient's next appt  -Pt set up for [facility]     Fatigue -likely stephenson to underlying cancer, chemo  -Pt instructed to initiate an exercise routine  -Will order home health PT/OT  -Will monitor    Debility - Pt to get PT/OT with home health    Stage II Ulcer - on left buttock  -Pt top get wound care with home health    Insomnia - improved with better sleep hygeine  -Will monitor    Abdominal Pain - In LLQ  -Better with tylenol  -Will get repeat staging scans  -Will monitor    Hypoxia - 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  -Pt currently on 2L O2  -Will monitor     A-fib - pt to underwent cardioversion 7/19/22  -Pt on amiodarone and in NSR  -PT on Eliquis  -Stable  -Management per cardiology     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  -No current signs of infection  -Pt given Evusheld on 6/09/22  -Will monitor     Pancreatic Insufficiency - pt on creon  -Pt taking 3 pills with meals and 2 with snacks with improvement in diarrhea  -Will monitor     HTN - pt taking losartan, bisoprolol  -Cardiology managing  -Will monitor     HLD - pt on pravastatin  -Management per PCP     CHF - pt with CHF exacerbation and hospitalized from 3/25/22 to  3/29/22 padma 9.5L removed  -Pt hospitalized again from 5/15/22 to 5/20/22 for possible CHF exacerbation  -Pt taking lasix daily  -Management per cardiology    Advance Care Planning     Power of   I initiated the process of advance care planning today and explained the importance of this process to the patient.  I introduced the concept of advance directives to the patient, as well. Then the patient received detailed information about the importance of designating a Health Care Power of  (HCPOA). She was also instructed to communicate with this person about their wishes for future healthcare, should she become sick and lose decision-making capacity. The patient has not previously appointed a HCPOA. After our discussion, the patient has decided to complete a HCPOA and will bring the form completed to her next clinic appointment.            Route Chart for Scheduling    Med Onc Chart Routing      Follow up with physician 2 weeks. The patient can proceed with treatment.  She needs labs CBC, CMP, Mg, phos, Ca19-9 on 8/29/22 with an appt with me and treatment on 8/31/22.  She will need home health established.  She will need a PET/Ct prior to her return appt.   Follow up with IVONNE    Infusion scheduling note    Injection scheduling note    Labs    Imaging    Pharmacy appointment    Other referrals          Treatment Plan Information   OP LIPOSOMAL IRINOTECAN FLUOROURACIL LEUCOVORIN Q2W   Salvador Green MD   Upcoming Treatment Dates - OP LIPOSOMAL IRINOTECAN FLUOROURACIL LEUCOVORIN Q2W    8/31/2022       Chemotherapy       irinotecan liposomaL (ONIVYDE) 56 mg/m2 = 96.879 mg in sodium chloride 0.9% 500 mL chemo infusion       leucovorin calcium 320 mg/m2 = 555 mg in dextrose 5 % 250 mL infusion       fluorouracil (ADRUCIL) 1,920 mg/m2 = 3,320 mg in sodium chloride 0.9% 100 mL chemo infusion       Supportive Care       atropine injection 0.4 mg       Antiemetics       palonosetron 0.25mg/dexamethasone 12mg  in NS IVPB 0.25 mg  9/14/2022       Chemotherapy       irinotecan liposomaL (ONIVYDE) 56 mg/m2 = 96.879 mg in sodium chloride 0.9% 500 mL chemo infusion       leucovorin calcium 320 mg/m2 = 555 mg in dextrose 5 % 250 mL infusion       fluorouracil (ADRUCIL) 1,920 mg/m2 = 3,320 mg in sodium chloride 0.9% 100 mL chemo infusion       Supportive Care       atropine injection 0.4 mg       Antiemetics       palonosetron 0.25mg/dexamethasone 12mg in NS IVPB 0.25 mg  9/28/2022       Chemotherapy       irinotecan liposomaL (ONIVYDE) 56 mg/m2 = 96.879 mg in sodium chloride 0.9% 500 mL chemo infusion       leucovorin calcium 320 mg/m2 = 555 mg in dextrose 5 % 250 mL infusion       fluorouracil (ADRUCIL) 1,920 mg/m2 = 3,320 mg in sodium chloride 0.9% 100 mL chemo infusion       Supportive Care       atropine injection 0.4 mg       Antiemetics       palonosetron 0.25mg/dexamethasone 12mg in NS IVPB 0.25 mg  10/12/2022       Chemotherapy       irinotecan liposomaL (ONIVYDE) 56 mg/m2 = 96.879 mg in sodium chloride 0.9% 500 mL chemo infusion       leucovorin calcium 320 mg/m2 = 555 mg in dextrose 5 % 250 mL infusion       fluorouracil (ADRUCIL) 1,920 mg/m2 = 3,320 mg in sodium chloride 0.9% 100 mL chemo infusion       Supportive Care       atropine injection 0.4 mg       Antiemetics       palonosetron 0.25mg/dexamethasone 12mg in NS IVPB 0.25 mg    Therapy Plan Information  diphenhydrAMINE capsule 25 mg  25 mg, Oral, PRN  predniSONE tablet 40 mg  40 mg, Oral, PRN  EPINEPHrine (EPIPEN) 0.3 mg/0.3 mL pen injection 0.3 mg  0.3 mg, Intramuscular, PRN  ondansetron disintegrating tablet 4 mg  4 mg, Oral, PRN  acetaminophen tablet 650 mg  650 mg, Oral, PRN  albuterol inhaler 2 puff  2 puff, Inhalation, PRN      Salvador Green MD  [facility]  Hematology and Oncology  [facility]   [address]   [place] [id number]   O: [phone]  F: [phone]

## 2022-08-17 NOTE — PROGRESS NOTES
2:42 PM    This LCSW met with this pt to check in and provide support.  The pt was resting and said she would like for me to recline her chair.   The pt said she would like to rest and has no needs at this time.        
forehead

## 2022-08-19 NOTE — PLAN OF CARE
Problem: Adult Inpatient Plan of Care  Goal: Plan of Care Review  Outcome: Ongoing, Progressing  Goal: Patient-Specific Goal (Individualized)  Outcome: Ongoing, Progressing     Problem: Fatigue  Goal: Improved Activity Tolerance  Outcome: Ongoing, Progressing   Pt tolerated pump d/c  well.   No adverse reaction noted.  PAC flushed with ns and de-accessed per protocol.   PIV de-accessed per protocol.   Pt left clinic in no acute distress.

## 2022-08-29 NOTE — PROGRESS NOTES
PET Imaging Questionnaire    Are you a Diabetic? Recent Blood Sugar level? No    Are you anemic? Bone Marrow Stimulation Meds? No    Have you had a CT Scan, if so when & where was your last one? Yes -     Have you had a PET Scan, if so when & where was your last one? Yes -     Chemotherapy or currently on Chemotherapy? Yes    Radiation therapy? No    Surgical History:   Past Surgical History:   Procedure Laterality Date    CARPAL TUNNEL RELEASE Bilateral     wrist    CATARACT EXTRACTION, BILATERAL  2017    CORONARY ANGIOGRAPHY  09/01/2020    Procedure: ANGIOGRAM, CORONARY ARTERY;  Surgeon: Ivette Horne MD;  Location: [facility] CATH;  Service: Cardiology;;    DILATION AND CURETTAGE OF UTERUS      ENDOSCOPIC ULTRASOUND OF UPPER GASTROINTESTINAL TRACT Left 12/15/2021    Procedure: ULTRASOUND, UPPER GI TRACT, ENDOSCOPIC;  Surgeon: Lico Tse MD;  Location: [facility] ENDO;  Service: Endoscopy;  Laterality: Left;    ESOPHAGOGASTRODUODENOSCOPY N/A 12/15/2021    Procedure: EGD (ESOPHAGOGASTRODUODENOSCOPY);  Surgeon: Lico Tse MD;  Location: [facility] ENDO;  Service: Endoscopy;  Laterality: N/A;    INSERTION OF TUNNELED CENTRAL VENOUS CATHETER (CVC) WITH SUBCUTANEOUS PORT N/A 01/07/2022    Procedure: [id number];  Surgeon: Cas Acuña MD;  Location: [facility] OR;  Service: General;  Laterality: N/A;    LEFT HEART CATHETERIZATION  09/01/2020    Procedure: Left heart cath;  Surgeon: Ivette Horne MD;  Location: [facility] CATH;  Service: Cardiology;;    MITRAL VALVE REPAIR      OOPHORECTOMY Left 1988    OTHER SURGICAL HISTORY      maze procedure and PHILL ligation    REPAIR OF MENISCUS OF KNEE Left     TONSILLECTOMY      vein removal          Have you been fasting for at least 6 hours? Yes    Is there any chance you may be pregnant or breastfeeding? No    Assay: 12.05 MCi@:7:40   Injection Site:RT AC    Residual: 1.207 mCi@: 7:42   Technologist: Jagjit Benton Injected:10.84mCi

## 2022-08-31 PROBLEM — C79.51 BONE METASTASIS: Status: ACTIVE | Noted: 2022-01-01

## 2022-08-31 NOTE — PROGRESS NOTES
PATIENT: Nel Ciu  MRN: [id number]  DATE: 8/31/2022      Diagnosis:   1. Primary pancreatic cancer with metastasis to other site    2. Metastasis to liver    3. 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 Bone metastasis    5. Insomnia, unspecified type    6. Cancer related pain    7. 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therapy    8. Atrial fibrillation, unspecified type    9. Pancreatic insufficiency    10. Hypertension, unspecified type    11. Hyperlipidemia, unspecified hyperlipidemia type    12. Chronic diastolic congestive heart failure          Chief Complaint: Pancreatic Cancer (2 week follow up )      Oncologic History:      Oncologic History 12/09/21 Ct Abdomen  12/15/21 EUS  1/04/22 PET/CT  1/07/22 EITAN - Dr Acuña  1/11/22 MRI Brain  3/16/22 PET/CT  6/20/22 PET/CT  8/29/22 PET/CT    Oncologic Treatment 1/12/22 - 5/02/22 Gemcitabine and Paclitaxel s/p C5D1  6/22/22 - current s/p cycle 5 Liposomal Irinotecan and Fluorouracil     Pathology 12/15/21 adenocarcinoma in the pancreatic body and adenocarcinoma in the liver       The patient initially presented to her PCP on 12/02/21 with complaint of abdominal pain.  She underwent a CT of the abdomen on 12/09/21 showing subcentimeter para-aortic, mesenteric lymph nodes; multiple hepatic infiltrative lesions present throughout the liver with 1 of the larger areas right liver lobe measuring approximately 2.2 cm in diameter; mass in the pancreatic tail measuring 9 x 3.3 cm.  The patient underwent EUS under the care of Dr Tse on 12/15/21 showing a mass in the pancreatic body and multiple liver lesions.  Path from the procedure showed adenocarcinoma in the pancreatic body and adenocarcinoma in the liver.    The patient underwent PET/CT on 1/04/22 showing hypermetabolic rounded mass at the junction of the body and tail of the pancreas measuring 2.5 x 2.4 cm, hypermetabolic left adrenal gland nodule measuring 1.9 cm, innumerable hypermetabolic nodular masses  throughout the left and right hepatic lobes with largest lesions in the right liver measuring 2.5 x 1.9 cm and 5.2 x 4.4 cm, mildly enlarged hypermetabolic portacaval lymph node measuring 1.4 cm, 0.7 cm hypermetabolic focus at the umbilicus, lesion in the T9 vertebra measuring 1.8 x 1.7 cm and a lesion in the left iliac fossa measuring 1.7 x 1.2 cm.  MRI of the brain on 01/11/2022 showed no evidence of metastases but did show an old right midbrain lacunar infarct.   The patient was in the hospital from 1/28/22-1/29/22 with fever and low sodium.  The patient's hydrochlorothiazide was discontinued.  The patient then presented to the hospital on 02/06/2022 for dyspnea on exertion.  CTA was done on 02/06/2022 showing no evidence of pulmonary embolism.  The patient underwent NT proBNP which was elevated at 2630 pg/mL.  The patient was started on Lasix every other day at the request of Cardiology.   The patient was admitted to the hospital from 2/12/22-2/14/22 for fever.  The patient was discharged on Levaquin for 5 days.  The patient saw Cardiology on 02/17/2022 for atrial fibrillation and was started on Eliquis and taken off of aspirin.     The the patient was admitted to the hospital from [id number] for fever.  During her hospitalization procalcitonin was checked and was normal.  All her cultures were negative.  Her fever was felt to be from chemotherapy.    The patient underwent PET-CT on 03/16/2022 showing a followed of mm pulmonary based nodule; decrease metabolic activity in the liver mass with stable right lobe mass measuring 5.2 x 4.2 cm; right lobe mass measuring 2.2 x 1.8 cm; adrenal gland thickening of 1.9 cm; decrease in pancreatic mass measuring 2 cm; decreased metabolic activity of hypermetabolic mesenteric foci and right-sided pleural effusion.  The patient underwent cardioversion on 03/17/2022 at which point constantine was performed.  Patient was put back in normal sinus rhythm.    The patient was admitted to the  hospital for CHF exacerbation from 3/25/22 to 3/29/22 and was diuresed a little over 9.5L.    The patient was admitted to the hospital from 5/15/22-5/20/22 for hypoxia.  CTA chest on 5/15/22 showed diffuse faint ground-glass opacities in the lungs bilaterally.   NT-ProNP on admission was elevated at 5,220pg/mL.  The patient was initially diuresed without much improvement.  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  The patient was eventually weaned to 1.5L O2.  Repeat CXR on 5/26/22 showed improvement in her intrapulmonary process.      The pt underwent cardioversion on 6/16/22 and was started on amiodarone.  PET/CT completed on 6/20/22 showed a new to 1.2 cm subpleural nodule in the right upper lobe; enlarging right upper lobe nodule measuring 8 mm; resolution of right pleural effusion compared to prior studies; enlargement of pancreatic mass now 2.2 x 1.2 cm; stable 2 cm left adrenal nodule; new hypermetabolic nodule in the right hepatic lobe measuring 1.9 x 1.5 cm; decrease in size of separate nodule in the right hepatic lobe measuring 3 x 2.4 cm; new hypermetabolic focus in the L1 vertebral body measuring 2.7 x 2 cm; and 2.6 x 1.3 new hypermetabolic lesion in the left iliac bone.    Subjective:     Interval History: Ms. Cui is a 87 y.o. female with Osteopenia, HLD, HTN, pancreatic cancer who presents for fever.  Since the last clinic visit the patient underwent PET-CT on 08/29/2022 showing decrease in right upper lobe nodule measuring 0.8 cm from 1.2 cm; stable subpleural non metabolic right upper lobe nodule measuring 8 mm; new mildly hypermetabolic pleural thickening in the posterior right lower lobe; interval progression of metastatic disease in the liver; hypermetabolic metastasis in the medial left iliac bone increase in size and degree of hypermetabolism; and hypermetabolic metastasis at the L1 vertebral body with pathologic compression  fracture.    The patient endorses occasional left hip pain and left flank pain.  She also endorses fatigue.  The patient denies CP, cough, SOB, abdominal pain, N/V, constipation, diarrhea.  The patient denies fever, chills, night sweats, weight loss, new lumps or bumps, easy bruising or bleeding.    Past Medical History:   Past Medical History:   Diagnosis Date    A-fib     Anemia     Anticoagulant long-term use     Arthritis     Cataracts, bilateral     CHF (congestive heart failure)     chronic diastolic    Coronary artery disease     Nonobstructive    Essential (primary) hypertension 10/05/2010    Hyperlipidemia     Hyponatremia     Mitral regurgitation     Osteopenia     Pancreatic insufficiency     Primary pancreatic cancer with metastasis to other site     Thrombocytopenia        Past Surgical HIstory:   Past Surgical History:   Procedure Laterality Date    CARPAL TUNNEL RELEASE Bilateral     wrist    CATARACT EXTRACTION, BILATERAL  2017    CORONARY ANGIOGRAPHY  09/01/2020    Procedure: ANGIOGRAM, CORONARY ARTERY;  Surgeon: Ivette Horne MD;  Location: [facility] CATH;  Service: Cardiology;;    DILATION AND CURETTAGE OF UTERUS      ENDOSCOPIC ULTRASOUND OF UPPER GASTROINTESTINAL TRACT Left 12/15/2021    Procedure: ULTRASOUND, UPPER GI TRACT, ENDOSCOPIC;  Surgeon: Lico Tse MD;  Location: [facility] ENDO;  Service: Endoscopy;  Laterality: Left;    ESOPHAGOGASTRODUODENOSCOPY N/A 12/15/2021    Procedure: EGD (ESOPHAGOGASTRODUODENOSCOPY);  Surgeon: Lico Tse MD;  Location: [facility] ENDO;  Service: Endoscopy;  Laterality: N/A;    INSERTION OF TUNNELED CENTRAL VENOUS CATHETER (CVC) WITH SUBCUTANEOUS PORT N/A 01/07/2022    Procedure: [id number];  Surgeon: Cas Acuña MD;  Location: [facility] OR;  Service: General;  Laterality: N/A;    LEFT HEART CATHETERIZATION  09/01/2020    Procedure: Left heart cath;  Surgeon: Ivette Horne MD;  Location: [facility] CATH;  Service: Cardiology;;    MITRAL VALVE REPAIR       "OOPHORECTOMY Left 1988    OTHER SURGICAL HISTORY      maze procedure and PHILL ligation    REPAIR OF MENISCUS OF KNEE Left     TONSILLECTOMY      vein removal         Family History:   Family History   Problem Relation Age of Onset    No Known Problems Mother     No Known Problems Father     Cancer Maternal Grandmother         Stomach    No Known Problems Maternal Grandfather     No Known Problems Paternal Grandmother     No Known Problems Paternal Grandfather     Breast cancer Sister         over 60    No Known Problems Brother        Social History:  reports that she quit smoking about 34 years ago. Her smoking use included cigarettes. She has a 30.00 pack-year smoking history. She has never used smokeless tobacco. She reports that she does not drink alcohol and does not use drugs.    Allergies:  Review of patient's allergies indicates:   Allergen Reactions    Amoxicillin-pot clavulanate Other (See Comments)     "Spaced out feeling- can't take it longer than 4 or 5 days"  Patient tolerated Zosyn    Breo ellipta [fluticasone furoate-vilanterol] Other (See Comments)     Feels jittery    Corticosteroids (glucocorticoids)      Other reaction(s): tachycardia    Erythromycin      "Spaced out"    Metoprolol succinate      "I dont feel good, it doesn't work"    Latex, natural rubber      Turns skin red around area where latex touches       Medications:  Current Outpatient Medications   Medication Sig Dispense Refill    acetaminophen (TYLENOL) 325 MG tablet Take 325 mg by mouth every 6 (six) hours as needed for Pain or Temperature greater than.      amiodarone (PACERONE) 200 MG Tab Take 1 tablet (200 mg total) by mouth once daily. 90 tablet 3    apixaban (ELIQUIS) 2.5 mg Tab Take 1 tablet (2.5 mg total) by mouth 2 (two) times daily. 60 tablet 0    ascorbic acid, vitamin C, (VITAMIN C) 500 MG tablet Take 500 mg by mouth once daily.      bisoprolol (ZEBETA) 5 MG tablet Take 2 tablets (10 mg total) by mouth every evening. 60 " tablet 0    cholecalciferol, vitamin D3, (VITAMIN D3) 25 mcg (1,000 unit) capsule Take 1,000 Units by mouth every morning.      coenzyme Q10 100 mg capsule Take 100 mg by mouth once daily.      docusate sodium (COLACE) 100 MG capsule Take 1 capsule (100 mg total) by mouth 2 (two) times daily.  0    furosemide (LASIX) 20 MG tablet Take 1 tablet (20 mg total) by mouth once daily. Hold until approved by PCP      LIDOcaine-prilocaine (EMLA) cream Apply topically as needed (30 to 60 minutes prior to chemo). 30 g 2    lipase-protease-amylase (CREON) 36,000-114,000- 180,000 unit CpDR Take 3 capsules by mouth 3 (three) times daily with meals. Take 3 capsules TID with meals and 2 capsule PRN with snacks 300 capsule 11    loperamide (IMODIUM A-D) 2 mg Tab Take 4 mg after first loose or frequent bowel movement, then 2 mg every 2 hours until 12 hours have passed without a bowel movement. 24 tablet 25    losartan (COZAAR) 100 MG tablet Take 1 tablet (100 mg total) by mouth every evening. 90 tablet 3    magnesium 250 mg Tab Take 250 mg by mouth Daily.      multivitamin (THERAGRAN) per tablet Take 1 tablet by mouth every other day.      ondansetron (ZOFRAN-ODT) 4 MG TbDL Take 4 mg by mouth every 12 (twelve) hours as needed (nausea/vomiting).      ondansetron (ZOFRAN-ODT) 8 MG TbDL Take 1 tablet (8 mg total) by mouth every 8 (eight) hours as needed (nausea/vomiting). 60 tablet 5    OXYGEN-AIR DELIVERY SYSTEMS MISC Inhale 1.5 L/min into the lungs continuous.      pravastatin (PRAVACHOL) 40 MG tablet TAKE 1 TABLET(40 MG) BY MOUTH EVERY DAY 90 tablet 3    saliva substitute combo no.9 (BIOTENE DRY MOUTH ORAL RINSE) Mwsh Swish and spit 5 mLs every evening.      sod chlor,sod bicarb/neti pot (NEILMED NASAFLO MIKE) 2 sprays by Nasal route 6 (six) times daily. Aly-Med saile nose gel spray      sodium chloride (OCEAN) 0.65 % nasal spray 2 sprays by Nasal route 6 (six) times daily. PRN  Indications: dryness of the nose      [START ON  "9/1/2022] dexAMETHasone (DECADRON) 4 MG Tab Take 2 tablets (8 mg total) by mouth once daily. Take as directed on days 2 and 3 of your chemotherapy cycle. 24 tablet 5     No current facility-administered medications for this visit.       Review of Systems   Constitutional:  Positive for fatigue. Negative for chills, fever and unexpected weight change.   Respiratory:  Negative for cough and shortness of breath.    Cardiovascular:  Negative for chest pain and palpitations.   Gastrointestinal:  Negative for abdominal pain, constipation, diarrhea, nausea and vomiting.   Musculoskeletal:  Positive for arthralgias (left hip pain).        Left flank pain   Skin:  Negative for rash.        Sore on left buttock   Neurological:  Negative for headaches.   Hematological:  Negative for adenopathy. Does not bruise/bleed easily.     ECOG Performance Status: 2   Objective:      Vitals:   Vitals:    08/31/22 1123   BP: 138/70   BP Location: Right arm   Patient Position: Sitting   BP Method: Medium (Manual)   Pulse: 70   Temp: 97.9 °F (36.6 °C)   TempSrc: Temporal   SpO2: 97%   Weight: 64.8 kg (142 lb 12 oz)   Height: 5' 3" (1.6 m)     Physical Exam  Constitutional:       General: She is not in acute distress.     Appearance: She is well-developed. She is not diaphoretic.   HENT:      Head: Normocephalic and atraumatic.   Cardiovascular:      Rate and Rhythm: Normal rate and regular rhythm.      Heart sounds: No murmur heard.    No friction rub. No gallop.   Pulmonary:      Effort: Pulmonary effort is normal. No respiratory distress.      Breath sounds: Normal breath sounds. No wheezing or rales.   Chest:      Chest wall: No tenderness.   Abdominal:      General: Bowel sounds are normal. There is no distension.      Palpations: Abdomen is soft. There is no mass.      Tenderness: There is no abdominal tenderness. There is no guarding or rebound.   Musculoskeletal:         General: No tenderness. Normal range of motion.      Right lower " leg: No edema.      Left lower leg: No edema.   Lymphadenopathy:      Cervical: No cervical adenopathy.      Upper Body:      Right upper body: No supraclavicular or axillary adenopathy.      Left upper body: No supraclavicular or axillary adenopathy.   Skin:     Findings: No erythema or rash.   Neurological:      Mental Status: She is alert and oriented to person, place, and time.   Psychiatric:         Behavior: Behavior normal.       Laboratory Data:  Hospital Outpatient Visit on 08/29/2022   Component Date Value Ref Range Status    POC Glucose 08/29/2022 103  70 - 110 MG/DL Final   Lab Visit on 08/29/2022   Component Date Value Ref Range Status    WBC 08/29/2022 4.82  3.90 - 12.70 K/uL Final    RBC 08/29/2022 4.07  4.00 - 5.40 M/uL Final    Hemoglobin 08/29/2022 12.1  12.0 - 16.0 g/dL Final    Hematocrit 08/29/2022 37.8  37.0 - 48.5 % Final    MCV 08/29/2022 93  82 - 98 fL Final    MCH 08/29/2022 29.7  27.0 - 31.0 pg Final    MCHC 08/29/2022 32.0  32.0 - 36.0 g/dL Final    RDW 08/29/2022 19.6 (H)  11.5 - 14.5 % Final    Platelets 08/29/2022 157  150 - 450 K/uL Final    MPV 08/29/2022 8.6 (L)  9.2 - 12.9 fL Final    Immature Granulocytes 08/29/2022 0.6 (H)  0.0 - 0.5 % Final    Gran # (ANC) 08/29/2022 3.1  1.8 - 7.7 K/uL Final    Immature Grans (Abs) 08/29/2022 0.03  0.00 - 0.04 K/uL Final    Comment: Mild elevation in immature granulocytes is non specific and   can be seen in a variety of conditions including stress response,   acute inflammation, trauma and pregnancy. Correlation with other   laboratory and clinical findings is essential.      Lymph # 08/29/2022 0.9 (L)  1.0 - 4.8 K/uL Final    Mono # 08/29/2022 0.7  0.3 - 1.0 K/uL Final    Eos # 08/29/2022 0.1  0.0 - 0.5 K/uL Final    Baso # 08/29/2022 0.02  0.00 - 0.20 K/uL Final    nRBC 08/29/2022 0  0 /100 WBC Final    Gran % 08/29/2022 63.5  38.0 - 73.0 % Final    Lymph % 08/29/2022 19.5  18.0 - 48.0 % Final    Mono % 08/29/2022 14.1  4.0 - 15.0 % Final     Eosinophil % 08/29/2022 1.9  0.0 - 8.0 % Final    Basophil % 08/29/2022 0.4  0.0 - 1.9 % Final    Differential Method 08/29/2022 Automated   Final    Sodium 08/29/2022 142  136 - 145 mmol/L Final    Potassium 08/29/2022 4.8  3.5 - 5.1 mmol/L Final    Chloride 08/29/2022 100  95 - 110 mmol/L Final    CO2 08/29/2022 35 (H)  23 - 29 mmol/L Final    Glucose 08/29/2022 93  70 - 110 mg/dL Final    BUN 08/29/2022 20  8 - 23 mg/dL Final    Creatinine 08/29/2022 0.6  0.5 - 1.4 mg/dL Final    Calcium 08/29/2022 9.8  8.7 - 10.5 mg/dL Final    Total Protein 08/29/2022 6.6  6.0 - 8.4 g/dL Final    Albumin 08/29/2022 3.7  3.5 - 5.2 g/dL Final    Total Bilirubin 08/29/2022 0.5  0.1 - 1.0 mg/dL Final    Comment: For infants and newborns, interpretation of results should be based  on gestational age, weight and in agreement with clinical  observations.    Premature Infant recommended reference ranges:  Up to 24 hours.............<8.0 mg/dL  Up to 48 hours............<12.0 mg/dL  3-5 days..................<15.0 mg/dL  6-29 days.................<15.0 mg/dL      Alkaline Phosphatase 08/29/2022 57  55 - 135 U/L Final    AST 08/29/2022 21  10 - 40 U/L Final    ALT 08/29/2022 18  10 - 44 U/L Final    Anion Gap 08/29/2022 7 (L)  8 - 16 mmol/L Final    eGFR 08/29/2022 >60.0  >60 mL/min/1.73 m^2 Final    CA 19-9 08/29/2022 1316.1 (H)  0.0 - 40.0 U/mL Final    Comment: The testing method is a chemiluminescent microparticle immunoassay   manufactured by [employer] and performed on the    or   [employer] system. Values obtained with different assay manufacturers   for   methods may be different and cannot be used interchangeably.      Magnesium 08/29/2022 2.2  1.6 - 2.6 mg/dL Final         Imaging:     PET/CT 8/29/22  Head/neck:     No significant abnormal hypermetabolic foci are identified within the head and neck region.  No lymphadenopathy is present.     Chest:     A previously hypermetabolic right upper lobe apical  metastatic nodule has decreased in size and become essentially non metabolic.  On image 47 the nodule now measures 0.8 cm compared to 1.2 cm previously.  A subpleural non metabolic nodule in the posterior right upper lobe is unchanged in size and appearance on image 53 measures 8 mm.  There is new mildly hypermetabolic pleural thickening in the posterior right lower lobe of uncertain etiology.  The pleural thickening has a max SUV of 2.6 and may be inflammatory in nature.  No discrete pleural mass is identified and no pleural effusion is present.  No hypermetabolic lymphadenopathy is present within the chest.     Abdomen/pelvis:     Since the prior study, there has been interval progression of hepatic metastatic disease.  A hypermetabolic the mass in the posterior segment of the right hepatic lobe has increased in size and degree of hypermetabolism.  On image 116 the mass now measures 3.7 x 3.4 cm compared to 1.9 x 1.5 cm previously and has a max SUV of 7.1 compared to 4.8 previously.  There is also a new hypermetabolic nodular mass in the posterior segment of the right hepatic lobe.  On image 144 the mass measures 1.3 x 0.8 cm and has a max SUV of 4.5.  The hypermetabolic pancreatic tail adenocarcinoma has increased slightly in size since the prior study and can only be accurately measured on the fused PET-CT images.  On image 135 the mass measures 2.2 x 1.6 cm compared to 2.2 x 1.2 cm previously and has a max SUV of 4.7 compared to 6.2 previously.  The presumed left adrenal metastasis which measures approximately 2 cm continues to exhibit no significant hypermetabolism.     Skeleton:     Since the prior study, there has been interval progression of osseous metastatic disease.  A hypermetabolic metastasis in the medial left iliac bone has become slightly sclerotic and has increased in size and degree of hypermetabolism.  On image 180 of the axial fused PET-CT images the metastasis now measures 3.0 x 1.8 cm compared  to 2.6 x 1.3 cm previously and has a max SUV of 6.6 compared to 5.2 previously.  A hypermetabolic metastasis within the L1 vertebral body has increased in size and is now so seated with a pathologic compression fracture involving the superior endplate resulting in 50% loss of vertebral body height and retropulsion of the posterior vertebral body cortex causing moderate spinal canal stenosis.  The vertebral body metastasis now measures 4.8 x 3.1 cm compared to 2.7 x 2.0 cm previously and has a max SUV of 12.0 compared 8.7 previously.  No new hypermetabolic foci are identified within the skeleton.      Assessment:       1. Primary pancreatic cancer with metastasis to other site    2. Metastasis to liver    3. 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 Bone metastasis    5. Insomnia, unspecified type    6. Cancer related pain    7. 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therapy    8. Atrial fibrillation, unspecified type    9. Pancreatic insufficiency    10. Hypertension, unspecified type    11. Hyperlipidemia, unspecified hyperlipidemia type    12. Chronic diastolic congestive heart failure             Plan:   Metastatic Pancreatic Cancer - The patient was diagnosed with metastatic pancreatic cancer adenocarcinoma with mets to the liver on 12/15/21  -Ca19-9 was 7,581 on 12/15/21  -PET/CT on 1/04/22 shows mets to the liver, left adrenal gland, T9 vertebra and left iliac fossa.  -Potential mets are seen near the umbilicus  -PT underwent PORT placement 1/07/22  -MRI brain on 1/11/22 showed no brain mets  -Pt consented for Gemcitabine and paclitaxel on 1/03/22  -Pt treated with 20% dose reduction in paclitaxel and Gemcitabine to 100mg/mm2 and 800mg/mm2 respectively  -C2D8 and C2D15 cancelled given her hospitalization from 2/12/22-2/14/22  -Patient completed C3D8 and was hospitalized again for a fever  -Treatment moving forward was discussed with the patient and decision was made to remove day 8 of treatment  -PET/CT on  3/16/22 shows stable disease  -Ca19-9 has dropped dramatically to 170 on 3/16/22  -C3D15 delayed given cardiac ablation on 03/17/2022  -Will continue [place]/Abraxane at 20% does reduction due to prior thrombocytopenia  -Pt underwent C5D1 5/02/22  -Guardant 360 testing 1/26/22 showed STACY [id number] 0.3% amplification, KRAS G12D 0.3% amplification, MSI-stable, ARID1A C5390G 0.4%, KRAS G12D 0.3%, STACY H5016wy, STK11 S59T 0.2%, and BRCA1 R979C 0.1%.  No BRCA2 was detected  -Concern was the pt's hypoxia was a reaction to Gemcitabine with pneumonitis  -PET/CT 6/20/22 showed progression of disease with new pulmonary nodules, enlarging pancreatic mass, new hepatic nodules and bony lesions in the L1 vertebral body and left iliac bone  -Pt started on Liposomal irinotecan and Fllorouracil with 20% dose reduction  -Pt completed 5 cycles  -PET/CT on 8/19/22 showed progression of disease  -Treatment with FOLFOX discussed  -Patient was consented for chemotherapy today 8/31/2022 Fluorouracil, Oxaliplatin and Leucovorin.   An extensive discussion was had which included a thorough discussion of the risk and benefits of treatment and alternatives.  Risks, including but not limited to, possible hair loss, bone marrow damage (anemia, thrombocytopenia, immune suppression, neutropenia), damage to body organs (brain, heart, liver, kidney, lungs, nervous system, skin, and others), allergic reactions, sterility, nausea/vomiting, constipation/diarrhea, sores in the mouth, secondary cancers, local damage at possible injection sites, and rarely death were all discussed.  The patient agrees with the plan, and all questions have been answered to their satisfaction.  Consent was signed the patient, provider, and a third party witness.    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  -Pt currently on 2L O2  -Pt to follow up with pulmonary  -Will monitor     A-fib - pt to underwent cardioversion 7/19/22  -Pt on amiodarone and in NSR  -PT on Eliquis  -Stable  -Management per cardiology     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  -No current signs of infection  -Pt given Evusheld on 6/09/22  -Will monitor     Pancreatic Insufficiency - pt on creon  -Pt taking 3 pills with meals and 2 with snacks with improvement in diarrhea  -Will monitor     HTN - pt taking losartan, bisoprolol  -Cardiology managing  -Will monitor     HLD - pt on pravastatin  -Management per PCP     CHF - pt with CHF exacerbation and hospitalized from 3/25/22 to 3/29/22 wioth 9.5L removed  -Pt hospitalized again from 5/15/22 to 5/20/22 for possible CHF exacerbation  -Pt taking lasix daily  -Management per cardiology    Advance Care Planning     Power of   I initiated the process of advance care planning today and explained the importance of this process to the patient.  I introduced the concept of advance directives to the patient, as well. Then the patient received detailed information about the importance of designating a Health Care Power of  (HCPOA). She was also instructed to communicate with this person about their wishes for future healthcare, should she become sick and lose decision-making capacity. The patient has not previously appointed a HCPOA. After our discussion, the patient has decided to complete a HCPOA and will bring the form completed to her next clinic appointment.            Route Chart for Scheduling    Med Onc Chart Routing      Follow up with physician 2 weeks. The patient can proceed with  treatment.  She needs labs CBC, CMP, Mg, phos, Ca19-9 on 9/12/22 with an appt with me and treatment on 9/14/22.   Follow up with IVONNE    Infusion scheduling note    Injection scheduling note    Labs    Imaging    Pharmacy appointment    Other referrals        Treatment Plan Information   OP mFOLFOX 6 Q2W   Salvador Green MD   Upcoming Treatment Dates - OP mFOLFOX 6 Q2W    9/14/2022       Chemotherapy       oxaliplatin (ELOXATIN) 85 mg/m2 = 145 mg in dextrose 5 % 500 mL chemo infusion       leucovorin calcium 400 mg/m2 = 680 mg in dextrose 5 % 250 mL infusion       fluorouraciL injection 680 mg       fluorouracil (ADRUCIL) 2,400 mg/m2 = 4,080 mg in sodium chloride 0.9% 100 mL chemo infusion       Antiemetics       palonosetron 0.25mg/dexamethasone 12mg in NS IVPB 0.25 mg  9/28/2022       Chemotherapy       oxaliplatin (ELOXATIN) 85 mg/m2 = 145 mg in dextrose 5 % 500 mL chemo infusion       leucovorin calcium 400 mg/m2 = 680 mg in dextrose 5 % 250 mL infusion       fluorouraciL injection 680 mg       fluorouracil (ADRUCIL) 2,400 mg/m2 = 4,080 mg in sodium chloride 0.9% 100 mL chemo infusion       Antiemetics       palonosetron 0.25mg/dexamethasone 12mg in NS IVPB 0.25 mg  10/12/2022       Chemotherapy       oxaliplatin (ELOXATIN) 85 mg/m2 = 145 mg in dextrose 5 % 500 mL chemo infusion       leucovorin calcium 400 mg/m2 = 680 mg in dextrose 5 % 250 mL infusion       fluorouraciL injection 680 mg       fluorouracil (ADRUCIL) 2,400 mg/m2 = 4,080 mg in sodium chloride 0.9% 100 mL chemo infusion       Antiemetics       palonosetron 0.25mg/dexamethasone 12mg in NS IVPB 0.25 mg  10/26/2022       Chemotherapy       oxaliplatin (ELOXATIN) 85 mg/m2 = 145 mg in dextrose 5 % 500 mL chemo infusion       leucovorin calcium 400 mg/m2 = 680 mg in dextrose 5 % 250 mL infusion       fluorouraciL injection 680 mg       fluorouracil (ADRUCIL) 2,400 mg/m2 = 4,080 mg in sodium chloride 0.9% 100 mL chemo infusion       Antiemetics        palonosetron 0.25mg/dexamethasone 12mg in NS IVPB 0.25 mg    Therapy Plan Information  diphenhydrAMINE capsule 25 mg  25 mg, Oral, PRN  predniSONE tablet 40 mg  40 mg, Oral, PRN  EPINEPHrine (EPIPEN) 0.3 mg/0.3 mL pen injection 0.3 mg  0.3 mg, Intramuscular, PRN  ondansetron disintegrating tablet 4 mg  4 mg, Oral, PRN  acetaminophen tablet 650 mg  650 mg, Oral, PRN  albuterol inhaler 2 puff  2 puff, Inhalation, PRN      Salvador Green MD  [facility]  Hematology and Oncology  [facility]   [address]   [place] [address]   O: [phone]  F: [phone]

## 2022-08-31 NOTE — PLAN OF CARE
Problem: Adult Inpatient Plan of Care  Goal: Patient-Specific Goal (Individualized)  Outcome: Ongoing, Progressing  Flowsheets (Taken 8/31/2022 1600)  Anxieties, Fears or Concerns: None  Individualized Care Needs: Recliner, blanket, oxygen  Patient-Specific Goals (Include Timeframe): Infection prevention during treatment.     Problem: Adult Inpatient Plan of Care  Goal: Optimal Comfort and Wellbeing  Intervention: Provide Person-Centered Care  Flowsheets (Taken 8/31/2022 1600)  Trust Relationship/Rapport:   care explained   questions encouraged     Problem: Adult Inpatient Plan of Care  Goal: Plan of Care Review  Outcome: Ongoing, Progressing  Flowsheets (Taken 8/31/2022 1600)  Plan of Care Reviewed With: patient  Tolerated treatment with no known distress.  Ambulated from infusion center with use of cane.

## 2022-09-02 NOTE — PLAN OF CARE
Problem: Adult Inpatient Plan of Care  Goal: Plan of Care Review  Outcome: Ongoing, Progressing  Goal: Patient-Specific Goal (Individualized)  Outcome: Ongoing, Progressing     Problem: Fatigue  Goal: Improved Activity Tolerance  Outcome: Ongoing, Progressing   Pt tolerated pump d/c well.   No adverse reaction noted.  PAC flushed with NS and de-accessed per protocol.   Pt left clinic in no acute distress.

## 2022-09-12 PROBLEM — R09.02 HYPOXEMIA: Status: ACTIVE | Noted: 2022-01-01

## 2022-09-14 NOTE — PROGRESS NOTES
"3:47 PM  This LCSW met with this pt at chairside to check in and provide support.  The pt reported having back pain and requested I help her adjust her chair.   The pt spoke about her daughter who keeps track of all of her appointments and brings her to all of her doctor's appts'. She said, " I don't know what I would do without her"    The pt requested I pour some of her coffee out bc it was too full and then requested I move the trash can for her.    This LCSW asked the pt if she had any nutritional concerns and the pt stated, "I know I did but I cannot remember them"  This LCSW provided the pt with paper and a pen and suggested when/if she remembers to write it down and thn request a dietitian.     The pt reported no immediate needs at this time.      "

## 2022-09-14 NOTE — PROGRESS NOTES
PSYCHO-ONCOLOGY NOTE/ Individual Psychotherapy     Date: 9/14/2022   Site:  [facility]      Therapeutic Intervention: Met with patient.  Outpatient - Insight oriented psychotherapy 45 min - CPT code 66716, Outpatient - Behavior modifying psychotherapy 45 min - CPT code 55003, and Outpatient - Supportive psychotherapy 45 min - CPT Code 81344    This includes face to face time and non-face to face time preparing to see the patient, obtaining and/or reviewing separately obtained history, documenting clinical information in the electronic or other health record, independently interpreting results and communicating results to the patient/family/caregiver, or care coordinator.      Patient was last seen by me on 6/14/2022    Problem list  Patient Active Problem List   Diagnosis    Pure hypercholesterolemia    Essential (primary) hypertension    ACP (advance care planning)    Osteopenia    Nutritional deficiency    Nerve disease, peripheral    Vitamin D deficiency    AR (allergic rhinitis)    Stenosis of right carotid artery    Hyperglycemia    Non-occlusive coronary artery disease    S/P MVR (mitral valve repair) 07/2016 for severe MR, Dr. Armando    S/P Maze operation for atrial fibrillation + PHILL resection Dr. Armando 07/2016    Fall    Primary pancreatic cancer with metastasis to other site    Metastasis to liver    Hyponatremia    Venous stasis dermatitis    Thrombocytopenia    Fever    Other constipation    PAF (paroxysmal atrial fibrillation)    Mixed hyperlipidemia    Chronic diastolic heart failure    Acute respiratory failure with hypoxia    Elevated BUN    Chronic bilateral pleural effusions    Postnasal drip    High risk medication use    Bone metastasis    Hypoxemia       Chief complaint/reason for encounter: depression and anxiety     Met with patient to evaluate psychosocial adaptation to diagnosis/treatment/survivorship of pancreatic cancer    Current Medications  Current Outpatient Medications    Medication    acetaminophen (TYLENOL) 325 MG tablet    amiodarone (PACERONE) 200 MG Tab    apixaban (ELIQUIS) 2.5 mg Tab    ascorbic acid, vitamin C, (VITAMIN C) 500 MG tablet    bisoprolol (ZEBETA) 5 MG tablet    cholecalciferol, vitamin D3, (VITAMIN D3) 25 mcg (1,000 unit) capsule    coenzyme Q10 100 mg capsule    dexAMETHasone (DECADRON) 4 MG Tab    docusate sodium (COLACE) 100 MG capsule    furosemide (LASIX) 20 MG tablet    LIDOcaine-prilocaine (EMLA) cream    lipase-protease-amylase (CREON) 36,000-114,000- 180,000 unit CpDR    loperamide (IMODIUM A-D) 2 mg Tab    losartan (COZAAR) 100 MG tablet    magnesium 250 mg Tab    multivitamin (THERAGRAN) per tablet    ondansetron (ZOFRAN-ODT) 4 MG TbDL    ondansetron (ZOFRAN-ODT) 8 MG TbDL    OXYGEN-AIR DELIVERY SYSTEMS MISC    pravastatin (PRAVACHOL) 40 MG tablet    saliva substitute combo no.9 (BIOTENE DRY MOUTH ORAL RINSE) Mwsh    sod chlor,sod bicarb/neti pot (NEILMED NASAFLO MIKE)    sodium chloride (OCEAN) 0.65 % nasal spray     No current facility-administered medications for this visit.       ONCOLOGY HISTORY  Oncology History   Primary pancreatic cancer with metastasis to other site   12/22/2021 Initial Diagnosis    Adenocarcinoma of pancreas     12/22/2021 Cancer Staged    Staging form: Exocrine Pancreas, AJCC 8th Edition  - Clinical: Stage IV (cT3, cN0, pM1)       1/12/2022 - 5/2/2022 Chemotherapy    Treatment Summary   Plan Name: OP PANC NAB-PACLITAXEL + GEMCITABINE Q4W  Treatment Goal: Palliative  Status: Inactive  Start Date: 1/12/2022  End Date: 5/2/2022  Provider: Salvador Green MD  Chemotherapy: gemcitabine (GEMZAR) 1,375 mg in sodium chloride 0.9% 250 mL chemo infusion, 800 mg/m2 = 1,375 mg (100 % of original dose 800 mg/m2), Intravenous, Clinic/HOD 1 time, 5 of 12 cycles  Dose modification: 800 mg/m2 (original dose 800 mg/m2, Cycle 1)  Administration: 1,375 mg (1/12/2022), 1,385 mg (1/19/2022), 1,385 mg (1/26/2022), 1,400 mg (2/9/2022), 1,400 mg  (2/23/2022), 1,400 mg (3/2/2022), 1,400 mg (3/21/2022), 1,400 mg (4/4/2022), 1,400 mg (4/19/2022), 1,370 mg (5/2/2022)       6/22/2022 - 8/19/2022 Chemotherapy    Treatment Summary   Plan Name: OP LIPOSOMAL IRINOTECAN FLUOROURACIL LEUCOVORIN Q2W  Treatment Goal: Palliative  Status: Inactive  Start Date: 6/22/2022  End Date: 8/19/2022  Provider: Salvador Green MD  Chemotherapy: dexAMETHasone (DECADRON) 4 MG Tab, 8 mg, Oral, Daily, 1 of 1 cycle, Start date: 6/10/2022, End date: 8/30/2022  loperamide (IMODIUM A-D) 2 mg Tab, 1 of 1 cycle, Start date: 6/9/2022, End date: --  leucovorin calcium 320 mg/m2 = 540 mg in dextrose 5 % 250 mL infusion, 320 mg/m2 = 540 mg (80 % of original dose 400 mg/m2), Intravenous, Clinic/HOD 1 time, 5 of 24 cycles  Dose modification: 320 mg/m2 (80 % of original dose 400 mg/m2, Cycle 1, Reason: Other (see comments), Comment: MD Preference)  Administration: 540 mg (6/22/2022), 555 mg (7/6/2022), 555 mg (7/20/2022), 555 mg (8/3/2022), 555 mg (8/17/2022)  irinotecan liposomaL (ONIVYDE) 56 mg/m2 = 94.643 mg in sodium chloride 0.9% 500 mL chemo infusion, 56 mg/m2 = 94.643 mg (80 % of original dose 70 mg/m2), Intravenous, Clinic/HOD 1 time, 5 of 24 cycles  Dose modification: 56 mg/m2 (80 % of original dose 70 mg/m2, Cycle 1, Reason: Other (see comments), Comment: Md discretion)  Administration: 94.643 mg (6/22/2022), 96.879 mg (7/6/2022), 96.879 mg (7/20/2022), 96.879 mg (8/3/2022), 96.879 mg (8/17/2022)       8/31/2022 -  Chemotherapy    Treatment Summary   Plan Name: OP mFOLFOX 6 Q2W  Treatment Goal: Palliative  Status: Active  Start Date: 8/31/2022  End Date: 2/3/2023 (Planned)  Provider: Salvador Green MD  Chemotherapy: dexAMETHasone (DECADRON) 4 MG Tab, 8 mg, Oral, Daily, 1 of 1 cycle, Start date: 9/1/2022, End date: --  fluorouraciL injection 680 mg, 400 mg/m2 = 680 mg, Intravenous, Clinic/HOD 1 time, 2 of 12 cycles  Administration: 680 mg (8/31/2022), 680 mg (9/14/2022)  leucovorin  calcium 400 mg/m2 = 680 mg in dextrose 5 % 250 mL infusion, 400 mg/m2 = 680 mg, Intravenous, Clinic/HOD 1 time, 2 of 12 cycles  Administration: 680 mg (8/31/2022), 680 mg (9/14/2022)  oxaliplatin (ELOXATIN) 85 mg/m2 = 145 mg in dextrose 5 % 500 mL chemo infusion, 85 mg/m2 = 145 mg, Intravenous, Clinic/HOD 1 time, 2 of 12 cycles  Administration: 145 mg (8/31/2022), 145 mg (9/14/2022)           Objective:  Nel Cui arrived promptly for the session, meeting during her scheduled infusion. The patient was fully cooperative throughout the session.  Appearance: age appropriate, casually  dressed, well groomed  Behavior/Cooperation: friendly and cooperative  Speech: normal in rate, volume, and tone and appropriate quality, quantity and organization of sentences  Mood: anxious  Affect: mood congruent and appropriate  Thought Process: goal-directed, logical  Thought Content: normal,  No delusions or paranoia; did not appear to be responding to internal stimuli during the session  Orientation: grossly intact  Memory: grossly intact  Attention Span/Concentration: Attends to session without distraction; reports no difficulty  Fund of Knowledge: average  Estimate of Intelligence: average from verbal skills and history  Cognition: grossly intact  Insight: patient has awareness of illness; good insight into own behavior and behavior of others  Judgment: the patient's behavior is adequate to circumstances    NCCN Distress thermometer:   DISTRESS SCREENING 8/17/2022 6/9/2022 4/19/2022 3/21/2022 3/2/2022 2/9/2022 1/26/2022   Distress Score 0 - No Distress - 0 1 1 3 3   Practical Problems None of these - - - - - -   Family Problems None of these - - - - - -   Emotional Problems None of these - - - - - -   Spritual / Rastafarian Concerns No No No No No No No   Physical Problems None of these - - - - - -        Interval history and content of current session: Discussed illness progression and adaptation to tx, exploring  "associated increases in reduced mood and increased anxiety. Reports to be coping with moderate difficulty, describing continued difficulties in accepting aid/communicating w/ oldest daughter (Brinda) and further indicating that the majority of her distress extends from this interpersonal relationship. Evaluated cognitive response, paying particular attention to negative intrusive thoughts of a persistent and detrimental nature. Thoughts of this type are in evidence with moderate distress and are associated w/ "feeling like a child" in the presence of her caregiver. Provided cognitive behavioral therapy to address negative cognitions. Encouraged use of inclusive language to prompt greater collaboration w/ caregiver and further explored cognitive flexibility strategies. Identified and evaluated psychosocial and environmental stressors secondary to diagnosis and treatment.  Examined proactive behaviors that may be implemented to minimize or ameliorate psychosocial stressors secondary to diagnosis and treatment.     Risk parameters:   Patient reports no suicidal ideation  Patient reports no homicidal ideation  Patient reports no self-injurious behavior  Patient reports no violent behavior   Safety needs:  None at this time      Verbal deficits: None     Patient's response to intervention:The patient's response to intervention is accepting, motivated.     Progress toward goals and other mental status changes:  The patient's progress toward goals is fair .      Progress to date:Progress - Ongoing, but Slow      Goals from last visit: Attempted, not met        Patient Strengths: verbal, intelligent, successful, good social support, good insight, commitment to wellness, strong clifford, strong cultural traditions        Treatment Plan:individual psychotherapy  Target symptoms: depression, anxiety   Why chosen therapy is appropriate versus another modality: relevant to diagnosis, patient responds to this modality, evidence " based practice  Outcome monitoring methods: self-report, observation, feedback from family  Therapeutic intervention type: behavior modifying psychotherapy, supportive psychotherapy  Prognosis: Excellent                            Behavioral goals:               Social engagement: continued as per CDC COVID-19 contact guidelines, inclusive language/collaboration              Therapy: behavioral activation strategies, thought awareness (for discussion at follow up)    Return to clinic: 2 weeks     Length of Service (minutes direct face-to-face contact): 45    Diagnosis:     ICD-10-CM ICD-9-CM   1. Adjustment disorder with mixed anxiety and depressed mood  F43.23 309.28   2. Malignant neoplasm of tail of pancreas   C25.2 157.2                [place]eron[place] Roland License #[id number]  [place] License #[id number]

## 2022-09-14 NOTE — TELEPHONE ENCOUNTER
"Daughter @  .  Asking for PT HH & HH Aide x 1-2/week to assist w/ bathing.  "Mama is becoming so weak and frail, she's so scared when it's time to bathe.  Maybe PT can help strengthen her."    Emotional support given.  Instructed this RN will pass along message to Dr. Green.  Daughter verbally acknowledges understanding.  No further questions or concerns currently    Lissett Bradford RN     "

## 2022-09-14 NOTE — TELEPHONE ENCOUNTER
I called the patient's daughter who informed me the patient was having difficulty bathing and requiring additional assistance.  I instructed her we could have home health set up with an aide as well as PT/OT.  She expressed understanding.  All questions were answered to her satisfaction.    Salvador Green MD  [facility]  Hematology and Oncology  [facility]   [address] [place] [address]   O: [phone]  F: [phone]

## 2022-09-14 NOTE — PLAN OF CARE
Problem: Adult Inpatient Plan of Care  Goal: Patient-Specific Goal (Individualized)  Outcome: Ongoing, Progressing  Flowsheets (Taken 9/14/2022 1613)  Anxieties, Fears or Concerns: None  Individualized Care Needs: Recliner, blanket, oxygen  Patient-Specific Goals (Include Timeframe): Infection prevention during treatment.     Problem: Fatigue  Goal: Improved Activity Tolerance  Intervention: Promote Improved Energy  Flowsheets (Taken 9/14/2022 1613)  Fatigue Management:   activity schedule adjusted   frequent rest breaks encouraged   paced activity encouraged   fatigue-related activity identified  Sleep/Rest Enhancement:   regular sleep/rest pattern promoted   relaxation techniques promoted  Activity Management:   Ambulated -L4   Ambulated in villarreal - L4     Problem: Adult Inpatient Plan of Care  Goal: Plan of Care Review  Outcome: Ongoing, Progressing  Flowsheets (Taken 9/14/2022 1613)  Plan of Care Reviewed With: patient  Tolerated treatment with no known distress.  Ambulated from infusion center with use of walker.

## 2022-09-14 NOTE — PROGRESS NOTES
PATIENT: Nel Cui  MRN: [id number]  DATE: 9/14/2022      Diagnosis:   1. Primary pancreatic cancer with metastasis to other site    2. Metastasis to liver    3. 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 Bone metastasis    5. Insomnia, unspecified type    6. Cancer related pain    7. 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therapy    8. Atrial fibrillation, unspecified type    9. Pancreatic insufficiency    10. Hypertension, unspecified type    11. Hyperlipidemia, unspecified hyperlipidemia type    12. Chronic diastolic congestive heart failure            Chief Complaint: Pancreatic Cancer      Oncologic History:      Oncologic History 12/09/21 Ct Abdomen  12/15/21 EUS  1/04/22 PET/CT  1/07/22 EITAN - Dr Acuña  1/11/22 MRI Brain  3/16/22 PET/CT  6/20/22 PET/CT  8/29/22 PET/CT    Oncologic Treatment 1/12/22 - 5/02/22 Gemcitabine and Paclitaxel s/p C5D1  6/22/22 - current s/p cycle 5 Liposomal Irinotecan and Fluorouracil     Pathology 12/15/21 adenocarcinoma in the pancreatic body and adenocarcinoma in the liver       The patient initially presented to her PCP on 12/02/21 with complaint of abdominal pain.  She underwent a CT of the abdomen on 12/09/21 showing subcentimeter para-aortic, mesenteric lymph nodes; multiple hepatic infiltrative lesions present throughout the liver with 1 of the larger areas right liver lobe measuring approximately 2.2 cm in diameter; mass in the pancreatic tail measuring 9 x 3.3 cm.  The patient underwent EUS under the care of Dr Tse on 12/15/21 showing a mass in the pancreatic body and multiple liver lesions.  Path from the procedure showed adenocarcinoma in the pancreatic body and adenocarcinoma in the liver.    The patient underwent PET/CT on 1/04/22 showing hypermetabolic rounded mass at the junction of the body and tail of the pancreas measuring 2.5 x 2.4 cm, hypermetabolic left adrenal gland nodule measuring 1.9 cm, innumerable hypermetabolic nodular masses throughout the  left and right hepatic lobes with largest lesions in the right liver measuring 2.5 x 1.9 cm and 5.2 x 4.4 cm, mildly enlarged hypermetabolic portacaval lymph node measuring 1.4 cm, 0.7 cm hypermetabolic focus at the umbilicus, lesion in the T9 vertebra measuring 1.8 x 1.7 cm and a lesion in the left iliac fossa measuring 1.7 x 1.2 cm.  MRI of the brain on 01/11/2022 showed no evidence of metastases but did show an old right midbrain lacunar infarct.   The patient was in the hospital from 1/28/22-1/29/22 with fever and low sodium.  The patient's hydrochlorothiazide was discontinued.  The patient then presented to the hospital on 02/06/2022 for dyspnea on exertion.  CTA was done on 02/06/2022 showing no evidence of pulmonary embolism.  The patient underwent NT proBNP which was elevated at 2630 pg/mL.  The patient was started on Lasix every other day at the request of Cardiology.   The patient was admitted to the hospital from 2/12/22-2/14/22 for fever.  The patient was discharged on Levaquin for 5 days.  The patient saw Cardiology on 02/17/2022 for atrial fibrillation and was started on Eliquis and taken off of aspirin.     The the patient was admitted to the hospital from 1785-2478 for fever.  During her hospitalization procalcitonin was checked and was normal.  All her cultures were negative.  Her fever was felt to be from chemotherapy.    The patient underwent PET-CT on 03/16/2022 showing a followed of mm pulmonary based nodule; decrease metabolic activity in the liver mass with stable right lobe mass measuring 5.2 x 4.2 cm; right lobe mass measuring 2.2 x 1.8 cm; adrenal gland thickening of 1.9 cm; decrease in pancreatic mass measuring 2 cm; decreased metabolic activity of hypermetabolic mesenteric foci and right-sided pleural effusion.  The patient underwent cardioversion on 03/17/2022 at which point constantine was performed.  Patient was put back in normal sinus rhythm.    The patient was admitted to the hospital for  CHF exacerbation from 3/25/22 to 3/29/22 and was diuresed a little over 9.5L.    The patient was admitted to the hospital from 5/15/22-5/20/22 for hypoxia.  CTA chest on 5/15/22 showed diffuse faint ground-glass opacities in the lungs bilaterally.   NT-ProNP on admission was elevated at 5,220pg/mL.  The patient was initially diuresed without much improvement.  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  The patient was eventually weaned to 1.5L O2.  Repeat CXR on 5/26/22 showed improvement in her intrapulmonary process.      The pt underwent cardioversion on 6/16/22 and was started on amiodarone.  PET/CT completed on 6/20/22 showed a new to 1.2 cm subpleural nodule in the right upper lobe; enlarging right upper lobe nodule measuring 8 mm; resolution of right pleural effusion compared to prior studies; enlargement of pancreatic mass now 2.2 x 1.2 cm; stable 2 cm left adrenal nodule; new hypermetabolic nodule in the right hepatic lobe measuring 1.9 x 1.5 cm; decrease in size of separate nodule in the right hepatic lobe measuring 3 x 2.4 cm; new hypermetabolic focus in the L1 vertebral body measuring 2.7 x 2 cm; and 2.6 x 1.3 new hypermetabolic lesion in the left iliac bone.    Subjective:     Interval History: Ms. Cui is a 87 y.o. female with Osteopenia, HLD, HTN, pancreatic cancer who presents for fever.  Since the last clinic visit the patient underwent PET-CT on 08/29/2022 showing decrease in right upper lobe nodule measuring 0.8 cm from 1.2 cm; stable subpleural non metabolic right upper lobe nodule measuring 8 mm; new mildly hypermetabolic pleural thickening in the posterior right lower lobe; interval progression of metastatic disease in the liver; hypermetabolic metastasis in the medial left iliac bone increase in size and degree of hypermetabolism; and hypermetabolic metastasis at the L1 vertebral body with pathologic compression fracture.    The patient  endorses occasional left hip pain and left flank pain.  She also endorses fatigue.  The patient denies CP, cough, SOB, abdominal pain, N/V, constipation, diarrhea.  The patient denies fever, chills, night sweats, weight loss, new lumps or bumps, easy bruising or bleeding.    cONTINUED PAIN IN THE LEFT FLANK.  WORSE IN am.      Past Medical History:   Past Medical History:   Diagnosis Date    A-fib     Anemia     Anticoagulant long-term use     Arthritis     Cataracts, bilateral     CHF (congestive heart failure)     chronic diastolic    Coronary artery disease     Nonobstructive    Essential (primary) hypertension 10/05/2010    Hyperlipidemia     Hyponatremia     Mitral regurgitation     Osteopenia     Pancreatic insufficiency     Primary pancreatic cancer with metastasis to other site     Thrombocytopenia        Past Surgical HIstory:   Past Surgical History:   Procedure Laterality Date    CARPAL TUNNEL RELEASE Bilateral     wrist    CATARACT EXTRACTION, BILATERAL  2017    CORONARY ANGIOGRAPHY  09/01/2020    Procedure: ANGIOGRAM, CORONARY ARTERY;  Surgeon: Ivette Horne MD;  Location: [facility] CATH;  Service: Cardiology;;    DILATION AND CURETTAGE OF UTERUS      ENDOSCOPIC ULTRASOUND OF UPPER GASTROINTESTINAL TRACT Left 12/15/2021    Procedure: ULTRASOUND, UPPER GI TRACT, ENDOSCOPIC;  Surgeon: Lico Tse MD;  Location: [facility] ENDO;  Service: Endoscopy;  Laterality: Left;    ESOPHAGOGASTRODUODENOSCOPY N/A 12/15/2021    Procedure: EGD (ESOPHAGOGASTRODUODENOSCOPY);  Surgeon: Lico Tse MD;  Location: [facility] ENDO;  Service: Endoscopy;  Laterality: N/A;    INSERTION OF TUNNELED CENTRAL VENOUS CATHETER (CVC) WITH SUBCUTANEOUS PORT N/A 01/07/2022    Procedure: [id number];  Surgeon: Cas Acuña MD;  Location: [facility] OR;  Service: General;  Laterality: N/A;    LEFT HEART CATHETERIZATION  09/01/2020    Procedure: Left heart cath;  Surgeon: Ivette Horne MD;  Location: [facility] CATH;  Service: Cardiology;;  "   MITRAL VALVE REPAIR      OOPHORECTOMY Left 1988    OTHER SURGICAL HISTORY      maze procedure and PHILL ligation    REPAIR OF MENISCUS OF KNEE Left     TONSILLECTOMY      vein removal         Family History:   Family History   Problem Relation Age of Onset    No Known Problems Mother     No Known Problems Father     Cancer Maternal Grandmother         Stomach    No Known Problems Maternal Grandfather     No Known Problems Paternal Grandmother     No Known Problems Paternal Grandfather     Breast cancer Sister         over 60    No Known Problems Brother        Social History:  reports that she quit smoking about 34 years ago. Her smoking use included cigarettes. She has a 30.00 pack-year smoking history. She has never used smokeless tobacco. She reports that she does not drink alcohol and does not use drugs.    Allergies:  Review of patient's allergies indicates:   Allergen Reactions    Amoxicillin-pot clavulanate Other (See Comments)     "Spaced out feeling- can't take it longer than 4 or 5 days"  Patient tolerated Zosyn    Breo ellipta [fluticasone furoate-vilanterol] Other (See Comments)     Feels jittery    Corticosteroids (glucocorticoids)      Other reaction(s): tachycardia    Erythromycin      "Spaced out"    Metoprolol succinate      "I dont feel good, it doesn't work"    Latex, natural rubber      Turns skin red around area where latex touches       Medications:  Current Outpatient Medications   Medication Sig Dispense Refill    acetaminophen (TYLENOL) 325 MG tablet Take 325 mg by mouth every 6 (six) hours as needed for Pain or Temperature greater than.      amiodarone (PACERONE) 200 MG Tab Take 1 tablet (200 mg total) by mouth once daily. 90 tablet 3    apixaban (ELIQUIS) 2.5 mg Tab Take 1 tablet (2.5 mg total) by mouth 2 (two) times daily. 60 tablet 0    ascorbic acid, vitamin C, (VITAMIN C) 500 MG tablet Take 500 mg by mouth once daily.      bisoprolol (ZEBETA) 5 MG tablet Take 2 tablets (10 mg total) " by mouth every evening. 60 tablet 0    cholecalciferol, vitamin D3, (VITAMIN D3) 25 mcg (1,000 unit) capsule Take 1,000 Units by mouth every morning.      coenzyme Q10 100 mg capsule Take 100 mg by mouth once daily.      dexAMETHasone (DECADRON) 4 MG Tab Take 2 tablets (8 mg total) by mouth once daily. Take as directed on days 2 and 3 of your chemotherapy cycle. 24 tablet 5    docusate sodium (COLACE) 100 MG capsule Take 1 capsule (100 mg total) by mouth 2 (two) times daily.  0    furosemide (LASIX) 20 MG tablet Take 1 tablet (20 mg total) by mouth once daily. Hold until approved by PCP      LIDOcaine-prilocaine (EMLA) cream Apply topically as needed (30 to 60 minutes prior to chemo). 30 g 2    lipase-protease-amylase (CREON) 36,000-114,000- 180,000 unit CpDR Take 3 capsules by mouth 3 (three) times daily with meals. Take 3 capsules TID with meals and 2 capsule PRN with snacks 300 capsule 11    loperamide (IMODIUM A-D) 2 mg Tab Take 4 mg after first loose or frequent bowel movement, then 2 mg every 2 hours until 12 hours have passed without a bowel movement. 24 tablet 25    losartan (COZAAR) 100 MG tablet Take 1 tablet (100 mg total) by mouth every evening. 90 tablet 3    magnesium 250 mg Tab Take 250 mg by mouth Daily.      multivitamin (THERAGRAN) per tablet Take 1 tablet by mouth every other day.      ondansetron (ZOFRAN-ODT) 4 MG TbDL Take 4 mg by mouth every 12 (twelve) hours as needed (nausea/vomiting).      ondansetron (ZOFRAN-ODT) 8 MG TbDL Take 1 tablet (8 mg total) by mouth every 8 (eight) hours as needed (nausea/vomiting). 60 tablet 5    OXYGEN-AIR DELIVERY SYSTEMS MISC Inhale 1.5 L/min into the lungs continuous.      pravastatin (PRAVACHOL) 40 MG tablet TAKE 1 TABLET(40 MG) BY MOUTH EVERY DAY 90 tablet 3    saliva substitute combo no.9 (BIOTENE DRY MOUTH ORAL RINSE) Mwsh Swish and spit 5 mLs every evening.      sod chlor,sod bicarb/neti pot (NEILMED NASAFLO MIKE) 2 sprays by Nasal route 6 (six) times  daily. Aly-Med saile nose gel spray      sodium chloride (OCEAN) 0.65 % nasal spray 2 sprays by Nasal route 6 (six) times daily. PRN  Indications: dryness of the nose       No current facility-administered medications for this visit.     Facility-Administered Medications Ordered in Other Visits   Medication Dose Route Frequency Provider Last Rate Last Admin    diphenhydrAMINE injection 50 mg  50 mg Intravenous Once PRN Salvador Green MD        EPINEPHrine (EPIPEN) 0.3 mg/0.3 mL pen injection 0.3 mg  0.3 mg Intramuscular Once PRN Salvador Green MD        fluorouracil (ADRUCIL) 2,400 mg/m2 = 4,080 mg in sodium chloride 0.9% 100 mL chemo infusion  2,400 mg/m2 (Treatment Plan Recorded) Intravenous over 46 hr Salvador Green MD        fluorouraciL injection 680 mg  400 mg/m2 (Treatment Plan Recorded) Intravenous 1 time in Clinic/GRAEME Green MD        hydrocortisone sodium succinate injection 100 mg  100 mg Intravenous Once PRN Salvador Green MD        leucovorin calcium 400 mg/m2 = 680 mg in dextrose 5 % 250 mL infusion  400 mg/m2 (Treatment Plan Recorded) Intravenous 1 time in Clinic/HOD Salvador Green MD        oxaliplatin (ELOXATIN) 85 mg/m2 = 145 mg in dextrose 5 % 500 mL chemo infusion  85 mg/m2 (Treatment Plan Recorded) Intravenous 1 time in Clinic/HOD Salvador Green MD        sodium chloride 0.9% 100 mL flush bag   Intravenous 1 time in Clinic/GRAEME Green MD        sodium chloride 0.9% flush 10 mL  10 mL Intravenous PRN Salvador Green MD           Review of Systems   Constitutional:  Positive for fatigue. Negative for chills, fever and unexpected weight change.   Respiratory:  Negative for cough and shortness of breath.    Cardiovascular:  Negative for chest pain and palpitations.   Gastrointestinal:  Negative for abdominal pain, constipation, diarrhea, nausea and vomiting.   Musculoskeletal:  Positive for arthralgias (left hip pain).        Left flank pain   Skin:  Negative for  "rash.        Sore on left buttock   Neurological:  Negative for headaches.   Hematological:  Negative for adenopathy. Does not bruise/bleed easily.     ECOG Performance Status: 2   Objective:      Vitals:   Vitals:    09/14/22 1057   BP: (!) 156/76   BP Location: Left arm   Patient Position: Sitting   BP Method: Medium (Automatic)   Pulse: 72   Resp: 18   Temp: 97.7 °F (36.5 °C)   TempSrc: Temporal   SpO2: 97%   Weight: 64.8 kg (142 lb 13.7 oz)   Height: 5' 3" (1.6 m)     Physical Exam  Constitutional:       General: She is not in acute distress.     Appearance: She is well-developed. She is not diaphoretic.   HENT:      Head: Normocephalic and atraumatic.   Cardiovascular:      Rate and Rhythm: Normal rate and regular rhythm.      Heart sounds: No murmur heard.    No friction rub. No gallop.   Pulmonary:      Effort: Pulmonary effort is normal. No respiratory distress.      Breath sounds: Normal breath sounds. No wheezing or rales.   Chest:      Chest wall: No tenderness.   Abdominal:      General: Bowel sounds are normal. There is no distension.      Palpations: Abdomen is soft. There is no mass.      Tenderness: There is no abdominal tenderness. There is no guarding or rebound.   Musculoskeletal:         General: No tenderness. Normal range of motion.      Right lower leg: No edema.      Left lower leg: No edema.   Lymphadenopathy:      Cervical: No cervical adenopathy.      Upper Body:      Right upper body: No supraclavicular or axillary adenopathy.      Left upper body: No supraclavicular or axillary adenopathy.   Skin:     Findings: No erythema or rash.   Neurological:      Mental Status: She is alert and oriented to person, place, and time.   Psychiatric:         Behavior: Behavior normal.       Laboratory Data:  Lab Visit on 09/12/2022   Component Date Value Ref Range Status    WBC 09/12/2022 4.79  3.90 - 12.70 K/uL Final    RBC 09/12/2022 3.95 (L)  4.00 - 5.40 M/uL Final    Hemoglobin 09/12/2022 12.0  12.0 " - 16.0 g/dL Final    Hematocrit 09/12/2022 36.7 (L)  37.0 - 48.5 % Final    MCV 09/12/2022 93  82 - 98 fL Final    MCH 09/12/2022 30.4  27.0 - 31.0 pg Final    MCHC 09/12/2022 32.7  32.0 - 36.0 g/dL Final    RDW 09/12/2022 18.8 (H)  11.5 - 14.5 % Final    Platelets 09/12/2022 163  150 - 450 K/uL Final    MPV 09/12/2022 8.4 (L)  9.2 - 12.9 fL Final    Immature Granulocytes 09/12/2022 0.4  0.0 - 0.5 % Final    Gran # (ANC) 09/12/2022 2.8  1.8 - 7.7 K/uL Final    Immature Grans (Abs) 09/12/2022 0.02  0.00 - 0.04 K/uL Final    Comment: Mild elevation in immature granulocytes is non specific and   can be seen in a variety of conditions including stress response,   acute inflammation, trauma and pregnancy. Correlation with other   laboratory and clinical findings is essential.      Lymph # 09/12/2022 0.9 (L)  1.0 - 4.8 K/uL Final    Mono # 09/12/2022 1.0  0.3 - 1.0 K/uL Final    Eos # 09/12/2022 0.1  0.0 - 0.5 K/uL Final    Baso # 09/12/2022 0.02  0.00 - 0.20 K/uL Final    nRBC 09/12/2022 0  0 /100 WBC Final    Gran % 09/12/2022 58.4  38.0 - 73.0 % Final    Lymph % 09/12/2022 18.8  18.0 - 48.0 % Final    Mono % 09/12/2022 20.5 (H)  4.0 - 15.0 % Final    Eosinophil % 09/12/2022 1.5  0.0 - 8.0 % Final    Basophil % 09/12/2022 0.4  0.0 - 1.9 % Final    Differential Method 09/12/2022 Automated   Final    Sodium 09/12/2022 138  136 - 145 mmol/L Final    Potassium 09/12/2022 4.3  3.5 - 5.1 mmol/L Final    Chloride 09/12/2022 95  95 - 110 mmol/L Final    CO2 09/12/2022 29  23 - 29 mmol/L Final    Glucose 09/12/2022 108  70 - 110 mg/dL Final    BUN 09/12/2022 14  8 - 23 mg/dL Final    Creatinine 09/12/2022 0.6  0.5 - 1.4 mg/dL Final    Calcium 09/12/2022 9.5  8.7 - 10.5 mg/dL Final    Total Protein 09/12/2022 6.5  6.0 - 8.4 g/dL Final    Albumin 09/12/2022 3.6  3.5 - 5.2 g/dL Final    Total Bilirubin 09/12/2022 0.6  0.1 - 1.0 mg/dL Final    Comment: For infants and newborns, interpretation of results should be based  on  gestational age, weight and in agreement with clinical  observations.    Premature Infant recommended reference ranges:  Up to 24 hours.............<8.0 mg/dL  Up to 48 hours............<12.0 mg/dL  3-5 days..................<15.0 mg/dL  6-29 days.................<15.0 mg/dL      Alkaline Phosphatase 09/12/2022 60  55 - 135 U/L Final    AST 09/12/2022 25  10 - 40 U/L Final    ALT 09/12/2022 22  10 - 44 U/L Final    Anion Gap 09/12/2022 14  8 - 16 mmol/L Final    eGFR 09/12/2022 >60.0  >60 mL/min/1.73 m^2 Final    CA 19-9 09/12/2022 2085.2 (H)  0.0 - 40.0 U/mL Final    Comment: The testing method is a chemiluminescent microparticle immunoassay   manufactured by [employer] and performed on the    or   [employer]OOSE system. Values obtained with different assay manufacturers   for   methods may be different and cannot be used interchangeably.      Magnesium 09/12/2022 2.1  1.6 - 2.6 mg/dL Final    Phosphorus 09/12/2022 4.5  2.7 - 4.5 mg/dL Final         Imaging:     PET/CT 8/29/22  Head/neck:     No significant abnormal hypermetabolic foci are identified within the head and neck region.  No lymphadenopathy is present.     Chest:     A previously hypermetabolic right upper lobe apical metastatic nodule has decreased in size and become essentially non metabolic.  On image 47 the nodule now measures 0.8 cm compared to 1.2 cm previously.  A subpleural non metabolic nodule in the posterior right upper lobe is unchanged in size and appearance on image 53 measures 8 mm.  There is new mildly hypermetabolic pleural thickening in the posterior right lower lobe of uncertain etiology.  The pleural thickening has a max SUV of 2.6 and may be inflammatory in nature.  No discrete pleural mass is identified and no pleural effusion is present.  No hypermetabolic lymphadenopathy is present within the chest.     Abdomen/pelvis:     Since the prior study, there has been interval progression of hepatic metastatic disease.  A  hypermetabolic the mass in the posterior segment of the right hepatic lobe has increased in size and degree of hypermetabolism.  On image 116 the mass now measures 3.7 x 3.4 cm compared to 1.9 x 1.5 cm previously and has a max SUV of 7.1 compared to 4.8 previously.  There is also a new hypermetabolic nodular mass in the posterior segment of the right hepatic lobe.  On image 144 the mass measures 1.3 x 0.8 cm and has a max SUV of 4.5.  The hypermetabolic pancreatic tail adenocarcinoma has increased slightly in size since the prior study and can only be accurately measured on the fused PET-CT images.  On image 135 the mass measures 2.2 x 1.6 cm compared to 2.2 x 1.2 cm previously and has a max SUV of 4.7 compared to 6.2 previously.  The presumed left adrenal metastasis which measures approximately 2 cm continues to exhibit no significant hypermetabolism.     Skeleton:     Since the prior study, there has been interval progression of osseous metastatic disease.  A hypermetabolic metastasis in the medial left iliac bone has become slightly sclerotic and has increased in size and degree of hypermetabolism.  On image 180 of the axial fused PET-CT images the metastasis now measures 3.0 x 1.8 cm compared to 2.6 x 1.3 cm previously and has a max SUV of 6.6 compared to 5.2 previously.  A hypermetabolic metastasis within the L1 vertebral body has increased in size and is now so seated with a pathologic compression fracture involving the superior endplate resulting in 50% loss of vertebral body height and retropulsion of the posterior vertebral body cortex causing moderate spinal canal stenosis.  The vertebral body metastasis now measures 4.8 x 3.1 cm compared to 2.7 x 2.0 cm previously and has a max SUV of 12.0 compared 8.7 previously.  No new hypermetabolic foci are identified within the skeleton.      Assessment:       1. Primary pancreatic cancer with metastasis to other site    2. Metastasis to liver    3. Malignant  neoplasm metastatic to right lung    4. Bone metastasis    5. Insomnia, unspecified type    6. Cancer related pain    7. 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therapy    8. Atrial fibrillation, unspecified type    9. Pancreatic insufficiency    10. Hypertension, unspecified type    11. Hyperlipidemia, unspecified hyperlipidemia type    12. Chronic diastolic congestive heart failure               Plan:   Metastatic Pancreatic Cancer - The patient was diagnosed with metastatic pancreatic cancer adenocarcinoma with mets to the liver on 12/15/21  -Ca19-9 was 7,581 on 12/15/21  -PET/CT on 1/04/22 shows mets to the liver, left adrenal gland, T9 vertebra and left iliac fossa.  -Potential mets are seen near the umbilicus  -PT underwent PORT placement 1/07/22  -MRI brain on 1/11/22 showed no brain mets  -Pt consented for Gemcitabine and paclitaxel on 1/03/22  -Pt treated with 20% dose reduction in paclitaxel and Gemcitabine to 100mg/mm2 and 800mg/mm2 respectively  -C2D8 and C2D15 cancelled given her hospitalization from 2/12/22-2/14/22  -Patient completed C3D8 and was hospitalized again for a fever  -Treatment moving forward was discussed with the patient and decision was made to remove day 8 of treatment  -PET/CT on 3/16/22 shows stable disease  -Ca19-9 has dropped dramatically to 170 on 3/16/22  -C3D15 delayed given cardiac ablation on 03/17/2022  -Will continue [place]/Abraxane at 20% does reduction due to prior thrombocytopenia  -Pt underwent C5D1 5/02/22  -Guardant 360 testing 1/26/22 showed STACY [id number] 0.3% amplification, KRAS G12D 0.3% amplification, MSI-stable, ARID1A [id number] 0.4%, KRAS G12D 0.3%, STACY [id number], STK11 S59T 0.2%, and BRCA1 R979C 0.1%.  No BRCA2 was detected  -Concern was the pt's hypoxia was a reaction to Gemcitabine with pneumonitis  -PET/CT 6/20/22 showed progression of disease with new pulmonary nodules, enlarging pancreatic mass, new hepatic nodules and bony lesions in the L1 vertebral body and left iliac  bone  -Pt started on Liposomal irinotecan and Fllorouracil with 20% dose reduction  -Pt completed 5 cycles  -PET/CT on 8/19/22 showed progression of disease  -Pt started on treatment with FOLFOX 8/31/22  -Pt to proceed with cycle 2 today  -Pt to return in 2 weeks for cycle 3  -Pt set up for [facility]    Insomnia - improved with better sleep hygeine  -Will monitor    Bone Lesions - pt with lesion in L1 and left hip  -Plan on treating with Denosumab once pt has dental clearance    Cancer Related Pain- likely due to lesion at L1 And left hip  -Pain controlled with tylenol  -Pt with Tramadol but has not started taking it  -Pt encouraged to try tramadol  -Will monitor    Hypoxia - 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  -Pt currently on 2L O2  -Pt to follow up with pulmonary  -Will monitor     A-fib - pt to underwent cardioversion 7/19/22  -Pt on amiodarone and in NSR  -PT on Eliquis  -Stable  -Management per cardiology     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  -No current signs of infection  -Pt given Evusheld on 6/09/22  -Will monitor     Pancreatic Insufficiency - pt on creon  -Pt taking 3 pills with meals and 2 with snacks with improvement in diarrhea  -Will monitor     HTN - pt taking losartan, bisoprolol  -Cardiology managing  -Will monitor     HLD - pt on pravastatin  -Management per PCP     CHF - pt with CHF exacerbation and hospitalized from 3/25/22 to 3/29/22 wioth 9.5L removed  -Pt hospitalized again from 5/15/22 to 5/20/22 for possible CHF exacerbation  -Pt taking lasix daily  -Management per cardiology    Advance Care Planning     Power of   I initiated the process of advance care planning today and explained the importance of this process to the patient.  I introduced the concept of advance directives to the patient, as well. Then the patient received detailed information about the importance of designating a Health Care Power  of  (HCPOA). She was also instructed to communicate with this person about their wishes for future healthcare, should she become sick and lose decision-making capacity. The patient has not previously appointed a HCPOA. After our discussion, the patient has decided to complete a HCPOA and will bring the form completed to her next clinic appointment.            Route Chart for Scheduling    Med Onc Chart Routing      Follow up with physician 2 weeks. The patient can proceed with treatment.  She needs labs CBC, CMP, Mg, phos, Ca19-9 on 9/26/22 with an appt with me and treatment on 9/28/22.   Follow up with IVONNE    Infusion scheduling note    Injection scheduling note    Labs    Imaging    Pharmacy appointment    Other referrals        Treatment Plan Information   OP mFOLFOX 6 Q2W   Salvador Green MD   Upcoming Treatment Dates - OP mFOLFOX 6 Q2W    9/28/2022       Chemotherapy       oxaliplatin (ELOXATIN) 85 mg/m2 = 145 mg in dextrose 5 % 500 mL chemo infusion       leucovorin calcium 400 mg/m2 = 680 mg in dextrose 5 % 250 mL infusion       fluorouraciL injection 680 mg       fluorouracil (ADRUCIL) 2,400 mg/m2 = 4,080 mg in sodium chloride 0.9% 100 mL chemo infusion       Antiemetics       palonosetron 0.25mg/dexamethasone 12mg in NS IVPB 0.25 mg  10/12/2022       Chemotherapy       oxaliplatin (ELOXATIN) 85 mg/m2 = 145 mg in dextrose 5 % 500 mL chemo infusion       leucovorin calcium 400 mg/m2 = 680 mg in dextrose 5 % 250 mL infusion       fluorouraciL injection 680 mg       fluorouracil (ADRUCIL) 2,400 mg/m2 = 4,080 mg in sodium chloride 0.9% 100 mL chemo infusion       Antiemetics       palonosetron 0.25mg/dexamethasone 12mg in NS IVPB 0.25 mg  10/26/2022       Chemotherapy       oxaliplatin (ELOXATIN) 85 mg/m2 = 145 mg in dextrose 5 % 500 mL chemo infusion       leucovorin calcium 400 mg/m2 = 680 mg in dextrose 5 % 250 mL infusion       fluorouraciL injection 680 mg       fluorouracil (ADRUCIL) 2,400 mg/m2  = 4,080 mg in sodium chloride 0.9% 100 mL chemo infusion       Antiemetics       palonosetron 0.25mg/dexamethasone 12mg in NS IVPB 0.25 mg  11/9/2022       Chemotherapy       oxaliplatin (ELOXATIN) 85 mg/m2 = 145 mg in dextrose 5 % 500 mL chemo infusion       leucovorin calcium 400 mg/m2 = 680 mg in dextrose 5 % 250 mL infusion       fluorouraciL injection 680 mg       fluorouracil (ADRUCIL) 2,400 mg/m2 = 4,080 mg in sodium chloride 0.9% 100 mL chemo infusion       Antiemetics       palonosetron 0.25mg/dexamethasone 12mg in NS IVPB 0.25 mg    Supportive Plan Information  OP DENOSUMAB (XGEVA) Q4W   Salvador Green MD   Upcoming Treatment Dates - OP DENOSUMAB (XGEVA) Q4W    9/14/2022       Medications       denosumab (XGEVA) solution 120 mg  10/12/2022       Medications       denosumab (XGEVA) solution 120 mg  11/9/2022       Medications       denosumab (XGEVA) solution 120 mg  12/7/2022       Medications       denosumab (XGEVA) solution 120 mg    Therapy Plan Information  diphenhydrAMINE capsule 25 mg  25 mg, Oral, PRN  predniSONE tablet 40 mg  40 mg, Oral, PRN  EPINEPHrine (EPIPEN) 0.3 mg/0.3 mL pen injection 0.3 mg  0.3 mg, Intramuscular, PRN  ondansetron disintegrating tablet 4 mg  4 mg, Oral, PRN  acetaminophen tablet 650 mg  650 mg, Oral, PRN  albuterol inhaler 2 puff  2 puff, Inhalation, PRN      Salvador Green MD  [facility]  Hematology and Oncology  [facility]   [address] [place] [address]   O: [phone]  F: [phone]

## 2022-09-15 NOTE — PROGRESS NOTES
11:58 AM  This LCSW called [facility] to inform that the daughter, Brinda is point of contact for this pt, as she handles all of the pt's appointments etc.

## 2022-09-15 NOTE — PROGRESS NOTES
11:41 AM    This LCSW called this pt to inform that orders were placed for home health and to ask which home health she would like to use this time.  The pt's daughter, Brinda said they used [facility] in the past and would like to use them again since they had a good experience with them.    [facility] and spoke to Pat who confirmed that they would be admitting this pt to home health today.    This LCSW called the pt's  back to let them know the order was complete and home health would be out to the house today for admission of home health.

## 2022-09-16 NOTE — PROGRESS NOTES
3:58 PM    This LCSW had a brief encounter with this pt and her daughter and they reported home health working out fine at this point.  The pt reported no other needs at this time.

## 2022-09-28 NOTE — PLAN OF CARE
"Tolerated FOLFOX well.  No reactions noted. No questions or concerns at this time.  Instructed pt to check pump screen for "RUN " twice daily,  RTC this Friday for pump d/c.  Pump setting checked by 2 RNs.  Left unit via w/c in NAD.  "

## 2022-09-28 NOTE — PROGRESS NOTES
PATIENT: Nel Cui  MRN: [id number]  DATE: 9/28/2022      Diagnosis:   1. Primary pancreatic cancer with metastasis to other site    2. Metastasis to liver    3. 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 Bone metastasis    5. Insomnia, unspecified type    6. Cancer related pain    7. Constipation due to opioid therapy    8. 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therapy    9. Atrial fibrillation, unspecified type    10. Pancreatic insufficiency    11. Hypertension, unspecified type    12. Hyperlipidemia, unspecified hyperlipidemia type    13. Chronic diastolic congestive heart failure              Chief Complaint: Follow-up (Pancreatic Cancer)      Oncologic History:      Oncologic History 12/09/21 Ct Abdomen  12/15/21 EUS  1/04/22 PET/CT  1/07/22 EITAN - Dr Acuña  1/11/22 MRI Brain  3/16/22 PET/CT  6/20/22 PET/CT  8/29/22 PET/CT    Oncologic Treatment 1/12/22 - 5/02/22 Gemcitabine and Paclitaxel s/p C5D1  6/22/22 - 8/17/22 Liposomal Irinotecan and Fluorouracil   8/31/22 - current s/p 2 cycles FOLFOX    Pathology 12/15/21 adenocarcinoma in the pancreatic body and adenocarcinoma in the liver       The patient initially presented to her PCP on 12/02/21 with complaint of abdominal pain.  She underwent a CT of the abdomen on 12/09/21 showing subcentimeter para-aortic, mesenteric lymph nodes; multiple hepatic infiltrative lesions present throughout the liver with 1 of the larger areas right liver lobe measuring approximately 2.2 cm in diameter; mass in the pancreatic tail measuring 9 x 3.3 cm.  The patient underwent EUS under the care of Dr Tse on 12/15/21 showing a mass in the pancreatic body and multiple liver lesions.  Path from the procedure showed adenocarcinoma in the pancreatic body and adenocarcinoma in the liver.    The patient underwent PET/CT on 1/04/22 showing hypermetabolic rounded mass at the junction of the body and tail of the pancreas measuring 2.5 x 2.4 cm, hypermetabolic left adrenal gland  nodule measuring 1.9 cm, innumerable hypermetabolic nodular masses throughout the left and right hepatic lobes with largest lesions in the right liver measuring 2.5 x 1.9 cm and 5.2 x 4.4 cm, mildly enlarged hypermetabolic portacaval lymph node measuring 1.4 cm, 0.7 cm hypermetabolic focus at the umbilicus, lesion in the T9 vertebra measuring 1.8 x 1.7 cm and a lesion in the left iliac fossa measuring 1.7 x 1.2 cm.  MRI of the brain on 01/11/2022 showed no evidence of metastases but did show an old right midbrain lacunar infarct.   The patient was in the hospital from 1/28/22-1/29/22 with fever and low sodium.  The patient's hydrochlorothiazide was discontinued.  The patient then presented to the hospital on 02/06/2022 for dyspnea on exertion.  CTA was done on 02/06/2022 showing no evidence of pulmonary embolism.  The patient underwent NT proBNP which was elevated at 2630 pg/mL.  The patient was started on Lasix every other day at the request of Cardiology.   The patient was admitted to the hospital from 2/12/22-2/14/22 for fever.  The patient was discharged on Levaquin for 5 days.  The patient saw Cardiology on 02/17/2022 for atrial fibrillation and was started on Eliquis and taken off of aspirin.     The the patient was admitted to the hospital from [id number] for fever.  During her hospitalization procalcitonin was checked and was normal.  All her cultures were negative.  Her fever was felt to be from chemotherapy.    The patient underwent PET-CT on 03/16/2022 showing a followed of mm pulmonary based nodule; decrease metabolic activity in the liver mass with stable right lobe mass measuring 5.2 x 4.2 cm; right lobe mass measuring 2.2 x 1.8 cm; adrenal gland thickening of 1.9 cm; decrease in pancreatic mass measuring 2 cm; decreased metabolic activity of hypermetabolic mesenteric foci and right-sided pleural effusion.  The patient underwent cardioversion on 03/17/2022 at which point constantine was performed.  Patient was  put back in normal sinus rhythm.    The patient was admitted to the hospital for CHF exacerbation from 3/25/22 to 3/29/22 and was diuresed a little over 9.5L.    The patient was admitted to the hospital from 5/15/22-5/20/22 for hypoxia.  CTA chest on 5/15/22 showed diffuse faint ground-glass opacities in the lungs bilaterally.   NT-ProNP on admission was elevated at 5,220pg/mL.  The patient was initially diuresed without much improvement.  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  The patient was eventually weaned to 1.5L O2.  Repeat CXR on 5/26/22 showed improvement in her intrapulmonary process.      The pt underwent cardioversion on 6/16/22 and was started on amiodarone.  PET/CT completed on 6/20/22 showed a new to 1.2 cm subpleural nodule in the right upper lobe; enlarging right upper lobe nodule measuring 8 mm; resolution of right pleural effusion compared to prior studies; enlargement of pancreatic mass now 2.2 x 1.2 cm; stable 2 cm left adrenal nodule; new hypermetabolic nodule in the right hepatic lobe measuring 1.9 x 1.5 cm; decrease in size of separate nodule in the right hepatic lobe measuring 3 x 2.4 cm; new hypermetabolic focus in the L1 vertebral body measuring 2.7 x 2 cm; and 2.6 x 1.3 new hypermetabolic lesion in the left iliac bone.   The patient underwent PET-CT on 08/29/2022 showing decrease in right upper lobe nodule measuring 0.8 cm from 1.2 cm; stable subpleural non metabolic right upper lobe nodule measuring 8 mm; new mildly hypermetabolic pleural thickening in the posterior right lower lobe; interval progression of metastatic disease in the liver; hypermetabolic metastasis in the medial left iliac bone increase in size and degree of hypermetabolism; and hypermetabolic metastasis at the L1 vertebral body with pathologic compression fracture.    Subjective:     Interval History: Ms. Cui is a 87 y.o. female with Osteopenia, HLD, HTN,  pancreatic cancer who presents for fever.  Since the last clinic visit the patient complains of occasional lower back pain for which she takes norco.  She also endorses constipation and states she is taking colace and miralax.  The patient denies CP, cough, SOB, abdominal pain, N/V, diarrhea.  The patient denies fever, chills, night sweats, weight loss, new lumps or bumps, easy bruising or bleeding.    Past Medical History:   Past Medical History:   Diagnosis Date    A-fib     Anemia     Anticoagulant long-term use     Arthritis     Cataracts, bilateral     CHF (congestive heart failure)     chronic diastolic    Coronary artery disease     Nonobstructive    Essential (primary) hypertension 10/05/2010    Hyperlipidemia     Hyponatremia     Mitral regurgitation     Osteopenia     Pancreatic insufficiency     Primary pancreatic cancer with metastasis to other site     Thrombocytopenia        Past Surgical HIstory:   Past Surgical History:   Procedure Laterality Date    CARPAL TUNNEL RELEASE Bilateral     wrist    CATARACT EXTRACTION, BILATERAL  2017    CORONARY ANGIOGRAPHY  09/01/2020    Procedure: ANGIOGRAM, CORONARY ARTERY;  Surgeon: Ivette Horne MD;  Location: [facility] CATH;  Service: Cardiology;;    DILATION AND CURETTAGE OF UTERUS      ENDOSCOPIC ULTRASOUND OF UPPER GASTROINTESTINAL TRACT Left 12/15/2021    Procedure: ULTRASOUND, UPPER GI TRACT, ENDOSCOPIC;  Surgeon: Lico Tse MD;  Location: [facility] ENDO;  Service: Endoscopy;  Laterality: Left;    ESOPHAGOGASTRODUODENOSCOPY N/A 12/15/2021    Procedure: EGD (ESOPHAGOGASTRODUODENOSCOPY);  Surgeon: Lico Tse MD;  Location: [facility] ENDO;  Service: Endoscopy;  Laterality: N/A;    INSERTION OF TUNNELED CENTRAL VENOUS CATHETER (CVC) WITH SUBCUTANEOUS PORT N/A 01/07/2022    Procedure: [id number];  Surgeon: Cas Acuña MD;  Location: [facility] OR;  Service: General;  Laterality: N/A;    LEFT HEART CATHETERIZATION  09/01/2020    Procedure: Left heart  "cath;  Surgeon: Ivette Horne MD;  Location: [facility];  Service: Cardiology;;    MITRAL VALVE REPAIR      OOPHORECTOMY Left 1988    OTHER SURGICAL HISTORY      maze procedure and PHILL ligation    REPAIR OF MENISCUS OF KNEE Left     TONSILLECTOMY      vein removal         Family History:   Family History   Problem Relation Age of Onset    No Known Problems Mother     No Known Problems Father     Cancer Maternal Grandmother         Stomach    No Known Problems Maternal Grandfather     No Known Problems Paternal Grandmother     No Known Problems Paternal Grandfather     Breast cancer Sister         over 60    No Known Problems Brother        Social History:  reports that she quit smoking about 34 years ago. Her smoking use included cigarettes. She has a 30.00 pack-year smoking history. She has never used smokeless tobacco. She reports that she does not drink alcohol and does not use drugs.    Allergies:  Review of patient's allergies indicates:   Allergen Reactions    Amoxicillin-pot clavulanate Other (See Comments)     "Spaced out feeling- can't take it longer than 4 or 5 days"  Patient tolerated Zosyn    Breo ellipta [fluticasone furoate-vilanterol] Other (See Comments)     Feels jittery    Corticosteroids (glucocorticoids)      Other reaction(s): tachycardia    Erythromycin      "Spaced out"    Metoprolol succinate      "I dont feel good, it doesn't work"    Latex, natural rubber      Turns skin red around area where latex touches       Medications:  Current Outpatient Medications   Medication Sig Dispense Refill    acetaminophen (TYLENOL) 325 MG tablet Take 650 mg by mouth every 6 (six) hours as needed for Pain or Temperature greater than. Pt taking 650mg 3-4 times daily prn pain      amiodarone (PACERONE) 200 MG Tab Take 1 tablet (200 mg total) by mouth once daily. 90 tablet 3    apixaban (ELIQUIS) 2.5 mg Tab Take 1 tablet (2.5 mg total) by mouth 2 (two) times daily. 60 tablet 0    ascorbic acid, vitamin C, " (VITAMIN C) 500 MG tablet Take 500 mg by mouth once daily.      bisoprolol (ZEBETA) 5 MG tablet Take 2 tablets (10 mg total) by mouth every evening. 60 tablet 0    cholecalciferol, vitamin D3, (VITAMIN D3) 25 mcg (1,000 unit) capsule Take 1,000 Units by mouth every morning.      coenzyme Q10 100 mg capsule Take 100 mg by mouth once daily.      dexAMETHasone (DECADRON) 4 MG Tab Take 2 tablets (8 mg total) by mouth once daily. Take as directed on days 2 and 3 of your chemotherapy cycle. 24 tablet 5    furosemide (LASIX) 20 MG tablet Take 1 tablet (20 mg total) by mouth once daily. Hold until approved by PCP (Patient taking differently: Take 20 mg by mouth once daily. taking daily)      HYDROcodone-acetaminophen (NORCO) 5-325 mg per tablet Take 1 tablet by mouth every 6 (six) hours as needed for Pain. 120 tablet 0    LIDOcaine-prilocaine (EMLA) cream Apply topically as needed (30 to 60 minutes prior to chemo). 30 g 2    lipase-protease-amylase (CREON) 36,000-114,000- 180,000 unit CpDR Take 3 capsules by mouth 3 (three) times daily with meals. Take 3 capsules TID with meals and 2 capsule PRN with snacks 300 capsule 11    loperamide (IMODIUM A-D) 2 mg Tab Take 4 mg after first loose or frequent bowel movement, then 2 mg every 2 hours until 12 hours have passed without a bowel movement. 24 tablet 25    losartan (COZAAR) 100 MG tablet Take 1 tablet (100 mg total) by mouth every evening. 90 tablet 3    magnesium 250 mg Tab Take 250 mg by mouth Daily.      multivitamin (THERAGRAN) per tablet Take 1 tablet by mouth every other day.      ondansetron (ZOFRAN-ODT) 4 MG TbDL Take 4 mg by mouth every 12 (twelve) hours as needed (nausea/vomiting).      ondansetron (ZOFRAN-ODT) 8 MG TbDL Take 1 tablet (8 mg total) by mouth every 8 (eight) hours as needed (nausea/vomiting). 60 tablet 5    OXYGEN-AIR DELIVERY SYSTEMS MISC Inhale 2 L/min into the lungs continuous.      pravastatin (PRAVACHOL) 40 MG tablet TAKE 1 TABLET(40 MG) BY MOUTH  EVERY DAY 90 tablet 3    saliva substitute combo no.9 (BIOTENE DRY MOUTH ORAL RINSE) Mwsh Swish and spit 5 mLs every evening.      sod chlor,sod bicarb/neti pot (NEILMED NASAFLO MIKE) 2 sprays by Nasal route 6 (six) times daily. Aly-Med saile nose gel spray      sodium chloride (OCEAN) 0.65 % nasal spray 2 sprays by Nasal route 6 (six) times daily. PRN  Indications: dryness of the nose      senna (SENOKOT) 8.6 mg tablet Take 1 tablet by mouth 2 (two) times daily. 60 tablet 2    traMADoL (ULTRAM) 50 mg tablet Take 50 mg by mouth every 6 (six) hours as needed for Pain (moderate pain of 5-10/10). Pt reports having prescription to take 50mg orally every 6 hours as needed for pain.  Instructed to take for moderate pain of 5-10/10.       No current facility-administered medications for this visit.     Facility-Administered Medications Ordered in Other Visits   Medication Dose Route Frequency Provider Last Rate Last Admin    alteplase injection 2 mg  2 mg Intra-Catheter PRN Salvador Green MD        diphenhydrAMINE injection 50 mg  50 mg Intravenous Once PRN Salvador Green MD        EPINEPHrine (EPIPEN) 0.3 mg/0.3 mL pen injection 0.3 mg  0.3 mg Intramuscular Once PRN Salvador Green MD        fluorouracil (ADRUCIL) 2,400 mg/m2 = 4,080 mg in sodium chloride 0.9% 100 mL chemo infusion  2,400 mg/m2 (Treatment Plan Recorded) Intravenous over 46 hr Salvador Green MD        fluorouraciL injection 680 mg  400 mg/m2 (Treatment Plan Recorded) Intravenous 1 time in Clinic/HOD Salvador Green MD        heparin, porcine (PF) 100 unit/mL injection flush 500 Units  500 Units Intravenous PRN Salvador Green MD        hydrocortisone sodium succinate injection 100 mg  100 mg Intravenous Once PRN Salvador Green MD        sodium chloride 0.9% 100 mL flush bag   Intravenous 1 time in Clinic/HOD Salvador Green MD        sodium chloride 0.9% flush 10 mL  10 mL Intravenous PRN Salvador Green MD           Review of Systems  "  Constitutional:  Negative for chills, fatigue, fever and unexpected weight change.   Respiratory:  Negative for cough and shortness of breath.    Cardiovascular:  Negative for chest pain and palpitations.   Gastrointestinal:  Positive for constipation. Negative for abdominal pain, diarrhea, nausea and vomiting.   Musculoskeletal:  Positive for back pain.   Skin:  Negative for rash.   Neurological:  Negative for headaches.   Hematological:  Negative for adenopathy. Does not bruise/bleed easily.     ECOG Performance Status: 2   Objective:      Vitals:   Vitals:    09/28/22 1104   BP: 135/68   BP Location: Left arm   Patient Position: Sitting   BP Method: Medium (Automatic)   Pulse: 83   Resp: 18   Temp: 97.3 °F (36.3 °C)   TempSrc: Temporal   Weight: 65.4 kg (144 lb 2.9 oz)   Height: 5' 3" (1.6 m)     Physical Exam  Constitutional:       General: She is not in acute distress.     Appearance: She is well-developed. She is not diaphoretic.   HENT:      Head: Normocephalic and atraumatic.   Cardiovascular:      Rate and Rhythm: Normal rate and regular rhythm.      Heart sounds: No murmur heard.    No friction rub. No gallop.   Pulmonary:      Effort: Pulmonary effort is normal. No respiratory distress.      Breath sounds: Normal breath sounds. No wheezing or rales.   Chest:      Chest wall: No tenderness.   Abdominal:      General: Bowel sounds are normal. There is no distension.      Palpations: Abdomen is soft. There is no mass.      Tenderness: There is no abdominal tenderness. There is no guarding or rebound.   Musculoskeletal:         General: No tenderness. Normal range of motion.      Right lower leg: No edema.      Left lower leg: No edema.   Lymphadenopathy:      Cervical: No cervical adenopathy.      Upper Body:      Right upper body: No supraclavicular or axillary adenopathy.      Left upper body: No supraclavicular or axillary adenopathy.   Skin:     Findings: No erythema or rash.   Neurological:      " Mental Status: She is alert and oriented to person, place, and time.   Psychiatric:         Behavior: Behavior normal.       Laboratory Data:  Lab Visit on 09/26/2022   Component Date Value Ref Range Status    WBC 09/26/2022 5.74  3.90 - 12.70 K/uL Final    RBC 09/26/2022 4.00  4.00 - 5.40 M/uL Final    Hemoglobin 09/26/2022 12.3  12.0 - 16.0 g/dL Final    Hematocrit 09/26/2022 37.9  37.0 - 48.5 % Final    MCV 09/26/2022 95  82 - 98 fL Final    MCH 09/26/2022 30.8  27.0 - 31.0 pg Final    MCHC 09/26/2022 32.5  32.0 - 36.0 g/dL Final    RDW 09/26/2022 18.5 (H)  11.5 - 14.5 % Final    Platelets 09/26/2022 130 (L)  150 - 450 K/uL Final    MPV 09/26/2022 8.7 (L)  9.2 - 12.9 fL Final    Immature Granulocytes 09/26/2022 0.3  0.0 - 0.5 % Final    Gran # (ANC) 09/26/2022 3.7  1.8 - 7.7 K/uL Final    Immature Grans (Abs) 09/26/2022 0.02  0.00 - 0.04 K/uL Final    Comment: Mild elevation in immature granulocytes is non specific and   can be seen in a variety of conditions including stress response,   acute inflammation, trauma and pregnancy. Correlation with other   laboratory and clinical findings is essential.      Lymph # 09/26/2022 0.8 (L)  1.0 - 4.8 K/uL Final    Mono # 09/26/2022 1.1 (H)  0.3 - 1.0 K/uL Final    Eos # 09/26/2022 0.0  0.0 - 0.5 K/uL Final    Baso # 09/26/2022 0.03  0.00 - 0.20 K/uL Final    nRBC 09/26/2022 0  0 /100 WBC Final    Gran % 09/26/2022 65.1  38.0 - 73.0 % Final    Lymph % 09/26/2022 13.9 (L)  18.0 - 48.0 % Final    Mono % 09/26/2022 19.7 (H)  4.0 - 15.0 % Final    Eosinophil % 09/26/2022 0.5  0.0 - 8.0 % Final    Basophil % 09/26/2022 0.5  0.0 - 1.9 % Final    Differential Method 09/26/2022 Automated   Final    Sodium 09/26/2022 137  136 - 145 mmol/L Final    Potassium 09/26/2022 4.7  3.5 - 5.1 mmol/L Final    Chloride 09/26/2022 93 (L)  95 - 110 mmol/L Final    CO2 09/26/2022 33 (H)  23 - 29 mmol/L Final    Glucose 09/26/2022 126 (H)  70 - 110 mg/dL Final    BUN 09/26/2022 23  8 - 23 mg/dL  Final    Creatinine 09/26/2022 0.6  0.5 - 1.4 mg/dL Final    Calcium 09/26/2022 9.5  8.7 - 10.5 mg/dL Final    Total Protein 09/26/2022 6.5  6.0 - 8.4 g/dL Final    Albumin 09/26/2022 3.5  3.5 - 5.2 g/dL Final    Total Bilirubin 09/26/2022 0.6  0.1 - 1.0 mg/dL Final    Comment: For infants and newborns, interpretation of results should be based  on gestational age, weight and in agreement with clinical  observations.    Premature Infant recommended reference ranges:  Up to 24 hours.............<8.0 mg/dL  Up to 48 hours............<12.0 mg/dL  3-5 days..................<15.0 mg/dL  6-29 days.................<15.0 mg/dL      Alkaline Phosphatase 09/26/2022 72  55 - 135 U/L Final    AST 09/26/2022 25  10 - 40 U/L Final    ALT 09/26/2022 19  10 - 44 U/L Final    Anion Gap 09/26/2022 11  8 - 16 mmol/L Final    eGFR 09/26/2022 >60.0  >60 mL/min/1.73 m^2 Final    Magnesium 09/26/2022 2.1  1.6 - 2.6 mg/dL Final    Phosphorus 09/26/2022 4.0  2.7 - 4.5 mg/dL Final    CA 19-9 09/26/2022 4105.8 (H)  0.0 - 40.0 U/mL Final    Comment: The testing method is a chemiluminescent microparticle immunoassay   manufactured by [employer] and performed on the [email or web address]   or   [employer] system. Values obtained with different assay manufacturers   for   methods may be different and cannot be used interchangeably.           Imaging:     PET/CT 8/29/22  Head/neck:     No significant abnormal hypermetabolic foci are identified within the head and neck region.  No lymphadenopathy is present.     Chest:     A previously hypermetabolic right upper lobe apical metastatic nodule has decreased in size and become essentially non metabolic.  On image 47 the nodule now measures 0.8 cm compared to 1.2 cm previously.  A subpleural non metabolic nodule in the posterior right upper lobe is unchanged in size and appearance on image 53 measures 8 mm.  There is new mildly hypermetabolic pleural thickening in the posterior right lower lobe of  uncertain etiology.  The pleural thickening has a max SUV of 2.6 and may be inflammatory in nature.  No discrete pleural mass is identified and no pleural effusion is present.  No hypermetabolic lymphadenopathy is present within the chest.     Abdomen/pelvis:     Since the prior study, there has been interval progression of hepatic metastatic disease.  A hypermetabolic the mass in the posterior segment of the right hepatic lobe has increased in size and degree of hypermetabolism.  On image 116 the mass now measures 3.7 x 3.4 cm compared to 1.9 x 1.5 cm previously and has a max SUV of 7.1 compared to 4.8 previously.  There is also a new hypermetabolic nodular mass in the posterior segment of the right hepatic lobe.  On image 144 the mass measures 1.3 x 0.8 cm and has a max SUV of 4.5.  The hypermetabolic pancreatic tail adenocarcinoma has increased slightly in size since the prior study and can only be accurately measured on the fused PET-CT images.  On image 135 the mass measures 2.2 x 1.6 cm compared to 2.2 x 1.2 cm previously and has a max SUV of 4.7 compared to 6.2 previously.  The presumed left adrenal metastasis which measures approximately 2 cm continues to exhibit no significant hypermetabolism.     Skeleton:     Since the prior study, there has been interval progression of osseous metastatic disease.  A hypermetabolic metastasis in the medial left iliac bone has become slightly sclerotic and has increased in size and degree of hypermetabolism.  On image 180 of the axial fused PET-CT images the metastasis now measures 3.0 x 1.8 cm compared to 2.6 x 1.3 cm previously and has a max SUV of 6.6 compared to 5.2 previously.  A hypermetabolic metastasis within the L1 vertebral body has increased in size and is now so seated with a pathologic compression fracture involving the superior endplate resulting in 50% loss of vertebral body height and retropulsion of the posterior vertebral body cortex causing moderate  spinal canal stenosis.  The vertebral body metastasis now measures 4.8 x 3.1 cm compared to 2.7 x 2.0 cm previously and has a max SUV of 12.0 compared 8.7 previously.  No new hypermetabolic foci are identified within the skeleton.      Assessment:       1. Primary pancreatic cancer with metastasis to other site    2. Metastasis to liver    3. 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 Bone metastasis    5. Insomnia, unspecified type    6. Cancer related pain    7. Constipation due to opioid therapy    8. 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therapy    9. Atrial fibrillation, unspecified type    10. Pancreatic insufficiency    11. Hypertension, unspecified type    12. Hyperlipidemia, unspecified hyperlipidemia type    13. Chronic diastolic congestive heart failure                 Plan:   Metastatic Pancreatic Cancer - The patient was diagnosed with metastatic pancreatic cancer adenocarcinoma with mets to the liver on 12/15/21  -Ca19-9 was 7,581 on 12/15/21  -PET/CT on 1/04/22 shows mets to the liver, left adrenal gland, T9 vertebra and left iliac fossa.  -Potential mets are seen near the umbilicus  -PT underwent PORT placement 1/07/22  -MRI brain on 1/11/22 showed no brain mets  -Pt consented for Gemcitabine and paclitaxel on 1/03/22  -Pt treated with 20% dose reduction in paclitaxel and Gemcitabine to 100mg/mm2 and 800mg/mm2 respectively  -C2D8 and C2D15 cancelled given her hospitalization from 2/12/22-2/14/22  -Patient completed C3D8 and was hospitalized again for a fever  -Treatment moving forward was discussed with the patient and decision was made to remove day 8 of treatment  -PET/CT on 3/16/22 shows stable disease  -Ca19-9 has dropped dramatically to 170 on 3/16/22  -C3D15 delayed given cardiac ablation on 03/17/2022  -Will continue Virgil/Abraxane at 20% does reduction due to prior thrombocytopenia  -Pt underwent C5D1 5/02/22  -Guardant 360 testing 1/26/22 showed STACY [id number] 0.3% amplification, KRAS G12D 0.3%  amplification, MSI-stable, ARID1A [id number] 0.4%, KRAS G12D 0.3%, STACY C1977co, STK11 S59T 0.2%, and BRCA1 R979C 0.1%.  No BRCA2 was detected  -Concern was the pt's hypoxia was a reaction to Gemcitabine with pneumonitis  -PET/CT 6/20/22 showed progression of disease with new pulmonary nodules, enlarging pancreatic mass, new hepatic nodules and bony lesions in the L1 vertebral body and left iliac bone  -Pt started on Liposomal irinotecan and Fllorouracil with 20% dose reduction  -Pt completed 5 cycles  -PET/CT on 8/19/22 showed progression of disease  -Pt started on treatment with FOLFOX 8/31/22  -Pt to proceed with cycle 3 today  -Pt to return in 2 weeks for cycle 4  - increased to 4105 on 9/26/22.  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  -Pt currently on 2L O2  -Pt to follow up with pulmonary  -Will monitor     A-fib - pt to underwent cardioversion 7/19/22  -Pt on amiodarone and in NSR  -PT on Eliquis  -Stable  -Management per cardiology     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  -No current signs of infection  -Pt given Evusheld on 6/09/22  -Will monitor     Pancreatic Insufficiency - pt on creon  -Pt taking 3 pills with meals and 2 with snacks  -Will monitor     HTN - pt taking losartan, bisoprolol  -Cardiology managing  -Will monitor     HLD - pt on pravastatin  -Management per PCP     CHF - pt with CHF exacerbation and hospitalized from 3/25/22 to 3/29/22 wioth 9.5L removed  -Pt  hospitalized again from 5/15/22 to 5/20/22 for possible CHF exacerbation  -Pt taking lasix daily  -Management per cardiology    Advance Care Planning     Power of   I initiated the process of advance care planning today and explained the importance of this process to the patient.  I introduced the concept of advance directives to the patient, as well. Then the patient received detailed information about the importance of designating a Health Care Power of  (HCPOA). She was also instructed to communicate with this person about their wishes for future healthcare, should she become sick and lose decision-making capacity. The patient has not previously appointed a HCPOA. After our discussion, the patient has decided to complete a HCPOA and will bring the form completed to her next clinic appointment.            Route Chart for Scheduling    Med Onc Chart Routing      Follow up with physician 2 weeks. The patient can proceed with treatment.  She needs labs CBC, CMP, Mg, phos, Ca19-9 on 10/11/22 with an appt with me and treatment on 10/12/22.   Follow up with IVONNE    Infusion scheduling note    Injection scheduling note    Labs    Imaging    Pharmacy appointment    Other referrals        Treatment Plan Information   OP mFOLFOX 6 Q2W   Salvador Green MD   Upcoming Treatment Dates - OP mFOLFOX 6 Q2W    10/12/2022       Chemotherapy       oxaliplatin (ELOXATIN) 85 mg/m2 = 145 mg in dextrose 5 % 500 mL chemo infusion       leucovorin calcium 400 mg/m2 = 680 mg in dextrose 5 % 250 mL infusion       fluorouraciL injection 680 mg       fluorouracil (ADRUCIL) 2,400 mg/m2 = 4,080 mg in sodium chloride 0.9% 100 mL chemo infusion       Antiemetics       palonosetron 0.25mg/dexamethasone 12mg in NS IVPB 0.25 mg  10/26/2022       Chemotherapy       oxaliplatin (ELOXATIN) 85 mg/m2 = 145 mg in dextrose 5 % 500 mL chemo infusion       leucovorin calcium 400 mg/m2 = 680 mg in dextrose 5 % 250 mL infusion        fluorouraciL injection 680 mg       fluorouracil (ADRUCIL) 2,400 mg/m2 = 4,080 mg in sodium chloride 0.9% 100 mL chemo infusion       Antiemetics       palonosetron 0.25mg/dexamethasone 12mg in NS IVPB 0.25 mg  11/9/2022       Chemotherapy       oxaliplatin (ELOXATIN) 85 mg/m2 = 145 mg in dextrose 5 % 500 mL chemo infusion       leucovorin calcium 400 mg/m2 = 680 mg in dextrose 5 % 250 mL infusion       fluorouraciL injection 680 mg       fluorouracil (ADRUCIL) 2,400 mg/m2 = 4,080 mg in sodium chloride 0.9% 100 mL chemo infusion       Antiemetics       palonosetron 0.25mg/dexamethasone 12mg in NS IVPB 0.25 mg  11/23/2022       Chemotherapy       oxaliplatin (ELOXATIN) 85 mg/m2 = 145 mg in dextrose 5 % 500 mL chemo infusion       leucovorin calcium 400 mg/m2 = 680 mg in dextrose 5 % 250 mL infusion       fluorouraciL injection 680 mg       fluorouracil (ADRUCIL) 2,400 mg/m2 = 4,080 mg in sodium chloride 0.9% 100 mL chemo infusion       Antiemetics       palonosetron 0.25mg/dexamethasone 12mg in NS IVPB 0.25 mg    Supportive Plan Information  OP DENOSUMAB (XGEVA) Q4W   Salvador Green MD   Upcoming Treatment Dates - OP DENOSUMAB (XGEVA) Q4W    9/14/2022       Medications       denosumab (XGEVA) solution 120 mg  10/12/2022       Medications       denosumab (XGEVA) solution 120 mg  11/9/2022       Medications       denosumab (XGEVA) solution 120 mg  12/7/2022       Medications       denosumab (XGEVA) solution 120 mg    Therapy Plan Information  diphenhydrAMINE capsule 25 mg  25 mg, Oral, PRN  predniSONE tablet 40 mg  40 mg, Oral, PRN  EPINEPHrine (EPIPEN) 0.3 mg/0.3 mL pen injection 0.3 mg  0.3 mg, Intramuscular, PRN  ondansetron disintegrating tablet 4 mg  4 mg, Oral, PRN  acetaminophen tablet 650 mg  650 mg, Oral, PRN  albuterol inhaler 2 puff  2 puff, Inhalation, PRN      Salvador Green MD  [facility]  Hematology and Oncology  [facility] [address] [place] [address]   O:  [phone]  F: [phone]

## 2022-09-30 NOTE — PLAN OF CARE
Problem: Adult Inpatient Plan of Care  Goal: Plan of Care Review  Outcome: Ongoing, Progressing  Goal: Patient-Specific Goal (Individualized)  Outcome: Ongoing, Progressing     Problem: Fatigue  Goal: Improved Activity Tolerance  Outcome: Ongoing, Progressing    Pt pump d/c per protocol. Reviewed f/u appts. All questions answered, pt adequate for d/c.

## 2022-10-01 PROBLEM — R00.0 TACHYCARDIA: Status: ACTIVE | Noted: 2022-01-01

## 2022-10-01 PROBLEM — Z79.01 CURRENT USE OF LONG TERM ANTICOAGULATION: Status: ACTIVE | Noted: 2022-01-01

## 2022-10-01 PROBLEM — K59.00 CONSTIPATION: Status: ACTIVE | Noted: 2022-01-01

## 2022-10-01 PROBLEM — J96.20 ACUTE ON CHRONIC RESPIRATORY FAILURE: Status: ACTIVE | Noted: 2022-01-01

## 2022-10-03 PROBLEM — R00.0 TACHYCARDIA: Status: RESOLVED | Noted: 2022-01-01 | Resolved: 2022-01-01

## 2022-10-03 PROBLEM — Z79.01 CURRENT USE OF LONG TERM ANTICOAGULATION: Status: RESOLVED | Noted: 2022-01-01 | Resolved: 2022-01-01

## 2022-10-03 PROBLEM — K56.41 FECAL IMPACTION IN RECTUM: Status: ACTIVE | Noted: 2022-01-01

## 2022-10-05 PROBLEM — R53.81 DEBILITY: Status: ACTIVE | Noted: 2022-01-01

## 2022-10-10 PROBLEM — N30.01 ACUTE CYSTITIS WITH HEMATURIA: Status: ACTIVE | Noted: 2022-01-01

## 2022-10-10 PROBLEM — J91.0 MALIGNANT PLEURAL EFFUSION: Status: ACTIVE | Noted: 2022-01-01

## 2022-10-15 PROBLEM — G89.3 CANCER RELATED PAIN: Status: ACTIVE | Noted: 2022-01-01

## 2022-10-16 PROBLEM — I95.9 HYPOTENSION: Status: ACTIVE | Noted: 2022-01-01

## 2022-10-18 PROBLEM — Z96.89 CHEST TUBE IN PLACE: Status: ACTIVE | Noted: 2022-01-01

## 2022-10-18 NOTE — TELEPHONE ENCOUNTER
I was called being notified of a hospital consult.  Ivan informed me that Dr Sofia had already seen the pt.    Salvador Green MD  [facility]  Hematology and Oncology  [facility] [address] [place] [address]   O: [phone]  F: [phone]

## 2022-10-18 NOTE — TELEPHONE ENCOUNTER
----- Message from Lissett Bradford RN sent at 10/18/2022  4:18 PM CDT -----  Regarding: Ivan from [facility]    ----- Message -----  From: Sara Izquierdo  Sent: 10/18/2022  12:31 PM CDT  To: Norma BARRETO Staff    Type: Needs Medical Advice  Who Called: Ivan with [facility]  Symptoms (please be specific):    How long has patient had these symptoms:    Pharmacy name and phone #:    Best Call Back Number:   Additional Information: Ivan is requesting a call back for a consult with Dr. Green.

## 2022-10-19 PROBLEM — J94.2 HEMOTHORAX ON RIGHT: Status: ACTIVE | Noted: 2022-01-01

## 2022-10-19 PROBLEM — Z96.89 CHEST TUBE IN PLACE: Status: RESOLVED | Noted: 2022-01-01 | Resolved: 2022-01-01

## 2022-10-20 PROBLEM — R14.0 ABDOMINAL DISTENSION: Status: ACTIVE | Noted: 2022-01-01

## 2022-10-24 ENCOUNTER — TELEPHONE (OUTPATIENT)
Dept: HEMATOLOGY/ONCOLOGY | Facility: CLINIC | Age: 87
End: 2022-10-24
Payer: MEDICARE

## 2022-10-24 NOTE — TELEPHONE ENCOUNTER
I called Mrs Brinda Whittington and expressed my condolences on the death of her mother.  I instructed her that she should feel free to contact us concerning any way we can help during this time.    Salvador Green MD  [facility]  Hematology and Oncology  [facility]   [address] [place] [address]   O: [phone]  F: [phone]

## 2023-02-27 NOTE — PROGRESS NOTES
Oncology   Chemotherapy Infusion Visit    Quick Social Service Status Follow Up   Met w/ pt and her daughter briefly to follow up on social and emotional needs since initiation of treatment.    The pt reported no needs at this time.     Suzanne Mckenzie LCSW  08/31/2022  4:03 PM          [facility]